# Patient Record
Sex: FEMALE | Race: ASIAN | NOT HISPANIC OR LATINO | Employment: OTHER | URBAN - METROPOLITAN AREA
[De-identification: names, ages, dates, MRNs, and addresses within clinical notes are randomized per-mention and may not be internally consistent; named-entity substitution may affect disease eponyms.]

---

## 2017-03-02 ENCOUNTER — APPOINTMENT (OUTPATIENT)
Dept: GENERAL RADIOLOGY | Facility: HOSPITAL | Age: 52
End: 2017-03-02

## 2017-03-02 ENCOUNTER — HOSPITAL ENCOUNTER (INPATIENT)
Facility: HOSPITAL | Age: 52
LOS: 5 days | Discharge: HOME | End: 2017-03-07
Attending: EMERGENCY MEDICINE | Admitting: HOSPITALIST

## 2017-03-02 DIAGNOSIS — K92.2 UPPER GI BLEEDING: Primary | ICD-10-CM

## 2017-03-02 DIAGNOSIS — D50.0 IRON DEFICIENCY ANEMIA DUE TO CHRONIC BLOOD LOSS: ICD-10-CM

## 2017-03-02 LAB
ABO GROUP BLD: NORMAL
ACETONE BLD QL: NEGATIVE
ALBUMIN SERPL-MCNC: 3.8 G/DL (ref 3.5–5.2)
ALBUMIN/GLOB SERPL: 1.3 G/DL
ALP SERPL-CCNC: 78 U/L (ref 39–117)
ALT SERPL W P-5'-P-CCNC: 18 U/L (ref 1–33)
AMYLASE SERPL-CCNC: 38 U/L (ref 28–100)
ANION GAP SERPL CALCULATED.3IONS-SCNC: 12.7 MMOL/L
AST SERPL-CCNC: 16 U/L (ref 1–32)
BASOPHILS # BLD AUTO: 0.02 10*3/MM3 (ref 0–0.2)
BASOPHILS NFR BLD AUTO: 0.1 % (ref 0–1.5)
BILIRUB SERPL-MCNC: 0.4 MG/DL (ref 0.1–1.2)
BLD GP AB SCN SERPL QL: NEGATIVE
BUN BLD-MCNC: 41 MG/DL (ref 6–20)
BUN/CREAT SERPL: 47.1 (ref 7–25)
CALCIUM SPEC-SCNC: 8.7 MG/DL (ref 8.6–10.5)
CHLORIDE SERPL-SCNC: 105 MMOL/L (ref 98–107)
CO2 SERPL-SCNC: 22.3 MMOL/L (ref 22–29)
CREAT BLD-MCNC: 0.87 MG/DL (ref 0.57–1)
D-LACTATE SERPL-SCNC: 1.9 MMOL/L (ref 0.5–2)
D-LACTATE SERPL-SCNC: 2.4 MMOL/L (ref 0.5–2)
DEPRECATED RDW RBC AUTO: 42.7 FL (ref 37–54)
EOSINOPHIL # BLD AUTO: 0.02 10*3/MM3 (ref 0–0.7)
EOSINOPHIL NFR BLD AUTO: 0.1 % (ref 0.3–6.2)
ERYTHROCYTE [DISTWIDTH] IN BLOOD BY AUTOMATED COUNT: 12.8 % (ref 11.7–13)
GFR SERPL CREATININE-BSD FRML MDRD: 69 ML/MIN/1.73
GLOBULIN UR ELPH-MCNC: 3 GM/DL
GLUCOSE BLD-MCNC: 158 MG/DL (ref 65–99)
HCT VFR BLD AUTO: 35.1 % (ref 35.6–45.5)
HGB BLD-MCNC: 11.3 G/DL (ref 11.9–15.5)
IMM GRANULOCYTES # BLD: 0.07 10*3/MM3 (ref 0–0.03)
IMM GRANULOCYTES NFR BLD: 0.5 % (ref 0–0.5)
INR PPP: 1.18 (ref 0.9–1.1)
LIPASE SERPL-CCNC: 35 U/L (ref 13–60)
LYMPHOCYTES # BLD AUTO: 2.79 10*3/MM3 (ref 0.9–4.8)
LYMPHOCYTES NFR BLD AUTO: 18.7 % (ref 19.6–45.3)
MCH RBC QN AUTO: 29.6 PG (ref 26.9–32)
MCHC RBC AUTO-ENTMCNC: 32.2 G/DL (ref 32.4–36.3)
MCV RBC AUTO: 91.9 FL (ref 80.5–98.2)
MONOCYTES # BLD AUTO: 0.45 10*3/MM3 (ref 0.2–1.2)
MONOCYTES NFR BLD AUTO: 3 % (ref 5–12)
NEUTROPHILS # BLD AUTO: 11.55 10*3/MM3 (ref 1.9–8.1)
NEUTROPHILS NFR BLD AUTO: 77.6 % (ref 42.7–76)
NRBC BLD MANUAL-RTO: 0 /100 WBC (ref 0–0)
PLATELET # BLD AUTO: 291 10*3/MM3 (ref 140–500)
PMV BLD AUTO: 11.6 FL (ref 6–12)
POTASSIUM BLD-SCNC: 4.7 MMOL/L (ref 3.5–5.2)
PROT SERPL-MCNC: 6.8 G/DL (ref 6–8.5)
PROTHROMBIN TIME: 14.6 SECONDS (ref 11.7–14.2)
RBC # BLD AUTO: 3.82 10*6/MM3 (ref 3.9–5.2)
RH BLD: POSITIVE
SODIUM BLD-SCNC: 140 MMOL/L (ref 136–145)
WBC NRBC COR # BLD: 14.9 10*3/MM3 (ref 4.5–10.7)

## 2017-03-02 PROCEDURE — 86900 BLOOD TYPING SEROLOGIC ABO: CPT | Performed by: EMERGENCY MEDICINE

## 2017-03-02 PROCEDURE — 80053 COMPREHEN METABOLIC PANEL: CPT | Performed by: EMERGENCY MEDICINE

## 2017-03-02 PROCEDURE — 83690 ASSAY OF LIPASE: CPT | Performed by: EMERGENCY MEDICINE

## 2017-03-02 PROCEDURE — 82150 ASSAY OF AMYLASE: CPT | Performed by: EMERGENCY MEDICINE

## 2017-03-02 PROCEDURE — 86850 RBC ANTIBODY SCREEN: CPT | Performed by: EMERGENCY MEDICINE

## 2017-03-02 PROCEDURE — 82009 KETONE BODYS QUAL: CPT | Performed by: EMERGENCY MEDICINE

## 2017-03-02 PROCEDURE — 93010 ELECTROCARDIOGRAM REPORT: CPT | Performed by: INTERNAL MEDICINE

## 2017-03-02 PROCEDURE — 93005 ELECTROCARDIOGRAM TRACING: CPT | Performed by: EMERGENCY MEDICINE

## 2017-03-02 PROCEDURE — 83605 ASSAY OF LACTIC ACID: CPT | Performed by: EMERGENCY MEDICINE

## 2017-03-02 PROCEDURE — 85610 PROTHROMBIN TIME: CPT | Performed by: EMERGENCY MEDICINE

## 2017-03-02 PROCEDURE — 71020 HC CHEST PA AND LATERAL: CPT

## 2017-03-02 PROCEDURE — 86901 BLOOD TYPING SEROLOGIC RH(D): CPT | Performed by: EMERGENCY MEDICINE

## 2017-03-02 PROCEDURE — 99284 EMERGENCY DEPT VISIT MOD MDM: CPT

## 2017-03-02 PROCEDURE — 85025 COMPLETE CBC W/AUTO DIFF WBC: CPT | Performed by: EMERGENCY MEDICINE

## 2017-03-02 RX ORDER — PANTOPRAZOLE SODIUM 40 MG/10ML
80 INJECTION, POWDER, LYOPHILIZED, FOR SOLUTION INTRAVENOUS ONCE
Status: COMPLETED | OUTPATIENT
Start: 2017-03-02 | End: 2017-03-02

## 2017-03-02 RX ORDER — SODIUM CHLORIDE 0.9 % (FLUSH) 0.9 %
10 SYRINGE (ML) INJECTION AS NEEDED
Status: DISCONTINUED | OUTPATIENT
Start: 2017-03-02 | End: 2017-03-07 | Stop reason: HOSPADM

## 2017-03-02 RX ORDER — ASPIRIN 81 MG/1
81 TABLET ORAL DAILY
Status: ON HOLD | COMMUNITY
End: 2017-03-03

## 2017-03-02 RX ADMIN — PANTOPRAZOLE SODIUM 40 MG: 40 INJECTION, POWDER, FOR SOLUTION INTRAVENOUS at 20:48

## 2017-03-02 RX ADMIN — SODIUM CHLORIDE 1000 ML: 9 INJECTION, SOLUTION INTRAVENOUS at 19:55

## 2017-03-02 RX ADMIN — SODIUM CHLORIDE 2859 ML: 9 INJECTION, SOLUTION INTRAVENOUS at 22:02

## 2017-03-02 RX ADMIN — PANTOPRAZOLE SODIUM 80 MG: 40 INJECTION, POWDER, FOR SOLUTION INTRAVENOUS at 20:26

## 2017-03-03 ENCOUNTER — ANESTHESIA (OUTPATIENT)
Dept: GASTROENTEROLOGY | Facility: HOSPITAL | Age: 52
End: 2017-03-03

## 2017-03-03 ENCOUNTER — ANESTHESIA EVENT (OUTPATIENT)
Dept: GASTROENTEROLOGY | Facility: HOSPITAL | Age: 52
End: 2017-03-03

## 2017-03-03 LAB
ANION GAP SERPL CALCULATED.3IONS-SCNC: 13.9 MMOL/L
BUN BLD-MCNC: 30 MG/DL (ref 6–20)
BUN/CREAT SERPL: 30.9 (ref 7–25)
CALCIUM SPEC-SCNC: 8.2 MG/DL (ref 8.6–10.5)
CHLORIDE SERPL-SCNC: 108 MMOL/L (ref 98–107)
CO2 SERPL-SCNC: 20.1 MMOL/L (ref 22–29)
CREAT BLD-MCNC: 0.97 MG/DL (ref 0.57–1)
DEPRECATED RDW RBC AUTO: 43.2 FL (ref 37–54)
ERYTHROCYTE [DISTWIDTH] IN BLOOD BY AUTOMATED COUNT: 12.9 % (ref 11.7–13)
GFR SERPL CREATININE-BSD FRML MDRD: 61 ML/MIN/1.73
GLUCOSE BLD-MCNC: 135 MG/DL (ref 65–99)
GLUCOSE BLDC GLUCOMTR-MCNC: 100 MG/DL (ref 70–130)
GLUCOSE BLDC GLUCOMTR-MCNC: 103 MG/DL (ref 70–130)
GLUCOSE BLDC GLUCOMTR-MCNC: 118 MG/DL (ref 70–130)
GLUCOSE BLDC GLUCOMTR-MCNC: 119 MG/DL (ref 70–130)
GLUCOSE BLDC GLUCOMTR-MCNC: 95 MG/DL (ref 70–130)
HBA1C MFR BLD: 5.7 % (ref 4.8–5.6)
HCT VFR BLD AUTO: 30.9 % (ref 35.6–45.5)
HGB BLD-MCNC: 9.9 G/DL (ref 11.9–15.5)
MAGNESIUM SERPL-MCNC: 1.7 MG/DL (ref 1.6–2.6)
MCH RBC QN AUTO: 29.4 PG (ref 26.9–32)
MCHC RBC AUTO-ENTMCNC: 32 G/DL (ref 32.4–36.3)
MCV RBC AUTO: 91.7 FL (ref 80.5–98.2)
NT-PROBNP SERPL-MCNC: 288.1 PG/ML (ref 5–900)
PLATELET # BLD AUTO: 283 10*3/MM3 (ref 140–500)
PMV BLD AUTO: 11.7 FL (ref 6–12)
POTASSIUM BLD-SCNC: 4 MMOL/L (ref 3.5–5.2)
POTASSIUM BLD-SCNC: 4.1 MMOL/L (ref 3.5–5.2)
RBC # BLD AUTO: 3.37 10*6/MM3 (ref 3.9–5.2)
SODIUM BLD-SCNC: 142 MMOL/L (ref 136–145)
TROPONIN T SERPL-MCNC: <0.01 NG/ML (ref 0–0.03)
TROPONIN T SERPL-MCNC: <0.01 NG/ML (ref 0–0.03)
WBC NRBC COR # BLD: 13.08 10*3/MM3 (ref 4.5–10.7)

## 2017-03-03 PROCEDURE — 80048 BASIC METABOLIC PNL TOTAL CA: CPT | Performed by: HOSPITALIST

## 2017-03-03 PROCEDURE — 0W3P8ZZ CONTROL BLEEDING IN GASTROINTESTINAL TRACT, VIA NATURAL OR ARTIFICIAL OPENING ENDOSCOPIC: ICD-10-PCS | Performed by: INTERNAL MEDICINE

## 2017-03-03 PROCEDURE — 84132 ASSAY OF SERUM POTASSIUM: CPT | Performed by: HOSPITALIST

## 2017-03-03 PROCEDURE — 83036 HEMOGLOBIN GLYCOSYLATED A1C: CPT | Performed by: HOSPITALIST

## 2017-03-03 PROCEDURE — 82962 GLUCOSE BLOOD TEST: CPT

## 2017-03-03 PROCEDURE — 25010000002 PROPOFOL 10 MG/ML EMULSION: Performed by: ANESTHESIOLOGY

## 2017-03-03 PROCEDURE — 43239 EGD BIOPSY SINGLE/MULTIPLE: CPT | Performed by: INTERNAL MEDICINE

## 2017-03-03 PROCEDURE — 87081 CULTURE SCREEN ONLY: CPT | Performed by: INTERNAL MEDICINE

## 2017-03-03 PROCEDURE — 93005 ELECTROCARDIOGRAM TRACING: CPT | Performed by: HOSPITALIST

## 2017-03-03 PROCEDURE — 88305 TISSUE EXAM BY PATHOLOGIST: CPT | Performed by: INTERNAL MEDICINE

## 2017-03-03 PROCEDURE — 93010 ELECTROCARDIOGRAM REPORT: CPT | Performed by: INTERNAL MEDICINE

## 2017-03-03 PROCEDURE — 0DB68ZX EXCISION OF STOMACH, VIA NATURAL OR ARTIFICIAL OPENING ENDOSCOPIC, DIAGNOSTIC: ICD-10-PCS | Performed by: INTERNAL MEDICINE

## 2017-03-03 PROCEDURE — 83880 ASSAY OF NATRIURETIC PEPTIDE: CPT | Performed by: NURSE PRACTITIONER

## 2017-03-03 PROCEDURE — 84484 ASSAY OF TROPONIN QUANT: CPT | Performed by: NURSE PRACTITIONER

## 2017-03-03 PROCEDURE — 99222 1ST HOSP IP/OBS MODERATE 55: CPT | Performed by: INTERNAL MEDICINE

## 2017-03-03 PROCEDURE — 84484 ASSAY OF TROPONIN QUANT: CPT | Performed by: HOSPITALIST

## 2017-03-03 PROCEDURE — 43236 UPPR GI SCOPE W/SUBMUC INJ: CPT | Performed by: INTERNAL MEDICINE

## 2017-03-03 PROCEDURE — 83735 ASSAY OF MAGNESIUM: CPT | Performed by: HOSPITALIST

## 2017-03-03 PROCEDURE — 3E0G8GC INTRODUCTION OF OTHER THERAPEUTIC SUBSTANCE INTO UPPER GI, VIA NATURAL OR ARTIFICIAL OPENING ENDOSCOPIC: ICD-10-PCS | Performed by: INTERNAL MEDICINE

## 2017-03-03 PROCEDURE — 88342 IMHCHEM/IMCYTCHM 1ST ANTB: CPT | Performed by: INTERNAL MEDICINE

## 2017-03-03 PROCEDURE — 85027 COMPLETE CBC AUTOMATED: CPT | Performed by: HOSPITALIST

## 2017-03-03 PROCEDURE — 43255 EGD CONTROL BLEEDING ANY: CPT | Performed by: INTERNAL MEDICINE

## 2017-03-03 PROCEDURE — 99232 SBSQ HOSP IP/OBS MODERATE 35: CPT | Performed by: INTERNAL MEDICINE

## 2017-03-03 PROCEDURE — 88312 SPECIAL STAINS GROUP 1: CPT | Performed by: INTERNAL MEDICINE

## 2017-03-03 RX ORDER — LIDOCAINE HYDROCHLORIDE AND EPINEPHRINE 10; 10 MG/ML; UG/ML
INJECTION, SOLUTION INFILTRATION; PERINEURAL AS NEEDED
Status: DISCONTINUED | OUTPATIENT
Start: 2017-03-03 | End: 2017-03-03 | Stop reason: HOSPADM

## 2017-03-03 RX ORDER — MONTELUKAST SODIUM 10 MG/1
10 TABLET ORAL DAILY
Status: DISCONTINUED | OUTPATIENT
Start: 2017-03-03 | End: 2017-03-07 | Stop reason: HOSPADM

## 2017-03-03 RX ORDER — SODIUM CHLORIDE, SODIUM LACTATE, POTASSIUM CHLORIDE, CALCIUM CHLORIDE 600; 310; 30; 20 MG/100ML; MG/100ML; MG/100ML; MG/100ML
9 INJECTION, SOLUTION INTRAVENOUS CONTINUOUS PRN
Status: DISCONTINUED | OUTPATIENT
Start: 2017-03-03 | End: 2017-03-07 | Stop reason: HOSPADM

## 2017-03-03 RX ORDER — LOSARTAN POTASSIUM 50 MG/1
50 TABLET ORAL DAILY
Status: DISCONTINUED | OUTPATIENT
Start: 2017-03-03 | End: 2017-03-07 | Stop reason: HOSPADM

## 2017-03-03 RX ORDER — PROPOFOL 10 MG/ML
VIAL (ML) INTRAVENOUS AS NEEDED
Status: DISCONTINUED | OUTPATIENT
Start: 2017-03-03 | End: 2017-03-03 | Stop reason: SURG

## 2017-03-03 RX ORDER — SODIUM CHLORIDE 0.9 % (FLUSH) 0.9 %
1-10 SYRINGE (ML) INJECTION AS NEEDED
Status: DISCONTINUED | OUTPATIENT
Start: 2017-03-03 | End: 2017-03-03

## 2017-03-03 RX ORDER — ATORVASTATIN CALCIUM 10 MG/1
10 TABLET, FILM COATED ORAL NIGHTLY
Status: DISCONTINUED | OUTPATIENT
Start: 2017-03-03 | End: 2017-03-07 | Stop reason: HOSPADM

## 2017-03-03 RX ORDER — ESCITALOPRAM OXALATE 10 MG/1
10 TABLET ORAL NIGHTLY
Status: DISCONTINUED | OUTPATIENT
Start: 2017-03-03 | End: 2017-03-07 | Stop reason: HOSPADM

## 2017-03-03 RX ORDER — SODIUM CHLORIDE 9 MG/ML
75 INJECTION, SOLUTION INTRAVENOUS CONTINUOUS
Status: DISCONTINUED | OUTPATIENT
Start: 2017-03-03 | End: 2017-03-04

## 2017-03-03 RX ORDER — PANTOPRAZOLE SODIUM 40 MG/10ML
80 INJECTION, POWDER, LYOPHILIZED, FOR SOLUTION INTRAVENOUS ONCE
Status: DISCONTINUED | OUTPATIENT
Start: 2017-03-03 | End: 2017-03-03

## 2017-03-03 RX ORDER — BUDESONIDE AND FORMOTEROL FUMARATE DIHYDRATE 160; 4.5 UG/1; UG/1
2 AEROSOL RESPIRATORY (INHALATION)
Status: DISCONTINUED | OUTPATIENT
Start: 2017-03-03 | End: 2017-03-07 | Stop reason: HOSPADM

## 2017-03-03 RX ORDER — PANTOPRAZOLE SODIUM 40 MG/10ML
40 INJECTION, POWDER, LYOPHILIZED, FOR SOLUTION INTRAVENOUS EVERY 12 HOURS SCHEDULED
Status: DISCONTINUED | OUTPATIENT
Start: 2017-03-03 | End: 2017-03-05

## 2017-03-03 RX ORDER — ISOSORBIDE MONONITRATE 30 MG/1
30 TABLET, EXTENDED RELEASE ORAL
Status: DISCONTINUED | OUTPATIENT
Start: 2017-03-03 | End: 2017-03-04

## 2017-03-03 RX ORDER — LABETALOL HYDROCHLORIDE 5 MG/ML
INJECTION, SOLUTION INTRAVENOUS AS NEEDED
Status: DISCONTINUED | OUTPATIENT
Start: 2017-03-03 | End: 2017-03-03 | Stop reason: SURG

## 2017-03-03 RX ORDER — PROPOFOL 10 MG/ML
VIAL (ML) INTRAVENOUS CONTINUOUS PRN
Status: DISCONTINUED | OUTPATIENT
Start: 2017-03-03 | End: 2017-03-03 | Stop reason: SURG

## 2017-03-03 RX ORDER — AMLODIPINE BESYLATE 5 MG/1
5 TABLET ORAL
Status: DISCONTINUED | OUTPATIENT
Start: 2017-03-03 | End: 2017-03-07 | Stop reason: HOSPADM

## 2017-03-03 RX ORDER — LIDOCAINE HYDROCHLORIDE 20 MG/ML
INJECTION, SOLUTION INFILTRATION; PERINEURAL AS NEEDED
Status: DISCONTINUED | OUTPATIENT
Start: 2017-03-03 | End: 2017-03-03 | Stop reason: SURG

## 2017-03-03 RX ADMIN — LOSARTAN POTASSIUM 50 MG: 50 TABLET, FILM COATED ORAL at 17:41

## 2017-03-03 RX ADMIN — BUDESONIDE AND FORMOTEROL FUMARATE DIHYDRATE 2 PUFF: 160; 4.5 AEROSOL RESPIRATORY (INHALATION) at 21:10

## 2017-03-03 RX ADMIN — PANTOPRAZOLE SODIUM 40 MG: 40 INJECTION, POWDER, FOR SOLUTION INTRAVENOUS at 09:24

## 2017-03-03 RX ADMIN — AMLODIPINE BESYLATE 5 MG: 5 TABLET ORAL at 17:42

## 2017-03-03 RX ADMIN — EPHEDRINE SULFATE 20 MG: 50 INJECTION INTRAMUSCULAR; INTRAVENOUS; SUBCUTANEOUS at 15:54

## 2017-03-03 RX ADMIN — ATORVASTATIN CALCIUM 10 MG: 10 TABLET, FILM COATED ORAL at 21:48

## 2017-03-03 RX ADMIN — SODIUM CHLORIDE 75 ML/HR: 9 INJECTION, SOLUTION INTRAVENOUS at 05:10

## 2017-03-03 RX ADMIN — MONTELUKAST 10 MG: 10 TABLET, FILM COATED ORAL at 17:42

## 2017-03-03 RX ADMIN — ESCITALOPRAM 10 MG: 10 TABLET, FILM COATED ORAL at 21:49

## 2017-03-03 RX ADMIN — ISOSORBIDE MONONITRATE 30 MG: 30 TABLET, EXTENDED RELEASE ORAL at 17:41

## 2017-03-03 RX ADMIN — EPHEDRINE SULFATE 10 MG: 50 INJECTION INTRAMUSCULAR; INTRAVENOUS; SUBCUTANEOUS at 15:52

## 2017-03-03 RX ADMIN — LABETALOL HYDROCHLORIDE 5 MG: 5 INJECTION, SOLUTION INTRAVENOUS at 15:38

## 2017-03-03 RX ADMIN — LIDOCAINE HYDROCHLORIDE 60 MG: 20 INJECTION, SOLUTION INFILTRATION; PERINEURAL at 15:25

## 2017-03-03 RX ADMIN — EPHEDRINE SULFATE 20 MG: 50 INJECTION INTRAMUSCULAR; INTRAVENOUS; SUBCUTANEOUS at 16:00

## 2017-03-03 RX ADMIN — PROPOFOL 300 MCG/KG/MIN: 10 INJECTION, EMULSION INTRAVENOUS at 15:26

## 2017-03-03 RX ADMIN — SODIUM CHLORIDE, POTASSIUM CHLORIDE, SODIUM LACTATE AND CALCIUM CHLORIDE: 600; 310; 30; 20 INJECTION, SOLUTION INTRAVENOUS at 15:22

## 2017-03-03 RX ADMIN — SODIUM CHLORIDE, POTASSIUM CHLORIDE, SODIUM LACTATE AND CALCIUM CHLORIDE 9 ML/HR: 600; 310; 30; 20 INJECTION, SOLUTION INTRAVENOUS at 14:04

## 2017-03-03 RX ADMIN — PANTOPRAZOLE SODIUM 40 MG: 40 INJECTION, POWDER, FOR SOLUTION INTRAVENOUS at 21:49

## 2017-03-03 RX ADMIN — PROPOFOL 150 MG: 10 INJECTION, EMULSION INTRAVENOUS at 15:25

## 2017-03-03 NOTE — CONSULTS
[facility]  Cardiology Consult Note  .  Patient Identification:  Name: Flaca Hassan  Age: 51 y.o.  Sex: female  :  1965  MRN: [id number]            Requesting Physician: Dr. 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  She underwent EGD today due to upper GI bleeding, iron deficiency anemia due to chronic blood loss.  Hemoglobin down to 9.9 this morning, was 11.3 on 3/2.  She states complaining of black stools.  Cardiology consultation asked to evaluate chest pain per Dr. Lopez.  Patient just returned back to room from the procedure.  She states was told has an ulcer.  She gets chest pains.  She states gets chest pains when has short of breath on exertion which she attributes to her asthma.  She states has asthma for several months, and lately worsening shortness of breath.  She describes the pain as substernal, chest tightness, comes and goes, not daily, associated with nausea, diaphoresis and can radiate to upper back shoulders.  Worsen upon taking a deep breath and palpation.  She states has chest tenderness due to breast cysts. Has belching burping.  She is physically active and can walk back and forth long hallway in her apartment and gets sob with chest tightness.  Chest pain resolves with rest.  Does not climb steps.  Gets palpitations sometimes with some lightheadedness, not daily, not bad, brief, resolve spontaneously.  No syncope.  No blackout spell.  Has cold sometimes with yellow phlegm.  No hemoptysis.  No recent fever.  Gets chills.  No swelling.  States has gained some weight but she  tries to avoid excess calories but still gains weight.  No chest pain now.  Last chest pain was a couple of days ago.  She wears oxygen at night for sleep apnea.    Cardiac risk factors: Positive for hypertension.  Borderline diabetes.  Positive for elevated cholesterol.  Nonsmoker never.  Family history for heart disease: Sr. age early 60s heart attack and underwent bypass.  Brother 56 or 57 years of age had heart attack.  Father age 65 heart attack.     Noted 3/21/2016 office visit her EKG sinus rhythm with T wave abnormality lateral leads.  At that time as well she was clinically stable.  Thought in view of a recent GI bleed at that time as well, the patient preferred to wait before pursuing with any cardiac cath.  She was recommended to go to cardiac rehab and reevaluate in 3 months.      Past Medical History:  Past Medical History   Diagnosis Date   • Asthma    • Breast cyst    • Diabetes mellitus    • Diverticulosis    • Hyperlipidemia    • Hypertension    • Irritable bowel syndrome    • Melena    • Obesity      Past Surgical History:  Past Surgical History   Procedure Laterality Date   • Upper gastrointestinal endoscopy  02/10/2016     sami-Ruth Ann Willson M.D.   • Colonoscopy  02/11/2016     mary, VICTOR MANUEL,    • Breast cyst excision        Current Meds:     Current Facility-Administered Medications:   •  amLODIPine (NORVASC) tablet 5 mg, 5 mg, Oral, Q24H, Myron Lopez MD  •  atorvastatin (LIPITOR) tablet 10 mg, 10 mg, Oral, Nightly, Myron Lopez MD  •  budesonide-formoterol (SYMBICORT) 160-4.5 MCG/ACT inhaler 2 puff, 2 puff, Inhalation, BID - RT, Myron Lopez MD  •  escitalopram (LEXAPRO) tablet 10 mg, 10 mg, Oral, Nightly, Myron Lopez MD  •  Influenza Vac Subunit Quad (FLUCELVAX) injection 0.5 mL, 0.5 mL, Intramuscular, During Hospitalization, Myron Lopez MD  •  isosorbide mononitrate (IMDUR) 24 hr tablet 30 mg, 30 mg, Oral, Q24H, Myron Lopez MD  •  lactated ringers infusion, 9 mL/hr, Intravenous, Continuous  "PRN, Ousmane Vergara MD, Last Rate: 9 mL/hr at 03/03/17 1404  •  losartan (COZAAR) tablet 50 mg, 50 mg, Oral, Daily, Myron Lopez MD  •  montelukast (SINGULAIR) tablet 10 mg, 10 mg, Oral, Daily, Myron Lopez MD  •  pantoprazole (PROTONIX) injection 40 mg, 40 mg, Intravenous, Q12H, Myron Lopez MD, 40 mg at 03/03/17 0924  •  sodium chloride 0.9 % flush 1-10 mL, 1-10 mL, Intravenous, PRN, Ousmane Vergara MD  •  Insert peripheral IV, , , Once **AND** sodium chloride 0.9 % flush 10 mL, 10 mL, Intravenous, PRN, Nadir Bush MD  •  sodium chloride 0.9 % infusion, 75 mL/hr, Intravenous, Continuous, Myron Lopez MD, Last Rate: 75 mL/hr at 03/03/17 0510, 75 mL/hr at 03/03/17 0510    Allergies:  No Known Allergies  Social History:   Social History   Substance Use Topics   • Smoking status: Never Smoker   • Smokeless tobacco: Never Used   • Alcohol use No      Family History:  Family History   Problem Relation Age of Onset   • Breast cancer Other    • Lung cancer Brother    • Throat cancer Paternal Uncle    • Tongue cancer Paternal Grandfather         All systems were reviewed and negative except for:  Constitution:  positive for chills, fatigue and sweats  Respiratory: positive for  cough, productive, shortness of air and Yellow phlegm  Cardiovascular: positive for  chest pressure / pain, on exertion, palpitations and No chest pain now.  Gets dizziness.  No near syncope or syncope.  No blackout spell.  Gastrointestinal: postitive for  nausea, vomiting and Black stools  Breast:  positive for cysts   Review of systems:  Pertenant positives are as mentioned in the HPI and all the other    review of systems are negative    Objective:  Visit Vitals   • /51 (BP Location: Right arm, Patient Position: Sitting)   • Pulse 90   • Temp 97.6 °F (36.4 °C) (Oral)   • Resp 14   • Ht 62\" (157.5 cm)   • Wt 214 lb (97.1 kg)   • LMP Comment: post menopausal   • SpO2 94%   • BMI 39.14 kg/m2          I/O this shift:  In: 200 [I.V.:200]  Out: - "     Physical Examination: By     Physical Exam    General: No acute distress, well developed, well nourished    Skin: Warm and dry, no diaphoresis noted;    no pallor or cyanosis   HEENT: Normal Pupills; no conjunctiva erythema; external ear and nose normal; oral mucosa moist, no xanthelasma, lips not cyanotic   Neck: Supple; no carotid bruits; no  JVD; thyroid not enlarged. Trachea mid line    Heart: [place] not palpable, S1S2  regular rate and rhythm; soft systolic murmurs, no rubs or gallops are auscultated.   Chest: Respirations regular, unlabored at rest, bilateral breath sounds have good air entry throughout all lung fields; no crackles,  or wheezes auscultated.     Abdomen: Soft, non-tender, non-distended, positive bowel sounds, no hepatomegaly, No Bruit   Extremities: Bilateral lower extremities have no pre-tibial pitting edema; Radials pulses are palpable   Musculoskeletal:  Gait and station; normal   No clubbing of the fingers   Neurological/  Psychological:  Alert and oriented; no new  motor deficits; speech and behavior appropriate;     Rest of the exam is stable     Lab Review:  Personally reviewed the labs, radiology imaging and other cardiac procedures.      Results from last 7 days  Lab Units 03/03/17  1040 03/03/17 0535 03/02/17 2015   SODIUM mmol/L  --  142 140   POTASSIUM mmol/L 4.0 4.1 4.7   CHLORIDE mmol/L  --  108* 105   TOTAL CO2 mmol/L  --  20.1* 22.3   BUN mg/dL  --  30* 41*   CREATININE mg/dL  --  0.97 0.87   CALCIUM mg/dL  --  8.2* 8.7   BILIRUBIN mg/dL  --   --  0.4   ALK PHOS U/L  --   --  78   ALT (SGPT) U/L  --   --  18   AST (SGOT) U/L  --   --  16   GLUCOSE mg/dL  --  135* 158*       Results from last 7 days  Lab Units 03/03/17  1040   TROPONIN T ng/mL <0.010     @LABRCNTbnp@    Results from last 7 days  Lab Units 03/03/17  0535 03/02/17  2113   WBC 10*3/mm3 13.08* 14.90*   HEMOGLOBIN g/dL 9.9* 11.3*   HEMATOCRIT % 30.9* 35.1*   PLATELETS 10*3/mm3 283 291       Results  from last 7 days  Lab Units 03/02/17 2015   INR  1.18*       Results from last 7 days  Lab Units 03/03/17  1040   MAGNESIUM mg/dL 1.7     @LABRCNTIP(chol,trig,hdl,ldl)    I personally viewed and interpreted the patient's EKG/Telemetry data    ECG: Sinus, nonspecific ST T wave changes and abnormal T-wave        IMPRESSION: Overall probably negative Cardiolite stress study.   Echocardiogram: Ordered.    XRAY:  )  Imaging Results (last 24 hours)     Procedure Component Value Units Date/Time    XR Chest 2 View [[id number]] Collected:  03/02/17 2008     Updated:  03/02/17 2017    Narrative:       XR CHEST 2 VW-     HISTORY: Female who is 51 years-old,  chest pain     TECHNIQUE: Frontal and lateral views of the chest     COMPARISON: 04/02/2012, 03/20/2012.           FINDINGS: Heart, mediastinum and pulmonary vasculature are unremarkable.  Region of fat density at the right base appears similar to prior exams,  in correlation with the CT from 03/23/2012. No acute infiltrate is  noted. No pleural effusion or pneumothorax. Otherwise stable.       Impression:       No acute infiltrate. No significant change.  Follow-up/further evaluation can be obtained is indications persist.     This report was finalized on 3/2/2017 8:14 PM by Dr. Pan Alexandre MD.             Patient Active Problem List   Diagnosis   • Hypertension   • Obesity   • Hyperlipidemia   • Iron deficiency anemia due to chronic blood loss   • Upper GI bleeding        Upper GI bleed.    Assessment/ Plan :  -Chest pain.  No chest pain now.  -Abnormal EKG  -Palpitations  -Hypertension, controlled.  Takes Norvasc, Cozaar  -Hyperlipidemia.  Takes Lipitor  -Obesity    She denies chest pains with typical features.  No chest pain now.  His chest pain couple of days ago.  EKG shows sinus and is abnormal for nonspecific ST abnormality, and T abn  lateral lead.  No heart failure upon physical exam.  No MI by troponin and EKG criteria.  Chest x-ray no acute process.   Cardiolite stress test 2/16 shows probably negative for ischemia.  Monitor troponin and EKG.  Monitor pro BNP, JVP, weight. Difficult situation because has GI bleed an d to undergo an invasive cardiac cath.  probably not a candidate for antiplatelet therapy if needs coronary stenting due to her GI bleeding issue (GI report not available). She if off ASA.  EKG in a.m.  TSH and Lipid profile in a.m.    Obtain echocardiogram with Doppler to evaluate chest pain, shortness of breath, hypertension, sleep apnea, check valvular and wall motion.    Discussed with and ongoing management by Dr. PAUL Rose-Maggie BANG    Thank you for requesting the consult and please call me if has any further questions on this consult    Yaniv Rose MD,FACC  3/3/2017, 5:13 PM    Patient is seen and examined by me. All labs, radiology reports and cardiac procedures are reviewed an or obtained by me. Asessment and plan is by me.  Yaniv Rose MD

## 2017-03-03 NOTE — PLAN OF CARE
Problem: Patient Care Overview (Adult)  Goal: Plan of Care Review  Outcome: Ongoing (interventions implemented as appropriate)    03/03/17 0406   Coping/Psychosocial Response Interventions   Plan Of Care Reviewed With patient   Patient Care Overview   Progress no change   Outcome Evaluation   Outcome Summary/Follow up Plan New admit. One BM with dark stool tonight. Awaiting orders from Dr. Lopez.       Goal: Discharge Needs Assessment  Outcome: Ongoing (interventions implemented as appropriate)    Problem: Gastrointestinal Bleeding (Adult)  Goal: Signs and Symptoms of Listed Potential Problems Will be Absent or Manageable (Gastrointestinal Bleeding)  Outcome: Ongoing (interventions implemented as appropriate)    Problem: Pain, Acute (Adult)  Goal: Identify Related Risk Factors and Signs and Symptoms  Outcome: Ongoing (interventions implemented as appropriate)  Goal: Acceptable Pain Control/Comfort Level  Outcome: Ongoing (interventions implemented as appropriate)    Problem: Fall Risk (Adult)  Goal: Identify Related Risk Factors and Signs and Symptoms  Outcome: Ongoing (interventions implemented as appropriate)  Goal: Absence of Falls  Outcome: Ongoing (interventions implemented as appropriate)

## 2017-03-03 NOTE — ED PROVIDER NOTES
EMERGENCY DEPARTMENT ENCOUNTER    CHIEF COMPLAINT  Chief Complaint: vomiting blood  History given by: patient  History limited by: nothing  Room Number: 20/20  PMD: Isidro Odom MD      HPI:  Pt is a 51 y.o. female who presents complaining of multiple episodes of sudden onset vomiting blood and black/bloody diarrhea today. Pt denies hx of abdominal and liver problems or stomach ulcers. Pt states she frequently takes ibuprofen and states hx of similar sx.     Duration:  1 day  Onset: sudden  Timing: episodic  Quality: bloody  Intensity/Severity: moderate  Progression: unchanged  Associated Symptoms: black/bloody diarrhea  Aggravating Factors: none  Alleviating Factors: none  Previous Episodes: none  Treatment before arrival: none    PAST MEDICAL HISTORY  Active Ambulatory Problems     Diagnosis Date Noted   • Hypertension 03/21/2016   • Obesity 03/21/2016   • Hyperlipidemia 03/21/2016   • Iron deficiency anemia due to chronic blood loss 07/11/2016     Resolved Ambulatory Problems     Diagnosis Date Noted   • No Resolved Ambulatory Problems     Past Medical History   Diagnosis Date   • Asthma    • Breast cyst    • Diabetes mellitus    • Diverticulosis    • Irritable bowel syndrome    • Melena        PAST SURGICAL HISTORY  Past Surgical History   Procedure Laterality Date   • Upper gastrointestinal endoscopy  02/10/2016     Dieudonne Willson M.D.   • Colonoscopy  02/11/2016     mary, VICTOR MANUEL,    • Breast cyst excision         FAMILY HISTORY  Family History   Problem Relation Age of Onset   • Breast cancer Other    • Lung cancer Other    • Throat cancer Other    • Tongue cancer Other        SOCIAL HISTORY  Social History     Social History   • Marital status:      Spouse name: N/A   • Number of children: N/A   • Years of education: High School     Occupational History   •  Retired     Social History Main Topics   • Smoking status: Never Smoker   • Smokeless tobacco: Not on file   • Alcohol use No   •  Drug use: No   • Sexual activity: Not on file     Other Topics Concern   • Not on file     Social History Narrative       ALLERGIES  Review of patient's allergies indicates no known allergies.    REVIEW OF SYSTEMS  Review of Systems   Constitutional: Negative for chills and fever.   HENT: Negative for sore throat and trouble swallowing.    Eyes: Negative for visual disturbance.   Respiratory: Negative for cough and shortness of breath.    Cardiovascular: Negative for chest pain, palpitations and leg swelling.   Gastrointestinal: Positive for blood in stool, diarrhea and vomiting (blood). Negative for abdominal pain.   Endocrine: Negative.    Genitourinary: Negative for decreased urine volume, dysuria and frequency.   Musculoskeletal: Negative for neck pain.   Skin: Negative for rash.   Allergic/Immunologic: Negative.    Neurological: Negative for syncope, weakness, numbness and headaches.   Hematological: Negative.    Psychiatric/Behavioral: Negative.    All other systems reviewed and are negative.      PHYSICAL EXAM  ED Triage Vitals   Temp Heart Rate Resp BP SpO2   03/02/17 1921 03/02/17 1921 03/02/17 1921 03/02/17 1921 03/02/17 1921   97 °F (36.1 °C) 90 20 120/61 97 %      Temp src Heart Rate Source Patient Position BP Location FiO2 (%)   -- -- -- -- --              Physical Exam   Constitutional: She is oriented to person, place, and time and well-developed, well-nourished, and in no distress. No distress.   HENT:   Head: Normocephalic and atraumatic.   Eyes: EOM are normal. Pupils are equal, round, and reactive to light.   Neck: Normal range of motion. Neck supple.   Cardiovascular: Normal rate, regular rhythm and normal heart sounds.    Pulmonary/Chest: Effort normal and breath sounds normal. No respiratory distress.   Abdominal: Soft. There is no tenderness. There is no rebound and no guarding.   Musculoskeletal: Normal range of motion. She exhibits no edema.   Neurological: She is alert and oriented to  person, place, and time. She has normal sensation and normal strength.   Skin: Skin is warm and dry. No rash noted.   Psychiatric: Mood and affect normal.   Nursing note and vitals reviewed.      LAB RESULTS  Lab Results (last 24 hours)     Procedure Component Value Units Date/Time    Ketone Bodies, Serum (will not run at [facility]) [[id number]] Collected:  03/02/17 1945    Specimen:  Blood Updated:  03/02/17 2032    Narrative:       The following orders were created for panel order Ketone Bodies, Serum (will not run at [facility]).  Procedure                               Abnormality         Status                     ---------                               -----------         ------                     Acetone[[id number]]                       Normal              Final result                 Please view results for these tests on the individual orders.    Acetone [[id number]]  (Normal) Collected:  03/02/17 1945    Specimen:  Blood from Arm, Right Updated:  03/02/17 2032     Acetone Negative     Lactic Acid, Plasma [[id number]]  (Abnormal) Collected:  03/02/17 2014    Specimen:  Blood from Arm, Left Updated:  03/02/17 2057     Lactate 2.4 (C) mmol/L     Comprehensive Metabolic Panel [[id number]]  (Abnormal) Collected:  03/02/17 2015    Specimen:  Blood from Arm, Right Updated:  03/02/17 2102     Glucose 158 (H) mg/dL      BUN 41 (H) mg/dL      Creatinine 0.87 mg/dL      Sodium 140 mmol/L      Potassium 4.7 mmol/L      Chloride 105 mmol/L      CO2 22.3 mmol/L      Calcium 8.7 mg/dL      Total Protein 6.8 g/dL      Albumin 3.80 g/dL      ALT (SGPT) 18 U/L      AST (SGOT) 16 U/L      Alkaline Phosphatase 78 U/L      Total Bilirubin 0.4 mg/dL      eGFR Non African Amer 69 mL/min/1.73      Globulin 3.0 gm/dL      A/G Ratio 1.3 g/dL      BUN/Creatinine Ratio 47.1 (H)      Anion Gap 12.7 mmol/L     CBC & Differential [[id number]] Collected:  03/02/17 2015    Specimen:  Blood Updated:  03/02/17 2145    Narrative:       The following  orders were created for panel order CBC & Differential.  Procedure                               Abnormality         Status                     ---------                               -----------         ------                     Scan Slide[[id number]]                                                                   CBC Auto Differential[[id number]]         Abnormal            Final result                 Please view results for these tests on the individual orders.    Protime-INR [[id number]]  (Abnormal) Collected:  03/02/17 2015    Specimen:  Blood from Arm, Right Updated:  03/02/17 2046     Protime 14.6 (H) Seconds      INR 1.18 (H)     Lipase [[id number]]  (Normal) Collected:  03/02/17 2015    Specimen:  Blood from Arm, Right Updated:  03/02/17 2102     Lipase 35 U/L     Amylase [[id number]]  (Normal) Collected:  03/02/17 2015    Specimen:  Blood from Arm, Right Updated:  03/02/17 2102     Amylase 38 U/L     CBC Auto Differential [[id number]]  (Abnormal) Collected:  03/02/17 2113    Specimen:  Blood from Arm, Right Updated:  03/02/17 2145     WBC 14.90 (H) 10*3/mm3      RBC 3.82 (L) 10*6/mm3      Hemoglobin 11.3 (L) g/dL      Hematocrit 35.1 (L) %      MCV 91.9 fL      MCH 29.6 pg      MCHC 32.2 (L) g/dL      RDW 12.8 %      RDW-SD 42.7 fl      MPV 11.6 fL      Platelets 291 10*3/mm3      Neutrophil % 77.6 (H) %      Lymphocyte % 18.7 (L) %      Monocyte % 3.0 (L) %      Eosinophil % 0.1 (L) %      Basophil % 0.1 %      Immature Grans % 0.5 %      Neutrophils, Absolute 11.55 (H) 10*3/mm3      Lymphocytes, Absolute 2.79 10*3/mm3      Monocytes, Absolute 0.45 10*3/mm3      Eosinophils, Absolute 0.02 10*3/mm3      Basophils, Absolute 0.02 10*3/mm3      Immature Grans, Absolute 0.07 (H) 10*3/mm3      nRBC 0.0 /100 WBC     Lactate Acid, Reflex [[id number]]  (Normal) Collected:  03/02/17 2113    Specimen:  Blood Updated:  03/02/17 2151     Lactate 1.9 mmol/L       I ordered the above labs and reviewed the results    RADIOLOGY  XR  Chest 2 View   Final Result   No acute infiltrate. No significant change.   Follow-up/further evaluation can be obtained is indications persist.       This report was finalized on 3/2/2017 8:14 PM by Dr. Pan Alexandre MD.          I ordered the above noted radiological studies. Interpreted by radiologist. Reviewed by me in PACS.       PROCEDURES  Procedures  EKG         EKG time: 19:49  Rhythm/Rate: sinus tachycardia, rate 102  P waves and MD: normal  QRS, axis: normal   ST and T waves: non specific ST wave changes     Interpreted Contemporaneously by me, independently viewed  Unchanged compared to prior 2/10/16      PROGRESS AND CONSULTS  ED Course     7: 42 PM  Ordered lactic acid, type and screen, lipase, Pt-INR, CBC, CMP, CXR, and EKG for further evaluation.     10:52 PM  Discussed Pt's case with Dr. Lopez who agrees to admit.       MEDICAL DECISION MAKING  Results were reviewed/discussed with the patient and they were also made aware of online access. Pt also made aware that some labs, such as cultures, will not be resulted during ER visit and follow up with PMD is necessary.     MDM  Number of Diagnoses or Management Options     Amount and/or Complexity of Data Reviewed  Clinical lab tests: ordered and reviewed (Lactate: 2.4  Hgb: 11.3  WBC: 14.90)  Tests in the radiology section of CPT®: ordered and reviewed (CXR: Negative acute)  Tests in the medicine section of CPT®: ordered and reviewed  Discuss the patient with other providers: yes (Dr. Lopez- admitting)  Independent visualization of images, tracings, or specimens: yes    Patient Progress  Patient progress: stable         DIAGNOSIS  Final diagnoses:   Upper GI bleeding       DISPOSITION  ADMISSION: Dr. Lopez    Discussed treatment plan and reason for admission with pt/family and admitting physician.  Pt/family voiced understanding of the plan for admission for further testing/treatment as needed.     Latest Documented Vital Signs:  As of 11:10  PM  BP- 146/88 HR- 104 Temp- 97 °F (36.1 °C) O2 sat- 93%    --  Documentation assistance provided by maryjo Jules for Dr. Eleazar MD.  Information recorded by the scribe was done at my direction and has been verified and validated by me.        Georgiana Jules  03/02/17 4171       Nadri Bush MD  03/02/17 1818

## 2017-03-03 NOTE — ED NOTES
MULTIPLE EPISODES OF VOMITING BLOOD & BLOODY DIARRHEA ALL DAY TODAY.      Kizzy Modi RN  03/02/17 1921

## 2017-03-03 NOTE — PLAN OF CARE
"Problem: Patient Care Overview (Adult)  Goal: Plan of Care Review  Outcome: Ongoing (interventions implemented as appropriate)    03/03/17 1802   Coping/Psychosocial Response Interventions   Plan Of Care Reviewed With patient   Patient Care Overview   Progress improving   Outcome Evaluation   Outcome Summary/Follow up Plan PT had EGD today, ulcer found and \"heat\" added to it. Pt currently resting comfortably in bed. Vitals stable, diet advanced to clear liquids. Will continue to monitor.        Goal: Adult Individualization and Mutuality  Outcome: Ongoing (interventions implemented as appropriate)  Goal: Discharge Needs Assessment  Outcome: Ongoing (interventions implemented as appropriate)    Problem: Gastrointestinal Bleeding (Adult)  Goal: Signs and Symptoms of Listed Potential Problems Will be Absent or Manageable (Gastrointestinal Bleeding)  Outcome: Ongoing (interventions implemented as appropriate)    Problem: Pain, Acute (Adult)  Goal: Identify Related Risk Factors and Signs and Symptoms  Outcome: Ongoing (interventions implemented as appropriate)  Goal: Acceptable Pain Control/Comfort Level  Outcome: Ongoing (interventions implemented as appropriate)    Problem: Fall Risk (Adult)  Goal: Identify Related Risk Factors and Signs and Symptoms  Outcome: Outcome(s) achieved Date Met:  03/03/17  Goal: Absence of Falls  Outcome: Ongoing (interventions implemented as appropriate)      "

## 2017-03-03 NOTE — ED NOTES
Pt ambulated to bathroom on the way back pt displayed SOA, dizzy and diaphoretic. Pt had a moderate sized bowel movement that was black and tarry with blood in the toliet. Pt placed on 2L NC         Jimena Lucero RN  03/02/17 2273

## 2017-03-03 NOTE — ANESTHESIA PREPROCEDURE EVALUATION
Anesthesia Evaluation     Patient summary reviewed and Nursing notes reviewed      Airway   Mallampati: III  TM distance: <3 FB  Neck ROM: full  difficult intubation highly probable  Dental - normal exam     Pulmonary - normal exam   (+) asthma,   Cardiovascular - normal exam    (+) hypertension,       Neuro/Psych- negative ROS  GI/Hepatic/Renal/Endo    (+) obesity, morbid obesity, diabetes mellitus type 2,     Musculoskeletal (-) negative ROS    Abdominal   (+) obese,    Substance History - negative use     OB/GYN negative ob/gyn ROS         Other                                  Anesthesia Plan    ASA 3     MAC     intravenous induction   Anesthetic plan and risks discussed with patient.

## 2017-03-03 NOTE — CONSULTS
[facility]  Initial Inpatient Consult Note    Referring Provider: Myron Lopez    Reason for Consultation: Hematemesis/melena    Subjective     History of present illness:    This is a 51-year-old female I been asked to see today in consultation by Dr. Lopez for evaluation of hematemesis and melena.  The patient presented to the emergency room yesterday evening with complaints of 2-3 days of abdominal pain located primarily in the mid to upper epigastrium.  She began noticing black tarry stool and had one or 2 episodes of emesis with visible red blood per her report.  She became increasingly fatigued and weak to the point where she required presentation to the emergency room.  Her admission hemoglobin was 11.3 which has decreased to 9.9 overnight.  She's had no further hematemesis since admission.  She continues to complain of abdominal pain and nausea.  Labs also show an isolated elevation of her BUN with a normal creatinine.    Patient has a prior history of similar episode 1 year ago.  She was admitted to this facility and underwent upper and lower endoscopic evaluation with Dr. Ruth Ann Willson which were unrevealing.  She was seen by Dr. Willson in the office for follow-up and was scheduled for capsule endoscopy but this was never performed.    Past Medical History:  Past Medical History   Diagnosis Date   • Asthma    • Breast cyst    • Diabetes mellitus    • Diverticulosis    • Hyperlipidemia    • Hypertension    • Irritable bowel syndrome    • Melena    • Obesity        Past Surgical History:  Past Surgical History   Procedure Laterality Date   • Upper gastrointestinal endoscopy  02/10/2016     sami-Ruth Ann Willson M.D.   • Colonoscopy  02/11/2016     tics, [facility],    • Breast cyst excision          Social History:   Social History   Substance Use Topics   • Smoking status: Never Smoker   • Smokeless tobacco: Never Used   • Alcohol use No        Family History:  Family History   Problem  Relation Age of Onset   • Breast cancer Other    • Lung cancer Brother    • Throat cancer Paternal Uncle    • Tongue cancer Paternal Grandfather        Home Meds:  Prescriptions Prior to Admission   Medication Sig Dispense Refill Last Dose   • amLODIPine (NORVASC) 5 MG tablet take 1 tablet by mouth once daily  0 3/2/2017 at Unknown time   • aspirin 81 MG EC tablet Take 81 mg by mouth Daily.   3/2/2017 at Unknown time   • budesonide-formoterol (SYMBICORT) 160-4.5 MCG/ACT inhaler Inhale 2 puffs 2 (two) times a day.   3/2/2017 at Unknown time   • Furosemide (LASIX PO) Take 20 mg by mouth daily.   3/2/2017 at Unknown time   • losartan (COZAAR) 50 MG tablet Take 50 mg by mouth Daily.  0 3/2/2017 at Unknown time   • Montelukast Sodium (SINGULAIR PO) Take 10 mg by mouth daily.   3/2/2017 at Unknown time   • potassium chloride (K-DUR,KLOR-CON) 20 MEQ CR tablet TAKE ONE TABLET BY MOUTH EVERY DAY                               ...  (REFER TO PRESCRIPTION NOTES).  0 3/2/2017 at Unknown time   • simvastatin (ZOCOR) 10 MG tablet Take 10 mg by mouth every night.   3/1/2017   • escitalopram (LEXAPRO) 10 MG tablet TAKE ONE TABLET BY MOUTH IN THE MORNING                          ...  (REFER TO PRESCRIPTION NOTES).  0 Taking       Current Meds:     pantoprazole 40 mg Intravenous Q12H       Allergies:  No Known Allergies    Review of Systems  All systems were reviewed and negative except for:  Gastrointestinal: postitive for  bloating / distention, hematemesis, melena, nausea and pain     Objective     Vital Signs  Temp:  [97 °F (36.1 °C)-98.2 °F (36.8 °C)] 98.2 °F (36.8 °C)  Heart Rate:  [] 89  Resp:  [18-20] 18  BP: (120-173)/(61-90) 132/71    Physical Exam:  General Appearance:    Alert, cooperative, in no acute distress   Head:    Normocephalic, without obvious abnormality, atraumatic   Eyes:            Lids and lashes normal, conjunctivae and sclerae normal, no   icterus   Throat:   No oral lesions, no thrush, oral mucosa  moist   Neck:   No adenopathy, supple, trachea midline, no thyromegaly, no   carotid bruit, no JVD   Lungs:     Clear to auscultation,respirations regular, even and                   unlabored    Heart:    Regular rhythm and normal rate, normal S1 and S2, no            murmur, no gallop, no rub, no click   Chest Wall:    No abnormalities observed   Abdomen:      Morbidly obese, mild TTP w/o rebound/guarding from umbilicus to upper epigastrium, +BS   Rectal:     Deferred   Extremities:   no edema, no cyanosis, no redness   Skin:   No bleeding, bruising or rash   Lymph nodes:   No palpable adenopathy   Psychiatric:  Judgement and insight: normal   Orientation to person place and time: normal   Mood and affect: normal     Results Review:   I reviewed the patient's new clinical results.  I reviewed the patient's new imaging results and agree with the interpretation.      Results from last 7 days  Lab Units 03/03/17  0535 03/02/17  2113   WBC 10*3/mm3 13.08* 14.90*   HEMOGLOBIN g/dL 9.9* 11.3*   HEMATOCRIT % 30.9* 35.1*   PLATELETS 10*3/mm3 283 291       Results from last 7 days  Lab Units 03/03/17  0535 03/02/17 2015   SODIUM mmol/L 142 140   POTASSIUM mmol/L 4.1 4.7   CHLORIDE mmol/L 108* 105   TOTAL CO2 mmol/L 20.1* 22.3   BUN mg/dL 30* 41*   CREATININE mg/dL 0.97 0.87   CALCIUM mg/dL 8.2* 8.7   BILIRUBIN mg/dL  --  0.4   ALK PHOS U/L  --  78   ALT (SGPT) U/L  --  18   AST (SGOT) U/L  --  16   GLUCOSE mg/dL 135* 158*       Results from last 7 days  Lab Units 03/02/17 2015   INR  1.18*       0  Lab Value Date/Time   LIPASE 35 03/02/2017 2015   LIPASE 42 02/09/2016 1018       Radiology:  Imaging Results (last 72 hours)     Procedure Component Value Units Date/Time    XR Chest 2 View [[id number]] Collected:  03/02/17 2008     Updated:  03/02/17 2017    Narrative:       XR CHEST 2 VW-     HISTORY: Female who is 51 years-old,  chest pain     TECHNIQUE: Frontal and lateral views of the chest     COMPARISON: 04/02/2012,  03/20/2012.           FINDINGS: Heart, mediastinum and pulmonary vasculature are unremarkable.  Region of fat density at the right base appears similar to prior exams,  in correlation with the CT from 03/23/2012. No acute infiltrate is  noted. No pleural effusion or pneumothorax. Otherwise stable.       Impression:       No acute infiltrate. No significant change.  Follow-up/further evaluation can be obtained is indications persist.     This report was finalized on 3/2/2017 8:14 PM by Dr. Pan Alexandre MD.             Assessment/Plan     Active Problems:    Upper GI bleeding      Recommendations:  Recurrent overt Gi bleeding - likely upper source given reports of hematemesis and isolated elevation of BUN.    -recommend patient undergo EGD this afternoon with Dr. Vergara  -continue IV PPI for now    Further recommendations will be made based on findings at time of upper endoscopy.  If EGD negative, options would be to repeat lower endoscopy or proceed with outpt colonoscopy    I discussed the patients findings and my recommendations with patient and nursing staff          Flaca Hassan  [facility]  [address] [place] [address]  Office: [phone]

## 2017-03-03 NOTE — H&P
CHIEF COMPLAINT: Vomiting blood for the last 2 days.     HISTORY: The patient is a 51-year-old  female with a history of hypertension, hyperlipidemia, obesity, and asthma, who presented to [facility] Emergency Room with nausea and vomiting, vomiting blood, and also noticed black stools and sometime bright red blood in the stools also. No abdominal pain.  Occasional chest pain. No shortness of breath. No associated symptoms. The patient was evaluated in the ER and admitted for further workup. The patient does take aspirin at home.     At the time of interview, she denies any chest pain, but she had chest pain earlier. EKG was done.  No associated symptoms with it, but her main complaint is vomiting blood for the last 2 days and noticed blood and black stools for the last couple of days.     PAST MEDICAL HISTORY:  1. Hypertension.    2. Hyperlipidemia.    3. Asthma.  4. Depression.     SOCIAL HISTORY: No tobacco, alcohol, or drug abuse.     FAMILY HISTORY: Noncontributory.     ALLERGIES: No known drug allergies.     HOME MEDICATIONS:   1. Aspirin.    2. Norvasc.    3. Lipitor.    4. Symbicort.    5. Lexapro.   6. Cozaar.   7. Singulair.     PHYSICAL EXAMINATION:  GENERAL: Alert, oriented, in no respiratory distress. No chest pain.   VITAL SIGNS: Afebrile, pulse 92, respirations 20, blood pressure is 147/80, and O2 saturations are 98% on room air.   HEENT: Unremarkable.   NECK: Supple.   LUNGS: Decreased breath sounds.   HEART: S1 and S2.   ABDOMEN: Soft, nontender. Bowel sounds positive.   EXTREMITIES: No edema.   NEUROLOGICAL: No focal deficit.     DIAGNOSTIC DATA: Hemoglobin is 9.9, white count 13.0, platelets are 283,000.  Sodium 142, potassium 4.1, BUN 30, creatinine 0.87, blood sugar is .  Acetone negative.  Chest x-ray no active disease. EKG: Sinus rhythm with nonspecific ST-T wave changes and abnormal T wave, could be ischemic.     ASSESSMENT:  1. Hematemesis.   2. Chest pain, rule  out acute coronary syndrome.   3. Hypertension.   4. Hyperlipidemia.   5. Asthma.     PLAN:  1. Admit.   2. Stop aspirin.   3. IV Protonix.   4. GI consult.   5. IV fluids.   6. Stress ulcer/DVT prophylaxis with Protonix and SCDs.   7. Cardiology to see.   8. Repeat EKG and cardiac enzymes.  9. Check echocardiogram.   10. Follow closely. Further recommendations according to hospital course.     Myron Lopez M.D.  AA:colleen  D:   03/03/2017 12:32:15  T:   03/03/2017 13:15:52  Job ID:   [id number]  Document ID:   [id number]  cc:

## 2017-03-03 NOTE — ANESTHESIA POSTPROCEDURE EVALUATION
Patient: Flaca Hassan    Procedure Summary     Date Anesthesia Start Anesthesia Stop Room / Location    03/03/17 1522 1601  [facility] 6 /  [facility]       Procedure Diagnosis Surgeon Provider    ESOPHAGOGASTRODUODENOSCOPY (N/A Esophagus) Upper GI bleeding; Iron deficiency anemia due to chronic blood loss  (Upper GI bleeding [K92.2]; Iron deficiency anemia due to chronic blood loss [D50.0]) MD Arnie Bronson MD          Anesthesia Type: MAC  Last vitals  BP 90/59 (03/03/17 1600)    Temp      Pulse 91 (03/03/17 1600)   Resp 20 (03/03/17 1600)    SpO2 95 % (03/03/17 1600)      Post Anesthesia Care and Evaluation    Patient location during evaluation: bedside  Patient participation: complete - patient participated  Level of consciousness: awake and alert  Pain management: adequate  Airway patency: patent  Anesthetic complications: No anesthetic complications  PONV Status: none  Cardiovascular status: acceptable  Respiratory status: acceptable and nasal cannula  Hydration status: acceptable

## 2017-03-03 NOTE — NURSING NOTE
Pt has complaints of a pressure in her chest, protocol for chest pain initiated.  STAT EKG, K and MG orders placed.

## 2017-03-03 NOTE — ED NOTES
PATIENT STATES HAVING BLACK STOOLS YESTERDAY, STARTED VOMITING BLACK BLOODY VOMIT TODAY. FEELS NAUSEATED AND HAS ABDOMINAL PAIN.      Lashon Wright RN  03/02/17 1943

## 2017-03-03 NOTE — ED NOTES
Assisted patient to the bathroom (bed side), patient had black bloody stool medium amount. Patient got out of breath when standing.      Lashon Wright RN  03/02/17 [id number]

## 2017-03-03 NOTE — PLAN OF CARE
Problem: GI Endoscopy (Adult)  Goal: Signs and Symptoms of Listed Potential Problems Will be Absent or Manageable (GI Endoscopy)  Outcome: Ongoing (interventions implemented as appropriate)    03/03/17 1338   GI Endoscopy   Problems Assessed (GI Endoscopy) pain;bleeding   Problems Present (GI Endoscopy) none

## 2017-03-04 ENCOUNTER — APPOINTMENT (OUTPATIENT)
Dept: CARDIOLOGY | Facility: HOSPITAL | Age: 52
End: 2017-03-04
Attending: INTERNAL MEDICINE

## 2017-03-04 LAB
ALBUMIN SERPL-MCNC: 3.1 G/DL (ref 3.5–5.2)
ALBUMIN/GLOB SERPL: 1.1 G/DL
ALP SERPL-CCNC: 61 U/L (ref 39–117)
ALT SERPL W P-5'-P-CCNC: 19 U/L (ref 1–33)
ANION GAP SERPL CALCULATED.3IONS-SCNC: 14.3 MMOL/L
AST SERPL-CCNC: 20 U/L (ref 1–32)
BACTERIA UR QL AUTO: ABNORMAL /HPF
BASOPHILS # BLD AUTO: 0.05 10*3/MM3 (ref 0–0.2)
BASOPHILS NFR BLD AUTO: 0.5 % (ref 0–1.5)
BH CV ECHO MEAS - AO MAX PG (FULL): 7.4 MMHG
BH CV ECHO MEAS - AO MAX PG: 16.2 MMHG
BH CV ECHO MEAS - AO MEAN PG (FULL): 6 MMHG
BH CV ECHO MEAS - AO MEAN PG: 9 MMHG
BH CV ECHO MEAS - AO ROOT AREA (BSA CORRECTED): 1.4
BH CV ECHO MEAS - AO ROOT AREA: 5.7 CM^2
BH CV ECHO MEAS - AO ROOT DIAM: 2.7 CM
BH CV ECHO MEAS - AO V2 MAX: 201 CM/SEC
BH CV ECHO MEAS - AO V2 MEAN: 136 CM/SEC
BH CV ECHO MEAS - AO V2 VTI: 41.5 CM
BH CV ECHO MEAS - ASC AORTA: 3.3 CM
BH CV ECHO MEAS - AVA(I,A): 2.1 CM^2
BH CV ECHO MEAS - AVA(I,D): 2.1 CM^2
BH CV ECHO MEAS - AVA(V,A): 2.3 CM^2
BH CV ECHO MEAS - AVA(V,D): 2.3 CM^2
BH CV ECHO MEAS - BSA(HAYCOCK): 2.1 M^2
BH CV ECHO MEAS - BSA: 2 M^2
BH CV ECHO MEAS - BZI_BMI: 39.1 KILOGRAMS/M^2
BH CV ECHO MEAS - BZI_METRIC_HEIGHT: 157.5 CM
BH CV ECHO MEAS - BZI_METRIC_WEIGHT: 97.1 KG
BH CV ECHO MEAS - CONTRAST EF (2CH): 56 ML/M^2
BH CV ECHO MEAS - CONTRAST EF 4CH: 52.6 ML/M^2
BH CV ECHO MEAS - EDV(CUBED): 59.3 ML
BH CV ECHO MEAS - EDV(MOD-SP2): 84 ML
BH CV ECHO MEAS - EDV(MOD-SP4): 78 ML
BH CV ECHO MEAS - EDV(TEICH): 65.9 ML
BH CV ECHO MEAS - EF(CUBED): 66.8 %
BH CV ECHO MEAS - EF(MOD-SP2): 56 %
BH CV ECHO MEAS - EF(MOD-SP4): 52.6 %
BH CV ECHO MEAS - EF(TEICH): 59 %
BH CV ECHO MEAS - ESV(CUBED): 19.7 ML
BH CV ECHO MEAS - ESV(MOD-SP2): 37 ML
BH CV ECHO MEAS - ESV(MOD-SP4): 37 ML
BH CV ECHO MEAS - ESV(TEICH): 27 ML
BH CV ECHO MEAS - FS: 30.8 %
BH CV ECHO MEAS - IVS/LVPW: 1
BH CV ECHO MEAS - IVSD: 1.2 CM
BH CV ECHO MEAS - LAT PEAK E' VEL: 8 CM/SEC
BH CV ECHO MEAS - LV DIASTOLIC VOL/BSA (35-75): 39.6 ML/M^2
BH CV ECHO MEAS - LV MASS(C)D: 159.3 GRAMS
BH CV ECHO MEAS - LV MASS(C)DI: 81 GRAMS/M^2
BH CV ECHO MEAS - LV MAX PG: 8.8 MMHG
BH CV ECHO MEAS - LV MEAN PG: 3 MMHG
BH CV ECHO MEAS - LV SYSTOLIC VOL/BSA (12-30): 18.8 ML/M^2
BH CV ECHO MEAS - LV V1 MAX: 148 CM/SEC
BH CV ECHO MEAS - LV V1 MEAN: 83.8 CM/SEC
BH CV ECHO MEAS - LV V1 VTI: 27.5 CM
BH CV ECHO MEAS - LVIDD: 3.9 CM
BH CV ECHO MEAS - LVIDS: 2.7 CM
BH CV ECHO MEAS - LVLD AP2: 9.1 CM
BH CV ECHO MEAS - LVLD AP4: 8.4 CM
BH CV ECHO MEAS - LVLS AP2: 8.4 CM
BH CV ECHO MEAS - LVLS AP4: 8.2 CM
BH CV ECHO MEAS - LVOT AREA (M): 3.1 CM^2
BH CV ECHO MEAS - LVOT AREA: 3.1 CM^2
BH CV ECHO MEAS - LVOT DIAM: 2 CM
BH CV ECHO MEAS - LVPWD: 1.2 CM
BH CV ECHO MEAS - MED PEAK E' VEL: 6 CM/SEC
BH CV ECHO MEAS - MV A DUR: 0.1 SEC
BH CV ECHO MEAS - MV A MAX VEL: 137 CM/SEC
BH CV ECHO MEAS - MV DEC SLOPE: 493 CM/SEC^2
BH CV ECHO MEAS - MV DEC TIME: 0.21 SEC
BH CV ECHO MEAS - MV E MAX VEL: 129 CM/SEC
BH CV ECHO MEAS - MV E/A: 0.94
BH CV ECHO MEAS - MV MAX PG: 7.1 MMHG
BH CV ECHO MEAS - MV MEAN PG: 4 MMHG
BH CV ECHO MEAS - MV P1/2T MAX VEL: 129 CM/SEC
BH CV ECHO MEAS - MV P1/2T: 76.6 MSEC
BH CV ECHO MEAS - MV V2 MAX: 133 CM/SEC
BH CV ECHO MEAS - MV V2 MEAN: 92.2 CM/SEC
BH CV ECHO MEAS - MV V2 VTI: 33.7 CM
BH CV ECHO MEAS - MVA P1/2T LCG: 1.7 CM^2
BH CV ECHO MEAS - MVA(P1/2T): 2.9 CM^2
BH CV ECHO MEAS - MVA(VTI): 2.6 CM^2
BH CV ECHO MEAS - PA ACC TIME: 0.1 SEC
BH CV ECHO MEAS - PA MAX PG (FULL): 4.4 MMHG
BH CV ECHO MEAS - PA MAX PG: 9 MMHG
BH CV ECHO MEAS - PA PR(ACCEL): 36.3 MMHG
BH CV ECHO MEAS - PA V2 MAX: 150 CM/SEC
BH CV ECHO MEAS - PULM A REVS DUR: 0.14 SEC
BH CV ECHO MEAS - PULM A REVS VEL: 34.4 CM/SEC
BH CV ECHO MEAS - PULM DIAS VEL: 33 CM/SEC
BH CV ECHO MEAS - PULM S/D: 1.7
BH CV ECHO MEAS - PULM SYS VEL: 55.3 CM/SEC
BH CV ECHO MEAS - PVA(V,A): 2 CM^2
BH CV ECHO MEAS - PVA(V,D): 2 CM^2
BH CV ECHO MEAS - QP/QS: 0.93
BH CV ECHO MEAS - RV MAX PG: 4.6 MMHG
BH CV ECHO MEAS - RV MEAN PG: 2 MMHG
BH CV ECHO MEAS - RV V1 MAX: 107 CM/SEC
BH CV ECHO MEAS - RV V1 MEAN: 58.3 CM/SEC
BH CV ECHO MEAS - RV V1 VTI: 28.2 CM
BH CV ECHO MEAS - RVOT AREA: 2.8 CM^2
BH CV ECHO MEAS - RVOT DIAM: 1.9 CM
BH CV ECHO MEAS - SI(AO): 120.8 ML/M^2
BH CV ECHO MEAS - SI(CUBED): 20.1 ML/M^2
BH CV ECHO MEAS - SI(LVOT): 43.9 ML/M^2
BH CV ECHO MEAS - SI(MOD-SP2): 23.9 ML/M^2
BH CV ECHO MEAS - SI(MOD-SP4): 20.8 ML/M^2
BH CV ECHO MEAS - SI(TEICH): 19.8 ML/M^2
BH CV ECHO MEAS - SV(AO): 237.6 ML
BH CV ECHO MEAS - SV(CUBED): 39.6 ML
BH CV ECHO MEAS - SV(LVOT): 86.4 ML
BH CV ECHO MEAS - SV(MOD-SP2): 47 ML
BH CV ECHO MEAS - SV(MOD-SP4): 41 ML
BH CV ECHO MEAS - SV(RVOT): 80 ML
BH CV ECHO MEAS - SV(TEICH): 38.9 ML
BH CV ECHO MEAS - TAPSE (>1.6): 2 CM2
BH CV XLRA - RV BASE: 2.3 CM
BH CV XLRA - TDI S': 12 CM/SEC
BILIRUB SERPL-MCNC: 0.4 MG/DL (ref 0.1–1.2)
BILIRUB UR QL STRIP: NEGATIVE
BUN BLD-MCNC: 20 MG/DL (ref 6–20)
BUN/CREAT SERPL: 22.2 (ref 7–25)
CALCIUM SPEC-SCNC: 7.9 MG/DL (ref 8.6–10.5)
CHLORIDE SERPL-SCNC: 108 MMOL/L (ref 98–107)
CHOLEST SERPL-MCNC: 120 MG/DL (ref 0–200)
CLARITY UR: ABNORMAL
CO2 SERPL-SCNC: 20.7 MMOL/L (ref 22–29)
COLOR UR: YELLOW
CREAT BLD-MCNC: 0.9 MG/DL (ref 0.57–1)
DEPRECATED RDW RBC AUTO: 45.8 FL (ref 37–54)
E/E' RATIO: 19
EOSINOPHIL # BLD AUTO: 0.36 10*3/MM3 (ref 0–0.7)
EOSINOPHIL NFR BLD AUTO: 3.3 % (ref 0.3–6.2)
ERYTHROCYTE [DISTWIDTH] IN BLOOD BY AUTOMATED COUNT: 13.4 % (ref 11.7–13)
GFR SERPL CREATININE-BSD FRML MDRD: 66 ML/MIN/1.73
GLOBULIN UR ELPH-MCNC: 2.7 GM/DL
GLUCOSE BLD-MCNC: 131 MG/DL (ref 65–99)
GLUCOSE BLDC GLUCOMTR-MCNC: 102 MG/DL (ref 70–130)
GLUCOSE BLDC GLUCOMTR-MCNC: 102 MG/DL (ref 70–130)
GLUCOSE BLDC GLUCOMTR-MCNC: 111 MG/DL (ref 70–130)
GLUCOSE BLDC GLUCOMTR-MCNC: 123 MG/DL (ref 70–130)
GLUCOSE BLDC GLUCOMTR-MCNC: 133 MG/DL (ref 70–130)
GLUCOSE UR STRIP-MCNC: NEGATIVE MG/DL
HCT VFR BLD AUTO: 25.9 % (ref 35.6–45.5)
HDLC SERPL-MCNC: 24 MG/DL (ref 40–60)
HGB BLD-MCNC: 8.3 G/DL (ref 11.9–15.5)
HGB UR QL STRIP.AUTO: ABNORMAL
HYALINE CASTS UR QL AUTO: ABNORMAL /LPF
IMM GRANULOCYTES # BLD: 0.05 10*3/MM3 (ref 0–0.03)
IMM GRANULOCYTES NFR BLD: 0.5 % (ref 0–0.5)
KETONES UR QL STRIP: NEGATIVE
LDLC SERPL CALC-MCNC: 65 MG/DL (ref 0–100)
LDLC/HDLC SERPL: 2.72 {RATIO}
LEFT ATRIUM VOLUME INDEX: 16 ML/M2
LEUKOCYTE ESTERASE UR QL STRIP.AUTO: ABNORMAL
LYMPHOCYTES # BLD AUTO: 2.56 10*3/MM3 (ref 0.9–4.8)
LYMPHOCYTES NFR BLD AUTO: 23.4 % (ref 19.6–45.3)
MCH RBC QN AUTO: 30.3 PG (ref 26.9–32)
MCHC RBC AUTO-ENTMCNC: 32 G/DL (ref 32.4–36.3)
MCV RBC AUTO: 94.5 FL (ref 80.5–98.2)
MONOCYTES # BLD AUTO: 0.59 10*3/MM3 (ref 0.2–1.2)
MONOCYTES NFR BLD AUTO: 5.4 % (ref 5–12)
NEUTROPHILS # BLD AUTO: 7.32 10*3/MM3 (ref 1.9–8.1)
NEUTROPHILS NFR BLD AUTO: 66.9 % (ref 42.7–76)
NITRITE UR QL STRIP: NEGATIVE
NT-PROBNP SERPL-MCNC: 146.8 PG/ML (ref 0–900)
PH UR STRIP.AUTO: 5.5 [PH] (ref 5–8)
PLATELET # BLD AUTO: 229 10*3/MM3 (ref 140–500)
PMV BLD AUTO: 11.4 FL (ref 6–12)
POTASSIUM BLD-SCNC: 4 MMOL/L (ref 3.5–5.2)
PROT SERPL-MCNC: 5.8 G/DL (ref 6–8.5)
PROT UR QL STRIP: NEGATIVE
RBC # BLD AUTO: 2.74 10*6/MM3 (ref 3.9–5.2)
RBC # UR: ABNORMAL /HPF
REF LAB TEST METHOD: ABNORMAL
SODIUM BLD-SCNC: 143 MMOL/L (ref 136–145)
SP GR UR STRIP: 1.02 (ref 1–1.03)
SQUAMOUS #/AREA URNS HPF: ABNORMAL /HPF
TRIGL SERPL-MCNC: 154 MG/DL (ref 0–150)
TSH SERPL DL<=0.05 MIU/L-ACNC: 4.02 MIU/ML (ref 0.27–4.2)
UREASE TISS QL: POSITIVE
UROBILINOGEN UR QL STRIP: ABNORMAL
VLDLC SERPL-MCNC: 30.8 MG/DL (ref 5–40)
WBC NRBC COR # BLD: 10.93 10*3/MM3 (ref 4.5–10.7)
WBC UR QL AUTO: ABNORMAL /HPF

## 2017-03-04 PROCEDURE — 84443 ASSAY THYROID STIM HORMONE: CPT | Performed by: HOSPITALIST

## 2017-03-04 PROCEDURE — 93306 TTE W/DOPPLER COMPLETE: CPT | Performed by: INTERNAL MEDICINE

## 2017-03-04 PROCEDURE — 82962 GLUCOSE BLOOD TEST: CPT

## 2017-03-04 PROCEDURE — 93010 ELECTROCARDIOGRAM REPORT: CPT | Performed by: INTERNAL MEDICINE

## 2017-03-04 PROCEDURE — 81001 URINALYSIS AUTO W/SCOPE: CPT | Performed by: HOSPITALIST

## 2017-03-04 PROCEDURE — 94799 UNLISTED PULMONARY SVC/PX: CPT

## 2017-03-04 PROCEDURE — 99232 SBSQ HOSP IP/OBS MODERATE 35: CPT | Performed by: INTERNAL MEDICINE

## 2017-03-04 PROCEDURE — C8929 TTE W OR WO FOL WCON,DOPPLER: HCPCS

## 2017-03-04 PROCEDURE — 93005 ELECTROCARDIOGRAM TRACING: CPT | Performed by: NURSE PRACTITIONER

## 2017-03-04 PROCEDURE — 80061 LIPID PANEL: CPT | Performed by: HOSPITALIST

## 2017-03-04 PROCEDURE — 85025 COMPLETE CBC W/AUTO DIFF WBC: CPT | Performed by: HOSPITALIST

## 2017-03-04 PROCEDURE — 87186 SC STD MICRODIL/AGAR DIL: CPT | Performed by: HOSPITALIST

## 2017-03-04 PROCEDURE — 83880 ASSAY OF NATRIURETIC PEPTIDE: CPT | Performed by: HOSPITALIST

## 2017-03-04 PROCEDURE — 25010000002 PERFLUTREN (DEFINITY) 8.476 MG IN SODIUM CHLORIDE 10 ML INJECTION: Performed by: HOSPITALIST

## 2017-03-04 PROCEDURE — 87086 URINE CULTURE/COLONY COUNT: CPT | Performed by: HOSPITALIST

## 2017-03-04 PROCEDURE — 80053 COMPREHEN METABOLIC PANEL: CPT | Performed by: HOSPITALIST

## 2017-03-04 RX ADMIN — ATORVASTATIN CALCIUM 10 MG: 10 TABLET, FILM COATED ORAL at 20:45

## 2017-03-04 RX ADMIN — AMLODIPINE BESYLATE 5 MG: 5 TABLET ORAL at 10:26

## 2017-03-04 RX ADMIN — PANTOPRAZOLE SODIUM 40 MG: 40 INJECTION, POWDER, FOR SOLUTION INTRAVENOUS at 10:26

## 2017-03-04 RX ADMIN — ESCITALOPRAM 10 MG: 10 TABLET, FILM COATED ORAL at 20:45

## 2017-03-04 RX ADMIN — PANTOPRAZOLE SODIUM 40 MG: 40 INJECTION, POWDER, FOR SOLUTION INTRAVENOUS at 20:45

## 2017-03-04 RX ADMIN — SODIUM CHLORIDE 75 ML/HR: 9 INJECTION, SOLUTION INTRAVENOUS at 00:32

## 2017-03-04 RX ADMIN — BUDESONIDE AND FORMOTEROL FUMARATE DIHYDRATE 2 PUFF: 160; 4.5 AEROSOL RESPIRATORY (INHALATION) at 10:29

## 2017-03-04 RX ADMIN — ISOSORBIDE MONONITRATE 30 MG: 30 TABLET, EXTENDED RELEASE ORAL at 10:26

## 2017-03-04 RX ADMIN — BUDESONIDE AND FORMOTEROL FUMARATE DIHYDRATE 2 PUFF: 160; 4.5 AEROSOL RESPIRATORY (INHALATION) at 20:53

## 2017-03-04 RX ADMIN — MONTELUKAST 10 MG: 10 TABLET, FILM COATED ORAL at 10:26

## 2017-03-04 RX ADMIN — LOSARTAN POTASSIUM 50 MG: 50 TABLET, FILM COATED ORAL at 10:26

## 2017-03-04 RX ADMIN — PERFLUTREN 7 ML: 6.52 INJECTION, SUSPENSION INTRAVENOUS at 09:22

## 2017-03-04 NOTE — PLAN OF CARE
Problem: Patient Care Overview (Adult)  Goal: Plan of Care Review  Outcome: Ongoing (interventions implemented as appropriate)    03/04/17 3643   Coping/Psychosocial Response Interventions   Plan Of Care Reviewed With patient   Patient Care Overview   Progress improving   Outcome Evaluation   Outcome Summary/Follow up Plan PT has had episodes of dark brown diarrhea, no signs of blood. Vitals have been stable. ECHO done today. Need urine sample for UA. Will continue to monitor.        Goal: Adult Individualization and Mutuality  Outcome: Ongoing (interventions implemented as appropriate)  Goal: Discharge Needs Assessment  Outcome: Ongoing (interventions implemented as appropriate)    Problem: Gastrointestinal Bleeding (Adult)  Goal: Signs and Symptoms of Listed Potential Problems Will be Absent or Manageable (Gastrointestinal Bleeding)  Outcome: Ongoing (interventions implemented as appropriate)    Problem: Pain, Acute (Adult)  Goal: Identify Related Risk Factors and Signs and Symptoms  Outcome: Ongoing (interventions implemented as appropriate)  Goal: Acceptable Pain Control/Comfort Level  Outcome: Ongoing (interventions implemented as appropriate)    Problem: Fall Risk (Adult)  Goal: Absence of Falls  Outcome: Ongoing (interventions implemented as appropriate)

## 2017-03-04 NOTE — PLAN OF CARE
Problem: Patient Care Overview (Adult)  Goal: Plan of Care Review  Outcome: Ongoing (interventions implemented as appropriate)    03/04/17 0423   Coping/Psychosocial Response Interventions   Plan Of Care Reviewed With patient   Patient Care Overview   Progress improving   Outcome Evaluation   Outcome Summary/Follow up Plan Had one small BM, no bright red blood, dark brown, no fever, no nausea, no vomiting.       Goal: Adult Individualization and Mutuality  Outcome: Ongoing (interventions implemented as appropriate)  Goal: Discharge Needs Assessment  Outcome: Ongoing (interventions implemented as appropriate)    Problem: Gastrointestinal Bleeding (Adult)  Goal: Signs and Symptoms of Listed Potential Problems Will be Absent or Manageable (Gastrointestinal Bleeding)  Outcome: Ongoing (interventions implemented as appropriate)    Problem: Pain, Acute (Adult)  Goal: Identify Related Risk Factors and Signs and Symptoms  Outcome: Ongoing (interventions implemented as appropriate)  Goal: Acceptable Pain Control/Comfort Level  Outcome: Ongoing (interventions implemented as appropriate)    Problem: Fall Risk (Adult)  Goal: Absence of Falls  Outcome: Ongoing (interventions implemented as appropriate)

## 2017-03-04 NOTE — PROGRESS NOTES
" DAILY PROGRESS NOTE      CHIEF COMPLAINT:   DOING BETTER  NO MORE VOMITING/HEMETEMESIS    HISTORY: The patient is a 51-year-old  female with a history of hypertension, hyperlipidemia, obesity, and asthma, who presented to [facility] Emergency Room with nausea and vomiting, vomiting blood, and also noticed black stools and sometime bright red blood in the stools also. No abdominal pain.  Occasional chest pain. No shortness of breath. No associated symptoms. The patient was evaluated in the ER and admitted for further workup. The patient does take aspirin at home.     PHYSICAL EXAMINATION:Blood pressure 126/68, pulse 82, temperature 96.9 °F (36.1 °C), temperature source Oral, resp. rate 18, height 62\" (157.5 cm), weight 214 lb (97.1 kg), SpO2 98 %.    GENERAL: Alert, oriented, in no respiratory distress. No chest pain.   HEENT: Unremarkable.   NECK: Supple.   LUNGS: Decreased breath sounds.   HEART: S1 and S2.   ABDOMEN: Soft, nontender. Bowel sounds positive.   EXTREMITIES: No edema.   NEUROLOGICAL: No focal deficit.     DIAGNOSTIC DATA:  Lab Results (last 24 hours)     Procedure Component Value Units Date/Time    Hemoglobin A1c [[id number]]  (Abnormal) Collected:  03/03/17 1240    Specimen:  Blood Updated:  03/03/17 1305     Hemoglobin A1C 5.70 (H) %     Narrative:       Hemoglobin A1C Ranges:    Increased Risk for Diabetes  5.7% to 6.4%  Diabetes                     >= 6.5%  Diabetic Goal                < 7.0%    Troponin [[id number]]  (Normal) Collected:  03/03/17 1040    Specimen:  Blood Updated:  03/03/17 1312     Troponin T <0.010 ng/mL     Narrative:       Troponin T Reference Ranges:  Less than 0.03 ng/mL:    Negative for AMI  0.03 to 0.09 ng/mL:      Indeterminant for AMI  Greater than 0.09 ng/mL: Positive for AMI    POC Glucose Fingerstick [[id number]]  (Normal) Collected:  03/03/17 1407    Specimen:  Blood Updated:  03/03/17 1408     Glucose 100 mg/dL     Narrative:       Meter: [id number] " : [id number] Vinh FREEMAN    Urease For H Pylori [[id number]] Collected:  03/03/17 1536    Specimen:  Tissue from Stomach Updated:  03/03/17 1653    POC Glucose Fingerstick [[id number]]  (Normal) Collected:  03/03/17 1722    Specimen:  Blood Updated:  03/03/17 1724     Glucose 118 mg/dL     Narrative:       Meter: [id number] : [id number] Edgar Humaira    BNP [[id number]]  (Normal) Collected:  03/03/17 1040    Specimen:  Blood Updated:  03/03/17 1847     proBNP 288.1 pg/mL     Narrative:       Among patients with dyspnea, NT-proBNP is highly sensitive for the detection of acute congestive heart failure. In addition NT-proBNP of <300 pg/ml effectively rules out acute congestive heart failure with 99% negative predictive value.    Troponin [[id number]]  (Normal) Collected:  03/03/17 1901    Specimen:  Blood Updated:  03/03/17 2008     Troponin T <0.010 ng/mL     Narrative:       Troponin T Reference Ranges:  Less than 0.03 ng/mL:    Negative for AMI  0.03 to 0.09 ng/mL:      Indeterminant for AMI  Greater than 0.09 ng/mL: Positive for AMI    Tissue Exam [[id number]] Collected:  03/03/17 1536    Specimen:  Tissue from Stomach Updated:  03/03/17 2243    POC Glucose Fingerstick [[id number]]  (Normal) Collected:  03/04/17 0011    Specimen:  Blood Updated:  03/04/17 0012     Glucose 123 mg/dL     Narrative:       Meter: [id number] : [id number] [facility]    Comprehensive Metabolic Panel [[id number]]  (Abnormal) Collected:  03/04/17 0538    Specimen:  Blood Updated:  03/04/17 0640     Glucose 131 (H) mg/dL      BUN 20 mg/dL      Creatinine 0.90 mg/dL      Sodium 143 mmol/L      Potassium 4.0 mmol/L      Chloride 108 (H) mmol/L      CO2 20.7 (L) mmol/L      Calcium 7.9 (L) mg/dL      Total Protein 5.8 (L) g/dL      Albumin 3.10 (L) g/dL      ALT (SGPT) 19 U/L      AST (SGOT) 20 U/L      Alkaline Phosphatase 61 U/L      Total Bilirubin 0.4 mg/dL      eGFR Non African Amer 66 mL/min/1.73      Globulin 2.7 gm/dL      A/G  Ratio 1.1 g/dL      BUN/Creatinine Ratio 22.2      Anion Gap 14.3 mmol/L     Lipid Panel [[id number]]  (Abnormal) Collected:  03/04/17 0538    Specimen:  Blood Updated:  03/04/17 0640     Total Cholesterol 120 mg/dL      Triglycerides 154 (H) mg/dL      HDL Cholesterol 24 (L) mg/dL      LDL Cholesterol  65 mg/dL      VLDL Cholesterol 30.8 mg/dL      LDL/HDL Ratio 2.72     Narrative:       Cholesterol Reference Ranges  (U.S. Department of Health and Human Services ATP III Classifications)    Desirable          <200 mg/dL  Borderline High    200-239 mg/dL  High Risk          >240 mg/dL      Triglyceride Reference Ranges  (U.S. Department of Health and Human Services ATP III Classifications)    Normal           <150 mg/dL  Borderline High  150-199 mg/dL  High             200-499 mg/dL  Very High        >500 mg/dL    HDL Reference Ranges  (U.S. Department of Health and Human Services ATP III Classifcations)    Low     <40 mg/dl (major risk factor for CHD)  High    >60 mg/dl ('negative' risk factor for CHD)        LDL Reference Ranges  (U.S. Department of Health and Human Services ATP III Classifcations)    Optimal          <100 mg/dL  Near Optimal     100-129 mg/dL  Borderline High  130-159 mg/dL  High             160-189 mg/dL  Very High        >189 mg/dL    CBC & Differential [[id number]] Collected:  03/04/17 0538    Specimen:  Blood Updated:  03/04/17 0642    Narrative:       The following orders were created for panel order CBC & Differential.  Procedure                               Abnormality         Status                     ---------                               -----------         ------                     CBC Auto Differential[[id number]]         Abnormal            Final result                 Please view results for these tests on the individual orders.    CBC Auto Differential [[id number]]  (Abnormal) Collected:  03/04/17 0538    Specimen:  Blood Updated:  03/04/17 0642     WBC 10.93 (H) 10*3/mm3      RBC 2.74  (L) 10*6/mm3      Hemoglobin 8.3 (L) g/dL      Hematocrit 25.9 (L) %      MCV 94.5 fL      MCH 30.3 pg      MCHC 32.0 (L) g/dL      RDW 13.4 (H) %      RDW-SD 45.8 fl      MPV 11.4 fL      Platelets 229 10*3/mm3      Neutrophil % 66.9 %      Lymphocyte % 23.4 %      Monocyte % 5.4 %      Eosinophil % 3.3 %      Basophil % 0.5 %      Immature Grans % 0.5 %      Neutrophils, Absolute 7.32 10*3/mm3      Lymphocytes, Absolute 2.56 10*3/mm3      Monocytes, Absolute 0.59 10*3/mm3      Eosinophils, Absolute 0.36 10*3/mm3      Basophils, Absolute 0.05 10*3/mm3      Immature Grans, Absolute 0.05 (H) 10*3/mm3     BNP [[id number]]  (Normal) Collected:  03/04/17 0538    Specimen:  Blood Updated:  03/04/17 0649     proBNP 146.8 pg/mL     Narrative:       Among patients with dyspnea, NT-proBNP is highly sensitive for the detection of acute congestive heart failure. In addition NT-proBNP of <300 pg/ml effectively rules out acute congestive heart failure with 99% negative predictive value.    TSH [[id number]]  (Normal) Collected:  03/04/17 0538    Specimen:  Blood Updated:  03/04/17 0649     TSH 4.020 mIU/mL     POC Glucose Fingerstick [[id number]]  (Abnormal) Collected:  03/04/17 0745    Specimen:  Blood Updated:  03/04/17 0750     Glucose 133 (H) mg/dL     Narrative:       Meter: [id number] : [id number] Michael Ferris    POC Glucose Fingerstick [[id number]]  (Normal) Collected:  03/04/17 1157    Specimen:  Blood Updated:  03/04/17 1158     Glucose 111 mg/dL     Narrative:       Meter: [id number] : [id number] Michael Ferris       Hemoglobin is 9.9, white count 13.0, platelets are 283,000.  Sodium 142, potassium 4.1, BUN 30, creatinine 0.87, blood sugar is .  Acetone negative.  Chest x-ray no active disease. EKG: Sinus rhythm with nonspecific ST-T wave changes and abnormal T wave, could be ischemic.   Imaging Results (last 72 hours)     Procedure Component Value Units Date/Time    XR Chest 2 View [[id number]] Collected:   03/02/17 2008     Updated:  03/02/17 2017    Narrative:       XR CHEST 2 VW-     HISTORY: Female who is 51 years-old,  chest pain     TECHNIQUE: Frontal and lateral views of the chest     COMPARISON: 04/02/2012, 03/20/2012.           FINDINGS: Heart, mediastinum and pulmonary vasculature are unremarkable.  Region of fat density at the right base appears similar to prior exams,  in correlation with the CT from 03/23/2012. No acute infiltrate is  noted. No pleural effusion or pneumothorax. Otherwise stable.       Impression:       No acute infiltrate. No significant change.  Follow-up/further evaluation can be obtained is indications persist.     This report was finalized on 3/2/2017 8:14 PM by Dr. Pan Alexandre MD.           Current Facility-Administered Medications:   •  amLODIPine (NORVASC) tablet 5 mg, 5 mg, Oral, Q24H, Myron Lopez MD, 5 mg at 03/04/17 1026  •  atorvastatin (LIPITOR) tablet 10 mg, 10 mg, Oral, Nightly, Myron Lopez MD, 10 mg at 03/03/17 2148  •  budesonide-formoterol (SYMBICORT) 160-4.5 MCG/ACT inhaler 2 puff, 2 puff, Inhalation, BID - RT, Myron Lopez MD, 2 puff at 03/04/17 1029  •  escitalopram (LEXAPRO) tablet 10 mg, 10 mg, Oral, Nightly, Myron Lopez MD, 10 mg at 03/03/17 2149  •  Influenza Vac Subunit Quad (FLUCELVAX) injection 0.5 mL, 0.5 mL, Intramuscular, During Hospitalization, Myron Lopez MD  •  isosorbide mononitrate (IMDUR) 24 hr tablet 30 mg, 30 mg, Oral, Q24H, Myron Lopez MD, 30 mg at 03/04/17 1026  •  lactated ringers infusion, 9 mL/hr, Intravenous, Continuous PRN, Ousmane Vergara MD, Last Rate: 9 mL/hr at 03/03/17 1404  •  losartan (COZAAR) tablet 50 mg, 50 mg, Oral, Daily, Myron Lopez MD, 50 mg at 03/04/17 1026  •  montelukast (SINGULAIR) tablet 10 mg, 10 mg, Oral, Daily, Myron Lopez MD, 10 mg at 03/04/17 1026  •  pantoprazole (PROTONIX) injection 40 mg, 40 mg, Intravenous, Q12H, Myron Lopez MD, 40 mg at 03/04/17 1026  •  Insert peripheral IV, , , Once **AND** sodium  chloride 0.9 % flush 10 mL, 10 mL, Intravenous, PRN, Nadir Bush MD  •  sodium chloride 0.9 % infusion, 75 mL/hr, Intravenous, Continuous, Myron Lopez MD, Last Rate: 75 mL/hr at 03/04/17 0032, 75 mL/hr at 03/04/17 0032       ASSESSMENT:  1. Hematemesis. /DU S/P EGD  2. Chest pain, rule out acute coronary syndrome. PER CARD  3. Hypertension.   4. Hyperlipidemia.   5. Asthma.     PLAN:  1. CPM  2. OFF aspirin.   3.  Protonix.   4. GI consult. NOTED  5. IV fluids.   6. Stress ulcer/DVT prophylaxis   7. Cardiology INPUT APPRECIATED  8. Repeat EKG and cardiac enzymes.  9. Echocardiogram. PENDING  10. Follow closely. Further recommendations according to hospital course.     Myron Lopez M.D.

## 2017-03-05 ENCOUNTER — INPATIENT HOSPITAL (OUTPATIENT)
Dept: URBAN - METROPOLITAN AREA HOSPITAL 113 | Facility: HOSPITAL | Age: 52
End: 2017-03-05
Payer: COMMERCIAL

## 2017-03-05 DIAGNOSIS — K92.2 GASTROINTESTINAL HEMORRHAGE, UNSPECIFIED: ICD-10-CM

## 2017-03-05 LAB
ANION GAP SERPL CALCULATED.3IONS-SCNC: 14.2 MMOL/L
BASOPHILS # BLD AUTO: 0.03 10*3/MM3 (ref 0–0.2)
BASOPHILS NFR BLD AUTO: 0.3 % (ref 0–1.5)
BUN BLD-MCNC: 13 MG/DL (ref 6–20)
BUN/CREAT SERPL: 14.3 (ref 7–25)
CALCIUM SPEC-SCNC: 8.4 MG/DL (ref 8.6–10.5)
CHLORIDE SERPL-SCNC: 106 MMOL/L (ref 98–107)
CO2 SERPL-SCNC: 20.8 MMOL/L (ref 22–29)
CREAT BLD-MCNC: 0.91 MG/DL (ref 0.57–1)
DEPRECATED RDW RBC AUTO: 44.9 FL (ref 37–54)
EOSINOPHIL # BLD AUTO: 0.38 10*3/MM3 (ref 0–0.7)
EOSINOPHIL NFR BLD AUTO: 3.5 % (ref 0.3–6.2)
ERYTHROCYTE [DISTWIDTH] IN BLOOD BY AUTOMATED COUNT: 13.2 % (ref 11.7–13)
GFR SERPL CREATININE-BSD FRML MDRD: 65 ML/MIN/1.73
GLUCOSE BLD-MCNC: 131 MG/DL (ref 65–99)
GLUCOSE BLDC GLUCOMTR-MCNC: 128 MG/DL (ref 70–130)
HCT VFR BLD AUTO: 26.3 % (ref 35.6–45.5)
HGB BLD-MCNC: 8.5 G/DL (ref 11.9–15.5)
IMM GRANULOCYTES # BLD: 0.06 10*3/MM3 (ref 0–0.03)
IMM GRANULOCYTES NFR BLD: 0.6 % (ref 0–0.5)
LYMPHOCYTES # BLD AUTO: 2.36 10*3/MM3 (ref 0.9–4.8)
LYMPHOCYTES NFR BLD AUTO: 21.8 % (ref 19.6–45.3)
MCH RBC QN AUTO: 30.6 PG (ref 26.9–32)
MCHC RBC AUTO-ENTMCNC: 32.3 G/DL (ref 32.4–36.3)
MCV RBC AUTO: 94.6 FL (ref 80.5–98.2)
MONOCYTES # BLD AUTO: 0.7 10*3/MM3 (ref 0.2–1.2)
MONOCYTES NFR BLD AUTO: 6.5 % (ref 5–12)
NEUTROPHILS # BLD AUTO: 7.32 10*3/MM3 (ref 1.9–8.1)
NEUTROPHILS NFR BLD AUTO: 67.3 % (ref 42.7–76)
PLATELET # BLD AUTO: 212 10*3/MM3 (ref 140–500)
PMV BLD AUTO: 11.4 FL (ref 6–12)
POTASSIUM BLD-SCNC: 3.8 MMOL/L (ref 3.5–5.2)
RBC # BLD AUTO: 2.78 10*6/MM3 (ref 3.9–5.2)
SODIUM BLD-SCNC: 141 MMOL/L (ref 136–145)
WBC NRBC COR # BLD: 10.85 10*3/MM3 (ref 4.5–10.7)

## 2017-03-05 PROCEDURE — 80048 BASIC METABOLIC PNL TOTAL CA: CPT | Performed by: HOSPITALIST

## 2017-03-05 PROCEDURE — 99232 SBSQ HOSP IP/OBS MODERATE 35: CPT | Performed by: INTERNAL MEDICINE

## 2017-03-05 PROCEDURE — 82962 GLUCOSE BLOOD TEST: CPT

## 2017-03-05 PROCEDURE — 85025 COMPLETE CBC W/AUTO DIFF WBC: CPT | Performed by: HOSPITALIST

## 2017-03-05 PROCEDURE — 94799 UNLISTED PULMONARY SVC/PX: CPT

## 2017-03-05 PROCEDURE — 99231 SBSQ HOSP IP/OBS SF/LOW 25: CPT

## 2017-03-05 PROCEDURE — 25010000002 ONDANSETRON PER 1 MG: Performed by: HOSPITALIST

## 2017-03-05 RX ORDER — PANTOPRAZOLE SODIUM 40 MG/1
40 TABLET, DELAYED RELEASE ORAL
Status: DISCONTINUED | OUTPATIENT
Start: 2017-03-05 | End: 2017-03-07 | Stop reason: HOSPADM

## 2017-03-05 RX ORDER — ONDANSETRON 2 MG/ML
4 INJECTION INTRAMUSCULAR; INTRAVENOUS EVERY 6 HOURS PRN
Status: DISCONTINUED | OUTPATIENT
Start: 2017-03-05 | End: 2017-03-07 | Stop reason: HOSPADM

## 2017-03-05 RX ORDER — PANTOPRAZOLE SODIUM 40 MG/1
40 TABLET, DELAYED RELEASE ORAL
Status: DISCONTINUED | OUTPATIENT
Start: 2017-03-06 | End: 2017-03-05

## 2017-03-05 RX ORDER — TEMAZEPAM 7.5 MG/1
7.5 CAPSULE ORAL NIGHTLY PRN
Status: DISCONTINUED | OUTPATIENT
Start: 2017-03-05 | End: 2017-03-07 | Stop reason: HOSPADM

## 2017-03-05 RX ADMIN — TEMAZEPAM 7.5 MG: 7.5 CAPSULE ORAL at 22:43

## 2017-03-05 RX ADMIN — ESCITALOPRAM 10 MG: 10 TABLET, FILM COATED ORAL at 20:46

## 2017-03-05 RX ADMIN — PANTOPRAZOLE SODIUM 40 MG: 40 INJECTION, POWDER, FOR SOLUTION INTRAVENOUS at 08:56

## 2017-03-05 RX ADMIN — ATORVASTATIN CALCIUM 10 MG: 10 TABLET, FILM COATED ORAL at 20:46

## 2017-03-05 RX ADMIN — LOSARTAN POTASSIUM 50 MG: 50 TABLET, FILM COATED ORAL at 08:55

## 2017-03-05 RX ADMIN — AMLODIPINE BESYLATE 5 MG: 5 TABLET ORAL at 08:55

## 2017-03-05 RX ADMIN — BUDESONIDE AND FORMOTEROL FUMARATE DIHYDRATE 2 PUFF: 160; 4.5 AEROSOL RESPIRATORY (INHALATION) at 22:31

## 2017-03-05 RX ADMIN — ONDANSETRON 4 MG: 2 INJECTION INTRAMUSCULAR; INTRAVENOUS at 00:39

## 2017-03-05 RX ADMIN — MONTELUKAST 10 MG: 10 TABLET, FILM COATED ORAL at 08:55

## 2017-03-05 RX ADMIN — BUDESONIDE AND FORMOTEROL FUMARATE DIHYDRATE 2 PUFF: 160; 4.5 AEROSOL RESPIRATORY (INHALATION) at 08:31

## 2017-03-05 NOTE — PLAN OF CARE
Problem: Patient Care Overview (Adult)  Goal: Plan of Care Review  Outcome: Ongoing (interventions implemented as appropriate)    03/05/17 0350   Coping/Psychosocial Response Interventions   Plan Of Care Reviewed With patient   Patient Care Overview   Progress improving   Outcome Evaluation   Outcome Summary/Follow up Plan Had one dark BM, c/o nausea, Zofran given, u/a sample collected.        Goal: Adult Individualization and Mutuality  Outcome: Ongoing (interventions implemented as appropriate)  Goal: Discharge Needs Assessment  Outcome: Ongoing (interventions implemented as appropriate)    Problem: Gastrointestinal Bleeding (Adult)  Goal: Signs and Symptoms of Listed Potential Problems Will be Absent or Manageable (Gastrointestinal Bleeding)  Outcome: Ongoing (interventions implemented as appropriate)    Problem: Pain, Acute (Adult)  Goal: Identify Related Risk Factors and Signs and Symptoms  Outcome: Ongoing (interventions implemented as appropriate)  Goal: Acceptable Pain Control/Comfort Level  Outcome: Ongoing (interventions implemented as appropriate)    Problem: Fall Risk (Adult)  Goal: Absence of Falls  Outcome: Ongoing (interventions implemented as appropriate)

## 2017-03-05 NOTE — PLAN OF CARE
Problem: Patient Care Overview (Adult)  Goal: Plan of Care Review  Outcome: Ongoing (interventions implemented as appropriate)    03/05/17 9323   Coping/Psychosocial Response Interventions   Plan Of Care Reviewed With patient   Patient Care Overview   Progress improving   Outcome Evaluation   Outcome Summary/Follow up Plan Pt to be NPO at midnight for stress test in AM. No caffeine at all. no c/o nausea. advaced to a regular cardiac diet, sleeping pill ordered for tonight. Vitals stable, will continue to monitor.        Goal: Adult Individualization and Mutuality  Outcome: Ongoing (interventions implemented as appropriate)  Goal: Discharge Needs Assessment  Outcome: Ongoing (interventions implemented as appropriate)    Problem: Gastrointestinal Bleeding (Adult)  Goal: Signs and Symptoms of Listed Potential Problems Will be Absent or Manageable (Gastrointestinal Bleeding)  Outcome: Ongoing (interventions implemented as appropriate)    Problem: Pain, Acute (Adult)  Goal: Identify Related Risk Factors and Signs and Symptoms  Outcome: Ongoing (interventions implemented as appropriate)  Goal: Acceptable Pain Control/Comfort Level  Outcome: Ongoing (interventions implemented as appropriate)    Problem: Fall Risk (Adult)  Goal: Absence of Falls  Outcome: Ongoing (interventions implemented as appropriate)

## 2017-03-05 NOTE — PROGRESS NOTES
[facility]  Cardiology Progress Note    Patient Identification:  Name:Flaca Hassan  Age:51 y.o.  Sex: female  :  1965  MRN: [id number]             Length of Stay: 2    Follow up for:  Chest pain      Interval History :      EGD showed gastric ucler and patient complains of chest pains that have both typical and atypical features. Echo shows normal LVEF. Once she is a bit better I would obtain cardiolyte stress test. She is still having some dark melanotic stools    HPI     Admitted with GI bleed and noted to have peptic ulcer disease    The following portions of the patient's history were reviewed and updated as appropriate: allergies, current medications, past family history, past medical history, past social history, past surgical history and problem list.  Past Medical History   Diagnosis Date   • Asthma    • Breast cyst    • Diabetes mellitus    • Diverticulosis    • Hyperlipidemia    • Hypertension    • Irritable bowel syndrome    • Melena    • Obesity        Scheduled Meds:    Current Facility-Administered Medications:   •  amLODIPine (NORVASC) tablet 5 mg, 5 mg, Oral, Q24H, Myron Lopez MD, 5 mg at 17 1026  •  atorvastatin (LIPITOR) tablet 10 mg, 10 mg, Oral, Nightly, Myron Lopez MD, 10 mg at 17 2148  •  budesonide-formoterol (SYMBICORT) 160-4.5 MCG/ACT inhaler 2 puff, 2 puff, Inhalation, BID - RT, Myron Lopez MD, 2 puff at 17 1029  •  escitalopram (LEXAPRO) tablet 10 mg, 10 mg, Oral, Nightly, Myron Lopez MD, 10 mg at 17 2149  •  Influenza Vac Subunit Quad (FLUCELVAX) injection 0.5 mL, 0.5 mL, Intramuscular, During Hospitalization, Myron Lopez MD  •  lactated ringers infusion, 9 mL/hr, Intravenous, Continuous PRN, Ousmane Vergara MD, Stopped at 17 1414  •  losartan (COZAAR) tablet 50 mg, 50 mg, Oral, Daily, Myron Lopez MD, 50 mg at 17 1026  •  montelukast (SINGULAIR) tablet 10 mg, 10 mg, Oral, Daily, Myron Lopez MD, 10  "mg at 03/04/17 1026  •  pantoprazole (PROTONIX) injection 40 mg, 40 mg, Intravenous, Q12H, Myron Lopez MD, 40 mg at 03/04/17 1026  •  Insert peripheral IV, , , Once **AND** sodium chloride 0.9 % flush 10 mL, 10 mL, Intravenous, PRN, Nadir Bush MD    All systems were reviewed and negative except for:  Gastrointestinal: postitive for  diarrhea    Visit Vitals   • /68 (BP Location: Right arm, Patient Position: Lying)   • Pulse 82   • Temp 97.7 °F (36.5 °C) (Oral)   • Resp 18   • Ht 62\" (157.5 cm)   • Wt 214 lb (97.1 kg)   • LMP Comment: post menopausal   • SpO2 97%   • BMI 39.14 kg/m2        No intake or output data in the 24 hours ending 03/04/17 1923       Wt Readings from Last 1 Encounters:   03/03/17 1353 214 lb (97.1 kg)   03/03/17 0042 229 lb 8 oz (104 kg)   03/02/17 1921 210 lb (95.3 kg)       Physical Examination: By         Physical Exam    General: No acute distress. obese   Skin: Warm and dry, no diaphoresis noted   HEENT: No ptosis;  oral mucosa moist   Neck: Supple; no carotid bruits; no JVD, Trachea mid line   Heart: S1S2  regular rate and rhythm;  Soft sysotlic murmurs; no gallop or rub appreciated, No thrills palpable   Chest: Respirations unlabored at rest, bilateral breath sounds have good air entry; no  wheezes auscultated.     Abdomen: Soft, non-tender, non-distended,obese, positive bowel sounds  ,No aneurysms palpable   Extremities: Bilateral lower extremities have no pre-tibial pitting edema; Radials are palpable   Neurological: Alert and oriented ; no new motor deficits,       Lab Review:  Personally reviewed the labs, radiology imaging and other cardiac procedures.       Results from last 7 days  Lab Units 03/04/17  0538   SODIUM mmol/L 143   POTASSIUM mmol/L 4.0   CHLORIDE mmol/L 108*   TOTAL CO2 mmol/L 20.7*   BUN mg/dL 20   CREATININE mg/dL 0.90   CALCIUM mg/dL 7.9*   BILIRUBIN mg/dL 0.4   ALK PHOS U/L 61   ALT (SGPT) U/L 19   AST (SGOT) U/L 20   GLUCOSE mg/dL 131* "       Results from last 7 days  Lab Units 03/03/17  1901 03/03/17  1040   TROPONIN T ng/mL <0.010 <0.010     @[id number]@    Results from last 7 days  Lab Units 03/04/17  0538 03/03/17  0535 03/02/17  2113   WBC 10*3/mm3 10.93* 13.08* 14.90*   HEMOGLOBIN g/dL 8.3* 9.9* 11.3*   HEMATOCRIT % 25.9* 30.9* 35.1*   PLATELETS 10*3/mm3 229 283 291       Results from last 7 days  Lab Units 03/02/17 2015   INR  1.18*       Estimated Creatinine Clearance: 80.4 mL/min (by C-G formula based on Cr of 0.9).    I personally viewed and interpreted the patient's EKG/Telemetry data    ECG: normal sinus rhythm    Echocardiogram:   Normal EF      Patient Active Problem List   Diagnosis   • Hypertension   • Obesity   • Hyperlipidemia   • Iron deficiency anemia due to chronic blood loss   • Upper GI bleeding       Assessment/ Plan :    Overall current chest pain is mostly because of GI issues. Pain radiates from epigastrium to the back. I will obtain a stress test when she is more stable.    Yaniv Rose MD, [facility]  3/4/71653:23 PM      Patient is personally examined by me. All labs, radiology reports and cardiac procedures are reviewed and or obtained by me. Asessment and plan is by me.-----Yaniv Rose MD

## 2017-03-05 NOTE — PROGRESS NOTES
" DAILY PROGRESS NOTE      CHIEF COMPLAINT:   DOING SAME  STILL WITH KELVIN    HISTORY: The patient is a 51-year-old  female with a history of hypertension, hyperlipidemia, obesity, and asthma, who presented to [facility] Emergency Room with nausea and vomiting, vomiting blood, and also noticed black stools and sometime bright red blood in the stools also. No abdominal pain.  Occasional chest pain. No shortness of breath. No associated symptoms. The patient was evaluated in the ER and admitted for further workup. The patient does take aspirin at home.     PHYSICAL EXAMINATION:Blood pressure 124/73, pulse 73, temperature 96.6 °F (35.9 °C), temperature source Oral, resp. rate 18, height 62\" (157.5 cm), weight 229 lb 4.8 oz (104 kg), SpO2 98 %.    GENERAL: Alert, oriented, in no respiratory distress. No chest pain.   HEENT: Unremarkable.   NECK: Supple.   LUNGS: Decreased breath sounds.   HEART: S1 and S2.   ABDOMEN: Soft, nontender. Bowel sounds positive.   EXTREMITIES: No edema.   NEUROLOGICAL: No focal deficit.     DIAGNOSTIC DATA:  Lab Results (last 24 hours)     Procedure Component Value Units Date/Time    POC Glucose Fingerstick [[id number]]  (Normal) Collected:  03/04/17 1709    Specimen:  Blood Updated:  03/04/17 1711     Glucose 102 mg/dL     Narrative:       Meter: [id number] : [id number] Michael Ferris    Urease For H Pylori [[id number]]  (Abnormal) Collected:  03/03/17 1536    Specimen:  Tissue from Stomach Updated:  03/04/17 1746     Urease Positive (A)     POC Glucose Fingerstick [[id number]]  (Normal) Collected:  03/04/17 2050    Specimen:  Blood Updated:  03/04/17 2052     Glucose 102 mg/dL     Narrative:       Meter: [id number] : [id number] Dean Marks    Urine Culture [[id number]] Collected:  03/04/17 2328    Specimen:  Urine from Urine, Clean Catch Updated:  03/04/17 2343    Urinalysis With / Culture If Indicated [[id number]]  (Abnormal) Collected:  03/04/17 2328    Specimen:  " Urine from Urine, Clean Catch Updated:  03/04/17 2347     Color, UA Yellow      Appearance, UA Cloudy (A)      pH, UA 5.5      Specific Gravity, UA 1.019      Glucose, UA Negative      Ketones, UA Negative      Bilirubin, UA Negative      Blood, UA Large (3+) (A)      Protein, UA Negative      Leuk Esterase, UA Moderate (2+) (A)      Nitrite, UA Negative      Urobilinogen, UA 0.2 E.U./dL     Urinalysis, Microscopic Only [[id number]]  (Abnormal) Collected:  03/04/17 2328    Specimen:  Urine from Urine, Clean Catch Updated:  03/04/17 2347     RBC, UA 6-12 (A) /HPF      WBC, UA 31-50 (A) /HPF      Bacteria, UA 1+ (A) /HPF      Squamous Epithelial Cells, UA 0-2 /HPF      Hyaline Casts, UA 0-2 /LPF      Methodology Automated Microscopy     CBC & Differential [[id number]] Collected:  03/05/17 0604    Specimen:  Blood Updated:  03/05/17 0642    Narrative:       The following orders were created for panel order CBC & Differential.  Procedure                               Abnormality         Status                     ---------                               -----------         ------                     CBC Auto Differential[[id number]]         Abnormal            Final result                 Please view results for these tests on the individual orders.    CBC Auto Differential [[id number]]  (Abnormal) Collected:  03/05/17 0604    Specimen:  Blood Updated:  03/05/17 0642     WBC 10.85 (H) 10*3/mm3      RBC 2.78 (L) 10*6/mm3      Hemoglobin 8.5 (L) g/dL      Hematocrit 26.3 (L) %      MCV 94.6 fL      MCH 30.6 pg      MCHC 32.3 (L) g/dL      RDW 13.2 (H) %      RDW-SD 44.9 fl      MPV 11.4 fL      Platelets 212 10*3/mm3      Neutrophil % 67.3 %      Lymphocyte % 21.8 %      Monocyte % 6.5 %      Eosinophil % 3.5 %      Basophil % 0.3 %      Immature Grans % 0.6 (H) %      Neutrophils, Absolute 7.32 10*3/mm3      Lymphocytes, Absolute 2.36 10*3/mm3      Monocytes, Absolute 0.70 10*3/mm3      Eosinophils, Absolute 0.38 10*3/mm3       Basophils, Absolute 0.03 10*3/mm3      Immature Grans, Absolute 0.06 (H) 10*3/mm3     Basic Metabolic Panel [[id number]]  (Abnormal) Collected:  03/05/17 0604    Specimen:  Blood Updated:  03/05/17 0705     Glucose 131 (H) mg/dL      BUN 13 mg/dL      Creatinine 0.91 mg/dL      Sodium 141 mmol/L      Potassium 3.8 mmol/L      Chloride 106 mmol/L      CO2 20.8 (L) mmol/L      Calcium 8.4 (L) mg/dL      eGFR Non African Amer 65 mL/min/1.73      BUN/Creatinine Ratio 14.3      Anion Gap 14.2 mmol/L     Narrative:       GFR Normal >60  Chronic Kidney Disease <60  Kidney Failure <15    POC Glucose Fingerstick [[id number]]  (Normal) Collected:  03/05/17 0742    Specimen:  Blood Updated:  03/05/17 0743     Glucose 128 mg/dL     Narrative:       Meter: [id number] : [id number] Michael Ferris       Hemoglobin is 9.9, white count 13.0, platelets are 283,000.  Sodium 142, potassium 4.1, BUN 30, creatinine 0.87, blood sugar is .  Acetone negative.  Chest x-ray no active disease. EKG: Sinus rhythm with nonspecific ST-T wave changes and abnormal T wave, could be ischemic.   Imaging Results (last 72 hours)     Procedure Component Value Units Date/Time    XR Chest 2 View [[id number]] Collected:  03/02/17 2008     Updated:  03/02/17 2017    Narrative:       XR CHEST 2 VW-     HISTORY: Female who is 51 years-old,  chest pain     TECHNIQUE: Frontal and lateral views of the chest     COMPARISON: 04/02/2012, 03/20/2012.           FINDINGS: Heart, mediastinum and pulmonary vasculature are unremarkable.  Region of fat density at the right base appears similar to prior exams,  in correlation with the CT from 03/23/2012. No acute infiltrate is  noted. No pleural effusion or pneumothorax. Otherwise stable.       Impression:       No acute infiltrate. No significant change.  Follow-up/further evaluation can be obtained is indications persist.     This report was finalized on 3/2/2017 8:14 PM by Dr. Pan Alexandre MD.           Current  Facility-Administered Medications:   •  amLODIPine (NORVASC) tablet 5 mg, 5 mg, Oral, Q24H, Myron Lopez MD, 5 mg at 03/05/17 0855  •  atorvastatin (LIPITOR) tablet 10 mg, 10 mg, Oral, Nightly, Myron Lopez MD, 10 mg at 03/04/17 2045  •  budesonide-formoterol (SYMBICORT) 160-4.5 MCG/ACT inhaler 2 puff, 2 puff, Inhalation, BID - RT, Myron Lopez MD, 2 puff at 03/05/17 0831  •  escitalopram (LEXAPRO) tablet 10 mg, 10 mg, Oral, Nightly, Myron Lopez MD, 10 mg at 03/04/17 2045  •  Influenza Vac Subunit Quad (FLUCELVAX) injection 0.5 mL, 0.5 mL, Intramuscular, During Hospitalization, Myron Lopez MD  •  lactated ringers infusion, 9 mL/hr, Intravenous, Continuous PRN, Ousmane Vergara MD, Stopped at 03/04/17 1414  •  losartan (COZAAR) tablet 50 mg, 50 mg, Oral, Daily, Myron Lopez MD, 50 mg at 03/05/17 0855  •  montelukast (SINGULAIR) tablet 10 mg, 10 mg, Oral, Daily, Myron Lopez MD, 10 mg at 03/05/17 0855  •  ondansetron (ZOFRAN) injection 4 mg, 4 mg, Intravenous, Q6H PRN, Myron Lopez MD, 4 mg at 03/05/17 0039  •  pantoprazole (PROTONIX) injection 40 mg, 40 mg, Intravenous, Q12H, Myron Lopez MD, 40 mg at 03/05/17 0856  •  Insert peripheral IV, , , Once **AND** sodium chloride 0.9 % flush 10 mL, 10 mL, Intravenous, PRN, Nadir Bush MD       ASSESSMENT:  1. Hematemesis. /DU S/P EGD UREASE POSITIVE PER GI  2. Chest pain, rule out acute coronary syndrome. PER CARD NEED STRESS TEST  3. Hypertension.   4. Hyperlipidemia.   5. Asthma.     PLAN:  1. CPM  2. OFF aspirin.   3.  Protonix.   4. GI consult. NOTED  5. IV fluids. STOP  6. Stress ulcer/DVT prophylaxis   7. Cardiology INPUT APPRECIATED  8. Repeat EKG and cardiac enzymes.  9. Echocardiogram. PENDING  10. Follow closely. Further recommendations according to hospital course.     Myron Lopez M.D.

## 2017-03-05 NOTE — NURSING NOTE
"Dr. Lopez called to RN to notify pt positive for H Pylori, GI called, Dr. Perkins on call for Dr. Vergara.  Dr. Perkins stated, \"this can be treated outpatient.\"   "

## 2017-03-06 ENCOUNTER — APPOINTMENT (OUTPATIENT)
Dept: NUCLEAR MEDICINE | Facility: HOSPITAL | Age: 52
End: 2017-03-06

## 2017-03-06 LAB
ANION GAP SERPL CALCULATED.3IONS-SCNC: 17.2 MMOL/L
BASOPHILS # BLD AUTO: 0.05 10*3/MM3 (ref 0–0.2)
BASOPHILS NFR BLD AUTO: 0.5 % (ref 0–1.5)
BH CV STRESS COMMENTS STAGE 1: NORMAL
BH CV STRESS DOSE REGADENOSON STAGE 1: 0.4
BH CV STRESS DURATION MIN STAGE 1: 0
BH CV STRESS DURATION SEC STAGE 1: 15
BH CV STRESS PROTOCOL 1: NORMAL
BH CV STRESS RECOVERY BP: NORMAL MMHG
BH CV STRESS RECOVERY HR: 78 BPM
BH CV STRESS STAGE 1: 1
BUN BLD-MCNC: 13 MG/DL (ref 6–20)
BUN/CREAT SERPL: 13.1 (ref 7–25)
CALCIUM SPEC-SCNC: 8.3 MG/DL (ref 8.6–10.5)
CHLORIDE SERPL-SCNC: 106 MMOL/L (ref 98–107)
CO2 SERPL-SCNC: 17.8 MMOL/L (ref 22–29)
CREAT BLD-MCNC: 0.99 MG/DL (ref 0.57–1)
DEPRECATED RDW RBC AUTO: 44.3 FL (ref 37–54)
EOSINOPHIL # BLD AUTO: 0.33 10*3/MM3 (ref 0–0.7)
EOSINOPHIL NFR BLD AUTO: 3.1 % (ref 0.3–6.2)
ERYTHROCYTE [DISTWIDTH] IN BLOOD BY AUTOMATED COUNT: 13.2 % (ref 11.7–13)
GFR SERPL CREATININE-BSD FRML MDRD: 59 ML/MIN/1.73
GLUCOSE BLD-MCNC: 129 MG/DL (ref 65–99)
GLUCOSE BLDC GLUCOMTR-MCNC: 149 MG/DL (ref 70–130)
HCT VFR BLD AUTO: 26.1 % (ref 35.6–45.5)
HGB BLD-MCNC: 8.3 G/DL (ref 11.9–15.5)
IMM GRANULOCYTES # BLD: 0.07 10*3/MM3 (ref 0–0.03)
IMM GRANULOCYTES NFR BLD: 0.7 % (ref 0–0.5)
LYMPHOCYTES # BLD AUTO: 2.34 10*3/MM3 (ref 0.9–4.8)
LYMPHOCYTES NFR BLD AUTO: 22.2 % (ref 19.6–45.3)
MAXIMAL PREDICTED HEART RATE: 169 BPM
MCH RBC QN AUTO: 29.7 PG (ref 26.9–32)
MCHC RBC AUTO-ENTMCNC: 31.8 G/DL (ref 32.4–36.3)
MCV RBC AUTO: 93.5 FL (ref 80.5–98.2)
MONOCYTES # BLD AUTO: 0.69 10*3/MM3 (ref 0.2–1.2)
MONOCYTES NFR BLD AUTO: 6.6 % (ref 5–12)
NEUTROPHILS # BLD AUTO: 7.04 10*3/MM3 (ref 1.9–8.1)
NEUTROPHILS NFR BLD AUTO: 66.9 % (ref 42.7–76)
NRBC BLD MANUAL-RTO: 0.7 /100 WBC (ref 0–0)
PERCENT MAX PREDICTED HR: 47.93 %
PLATELET # BLD AUTO: 233 10*3/MM3 (ref 140–500)
PMV BLD AUTO: 11.6 FL (ref 6–12)
POTASSIUM BLD-SCNC: 4.1 MMOL/L (ref 3.5–5.2)
RBC # BLD AUTO: 2.79 10*6/MM3 (ref 3.9–5.2)
SODIUM BLD-SCNC: 141 MMOL/L (ref 136–145)
STRESS BASELINE BP: NORMAL MMHG
STRESS BASELINE HR: 77 BPM
STRESS PERCENT HR: 56 %
STRESS POST PEAK BP: NORMAL MMHG
STRESS POST PEAK HR: 81 BPM
STRESS TARGET HR: 144 BPM
WBC NRBC COR # BLD: 10.52 10*3/MM3 (ref 4.5–10.7)

## 2017-03-06 PROCEDURE — 25010000002 REGADENOSON 0.4 MG/5ML SOLUTION: Performed by: HOSPITALIST

## 2017-03-06 PROCEDURE — 78452 HT MUSCLE IMAGE SPECT MULT: CPT | Performed by: INTERNAL MEDICINE

## 2017-03-06 PROCEDURE — A9500 TC99M SESTAMIBI: HCPCS | Performed by: HOSPITALIST

## 2017-03-06 PROCEDURE — 99231 SBSQ HOSP IP/OBS SF/LOW 25: CPT | Performed by: INTERNAL MEDICINE

## 2017-03-06 PROCEDURE — 78452 HT MUSCLE IMAGE SPECT MULT: CPT

## 2017-03-06 PROCEDURE — 82962 GLUCOSE BLOOD TEST: CPT

## 2017-03-06 PROCEDURE — 0 TECHNETIUM SESTAMIBI: Performed by: HOSPITALIST

## 2017-03-06 PROCEDURE — 0 TECHNETIUM SESTAMIBI: Performed by: INTERNAL MEDICINE

## 2017-03-06 PROCEDURE — 80048 BASIC METABOLIC PNL TOTAL CA: CPT | Performed by: HOSPITALIST

## 2017-03-06 PROCEDURE — 93018 CV STRESS TEST I&R ONLY: CPT | Performed by: INTERNAL MEDICINE

## 2017-03-06 PROCEDURE — 93017 CV STRESS TEST TRACING ONLY: CPT

## 2017-03-06 PROCEDURE — 94799 UNLISTED PULMONARY SVC/PX: CPT

## 2017-03-06 PROCEDURE — 93016 CV STRESS TEST SUPVJ ONLY: CPT | Performed by: INTERNAL MEDICINE

## 2017-03-06 PROCEDURE — A9500 TC99M SESTAMIBI: HCPCS | Performed by: INTERNAL MEDICINE

## 2017-03-06 PROCEDURE — 85025 COMPLETE CBC W/AUTO DIFF WBC: CPT | Performed by: HOSPITALIST

## 2017-03-06 PROCEDURE — 99232 SBSQ HOSP IP/OBS MODERATE 35: CPT | Performed by: INTERNAL MEDICINE

## 2017-03-06 RX ORDER — CLARITHROMYCIN 500 MG/1
500 TABLET, COATED ORAL EVERY 12 HOURS SCHEDULED
Status: DISCONTINUED | OUTPATIENT
Start: 2017-03-06 | End: 2017-03-07 | Stop reason: HOSPADM

## 2017-03-06 RX ORDER — AMOXICILLIN 875 MG/1
875 TABLET, COATED ORAL EVERY 12 HOURS SCHEDULED
Status: DISCONTINUED | OUTPATIENT
Start: 2017-03-06 | End: 2017-03-07 | Stop reason: HOSPADM

## 2017-03-06 RX ADMIN — Medication 1 DOSE: at 06:40

## 2017-03-06 RX ADMIN — AMLODIPINE BESYLATE 5 MG: 5 TABLET ORAL at 12:30

## 2017-03-06 RX ADMIN — AMOXICILLIN 875 MG: 875 TABLET, FILM COATED ORAL at 17:00

## 2017-03-06 RX ADMIN — CLARITHROMYCIN 500 MG: 500 TABLET, FILM COATED ORAL at 20:55

## 2017-03-06 RX ADMIN — PANTOPRAZOLE SODIUM 40 MG: 40 TABLET, DELAYED RELEASE ORAL at 12:31

## 2017-03-06 RX ADMIN — ESCITALOPRAM 10 MG: 10 TABLET, FILM COATED ORAL at 20:56

## 2017-03-06 RX ADMIN — ATORVASTATIN CALCIUM 10 MG: 10 TABLET, FILM COATED ORAL at 20:56

## 2017-03-06 RX ADMIN — REGADENOSON 0.4 MG: 0.08 INJECTION, SOLUTION INTRAVENOUS at 12:15

## 2017-03-06 RX ADMIN — BUDESONIDE AND FORMOTEROL FUMARATE DIHYDRATE 2 PUFF: 160; 4.5 AEROSOL RESPIRATORY (INHALATION) at 07:51

## 2017-03-06 RX ADMIN — AMOXICILLIN 875 MG: 875 TABLET, FILM COATED ORAL at 20:56

## 2017-03-06 RX ADMIN — CLARITHROMYCIN 500 MG: 500 TABLET, FILM COATED ORAL at 17:00

## 2017-03-06 RX ADMIN — MONTELUKAST 10 MG: 10 TABLET, FILM COATED ORAL at 12:30

## 2017-03-06 RX ADMIN — LOSARTAN POTASSIUM 50 MG: 50 TABLET, FILM COATED ORAL at 12:30

## 2017-03-06 RX ADMIN — BUDESONIDE AND FORMOTEROL FUMARATE DIHYDRATE 2 PUFF: 160; 4.5 AEROSOL RESPIRATORY (INHALATION) at 20:41

## 2017-03-06 RX ADMIN — PANTOPRAZOLE SODIUM 40 MG: 40 TABLET, DELAYED RELEASE ORAL at 19:06

## 2017-03-06 RX ADMIN — Medication 1 DOSE: at 12:15

## 2017-03-06 NOTE — PROGRESS NOTES
Discharge Planning Assessment  [facility]     Patient Name: Flaca Hassan  MRN: [id number]  Today's Date: 3/6/2017    Admit Date: 3/2/2017          Discharge Needs Assessment       03/06/17 0835    Living Environment    Lives With sibling(s)   lives with brotherTaty     Living Arrangements apartment    Quality Of Family Relationships supportive    Able to Return to Prior Living Arrangements yes    Discharge Needs Assessment    Concerns To Be Addressed discharge planning concerns   Patient concerned that she has become increasingly weak.    Equipment Currently Used at Home oxygen;respiratory supplies   Patient reports that she has home o2 and c-pap from [facility]    Transportation Available car;family or friend will provide    Discharge Disposition still a patient    Discharge Contact Information if Applicable brother, Taty Hassan ( [phone])    Discharge Planning Comments Patient concerned about increasing weakness.  Will obtain PT eval.  Patient provided RTR and NH/HH list.  Will follow.             Discharge Plan       03/06/17 0823    Case Management/Social Work Plan    Plan Disposition unknown    Patient/Family In Agreement With Plan yes    Additional Comments IMM letter signed.  Facesheet verified.  Spoke with patient in room.  Introduced self and explained role. Patient lives in a 1st floor apartment with her brother, Taty Hassan ( [phone]).  Patient IADLS prior to admit, but admits that she has become increasingly weak this hospitalization.  Note left  for MD to request PT eval.  Patient normally IADLS, but sister in law does assist with bathing if she isn't felling well.  Patient has a c-pap and home o2 from [facility].  She does not have a HH or SNU history.  Due to complaints of weakness, patient was provided with RTR and NH/HH list.  Explained Medicare benefits and qualifying inpatient stay for SNU. CCP will  continue to follow for disposition.         Discharge Placement      No information found                Demographic Summary       03/06/17 0832    Referral Information    Admission Type inpatient    Arrived From admitted as an inpatient    Reason For Consult discharge planning    Contact Information    Permission Granted to Share Information With family/designee    Primary Care Physician Information    Name DR ENID KAUR            Functional Status       03/06/17 0832    Functional Status Current    Ambulation 2-->assistive person    Transferring 2-->assistive person    Toileting 2-->assistive person    Bathing 2-->assistive person    Dressing 2-->assistive person    Eating 0-->independent    Communication 0-->understands/communicates without difficulty    Swallowing (if score 2 or more for any item, consult Rehab Services) 0-->swallows foods/liquids without difficulty    Change in Functional Status Since Onset of Current Illness/Injury yes   Patient reports that she has become increasingly weak and is having difficulty ambulating to the bathroom.  Will ask MD for PT marilou.     Functional Status Prior    Ambulation 0-->independent    Transferring 0-->independent    Toileting 0-->independent    Bathing 0-->independent   Reports that sometimes her sister in law assists her if she isn't felling well.     Dressing 0-->independent    Eating 0-->independent    Communication 0-->understands/communicates without difficulty    Swallowing 0-->swallows foods/liquids without difficulty    Activity Tolerance    Current Activity Limitations none    Usual Activity Tolerance moderate    Current Activity Tolerance poor    Cognitive/Perceptual/Developmental    Current Mental Status/Cognitive Functioning no deficits noted    Recent Changes in Mental Status/Cognitive Functioning no changes            Psychosocial     None            Abuse/Neglect     None            Legal     None            Substance Abuse     None            Patient Forms     None          Radha Evans RN

## 2017-03-06 NOTE — PROGRESS NOTES
[facility]/GI Progress Note   Chief Complaint:  Upper GI bleed    Subjective     Interval History:   EGD on 3/3/17 with DU and HP +.     Brown stool this am but patient states she had black stools yesterday and last night. RN states there was no report of melena in shift hand off. Pt c/o lower abdominal pain.     History taken from: patient chart RN    Review of Systems:    per HPI    Objective     Vital Signs  Temp:  [96.6 °F (35.9 °C)-98.6 °F (37 °C)] 98.6 °F (37 °C)  Heart Rate:  [70-87] 81  Resp:  [18] 18  BP: (124-141)/(72-79) 141/79  Body mass index is 41.94 kg/(m^2).  No intake or output data in the 24 hours ending 03/06/17 0829       Physical Exam:   General: patient awake, alert and cooperative   Eyes: Normal lids and lashes, no scleral icterus, no conjunctival pallor   Neck: supple, normal ROM, no tracheal deviation, no thyromegaly   Skin: warm and dry, not jaundiced   Cardiovascular: regular rhythm and rate, no murmurs auscultated   Pulm: clear to auscultation bilaterally, regular and unlabored   Abdomen: morbidly obese, soft, Mild generalized tender, nondistended; normal bowel sounds   Rectal: deferred   Extremities: no rash or edema   Neurologic: Normal mood and behavior    All Medications Have Been Reviewed     Results Review:       Results from last 7 days  Lab Units 03/06/17 0541 03/05/17 0604 03/04/17  0538   WBC 10*3/mm3 10.52 10.85* 10.93*   HEMOGLOBIN g/dL 8.3* 8.5* 8.3*   HEMATOCRIT % 26.1* 26.3* 25.9*   PLATELETS 10*3/mm3 233 212 229       Results from last 7 days  Lab Units 03/06/17 0541 03/05/17  0604 03/04/17 0538  03/02/17 2015   SODIUM mmol/L 141 141 143  < > 140   POTASSIUM mmol/L 4.1 3.8 4.0  < > 4.7   CHLORIDE mmol/L 106 106 108*  < > 105   TOTAL CO2 mmol/L 17.8* 20.8* 20.7*  < > 22.3   BUN mg/dL 13 13 20  < > 41*   CREATININE mg/dL 0.99 0.91 0.90  < > 0.87   CALCIUM mg/dL 8.3* 8.4* 7.9*  < > 8.7   BILIRUBIN mg/dL  --   --  0.4  --  0.4   ALK PHOS U/L  --   --  61  --  78   ALT  (SGPT) U/L  --   --  19  --  18   AST (SGOT) U/L  --   --  20  --  16   GLUCOSE mg/dL 129* 131* 131*  < > 158*   < > = values in this interval not displayed.      Results from last 7 days  Lab Units 03/02/17 2015   INR  1.18*     RADIOLOGY:    Imaging Results (last 72 hours)     ** No results found for the last 72 hours. **        Assessment/Plan     Patient Active Problem List   Diagnosis Code   • Hypertension I10   • Obesity E66.9   • Hyperlipidemia E78.5   • Iron deficiency anemia due to chronic blood loss D50.0   • Upper GI bleeding K92.2       Vanessa Leong, MERRITT  03/06/17  8:29 AM    1. Recurrent GI bleed- DU s/p heater probe. PPI BID. Path pending. Conflicting reports of continued melena.  BUN normalized and Hb stable.    2. H.pylori positive- upon discharge recommend amoxicillin 1gm bid and clarithromycin 500mg bid x 10 days, in conjuction with PPI therapy   3. Chest pain- cards w/u in progress  4. Anemia- low but stable    F/u appt 4/17/17 at 830 with Ruth Ann Willson MD

## 2017-03-06 NOTE — PROGRESS NOTES
[facility]  Cardiology Progress Note    Patient Identification:  Name:Flaca Hassan  Age:51 y.o.  Sex: female  :  1965  MRN: [id number]             Length of Stay: 3    Follow up for:  Chest pain    Interval History :      EGD showed gastric ucler and patient complains of chest pains that have both typical and atypical features. Echo shows normal LVEF. Once she is a bit better I would obtain cardiolyte stress test. She is still having some dark melanotic stools    HPI     Admitted with GI bleed and noted to have peptic ulcer disease    The following portions of the patient's history were reviewed and updated as appropriate: allergies, current medications, past family history, past medical history, past social history, past surgical history and problem list.  Past Medical History   Diagnosis Date   • Asthma    • Breast cyst    • Diabetes mellitus    • Diverticulosis    • Hyperlipidemia    • Hypertension    • Irritable bowel syndrome    • Melena    • Obesity        Scheduled Meds:    Current Facility-Administered Medications:   •  amLODIPine (NORVASC) tablet 5 mg, 5 mg, Oral, Q24H, Myron Lopez MD, 5 mg at 17  •  atorvastatin (LIPITOR) tablet 10 mg, 10 mg, Oral, Nightly, Myron Lopez MD, 10 mg at 17  •  budesonide-formoterol (SYMBICORT) 160-4.5 MCG/ACT inhaler 2 puff, 2 puff, Inhalation, BID - RT, Myron Lopez MD, 2 puff at 17 0831  •  escitalopram (LEXAPRO) tablet 10 mg, 10 mg, Oral, Nightly, Myron Lopez MD, 10 mg at 17  •  Influenza Vac Subunit Quad (FLUCELVAX) injection 0.5 mL, 0.5 mL, Intramuscular, During Hospitalization, Myron Lopez MD  •  lactated ringers infusion, 9 mL/hr, Intravenous, Continuous PRN, Ousmane Vergara MD, Stopped at 17 1414  •  losartan (COZAAR) tablet 50 mg, 50 mg, Oral, Daily, Myron Lopez MD, 50 mg at 17 0855  •  montelukast (SINGULAIR) tablet 10 mg, 10 mg, Oral, Daily, Myron Lopez MD, 10 mg  "at 03/05/17 0855  •  ondansetron (ZOFRAN) injection 4 mg, 4 mg, Intravenous, Q6H PRN, Myron Lopez MD, 4 mg at 03/05/17 0039  •  pantoprazole (PROTONIX) EC tablet 40 mg, 40 mg, Oral, BID AC, Myron Lopez MD, 40 mg at 03/05/17 1820  •  Insert peripheral IV, , , Once **AND** sodium chloride 0.9 % flush 10 mL, 10 mL, Intravenous, PRN, Nadir Bush MD  •  temazepam (RESTORIL) capsule 7.5 mg, 7.5 mg, Oral, Nightly PRN, Myron Lopez MD    All systems were reviewed and negative except for:  Gastrointestinal: postitive for  diarrhea    Visit Vitals   • /72 (BP Location: Left arm, Patient Position: Sitting)   • Pulse 87   • Temp 98.6 °F (37 °C) (Oral)   • Resp 18   • Ht 62\" (157.5 cm)   • Wt 229 lb 4.8 oz (104 kg)   • LMP Comment: post menopausal   • SpO2 98%   • BMI 41.94 kg/m2          Intake/Output Summary (Last 24 hours) at 03/05/17 2201  Last data filed at 03/04/17 2359   Gross per 24 hour   Intake      0 ml   Output    300 ml   Net   -300 ml          Wt Readings from Last 1 Encounters:   03/04/17 2359 229 lb 4.8 oz (104 kg)   03/03/17 1353 214 lb (97.1 kg)   03/03/17 0042 229 lb 8 oz (104 kg)   03/02/17 1921 210 lb (95.3 kg)       Physical Examination: By   Physical Exam    General: No acute distress. obese   Skin: Warm and dry, no diaphoresis noted   HEENT: No ptosis;  oral mucosa moist   Neck: Supple; no carotid bruits; no JVD, Trachea mid line   Heart: S1S2  regular rate and rhythm;  Soft sysotlic murmurs; no gallop or rub appreciated, No thrills palpable   Chest: Respirations unlabored at rest, bilateral breath sounds have good air entry; no  wheezes auscultated.     Abdomen: Soft, non-tender, non-distended,obese, positive bowel sounds  ,No aneurysms palpable   Extremities: Bilateral lower extremities have no pre-tibial pitting edema; Radials are palpable   Neurological: Alert and oriented ; no new motor deficits,       Lab Review:  Personally reviewed the labs, radiology imaging and other " cardiac procedures.         Results from last 7 days  Lab Units 03/05/17  0604 03/04/17  0538   SODIUM mmol/L 141 143   POTASSIUM mmol/L 3.8 4.0   CHLORIDE mmol/L 106 108*   TOTAL CO2 mmol/L 20.8* 20.7*   BUN mg/dL 13 20   CREATININE mg/dL 0.91 0.90   CALCIUM mg/dL 8.4* 7.9*   BILIRUBIN mg/dL  --  0.4   ALK PHOS U/L  --  61   ALT (SGPT) U/L  --  19   AST (SGOT) U/L  --  20   GLUCOSE mg/dL 131* 131*       Results from last 7 days  Lab Units 03/03/17  1901 03/03/17  1040   TROPONIN T ng/mL <0.010 <0.010     @[id number]@    Results from last 7 days  Lab Units 03/05/17  0604 03/04/17  0538 03/03/17  0535   WBC 10*3/mm3 10.85* 10.93* 13.08*   HEMOGLOBIN g/dL 8.5* 8.3* 9.9*   HEMATOCRIT % 26.3* 25.9* 30.9*   PLATELETS 10*3/mm3 212 229 283       Results from last 7 days  Lab Units 03/02/17 2015   INR  1.18*       Estimated Creatinine Clearance: 82.8 mL/min (by C-G formula based on Cr of 0.91).    I personally viewed and interpreted the patient's EKG/Telemetry data    ECG: normal sinus rhythm    Echocardiogram:   Normal EF      Patient Active Problem List   Diagnosis   • Hypertension   • Obesity   • Hyperlipidemia   • Iron deficiency anemia due to chronic blood loss   • Upper GI bleeding       Assessment/ Plan :    Overall current chest pain is mostly because of GI issues. Pain radiates from epigastrium to the back. I will obtain a stress test in AM    Yaniv Rose MD, [facility]  3/5/39557:23 PM      Patient is personally examined by me. All labs, radiology reports and cardiac procedures are reviewed and or obtained by me. Asessment and plan is by me.-----Yaniv Rose MD

## 2017-03-06 NOTE — PROGRESS NOTES
GI Daily Progress Note    Assessment/Plan:    Active Problems:    Upper GI bleeding       LOS: 3 days     Flaca Hassan is a 51 y.o. female who was admitted with UGIB. She reports his symptoms are improving with treatment. Pt in bed eating dinner some SOA but o/w no complaints. Reviewed hr EGD with DU and was HP positive on Urease    Subjective:    Patient expresses no GI complaints  Patient denies abdominal pain and bloody stools    Objective:    Vital signs in last 24 hours:  Temp:  [96.6 °F (35.9 °C)-98.8 °F (37.1 °C)] 98.4 °F (36.9 °C)  Heart Rate:  [70-83] 82  Resp:  [18] 18  BP: (124-140)/(63-76) 133/76    Intake/Output last 3 shifts:  I/O last 3 completed shifts:  In: -   Out: 300 [Urine:300]  Intake/Output this shift:        Results from last 7 days  Lab Units 03/05/17  0604 03/04/17  0538 03/03/17  0535   WBC 10*3/mm3 10.85* 10.93* 13.08*   HEMOGLOBIN g/dL 8.5* 8.3* 9.9*   HEMATOCRIT % 26.3* 25.9* 30.9*   PLATELETS 10*3/mm3 212 229 283       Results from last 7 days  Lab Units 03/05/17  0604   SODIUM mmol/L 141   POTASSIUM mmol/L 3.8   CHLORIDE mmol/L 106   TOTAL CO2 mmol/L 20.8*   BUN mg/dL 13   CREATININE mg/dL 0.91   GLUCOSE mg/dL 131*   CALCIUM mg/dL 8.4*       Physical Exam:Abdomen  Sounds Normal Active Bowel Sounds   Distension  Obese   Tenderness Nontender     Bleeding DU-S/P heater probe  CP  Asthma    Cardio w/u planned but if possible would hold asa for 4 weeks, would treat with PPI only for now and address HP, 2 weeks of 2 abx in about 6 weeks. Has seen Akua in the past but appears to like whomever she is talking to?  [facility] to check on and set up f/u

## 2017-03-06 NOTE — PROGRESS NOTES
" DAILY PROGRESS NOTE      CHIEF COMPLAINT:   DOING SAME  STILL WITH KELVIN    HISTORY: The patient is a 51-year-old  female with a history of hypertension, hyperlipidemia, obesity, and asthma, who presented to [facility] Emergency Room with nausea and vomiting, vomiting blood, and also noticed black stools and sometime bright red blood in the stools also. No abdominal pain.  Occasional chest pain. No shortness of breath. No associated symptoms. The patient was evaluated in the ER and admitted for further workup. The patient does take aspirin at home.     PHYSICAL EXAMINATION:Blood pressure 141/79, pulse 84, temperature 98.3 °F (36.8 °C), temperature source Oral, resp. rate 19, height 62\" (157.5 cm), weight 229 lb 4.8 oz (104 kg), SpO2 96 %.    GENERAL: Alert, oriented, in no respiratory distress. No chest pain.   HEENT: Unremarkable.   NECK: Supple.   LUNGS: Decreased breath sounds.   HEART: S1 and S2.   ABDOMEN: Soft, nontender. Bowel sounds positive.   EXTREMITIES: No edema.   NEUROLOGICAL: No focal deficit.     DIAGNOSTIC DATA:  Lab Results (last 24 hours)     Procedure Component Value Units Date/Time    CBC & Differential [[id number]] Collected:  03/06/17 0541    Specimen:  Blood Updated:  03/06/17 0609    Narrative:       The following orders were created for panel order CBC & Differential.  Procedure                               Abnormality         Status                     ---------                               -----------         ------                     CBC Auto Differential[[id number]]         Abnormal            Final result                 Please view results for these tests on the individual orders.    CBC Auto Differential [[id number]]  (Abnormal) Collected:  03/06/17 0541    Specimen:  Blood from Hand, Right Updated:  03/06/17 0609     WBC 10.52 10*3/mm3      RBC 2.79 (L) 10*6/mm3      Hemoglobin 8.3 (L) g/dL      Hematocrit 26.1 (L) %      MCV 93.5 fL      MCH 29.7 pg      MCHC " 31.8 (L) g/dL      RDW 13.2 (H) %      RDW-SD 44.3 fl      MPV 11.6 fL      Platelets 233 10*3/mm3      Neutrophil % 66.9 %      Lymphocyte % 22.2 %      Monocyte % 6.6 %      Eosinophil % 3.1 %      Basophil % 0.5 %      Immature Grans % 0.7 (H) %      Neutrophils, Absolute 7.04 10*3/mm3      Lymphocytes, Absolute 2.34 10*3/mm3      Monocytes, Absolute 0.69 10*3/mm3      Eosinophils, Absolute 0.33 10*3/mm3      Basophils, Absolute 0.05 10*3/mm3      Immature Grans, Absolute 0.07 (H) 10*3/mm3      nRBC 0.7 (H) /100 WBC     Basic Metabolic Panel [[id number]]  (Abnormal) Collected:  03/06/17 0541    Specimen:  Blood Updated:  03/06/17 0637     Glucose 129 (H) mg/dL      BUN 13 mg/dL      Creatinine 0.99 mg/dL      Sodium 141 mmol/L      Potassium 4.1 mmol/L      Chloride 106 mmol/L      CO2 17.8 (L) mmol/L      Calcium 8.3 (L) mg/dL      eGFR Non African Amer 59 (L) mL/min/1.73      BUN/Creatinine Ratio 13.1      Anion Gap 17.2 mmol/L     Narrative:       GFR Normal >60  Chronic Kidney Disease <60  Kidney Failure <15    Urine Culture [[id number]]  (Abnormal) Collected:  03/04/17 2328    Specimen:  Urine from Urine, Clean Catch Updated:  03/06/17 0658     Urine Culture >100,000 CFU/mL Gram Negative Bacilli (A)        Hemoglobin is 9.9, white count 13.0, platelets are 283,000.  Sodium 142, potassium 4.1, BUN 30, creatinine 0.87, blood sugar is .  Acetone negative.  Chest x-ray no active disease. EKG: Sinus rhythm with nonspecific ST-T wave changes and abnormal T wave, could be ischemic.   Imaging Results (last 72 hours)     Procedure Component Value Units Date/Time    XR Chest 2 View [[id number]] Collected:  03/02/17 2008     Updated:  03/02/17 2017    Narrative:       XR CHEST 2 VW-     HISTORY: Female who is 51 years-old,  chest pain     TECHNIQUE: Frontal and lateral views of the chest     COMPARISON: 04/02/2012, 03/20/2012.           FINDINGS: Heart, mediastinum and pulmonary vasculature are unremarkable.  Region of  fat density at the right base appears similar to prior exams,  in correlation with the CT from 03/23/2012. No acute infiltrate is  noted. No pleural effusion or pneumothorax. Otherwise stable.       Impression:       No acute infiltrate. No significant change.  Follow-up/further evaluation can be obtained is indications persist.     This report was finalized on 3/2/2017 8:14 PM by Dr. Pan Alexandre MD.           Current Facility-Administered Medications:   •  amLODIPine (NORVASC) tablet 5 mg, 5 mg, Oral, Q24H, Myron Lopez MD, 5 mg at 03/06/17 1230  •  atorvastatin (LIPITOR) tablet 10 mg, 10 mg, Oral, Nightly, Myron Lopez MD, 10 mg at 03/05/17 2046  •  budesonide-formoterol (SYMBICORT) 160-4.5 MCG/ACT inhaler 2 puff, 2 puff, Inhalation, BID - RT, Myron Lopez MD, 2 puff at 03/06/17 0751  •  escitalopram (LEXAPRO) tablet 10 mg, 10 mg, Oral, Nightly, Myron Lopez MD, 10 mg at 03/05/17 2046  •  Influenza Vac Subunit Quad (FLUCELVAX) injection 0.5 mL, 0.5 mL, Intramuscular, During Hospitalization, Myron Lopez MD  •  lactated ringers infusion, 9 mL/hr, Intravenous, Continuous PRN, Ousmane Vergara MD, Stopped at 03/04/17 1414  •  losartan (COZAAR) tablet 50 mg, 50 mg, Oral, Daily, Myron Lopez MD, 50 mg at 03/06/17 1230  •  montelukast (SINGULAIR) tablet 10 mg, 10 mg, Oral, Daily, Myron Lopez MD, 10 mg at 03/06/17 1230  •  ondansetron (ZOFRAN) injection 4 mg, 4 mg, Intravenous, Q6H PRN, Myron Lopez MD, 4 mg at 03/05/17 0039  •  pantoprazole (PROTONIX) EC tablet 40 mg, 40 mg, Oral, BID AC, Myron Lopez MD, 40 mg at 03/06/17 1231  •  Insert peripheral IV, , , Once **AND** sodium chloride 0.9 % flush 10 mL, 10 mL, Intravenous, PRN, Nadir Bush MD  •  temazepam (RESTORIL) capsule 7.5 mg, 7.5 mg, Oral, Nightly PRN, Myron Lopez MD, 7.5 mg at 03/05/17 1028       ASSESSMENT:  1. Hematemesis. /DU S/P EGD UREASE POSITIVE PER GI  2. Chest pain, rule out acute coronary syndrome. PER CARD NEED STRESS TEST  3. Hypertension.    4. Hyperlipidemia.   5. Asthma.     PLAN:  1. CPM  2. OFF aspirin.   3.  PPI/AMOXICILLIN/BIAXIN  4. STRESS TEST TODAY  5. IV fluids. STOP  6. Stress ulcer/DVT prophylaxis   7. Cardiology INPUT APPRECIATED  8. ACTIVITY AS TOLERATED  9. Echocardiogram. NOTED  10. HOME IN AM    Myron Lopez M.D.

## 2017-03-06 NOTE — PLAN OF CARE
Problem: Patient Care Overview (Adult)  Goal: Plan of Care Review  Outcome: Ongoing (interventions implemented as appropriate)    03/06/17 6144   Coping/Psychosocial Response Interventions   Plan Of Care Reviewed With patient   Patient Care Overview   Progress improving   Outcome Evaluation   Outcome Summary/Follow up Plan pt had 2 bm today dark brown in color no blood noted in the stool. Pt had stress test day. plan to d/c in the am       Goal: Adult Individualization and Mutuality  Outcome: Ongoing (interventions implemented as appropriate)  Goal: Discharge Needs Assessment  Outcome: Ongoing (interventions implemented as appropriate)    Problem: Gastrointestinal Bleeding (Adult)  Goal: Signs and Symptoms of Listed Potential Problems Will be Absent or Manageable (Gastrointestinal Bleeding)  Outcome: Ongoing (interventions implemented as appropriate)    Problem: Pain, Acute (Adult)  Goal: Identify Related Risk Factors and Signs and Symptoms  Outcome: Ongoing (interventions implemented as appropriate)  Goal: Acceptable Pain Control/Comfort Level  Outcome: Ongoing (interventions implemented as appropriate)    Problem: Fall Risk (Adult)  Goal: Absence of Falls  Outcome: Ongoing (interventions implemented as appropriate)

## 2017-03-06 NOTE — PROGRESS NOTES
[facility]  Cardiology Progress Note    Patient Identification:  Name:Flaca Hassan  Age:51 y.o.  Sex: female  :  1965  MRN: [id number]             Length of Stay: 4    Follow up for:  Chest pain    Interval History :    She underwent a cardiolite stress test today to evaluate her cp    EGD showed gastric ucler and patient complains of chest pains that have both typical and atypical features. Echo shows normal LVEF.     She is still having some dark melanotic stools    HPI     Admitted with GI bleed and noted to have peptic ulcer disease    The following portions of the patient's history were reviewed and updated as appropriate: allergies, current medications, past family history, past medical history, past social history, past surgical history and problem list.  Past Medical History   Diagnosis Date   • Asthma    • Breast cyst    • Diabetes mellitus    • Diverticulosis    • Hyperlipidemia    • Hypertension    • Irritable bowel syndrome    • Melena    • Obesity        Scheduled Meds:    Current Facility-Administered Medications:   •  amLODIPine (NORVASC) tablet 5 mg, 5 mg, Oral, Q24H, Myron Lopez MD, 5 mg at 17 1230  •  amoxicillin (AMOXIL) tablet 875 mg, 875 mg, Oral, Q12H, Myron Lopez MD  •  atorvastatin (LIPITOR) tablet 10 mg, 10 mg, Oral, Nightly, Myron Lopez MD, 10 mg at 17  •  budesonide-formoterol (SYMBICORT) 160-4.5 MCG/ACT inhaler 2 puff, 2 puff, Inhalation, BID - RT, Myron Lopez MD, 2 puff at 17 0751  •  clarithromycin (BIAXIN) tablet 500 mg, 500 mg, Oral, Q12H, Myron Lopez MD  •  escitalopram (LEXAPRO) tablet 10 mg, 10 mg, Oral, Nightly, Myron Lopez MD, 10 mg at 17  •  Influenza Vac Subunit Quad (FLUCELVAX) injection 0.5 mL, 0.5 mL, Intramuscular, During Hospitalization, Myron Lopez MD  •  lactated ringers infusion, 9 mL/hr, Intravenous, Continuous PRN, Ousmane Vergara MD, Stopped at 17 1414  •  losartan (COZAAR)  "tablet 50 mg, 50 mg, Oral, Daily, Myron Lopez MD, 50 mg at 03/06/17 1230  •  montelukast (SINGULAIR) tablet 10 mg, 10 mg, Oral, Daily, Myron Lopez MD, 10 mg at 03/06/17 1230  •  ondansetron (ZOFRAN) injection 4 mg, 4 mg, Intravenous, Q6H PRN, Myron Lopez MD, 4 mg at 03/05/17 0039  •  pantoprazole (PROTONIX) EC tablet 40 mg, 40 mg, Oral, BID AC, Myron Lopez MD, 40 mg at 03/06/17 1231  •  Insert peripheral IV, , , Once **AND** sodium chloride 0.9 % flush 10 mL, 10 mL, Intravenous, PRN, Nadir Bush MD  •  temazepam (RESTORIL) capsule 7.5 mg, 7.5 mg, Oral, Nightly PRN, Myron Lopez MD, 7.5 mg at 03/05/17 2243    All systems were reviewed and negative except for:  Gastrointestinal: postitive for  diarrhea    Visit Vitals   • /79 (BP Location: Right arm, Patient Position: Lying)   • Pulse 84   • Temp 98.3 °F (36.8 °C) (Oral)   • Resp 19   • Ht 62\" (157.5 cm)   • Wt 229 lb 4.8 oz (104 kg)   • LMP Comment: post menopausal   • SpO2 96%   • BMI 41.94 kg/m2          Intake/Output Summary (Last 24 hours) at 03/06/17 1337  Last data filed at 03/06/17 1220   Gross per 24 hour   Intake      0 ml   Output      1 ml   Net     -1 ml          Wt Readings from Last 1 Encounters:   03/04/17 2359 229 lb 4.8 oz (104 kg)   03/03/17 1353 214 lb (97.1 kg)   03/03/17 0042 229 lb 8 oz (104 kg)   03/02/17 1921 210 lb (95.3 kg)       Physical Examination: By   Physical Exam    General: No acute distress. obese   Skin: Warm and dry, no diaphoresis noted   HEENT: No ptosis;  oral mucosa moist   Neck: Supple; no carotid bruits; no JVD, Trachea mid line   Heart: S1S2  regular rate and rhythm;  Soft sysotlic murmurs; no gallop or rub appreciated, No thrills palpable   Chest: Respirations unlabored at rest, bilateral breath sounds have good air entry; no  wheezes auscultated.     Abdomen: Soft, non-tender, non-distended,obese, positive bowel sounds  ,No aneurysms palpable   Extremities: Bilateral lower extremities have no " pre-tibial pitting edema; Radials are palpable   Neurological: Alert and oriented ; no new motor deficits,       Lab Review:  Personally reviewed the labs, radiology imaging and other cardiac procedures.         Results from last 7 days  Lab Units 03/06/17  0541  03/04/17  0538   SODIUM mmol/L 141  < > 143   POTASSIUM mmol/L 4.1  < > 4.0   CHLORIDE mmol/L 106  < > 108*   TOTAL CO2 mmol/L 17.8*  < > 20.7*   BUN mg/dL 13  < > 20   CREATININE mg/dL 0.99  < > 0.90   CALCIUM mg/dL 8.3*  < > 7.9*   BILIRUBIN mg/dL  --   --  0.4   ALK PHOS U/L  --   --  61   ALT (SGPT) U/L  --   --  19   AST (SGOT) U/L  --   --  20   GLUCOSE mg/dL 129*  < > 131*   < > = values in this interval not displayed.    Results from last 7 days  Lab Units 03/03/17  1901 03/03/17  1040   TROPONIN T ng/mL <0.010 <0.010     @[id number]@    Results from last 7 days  Lab Units 03/06/17  0541 03/05/17  0604 03/04/17  0538   WBC 10*3/mm3 10.52 10.85* 10.93*   HEMOGLOBIN g/dL 8.3* 8.5* 8.3*   HEMATOCRIT % 26.1* 26.3* 25.9*   PLATELETS 10*3/mm3 233 212 229       Results from last 7 days  Lab Units 03/02/17 2015   INR  1.18*       Estimated Creatinine Clearance: 76.1 mL/min (by C-G formula based on Cr of 0.99).    I personally viewed and interpreted the patient's EKG/Telemetry data    ECG: normal sinus rhythm nonspecific ST Tchange    Echocardiogram:   Normal EF    CXR  3/2  IMPRESSION:  No acute infiltrate. No significant change.        Patient Active Problem List   Diagnosis   • Hypertension   • Obesity   • Hyperlipidemia   • Iron deficiency anemia due to chronic blood loss   • Upper GI bleeding       Assessment/ Plan :    Overall current chest pain is mostly because of GI issues. Pain radiates from epigastrium to the back.   Underwent a cardiolite stress test today, report pending    Ongoing  Management by dr PAUL Urbina [facility] ARRN    stresstest has been normal    Yaniv Rose MD, FACC  3/6/78470:23 PM      Patient is personally  examined by me. All labs, radiology reports and cardiac procedures are reviewed and or obtained by me. Asessment and plan is by me.-----Yaniv Rose MD

## 2017-03-06 NOTE — PLAN OF CARE
Problem: Patient Care Overview (Adult)  Goal: Plan of Care Review  Outcome: Ongoing (interventions implemented as appropriate)    03/05/17 9819   Coping/Psychosocial Response Interventions   Plan Of Care Reviewed With patient   Patient Care Overview   Progress improving   Outcome Evaluation   Outcome Summary/Follow up Plan Pt NPO at MD for stress test no c/o of pain this shift sleeping pill gien for rest.         Problem: Gastrointestinal Bleeding (Adult)  Goal: Signs and Symptoms of Listed Potential Problems Will be Absent or Manageable (Gastrointestinal Bleeding)  Outcome: Ongoing (interventions implemented as appropriate)    Problem: Pain, Acute (Adult)  Goal: Identify Related Risk Factors and Signs and Symptoms  Outcome: Ongoing (interventions implemented as appropriate)  Goal: Acceptable Pain Control/Comfort Level  Outcome: Ongoing (interventions implemented as appropriate)    Problem: Fall Risk (Adult)  Goal: Absence of Falls  Outcome: Ongoing (interventions implemented as appropriate)

## 2017-03-07 VITALS
RESPIRATION RATE: 18 BRPM | TEMPERATURE: 97.6 F | HEIGHT: 62 IN | WEIGHT: 229.3 LBS | HEART RATE: 82 BPM | DIASTOLIC BLOOD PRESSURE: 77 MMHG | SYSTOLIC BLOOD PRESSURE: 153 MMHG | OXYGEN SATURATION: 94 % | BODY MASS INDEX: 42.2 KG/M2

## 2017-03-07 LAB
ANISOCYTOSIS BLD QL: NORMAL
BASOPHILS # BLD AUTO: 0.03 10*3/MM3 (ref 0–0.2)
BASOPHILS NFR BLD AUTO: 0.3 % (ref 0–1.5)
C DIFF TOX GENS STL QL NAA+PROBE: NEGATIVE
CYTO UR: NORMAL
DEPRECATED RDW RBC AUTO: 46.3 FL (ref 37–54)
EOSINOPHIL # BLD AUTO: 0.37 10*3/MM3 (ref 0–0.7)
EOSINOPHIL NFR BLD AUTO: 3.2 % (ref 0.3–6.2)
ERYTHROCYTE [DISTWIDTH] IN BLOOD BY AUTOMATED COUNT: 14 % (ref 11.7–13)
GLUCOSE BLDC GLUCOMTR-MCNC: 131 MG/DL (ref 70–130)
GLUCOSE BLDC GLUCOMTR-MCNC: 189 MG/DL (ref 70–130)
HCT VFR BLD AUTO: 28.8 % (ref 35.6–45.5)
HGB BLD-MCNC: 9.2 G/DL (ref 11.9–15.5)
IMM GRANULOCYTES # BLD: 0.06 10*3/MM3 (ref 0–0.03)
IMM GRANULOCYTES NFR BLD: 0.5 % (ref 0–0.5)
LAB AP CASE REPORT: NORMAL
LYMPHOCYTES # BLD AUTO: 2.82 10*3/MM3 (ref 0.9–4.8)
LYMPHOCYTES NFR BLD AUTO: 24.3 % (ref 19.6–45.3)
Lab: NORMAL
MCH RBC QN AUTO: 30.5 PG (ref 26.9–32)
MCHC RBC AUTO-ENTMCNC: 31.9 G/DL (ref 32.4–36.3)
MCV RBC AUTO: 95.4 FL (ref 80.5–98.2)
MONOCYTES # BLD AUTO: 0.85 10*3/MM3 (ref 0.2–1.2)
MONOCYTES NFR BLD AUTO: 7.3 % (ref 5–12)
NEUTROPHILS # BLD AUTO: 7.48 10*3/MM3 (ref 1.9–8.1)
NEUTROPHILS NFR BLD AUTO: 64.4 % (ref 42.7–76)
PATH REPORT.FINAL DX SPEC: NORMAL
PATH REPORT.GROSS SPEC: NORMAL
PLAT MORPH BLD: NORMAL
PLATELET # BLD AUTO: 291 10*3/MM3 (ref 140–500)
PMV BLD AUTO: 11.4 FL (ref 6–12)
POLYCHROMASIA BLD QL SMEAR: NORMAL
RBC # BLD AUTO: 3.02 10*6/MM3 (ref 3.9–5.2)
SPHEROCYTES BLD QL SMEAR: NORMAL
WBC MORPH BLD: NORMAL
WBC NRBC COR # BLD: 11.61 10*3/MM3 (ref 4.5–10.7)

## 2017-03-07 PROCEDURE — 85025 COMPLETE CBC W/AUTO DIFF WBC: CPT | Performed by: HOSPITALIST

## 2017-03-07 PROCEDURE — 87493 C DIFF AMPLIFIED PROBE: CPT | Performed by: HOSPITALIST

## 2017-03-07 PROCEDURE — 82962 GLUCOSE BLOOD TEST: CPT

## 2017-03-07 PROCEDURE — 99231 SBSQ HOSP IP/OBS SF/LOW 25: CPT | Performed by: INTERNAL MEDICINE

## 2017-03-07 PROCEDURE — 94799 UNLISTED PULMONARY SVC/PX: CPT

## 2017-03-07 PROCEDURE — 85007 BL SMEAR W/DIFF WBC COUNT: CPT | Performed by: HOSPITALIST

## 2017-03-07 RX ORDER — AMOXICILLIN 875 MG/1
875 TABLET, COATED ORAL EVERY 12 HOURS SCHEDULED
Qty: 18 TABLET | Refills: 0 | Status: SHIPPED | OUTPATIENT
Start: 2017-03-07 | End: 2017-03-16

## 2017-03-07 RX ORDER — CLARITHROMYCIN 500 MG/1
500 TABLET, COATED ORAL EVERY 12 HOURS SCHEDULED
Qty: 18 TABLET | Refills: 0 | Status: SHIPPED | OUTPATIENT
Start: 2017-03-07 | End: 2017-03-16

## 2017-03-07 RX ORDER — PANTOPRAZOLE SODIUM 40 MG/1
40 TABLET, DELAYED RELEASE ORAL
Qty: 60 TABLET | Refills: 0 | Status: SHIPPED | OUTPATIENT
Start: 2017-03-07 | End: 2017-04-06

## 2017-03-07 RX ADMIN — PANTOPRAZOLE SODIUM 40 MG: 40 TABLET, DELAYED RELEASE ORAL at 08:20

## 2017-03-07 RX ADMIN — BUDESONIDE AND FORMOTEROL FUMARATE DIHYDRATE 2 PUFF: 160; 4.5 AEROSOL RESPIRATORY (INHALATION) at 09:01

## 2017-03-07 RX ADMIN — LOSARTAN POTASSIUM 50 MG: 50 TABLET, FILM COATED ORAL at 08:20

## 2017-03-07 RX ADMIN — MONTELUKAST 10 MG: 10 TABLET, FILM COATED ORAL at 08:20

## 2017-03-07 RX ADMIN — CLARITHROMYCIN 500 MG: 500 TABLET, FILM COATED ORAL at 08:20

## 2017-03-07 RX ADMIN — AMLODIPINE BESYLATE 5 MG: 5 TABLET ORAL at 08:20

## 2017-03-07 RX ADMIN — AMOXICILLIN 875 MG: 875 TABLET, FILM COATED ORAL at 08:20

## 2017-03-07 NOTE — DISCHARGE SUMMARY
DATE OF ADMISSION: 03/02/2017  DATE OF DISCHARGE:  03/07/2017     FINAL DIAGNOSES:  1.  Recurrent gastrointestinal bleed.   2.  Duodenal ulcer, status post heater probe.   3.  Anemia. Hemoglobin and hematocrit stable.   4.  Helicobacter pylori positive, on treatment.   5.  Atypical chest pain with stress test negative.   6.  Hypertension.   7.  Urinary tract infection.   8.  History of hyperlipidemia.   9.  Asthma.     DISCHARGE MEDICATIONS: Per discharge medication reconciliation sheet.     CONSULTANTS:  1.  Gastroenterology.   2.  Cardiology.     PROCEDURES:  1.  Stress test, which was negative for ischemia. No evidence of ischemia. Ejection fraction was normal.   2.  The patient did have an echocardiogram done too, which was essentially unremarkable with mild concentric hypertrophy.     HISTORY OF PRESENT ILLNESS: The patient is a 51-year-old  female with history of hypertension, hyperlipidemia, obesity, and asthma, who was admitted through the emergency room with nausea and vomiting, vomiting blood.  She was admitted for management.     HOSPITAL COURSE: The patient was admitted. She was treated with IV Protonix. She was taking aspirin which was stopped. A GI consult was obtained. They did the EGD, which they found duodenal ulcers and they stopped the bleeding with heater probe. The biopsy came back positive for H. pylori. She was started on amoxicillin, Biaxin, and Protonix; she will continue for 10 days. She has diarrhea, which was checked and C. difficile was negative.  She underwent a stress test, which showed no ischemia. The patient will be discharged home on the above medication. She will continue home dose of Lipitor, Lexapro, Cozaar, and Norvasc.    FOLLOWUP:  She will follow up with primary doctor in 1 week and follow up with GI and Cardiology per their instructions, and take medications as directed.        BONNIE Bruno:Kim  D:   03/07/2017 12:53:39  T:   03/07/2017 13:13:45  Job ID:    [id number]  Document ID:   [id number]  cc:

## 2017-03-07 NOTE — PROGRESS NOTES
" DAILY PROGRESS NOTE      CHIEF COMPLAINT:   DOING BETTER      HISTORY: The patient is a 51-year-old  female with a history of hypertension, hyperlipidemia, obesity, and asthma, who presented to [facility] with nausea and vomiting, vomiting blood, and also noticed black stools and sometime bright red blood in the stools also. No abdominal pain.  Occasional chest pain. No shortness of breath. No associated symptoms. The patient was evaluated in the ER and admitted for further workup. The patient does take aspirin at home.     PHYSICAL EXAMINATION:Blood pressure 153/84, pulse 82, temperature 98.4 °F (36.9 °C), temperature source Oral, resp. rate 20, height 62\" (157.5 cm), weight 229 lb 4.8 oz (104 kg), SpO2 97 %.    GENERAL: Alert, oriented, in no respiratory distress. No chest pain.   HEENT: Unremarkable.   NECK: Supple.   LUNGS: Decreased breath sounds.   HEART: S1 and S2.   ABDOMEN: Soft, nontender. Bowel sounds positive.   EXTREMITIES: No edema.   NEUROLOGICAL: No focal deficit.     DIAGNOSTIC DATA:  Lab Results (last 24 hours)     Procedure Component Value Units Date/Time    POC Glucose Fingerstick [[id number]]  (Abnormal) Collected:  03/06/17 2035    Specimen:  Blood Updated:  03/06/17 2036     Glucose 149 (H) mg/dL     Narrative:       Meter: [id number] : [id number] Challengevane Casanova    POC Glucose Fingerstick [[id number]]  (Abnormal) Collected:  03/07/17 0729    Specimen:  Blood Updated:  03/07/17 0733     Glucose 131 (H) mg/dL     Narrative:       Meter: [id number] : [id number] Sy Lawrence    CBC & Differential [[id number]] Collected:  03/07/17 0613    Specimen:  Blood Updated:  03/07/17 0756    Narrative:       The following orders were created for panel order CBC & Differential.  Procedure                               Abnormality         Status                     ---------                               -----------         ------                     Scan Slide[[id number]]      "                                   Final result               CBC Auto Differential[[id number]]         Abnormal            Final result                 Please view results for these tests on the individual orders.    CBC Auto Differential [[id number]]  (Abnormal) Collected:  03/07/17 0613    Specimen:  Blood Updated:  03/07/17 0756     WBC 11.61 (H) 10*3/mm3      RBC 3.02 (L) 10*6/mm3      Hemoglobin 9.2 (L) g/dL      Hematocrit 28.8 (L) %      MCV 95.4 fL      MCH 30.5 pg      MCHC 31.9 (L) g/dL      RDW 14.0 (H) %      RDW-SD 46.3 fl      MPV 11.4 fL      Platelets 291 10*3/mm3      Neutrophil % 64.4 %      Lymphocyte % 24.3 %      Monocyte % 7.3 %      Eosinophil % 3.2 %      Basophil % 0.3 %      Immature Grans % 0.5 %      Neutrophils, Absolute 7.48 10*3/mm3      Lymphocytes, Absolute 2.82 10*3/mm3      Monocytes, Absolute 0.85 10*3/mm3      Eosinophils, Absolute 0.37 10*3/mm3      Basophils, Absolute 0.03 10*3/mm3      Immature Grans, Absolute 0.06 (H) 10*3/mm3     Scan Slide [[id number]] Collected:  03/07/17 0613    Specimen:  Blood Updated:  03/07/17 0756     Anisocytosis Mod/2+      Polychromasia Slight/1+      Spherocytes Slight/1+      WBC Morphology Normal      Platelet Morphology Normal     Clostridium Difficile Toxin, PCR [[id number]]  (Normal) Collected:  03/07/17 0416    Specimen:  Stool from Per Rectum Updated:  03/07/17 0825     C. Difficile Toxins by PCR Negative     Tissue Exam [[id number]] Collected:  03/03/17 1536    Specimen:  Tissue from Stomach Updated:  03/07/17 0845     Case Report --      Surgical Pathology Report                         Case: [id number]                                  Authorizing Provider:  Ousmane Vergara MD            Collected:           03/03/2017 03:36 PM          Ordering Location:     [facility]  Received:            03/03/2017 10:43 PM                                 ENDO SUITES                                                                  Pathologist:       "     Giorgio Castaneda MD                                                        Specimen:    Stomach, RANDOM GASTRIC BIOPSIES                                                            Final Diagnosis --      1. RANDOM GASTRIC BIOPSIES:              MILD-TO-MODERATE CHRONIC ACTIVE GASTRITIS.               NO INTESTINAL METAPLASIA.               NO IDENTIFIED HELICOBACTER ORGANISMS WITH A DIFF-QUIK STAIN.               IMMUNOSTAIN FOR H. PYLORI POSITIVE.     COMMENT: Appropriate controls have been utilized for al stains including Diff-Quik and immunostain.     [facility]/brielle     CPT CODES:  1. 02800, 38150, 38241       Gross Description --      Received in formalin labeled \"random gastric biopsies\". The specimen consists of five tan-white tissue fragments ranging from 1 mm to 5 mm which are submitted in toto in block 1A.     DH/USO/CMK/hmf        Microscopic Description --      Performed, incorporated in diagnosis.         Embedded Images --     Urine Culture [[id number]]  (Abnormal)  (Susceptibility) Collected:  03/04/17 2328    Specimen:  Urine from Urine, Clean Catch Updated:  03/07/17 1016     Urine Culture >100,000 CFU/mL Escherichia coli (A)       50,000 CFU/mL Gram Negative Bacilli (A)     Susceptibility      Escherichia coli     CAMILLE     Ampicillin >=32 ug/ml Resistant     Ampicillin + Sulbactam >=32 ug/ml Resistant     Cefazolin <=4 ug/ml Susceptible     Cefepime <=1 ug/ml Susceptible     Ceftriaxone <=1 ug/ml Susceptible     Ciprofloxacin 0.5 ug/ml Susceptible     Ertapenem <=0.5 ug/ml Susceptible     Gentamicin <=1 ug/ml Susceptible     Levofloxacin 1 ug/ml Susceptible     Nitrofurantoin <=16 ug/ml Susceptible     Piperacillin + Tazobactam <=4 ug/ml Susceptible     Tetracycline <=1 ug/ml Susceptible     Trimethoprim + Sulfamethoxazole >=320 ug/ml Resistant                    POC Glucose Fingerstick [[id number]]  (Abnormal) Collected:  03/07/17 1152    Specimen:  Blood Updated:  03/07/17 1153     Glucose 189 (H) " mg/dL     Narrative:       Meter: [id number] : [id number] Sy Lawrence       Hemoglobin is 9.9, white count 13.0, platelets are 283,000.  Sodium 142, potassium 4.1, BUN 30, creatinine 0.87, blood sugar is .  Acetone negative.  Chest x-ray no active disease. EKG: Sinus rhythm with nonspecific ST-T wave changes and abnormal T wave, could be ischemic.   Imaging Results (last 72 hours)     Procedure Component Value Units Date/Time    XR Chest 2 View [[id number]] Collected:  03/02/17 2008     Updated:  03/02/17 2017    Narrative:       XR CHEST 2 VW-     HISTORY: Female who is 51 years-old,  chest pain     TECHNIQUE: Frontal and lateral views of the chest     COMPARISON: 04/02/2012, 03/20/2012.           FINDINGS: Heart, mediastinum and pulmonary vasculature are unremarkable.  Region of fat density at the right base appears similar to prior exams,  in correlation with the CT from 03/23/2012. No acute infiltrate is  noted. No pleural effusion or pneumothorax. Otherwise stable.       Impression:       No acute infiltrate. No significant change.  Follow-up/further evaluation can be obtained is indications persist.     This report was finalized on 3/2/2017 8:14 PM by Dr. Pan Alexandre MD.           Current Facility-Administered Medications:   •  amLODIPine (NORVASC) tablet 5 mg, 5 mg, Oral, Q24H, Myron Lopez MD, 5 mg at 03/07/17 0820  •  amoxicillin (AMOXIL) tablet 875 mg, 875 mg, Oral, Q12H, Myron Lopez MD, 875 mg at 03/07/17 0820  •  atorvastatin (LIPITOR) tablet 10 mg, 10 mg, Oral, Nightly, Myron Lopez MD, 10 mg at 03/06/17 2056  •  budesonide-formoterol (SYMBICORT) 160-4.5 MCG/ACT inhaler 2 puff, 2 puff, Inhalation, BID - RT, Myron Lopez MD, 2 puff at 03/07/17 0901  •  clarithromycin (BIAXIN) tablet 500 mg, 500 mg, Oral, Q12H, Myron Lopez MD, 500 mg at 03/07/17 0820  •  escitalopram (LEXAPRO) tablet 10 mg, 10 mg, Oral, Nightly, Myron Lopez MD, 10 mg at 03/06/17 2056  •  Influenza Vac Subunit Quad  (FLUCELVAX) injection 0.5 mL, 0.5 mL, Intramuscular, During Hospitalization, Myron Lopez MD  •  lactated ringers infusion, 9 mL/hr, Intravenous, Continuous PRN, Ousmane Vergara MD, Stopped at 03/04/17 1414  •  losartan (COZAAR) tablet 50 mg, 50 mg, Oral, Daily, Myron Lopez MD, 50 mg at 03/07/17 0820  •  montelukast (SINGULAIR) tablet 10 mg, 10 mg, Oral, Daily, Myron Lopez MD, 10 mg at 03/07/17 0820  •  ondansetron (ZOFRAN) injection 4 mg, 4 mg, Intravenous, Q6H PRN, Myron Lopez MD, 4 mg at 03/05/17 0039  •  pantoprazole (PROTONIX) EC tablet 40 mg, 40 mg, Oral, BID AC, Myron Lopez MD, 40 mg at 03/07/17 0820  •  Insert peripheral IV, , , Once **AND** sodium chloride 0.9 % flush 10 mL, 10 mL, Intravenous, PRN, Nadir Bush MD  •  temazepam (RESTORIL) capsule 7.5 mg, 7.5 mg, Oral, Nightly PRN, Myron Lopez MD, 7.5 mg at 03/05/17 3970     STRESS TEST NEGATIVE    ASSESSMENT:  1. Hematemesis. /DU S/P EGD UREASE POSITIVE PER GI/H PYLORI ON TX  2. Chest pain, rule out acute coronary syndrome.   3. Hypertension.   4. Hyperlipidemia.   5. Asthma.   6. UTI ON ABX    PLAN:  DISCHARGE  SUMMARY DICTATED    Myron Lopez M.D.

## 2017-03-07 NOTE — PLAN OF CARE
Problem: Patient Care Overview (Adult)  Goal: Plan of Care Review  Outcome: Ongoing (interventions implemented as appropriate)    03/07/17 1502   Coping/Psychosocial Response Interventions   Plan Of Care Reviewed With patient   Patient Care Overview   Progress improving   Outcome Evaluation   Outcome Summary/Follow up Plan No signs of bleeding, no blood in stools noted. O2 weaned down 1L, patient does have dyspnea on exertion. Will be discharged today.       Goal: Adult Individualization and Mutuality  Outcome: Ongoing (interventions implemented as appropriate)  Goal: Discharge Needs Assessment  Outcome: Ongoing (interventions implemented as appropriate)    Problem: Gastrointestinal Bleeding (Adult)  Goal: Signs and Symptoms of Listed Potential Problems Will be Absent or Manageable (Gastrointestinal Bleeding)  Outcome: Ongoing (interventions implemented as appropriate)    Problem: Pain, Acute (Adult)  Goal: Identify Related Risk Factors and Signs and Symptoms  Outcome: Ongoing (interventions implemented as appropriate)  Goal: Acceptable Pain Control/Comfort Level  Outcome: Ongoing (interventions implemented as appropriate)    Problem: Fall Risk (Adult)  Goal: Absence of Falls  Outcome: Ongoing (interventions implemented as appropriate)

## 2017-03-07 NOTE — PROGRESS NOTES
BGA/GI Progress Note   Chief Complaint:  Upper GI bleed    Subjective     Interval History:   EGD on 3/3/17 with DU and HP +.     Brown stools only no melena.  Mild nausea and complaints of mild RUQ/ chest wall pain.     History taken from: patient chart RN    Review of Systems:    per HPI    Objective     Vital Signs  Temp:  [98 °F (36.7 °C)-98.4 °F (36.9 °C)] 98.4 °F (36.9 °C)  Heart Rate:  [77-84] 82  Resp:  [18-20] 20  BP: (102-166)/(61-92) 153/84  Body mass index is 41.94 kg/(m^2).    Intake/Output Summary (Last 24 hours) at 03/07/17 1005  Last data filed at 03/06/17 1220   Gross per 24 hour   Intake      0 ml   Output      1 ml   Net     -1 ml          Physical Exam:   General: patient awake, alert and cooperative   Eyes: Normal lids and lashes, no scleral icterus, no conjunctival pallor   Neck: supple, normal ROM, no tracheal deviation, no thyromegaly   Skin: warm and dry, not jaundiced   Cardiovascular: regular rhythm and rate, no murmurs auscultated   Pulm: clear to auscultation bilaterally, regular and unlabored   Abdomen: morbidly obese, soft, Mild generalized tender, nondistended; normal bowel sounds   Rectal: deferred   Extremities: no rash or edema   Neurologic: Normal mood and behavior    All Medications Have Been Reviewed     Results Review:       Results from last 7 days  Lab Units 03/07/17  0613 03/06/17  0541 03/05/17  0604   WBC 10*3/mm3 11.61* 10.52 10.85*   HEMOGLOBIN g/dL 9.2* 8.3* 8.5*   HEMATOCRIT % 28.8* 26.1* 26.3*   PLATELETS 10*3/mm3 291 233 212       Results from last 7 days  Lab Units 03/06/17  0541 03/05/17  0604 03/04/17  0538  03/02/17 2015   SODIUM mmol/L 141 141 143  < > 140   POTASSIUM mmol/L 4.1 3.8 4.0  < > 4.7   CHLORIDE mmol/L 106 106 108*  < > 105   TOTAL CO2 mmol/L 17.8* 20.8* 20.7*  < > 22.3   BUN mg/dL 13 13 20  < > 41*   CREATININE mg/dL 0.99 0.91 0.90  < > 0.87   CALCIUM mg/dL 8.3* 8.4* 7.9*  < > 8.7   BILIRUBIN mg/dL  --   --  0.4  --  0.4   ALK PHOS U/L  --    --  61  --  78   ALT (SGPT) U/L  --   --  19  --  18   AST (SGOT) U/L  --   --  20  --  16   GLUCOSE mg/dL 129* 131* 131*  < > 158*   < > = values in this interval not displayed.      Results from last 7 days  Lab Units 03/02/17 2015   INR  1.18*     RADIOLOGY:    Imaging Results (last 72 hours)     ** No results found for the last 72 hours. **        Assessment/Plan     Patient Active Problem List   Diagnosis Code   • Hypertension I10   • Obesity E66.9   • Hyperlipidemia E78.5   • Iron deficiency anemia due to chronic blood loss D50.0   • Upper GI bleeding K92.2       Vanessa Leong, APRN  03/07/17  10:05 AM    1. Recurrent GI bleed- DU s/p heater probe. PPI BID. Path pending. Conflicting reports of continued melena. BUN normalized and Hb stable/ increased.   2. H.pylori positive- upon discharge recommend amoxicillin 1gm bid and clarithromycin 500mg bid x 10 days, in conjuction with PPI therapy   3. Chest pain- cards opinion likely GI related  4. Anemia- improved     Pt requesting nutrition consult for PUD. GERD diet guidance.   Ok for discharge.   F/u appt 4/17/17 at 830 with Ruth Ann Willson MD

## 2017-03-07 NOTE — PLAN OF CARE
Problem: Patient Care Overview (Adult)  Goal: Plan of Care Review  Outcome: Ongoing (interventions implemented as appropriate)    03/06/17 6814   Coping/Psychosocial Response Interventions   Plan Of Care Reviewed With patient   Patient Care Overview   Progress improving   Outcome Evaluation   Outcome Summary/Follow up Plan Pt is on H-Pyloria medication dose pack. Pt still c/o of abdomanal pain but no BM to show blood in stool. Pt does not want to go home. Pt brother in law was in pt room and pt had him ask questions about treatment. I assured him that if there was blood in the stool we would notify MD also stool collection has been ordered no stool to send as of now will cont to monitor.         Problem: Gastrointestinal Bleeding (Adult)  Goal: Signs and Symptoms of Listed Potential Problems Will be Absent or Manageable (Gastrointestinal Bleeding)  Outcome: Ongoing (interventions implemented as appropriate)    Problem: Pain, Acute (Adult)  Goal: Identify Related Risk Factors and Signs and Symptoms  Outcome: Ongoing (interventions implemented as appropriate)  Goal: Acceptable Pain Control/Comfort Level  Outcome: Ongoing (interventions implemented as appropriate)    Problem: Fall Risk (Adult)  Goal: Absence of Falls  Outcome: Ongoing (interventions implemented as appropriate)

## 2017-03-08 LAB
BACTERIA SPEC AEROBE CULT: ABNORMAL
BACTERIA SPEC AEROBE CULT: ABNORMAL

## 2017-04-04 ENCOUNTER — TELEPHONE (OUTPATIENT)
Dept: GASTROENTEROLOGY | Facility: CLINIC | Age: 52
End: 2017-04-04

## 2017-04-04 NOTE — TELEPHONE ENCOUNTER
----- Message from Ousmane Vergara MD sent at 4/2/2017  3:21 PM EDT -----  His call him and tell him that the biopsies of his stomach that were done when we did the EGD last month, did show that he has a bacteria lining his stomach called Helicobacter pylori.  He has probably had this bacteria lining his stomach since he was a child.  This bacteria can cause ulcers in the stomach and duodenum and rarely can cause stomach cancer.  I see no evidence of cancer on the biopsies.  Tell him to make sure to follow up with Dr. Ruth Ann Horvath later this month and he can discuss with her how to treat this bacteria with antibiotics.

## 2017-04-04 NOTE — TELEPHONE ENCOUNTER
Called pt and with pt's permission spoke with her brother and advised per Dr Vergara that the bx of the stomach showed a bacteria lining her stomach called helicobacter pylori.  She has probably has this bacteria lining her stomach since she was a child.  This bacteria can cause ulcers in the stomach and duodenum and rarely can cause stomach cancer.  He saw no eviedence of cancer on the bx.  Advised him to have her f/u with Dr Willson  Later this month to discuss how to treat this bacteria.  He verb understanding and state he will have her call back to make appt.

## 2017-04-14 ENCOUNTER — TELEPHONE (OUTPATIENT)
Dept: GASTROENTEROLOGY | Facility: CLINIC | Age: 52
End: 2017-04-14

## 2017-04-14 RX ORDER — PANTOPRAZOLE SODIUM 40 MG/1
40 TABLET, DELAYED RELEASE ORAL 2 TIMES DAILY
Qty: 180 TABLET | Refills: 3 | Status: SHIPPED | OUTPATIENT
Start: 2017-04-14 | End: 2018-03-13

## 2017-04-14 NOTE — TELEPHONE ENCOUNTER
Faxed request received from [facility] for Pantoprazole.  Per op note with EGD of 3/3/17 - continue protonix.  Escribe completed.

## 2017-06-28 ENCOUNTER — OFFICE VISIT (OUTPATIENT)
Dept: GASTROENTEROLOGY | Facility: CLINIC | Age: 52
End: 2017-06-28

## 2017-06-28 VITALS
BODY MASS INDEX: 38.9 KG/M2 | DIASTOLIC BLOOD PRESSURE: 84 MMHG | SYSTOLIC BLOOD PRESSURE: 142 MMHG | WEIGHT: 211.4 LBS | HEIGHT: 62 IN

## 2017-06-28 DIAGNOSIS — A04.8 BACTERIAL INFECTION DUE TO HELICOBACTER PYLORI: ICD-10-CM

## 2017-06-28 DIAGNOSIS — K59.00 CONSTIPATION, UNSPECIFIED CONSTIPATION TYPE: ICD-10-CM

## 2017-06-28 DIAGNOSIS — K26.9 DUODENAL ULCER: ICD-10-CM

## 2017-06-28 DIAGNOSIS — D50.0 IRON DEFICIENCY ANEMIA DUE TO CHRONIC BLOOD LOSS: Primary | ICD-10-CM

## 2017-06-28 PROCEDURE — 99214 OFFICE O/P EST MOD 30 MIN: CPT | Performed by: INTERNAL MEDICINE

## 2017-06-28 RX ORDER — CLARITHROMYCIN 500 MG/1
500 TABLET, COATED ORAL 2 TIMES DAILY
Qty: 28 TABLET | Refills: 0 | Status: SHIPPED | OUTPATIENT
Start: 2017-06-28 | End: 2017-11-16 | Stop reason: HOSPADM

## 2017-06-28 RX ORDER — AMOXICILLIN 500 MG/1
1000 CAPSULE ORAL 2 TIMES DAILY
Qty: 56 CAPSULE | Refills: 0 | Status: SHIPPED | OUTPATIENT
Start: 2017-06-28 | End: 2017-11-16 | Stop reason: HOSPADM

## 2017-06-28 RX ORDER — PANTOPRAZOLE SODIUM 40 MG/1
40 TABLET, DELAYED RELEASE ORAL 2 TIMES DAILY
Qty: 28 TABLET | Refills: 0 | Status: SHIPPED | OUTPATIENT
Start: 2017-06-28 | End: 2017-07-12

## 2017-06-29 LAB
BASOPHILS # BLD AUTO: 0.02 10*3/MM3 (ref 0–0.2)
BASOPHILS NFR BLD AUTO: 0.2 % (ref 0–1.5)
EOSINOPHIL # BLD AUTO: 0.16 10*3/MM3 (ref 0–0.7)
EOSINOPHIL NFR BLD AUTO: 2 % (ref 0.3–6.2)
ERYTHROCYTE [DISTWIDTH] IN BLOOD BY AUTOMATED COUNT: 16.3 % (ref 11.7–13)
HCT VFR BLD AUTO: 39.6 % (ref 35.6–45.5)
HGB BLD-MCNC: 12.1 G/DL (ref 11.9–15.5)
IMM GRANULOCYTES # BLD: 0.02 10*3/MM3 (ref 0–0.03)
IMM GRANULOCYTES NFR BLD: 0.2 % (ref 0–0.5)
LYMPHOCYTES # BLD AUTO: 2.26 10*3/MM3 (ref 0.9–4.8)
LYMPHOCYTES NFR BLD AUTO: 27.9 % (ref 19.6–45.3)
MCH RBC QN AUTO: 25.4 PG (ref 26.9–32)
MCHC RBC AUTO-ENTMCNC: 30.6 G/DL (ref 32.4–36.3)
MCV RBC AUTO: 83.2 FL (ref 80.5–98.2)
MONOCYTES # BLD AUTO: 0.57 10*3/MM3 (ref 0.2–1.2)
MONOCYTES NFR BLD AUTO: 7 % (ref 5–12)
NEUTROPHILS # BLD AUTO: 5.08 10*3/MM3 (ref 1.9–8.1)
NEUTROPHILS NFR BLD AUTO: 62.7 % (ref 42.7–76)
PLATELET # BLD AUTO: 338 10*3/MM3 (ref 140–500)
RBC # BLD AUTO: 4.76 10*6/MM3 (ref 3.9–5.2)
WBC # BLD AUTO: 8.11 10*3/MM3 (ref 4.5–10.7)

## 2017-06-30 ENCOUNTER — TELEPHONE (OUTPATIENT)
Dept: GASTROENTEROLOGY | Facility: CLINIC | Age: 52
End: 2017-06-30

## 2017-06-30 NOTE — TELEPHONE ENCOUNTER
Call to brotherTaty (see [employer] auth of 3/10/16).  Advise per Dr Willson that anemia has resolved.  Brother verb understanding.

## 2017-09-17 ENCOUNTER — HOSPITAL ENCOUNTER (EMERGENCY)
Facility: HOSPITAL | Age: 52
Discharge: HOME | End: 2017-09-17
Attending: EMERGENCY MEDICINE | Admitting: EMERGENCY MEDICINE

## 2017-09-17 VITALS
RESPIRATION RATE: 18 BRPM | TEMPERATURE: 98.8 F | BODY MASS INDEX: 37.91 KG/M2 | WEIGHT: 206 LBS | HEIGHT: 62 IN | OXYGEN SATURATION: 98 % | HEART RATE: 86 BPM

## 2017-09-17 DIAGNOSIS — L03.116 CELLULITIS OF LEFT FOOT: Primary | ICD-10-CM

## 2017-09-17 PROCEDURE — 99282 EMERGENCY DEPT VISIT SF MDM: CPT

## 2017-09-17 RX ORDER — SULFAMETHOXAZOLE AND TRIMETHOPRIM 800; 160 MG/1; MG/1
2 TABLET ORAL 2 TIMES DAILY
Qty: 40 TABLET | Refills: 0 | Status: SHIPPED | OUTPATIENT
Start: 2017-09-17 | End: 2018-07-26 | Stop reason: ALTCHOICE

## 2017-09-17 NOTE — ED PROVIDER NOTES
EMERGENCY DEPARTMENT ENCOUNTER    CHIEF COMPLAINT  Chief Complaint: bug bite, left foot  History given by: patient, pt's son  History limited by: none  Room Number: 08/08  PMD: Isidro Odom MD      HPI:  Pt is a 52 y.o. female who presents complaining bug bite to her left foot. Pt's son reported that on 9/14/17 she felt a numbness and jerked away. Pt did not see anything bite her. Pt's son reported that she was seen at urgent care 9/16/17 and was given a shot of abx and been taking Tylenol for pain.     Duration:  3 days  Onset: sudden  Timing: constant  Location: left foot  Radiation: none  Quality: none  Intensity/Severity: moderate  Progression: unchanged  Associated Symptoms: none  Aggravating Factors: none  Alleviating Factors: none  Previous Episodes: none  Treatment before arrival: seen by urgent care and given shot of abx    PAST MEDICAL HISTORY  Active Ambulatory Problems     Diagnosis Date Noted   • Hypertension 03/21/2016   • Obesity 03/21/2016   • Hyperlipidemia 03/21/2016   • Iron deficiency anemia due to chronic blood loss 07/11/2016   • Upper GI bleeding 03/02/2017     Resolved Ambulatory Problems     Diagnosis Date Noted   • No Resolved Ambulatory Problems     Past Medical History:   Diagnosis Date   • Anemia    • Asthma    • Breast cyst    • Diabetes mellitus    • Diverticulosis    • H/O: GI bleed    • Hyperlipidemia    • Hypertension    • Irritable bowel syndrome    • Melena    • Obesity        PAST SURGICAL HISTORY  Past Surgical History:   Procedure Laterality Date   • BREAST CYST EXCISION     • COLONOSCOPY  02/11/2016    mary, VICTOR MANUEL,    • ENDOSCOPY N/A 3/3/2017    erythematous mucosa in stomach, duodenal ulcer , mild to moderate chronic active gastritis, positive for h pylori   • UPPER GASTROINTESTINAL ENDOSCOPY  02/10/2016    sami-Ruth Ann Willson M.D.       FAMILY HISTORY  Family History   Problem Relation Age of Onset   • Breast cancer Other    • Lung cancer Brother    • Throat cancer  Paternal Uncle    • Tongue cancer Paternal Grandfather        SOCIAL HISTORY  Social History     Social History   • Marital status:      Spouse name: N/A   • Number of children: N/A   • Years of education: High School     Occupational History   •  Retired     Social History Main Topics   • Smoking status: Former Smoker     Packs/day: 1.00     Types: Cigarettes     Quit date: 1990   • Smokeless tobacco: Never Used   • Alcohol use No   • Drug use: No   • Sexual activity: Defer     Other Topics Concern   • Not on file     Social History Narrative       ALLERGIES  Review of patient's allergies indicates no known allergies.    REVIEW OF SYSTEMS  Review of Systems   Constitutional: Negative for fever.   HENT: Negative for sore throat.    Eyes: Negative.    Respiratory: Negative for cough and shortness of breath.    Cardiovascular: Negative for chest pain.   Gastrointestinal: Negative for abdominal pain, diarrhea and vomiting.   Genitourinary: Negative for dysuria.   Musculoskeletal: Negative for neck pain.   Skin: Negative for rash.        Bug bite to left foot   Allergic/Immunologic: Negative.    Neurological: Negative for weakness, numbness and headaches.   Hematological: Negative.    Psychiatric/Behavioral: Negative.    All other systems reviewed and are negative.      PHYSICAL EXAM  ED Triage Vitals   Temp Heart Rate Resp BP SpO2   09/17/17 1611 09/17/17 1611 09/17/17 1611 -- 09/17/17 1611   98.8 °F (37.1 °C) 86 18  98 %      Temp src Heart Rate Source Patient Position BP Location FiO2 (%)   09/17/17 1611 09/17/17 1611 -- -- --   Tympanic Monitor          Physical Exam   Constitutional: She is oriented to person, place, and time and well-developed, well-nourished, and in no distress.   Eyes: EOM are normal.   Neck: Normal range of motion.   Cardiovascular: Normal rate and regular rhythm.    Pulmonary/Chest: Effort normal and breath sounds normal. No respiratory distress.   Musculoskeletal:   Mild erythema to  dorsum of left foot. In the middle of the area appears to have a bug bite. NVI distally.   Neurological: She is alert and oriented to person, place, and time. She has normal sensation and normal strength.   Skin: Skin is warm and dry.   Mild erythema to dorsum of left foot. In the middle of the area appears to have a bug bite   Psychiatric: Affect normal.   Nursing note and vitals reviewed.      LAB RESULTS  Lab Results (last 24 hours)     ** No results found for the last 24 hours. **          I ordered the above labs and reviewed the results    PROCEDURES  Procedures      PROGRESS AND CONSULTS  ED Course   1704  Discussed the plan to discharge with oral abx, Bactrim and follow up with  in 2-3 days. Pt given instructions to elevate leg, local wound care, and Tylenol for pain as needed. Pt understands and agrees with the plan, all questions answered.    MEDICAL DECISION MAKING  Results were reviewed/discussed with the patient and they were also made aware of online access. Pt also made aware that some labs, such as cultures, will not be resulted during ER visit and follow up with PMD is necessary.     MDM       DIAGNOSIS  Final diagnoses:   Cellulitis of left foot       DISPOSITION  DISCHARGE    Patient discharged in stable condition.    Reviewed implications of results, diagnosis, meds, responsibility to follow up, warning signs and symptoms of possible worsening, potential complications and reasons to return to ER.    Patient/Family voiced understanding of above instructions.    Discussed plan for discharge, as there is no emergent indication for admission.  Pt/family is agreeable and understands need for follow up and repeat testing.  Pt is aware that discharge does not mean that nothing is wrong but it indicates no emergency is present that requires admission and they must continue care with follow-up as given below or physician of their choice.     FOLLOW-UP  Isidro Odom MD  [address]  [facility] [address]  [phone]    In 3 days  For wound re-check         Medication List      New Prescriptions          sulfamethoxazole-trimethoprim 800-160 MG per tablet   Commonly known as:  BACTRIM DS,SEPTRA DS   Take 2 tablets by mouth 2 (Two) Times a Day.               Latest Documented Vital Signs:  As of 5:22 PM  BP-   HR- 86 Temp- 98.8 °F (37.1 °C) (Tympanic) O2 sat- 98%    --  Documentation assistance provided by maryjo Montemayor for Dr. Grissom.  Information recorded by the scribe was done at my direction and has been verified and validated by me.     Saundra Montemayor  09/17/17 1729       Arnol Grissom MD  09/17/17 8517

## 2017-11-13 ENCOUNTER — APPOINTMENT (OUTPATIENT)
Dept: CARDIOLOGY | Facility: HOSPITAL | Age: 52
End: 2017-11-13
Attending: INTERNAL MEDICINE

## 2017-11-13 ENCOUNTER — APPOINTMENT (OUTPATIENT)
Dept: GENERAL RADIOLOGY | Facility: HOSPITAL | Age: 52
End: 2017-11-13

## 2017-11-13 ENCOUNTER — HOSPITAL ENCOUNTER (INPATIENT)
Facility: HOSPITAL | Age: 52
LOS: 4 days | Discharge: HOME | End: 2017-11-17
Attending: EMERGENCY MEDICINE | Admitting: INTERNAL MEDICINE

## 2017-11-13 DIAGNOSIS — I21.3 ST ELEVATION MYOCARDIAL INFARCTION (STEMI), UNSPECIFIED ARTERY (HCC): Primary | ICD-10-CM

## 2017-11-13 LAB
ALBUMIN SERPL-MCNC: 4.7 G/DL (ref 3.5–5.2)
ALBUMIN/GLOB SERPL: 1.1 G/DL
ALP SERPL-CCNC: 129 U/L (ref 39–117)
ALT SERPL W P-5'-P-CCNC: 22 U/L (ref 1–33)
ANION GAP SERPL CALCULATED.3IONS-SCNC: 17.8 MMOL/L
AORTIC DIMENSIONLESS INDEX: 0.7 (DI)
ASCENDING AORTA: 3.3 CM
AST SERPL-CCNC: 23 U/L (ref 1–32)
BASOPHILS # BLD AUTO: 0.02 10*3/MM3 (ref 0–0.2)
BASOPHILS NFR BLD AUTO: 0.2 % (ref 0–1.5)
BH CV ECHO MEAS - ACS: 1.7 CM
BH CV ECHO MEAS - AO MAX PG: 16 MMHG
BH CV ECHO MEAS - AO MEAN PG: 9 MMHG
BH CV ECHO MEAS - AO ROOT DIAM: 2.6 CM
BH CV ECHO MEAS - AO V2 MAX: 198 CM/SEC
BH CV ECHO MEAS - AO V2 VTI: 42 CM
BH CV ECHO MEAS - AVA(I,D): 1.9 CM2
BH CV ECHO MEAS - BSA(HAYCOCK): 2 M^2
BH CV ECHO MEAS - BSA: 1.9 M^2
BH CV ECHO MEAS - BZI_BMI: 36.6 KILOGRAMS/M^2
BH CV ECHO MEAS - BZI_METRIC_HEIGHT: 157.5 CM
BH CV ECHO MEAS - BZI_METRIC_WEIGHT: 90.7 KG
BH CV ECHO MEAS - EF(MOD-SP2): 58 %
BH CV ECHO MEAS - EF(MOD-SP4): 59 %
BH CV ECHO MEAS - FS: 59 %
BH CV ECHO MEAS - IVSD: 1.3 CM
BH CV ECHO MEAS - LAT PEAK E' VEL: 8 CM/SEC
BH CV ECHO MEAS - LV MEAN PG: 4 MMHG
BH CV ECHO MEAS - LV V1 MAX: 129 CM/SEC
BH CV ECHO MEAS - LV V1 VTI: 28 CM
BH CV ECHO MEAS - LVIDD: 3.3 CM
BH CV ECHO MEAS - LVIDS: 2.3 CM
BH CV ECHO MEAS - LVOT DIAM: 1.9 CM
BH CV ECHO MEAS - LVPWD: 1.4 CM
BH CV ECHO MEAS - MED PEAK E' VEL: 6 CM/SEC
BH CV ECHO MEAS - MR MAX PG: 80 MMHG
BH CV ECHO MEAS - MR MAX VEL: 489 CM/SEC
BH CV ECHO MEAS - MV A DUR: 0.1 SEC
BH CV ECHO MEAS - MV A MAX VEL: 90 CM/SEC
BH CV ECHO MEAS - MV DEC TIME: 0 MSEC
BH CV ECHO MEAS - MV E MAX VEL: 104 CM/SEC
BH CV ECHO MEAS - MV E/A: 1.2
BH CV ECHO MEAS - MV MAX PG: 6 MMHG
BH CV ECHO MEAS - MV MEAN PG: 3 MMHG
BH CV ECHO MEAS - MV P1/2T: 82 MSEC
BH CV ECHO MEAS - MV V2 VTI: 38 CM
BH CV ECHO MEAS - MVA(P1/2T): 2.7 CM2
BH CV ECHO MEAS - RAP SYSTOLE: 3 MMHG
BH CV ECHO MEAS - RVSP: 30 MMHG
BH CV ECHO MEAS - TAPSE (>1.6): 1.9 CM2
BH CV ECHO MEAS - TR MAX V: 27 MMHG
BH CV ECHO MEAS - TR MAX VEL: 259 CM/SEC
BH CV VAS BP RIGHT ARM: NORMAL MMHG
BH CV XLRA - RV BASE: 2.7 CM
BH CV XLRA - TDI S': 11 CM/SEC
BILIRUB SERPL-MCNC: 0.6 MG/DL (ref 0.1–1.2)
BUN BLD-MCNC: 13 MG/DL (ref 6–20)
BUN/CREAT SERPL: 13 (ref 7–25)
CALCIUM SPEC-SCNC: 10.9 MG/DL (ref 8.6–10.5)
CHLORIDE SERPL-SCNC: 99 MMOL/L (ref 98–107)
CO2 SERPL-SCNC: 24.2 MMOL/L (ref 22–29)
CREAT BLD-MCNC: 1 MG/DL (ref 0.57–1)
DEPRECATED RDW RBC AUTO: 47.8 FL (ref 37–54)
E/E' RATIO: 15
EOSINOPHIL # BLD AUTO: 0.72 10*3/MM3 (ref 0–0.7)
EOSINOPHIL NFR BLD AUTO: 5.6 % (ref 0.3–6.2)
ERYTHROCYTE [DISTWIDTH] IN BLOOD BY AUTOMATED COUNT: 15.1 % (ref 11.7–13)
GFR SERPL CREATININE-BSD FRML MDRD: 58 ML/MIN/1.73
GLOBULIN UR ELPH-MCNC: 4.4 GM/DL
GLUCOSE BLD-MCNC: 143 MG/DL (ref 65–99)
GLUCOSE BLDC GLUCOMTR-MCNC: 112 MG/DL (ref 70–130)
GLUCOSE BLDC GLUCOMTR-MCNC: 139 MG/DL (ref 70–130)
GLUCOSE BLDC GLUCOMTR-MCNC: 99 MG/DL (ref 70–130)
HCT VFR BLD AUTO: 44.7 % (ref 35.6–45.5)
HGB BLD-MCNC: 14.1 G/DL (ref 11.9–15.5)
HOLD SPECIMEN: NORMAL
HOLD SPECIMEN: NORMAL
IMM GRANULOCYTES # BLD: 0.05 10*3/MM3 (ref 0–0.03)
IMM GRANULOCYTES NFR BLD: 0.4 % (ref 0–0.5)
INR PPP: 1.16 (ref 0.9–1.1)
LEFT ATRIUM VOLUME INDEX: 23 ML/M2
LYMPHOCYTES # BLD AUTO: 4.41 10*3/MM3 (ref 0.9–4.8)
LYMPHOCYTES NFR BLD AUTO: 34.1 % (ref 19.6–45.3)
MCH RBC QN AUTO: 27.5 PG (ref 26.9–32)
MCHC RBC AUTO-ENTMCNC: 31.5 G/DL (ref 32.4–36.3)
MCV RBC AUTO: 87.3 FL (ref 80.5–98.2)
MONOCYTES # BLD AUTO: 0.87 10*3/MM3 (ref 0.2–1.2)
MONOCYTES NFR BLD AUTO: 6.7 % (ref 5–12)
NEUTROPHILS # BLD AUTO: 6.88 10*3/MM3 (ref 1.9–8.1)
NEUTROPHILS NFR BLD AUTO: 53 % (ref 42.7–76)
NRBC BLD MANUAL-RTO: 0 /100 WBC (ref 0–0)
PLATELET # BLD AUTO: 311 10*3/MM3 (ref 140–500)
PMV BLD AUTO: 12.5 FL (ref 6–12)
POTASSIUM BLD-SCNC: 3.7 MMOL/L (ref 3.5–5.2)
PROT SERPL-MCNC: 9.1 G/DL (ref 6–8.5)
PROTHROMBIN TIME: 14.4 SECONDS (ref 11.7–14.2)
RBC # BLD AUTO: 5.12 10*6/MM3 (ref 3.9–5.2)
SODIUM BLD-SCNC: 141 MMOL/L (ref 136–145)
TROPONIN T SERPL-MCNC: 0.03 NG/ML (ref 0–0.03)
WBC NRBC COR # BLD: 12.95 10*3/MM3 (ref 4.5–10.7)
WHOLE BLOOD HOLD SPECIMEN: NORMAL
WHOLE BLOOD HOLD SPECIMEN: NORMAL

## 2017-11-13 PROCEDURE — 80053 COMPREHEN METABOLIC PANEL: CPT | Performed by: EMERGENCY MEDICINE

## 2017-11-13 PROCEDURE — 93005 ELECTROCARDIOGRAM TRACING: CPT | Performed by: EMERGENCY MEDICINE

## 2017-11-13 PROCEDURE — 94799 UNLISTED PULMONARY SVC/PX: CPT

## 2017-11-13 PROCEDURE — 25010000002 EPTIFIBATIDE PER 5 MG: Performed by: INTERNAL MEDICINE

## 2017-11-13 PROCEDURE — 85347 COAGULATION TIME ACTIVATED: CPT

## 2017-11-13 PROCEDURE — C1887 CATHETER, GUIDING: HCPCS | Performed by: INTERNAL MEDICINE

## 2017-11-13 PROCEDURE — 93010 ELECTROCARDIOGRAM REPORT: CPT | Performed by: INTERNAL MEDICINE

## 2017-11-13 PROCEDURE — 25010000002 ONDANSETRON PER 1 MG

## 2017-11-13 PROCEDURE — 93306 TTE W/DOPPLER COMPLETE: CPT

## 2017-11-13 PROCEDURE — C1874 STENT, COATED/COV W/DEL SYS: HCPCS | Performed by: INTERNAL MEDICINE

## 2017-11-13 PROCEDURE — 82962 GLUCOSE BLOOD TEST: CPT

## 2017-11-13 PROCEDURE — 71010 HC CHEST PA OR AP: CPT

## 2017-11-13 PROCEDURE — 94640 AIRWAY INHALATION TREATMENT: CPT

## 2017-11-13 PROCEDURE — 25010000002 FENTANYL CITRATE (PF) 100 MCG/2ML SOLUTION: Performed by: INTERNAL MEDICINE

## 2017-11-13 PROCEDURE — 027034Z DILATION OF CORONARY ARTERY, ONE ARTERY WITH DRUG-ELUTING INTRALUMINAL DEVICE, PERCUTANEOUS APPROACH: ICD-10-PCS | Performed by: INTERNAL MEDICINE

## 2017-11-13 PROCEDURE — C1894 INTRO/SHEATH, NON-LASER: HCPCS | Performed by: INTERNAL MEDICINE

## 2017-11-13 PROCEDURE — B2151ZZ FLUOROSCOPY OF LEFT HEART USING LOW OSMOLAR CONTRAST: ICD-10-PCS | Performed by: INTERNAL MEDICINE

## 2017-11-13 PROCEDURE — 93458 L HRT ARTERY/VENTRICLE ANGIO: CPT | Performed by: INTERNAL MEDICINE

## 2017-11-13 PROCEDURE — C1725 CATH, TRANSLUMIN NON-LASER: HCPCS | Performed by: INTERNAL MEDICINE

## 2017-11-13 PROCEDURE — 25010000002 HYDRALAZINE PER 20 MG: Performed by: NURSE PRACTITIONER

## 2017-11-13 PROCEDURE — C1769 GUIDE WIRE: HCPCS | Performed by: INTERNAL MEDICINE

## 2017-11-13 PROCEDURE — B2111ZZ FLUOROSCOPY OF MULTIPLE CORONARY ARTERIES USING LOW OSMOLAR CONTRAST: ICD-10-PCS | Performed by: INTERNAL MEDICINE

## 2017-11-13 PROCEDURE — 85610 PROTHROMBIN TIME: CPT | Performed by: EMERGENCY MEDICINE

## 2017-11-13 PROCEDURE — 93306 TTE W/DOPPLER COMPLETE: CPT | Performed by: INTERNAL MEDICINE

## 2017-11-13 PROCEDURE — 25010000002 HEPARIN (PORCINE) PER 1000 UNITS: Performed by: INTERNAL MEDICINE

## 2017-11-13 PROCEDURE — 25010000002 MIDAZOLAM PER 1 MG: Performed by: INTERNAL MEDICINE

## 2017-11-13 PROCEDURE — C9606 PERC D-E COR REVASC W AMI S: HCPCS | Performed by: INTERNAL MEDICINE

## 2017-11-13 PROCEDURE — 25010000002 HYDROMORPHONE PER 4 MG

## 2017-11-13 PROCEDURE — 99291 CRITICAL CARE FIRST HOUR: CPT | Performed by: INTERNAL MEDICINE

## 2017-11-13 PROCEDURE — 4A023N7 MEASUREMENT OF CARDIAC SAMPLING AND PRESSURE, LEFT HEART, PERCUTANEOUS APPROACH: ICD-10-PCS | Performed by: INTERNAL MEDICINE

## 2017-11-13 PROCEDURE — 99285 EMERGENCY DEPT VISIT HI MDM: CPT

## 2017-11-13 PROCEDURE — 85025 COMPLETE CBC W/AUTO DIFF WBC: CPT | Performed by: EMERGENCY MEDICINE

## 2017-11-13 PROCEDURE — 92941 PRQ TRLML REVSC TOT OCCL AMI: CPT | Performed by: INTERNAL MEDICINE

## 2017-11-13 PROCEDURE — 84484 ASSAY OF TROPONIN QUANT: CPT | Performed by: EMERGENCY MEDICINE

## 2017-11-13 PROCEDURE — 0 IOPAMIDOL PER 1 ML: Performed by: INTERNAL MEDICINE

## 2017-11-13 DEVICE — XIENCE ALPINE EVEROLIMUS ELUTING CORONARY STENT SYSTEM 3.50 MM X 12 MM / RAPID-EXCHANGE
Type: IMPLANTABLE DEVICE | Status: FUNCTIONAL
Brand: XIENCE ALPINE

## 2017-11-13 RX ORDER — LOSARTAN POTASSIUM 50 MG/1
50 TABLET ORAL DAILY
Status: DISCONTINUED | OUTPATIENT
Start: 2017-11-13 | End: 2017-11-17 | Stop reason: HOSPADM

## 2017-11-13 RX ORDER — MIDAZOLAM HYDROCHLORIDE 1 MG/ML
INJECTION INTRAMUSCULAR; INTRAVENOUS AS NEEDED
Status: DISCONTINUED | OUTPATIENT
Start: 2017-11-13 | End: 2017-11-13 | Stop reason: HOSPADM

## 2017-11-13 RX ORDER — ONDANSETRON 2 MG/ML
INJECTION INTRAMUSCULAR; INTRAVENOUS
Status: COMPLETED
Start: 2017-11-13 | End: 2017-11-13

## 2017-11-13 RX ORDER — ASPIRIN 325 MG
325 TABLET, DELAYED RELEASE (ENTERIC COATED) ORAL DAILY
Status: DISCONTINUED | OUTPATIENT
Start: 2017-11-13 | End: 2017-11-17 | Stop reason: HOSPADM

## 2017-11-13 RX ORDER — CLOPIDOGREL BISULFATE 75 MG/1
75 TABLET ORAL DAILY
Status: DISCONTINUED | OUTPATIENT
Start: 2017-11-14 | End: 2017-11-17 | Stop reason: HOSPADM

## 2017-11-13 RX ORDER — HYDROMORPHONE HYDROCHLORIDE 1 MG/ML
0.5 INJECTION, SOLUTION INTRAMUSCULAR; INTRAVENOUS; SUBCUTANEOUS ONCE
Status: COMPLETED | OUTPATIENT
Start: 2017-11-13 | End: 2017-11-13

## 2017-11-13 RX ORDER — SODIUM CHLORIDE 9 MG/ML
100 INJECTION, SOLUTION INTRAVENOUS CONTINUOUS
Status: DISCONTINUED | OUTPATIENT
Start: 2017-11-13 | End: 2017-11-15

## 2017-11-13 RX ORDER — AMLODIPINE BESYLATE 2.5 MG/1
2.5 TABLET ORAL
Status: DISCONTINUED | OUTPATIENT
Start: 2017-11-13 | End: 2017-11-13

## 2017-11-13 RX ORDER — OXYCODONE HYDROCHLORIDE AND ACETAMINOPHEN 5; 325 MG/1; MG/1
1 TABLET ORAL EVERY 6 HOURS PRN
Status: DISCONTINUED | OUTPATIENT
Start: 2017-11-13 | End: 2017-11-17

## 2017-11-13 RX ORDER — EPTIFIBATIDE 0.75 MG/ML
2 INJECTION, SOLUTION INTRAVENOUS CONTINUOUS
Status: ACTIVE | OUTPATIENT
Start: 2017-11-13 | End: 2017-11-14

## 2017-11-13 RX ORDER — SODIUM CHLORIDE 0.9 % (FLUSH) 0.9 %
10 SYRINGE (ML) INJECTION AS NEEDED
Status: DISCONTINUED | OUTPATIENT
Start: 2017-11-13 | End: 2017-11-14

## 2017-11-13 RX ORDER — ONDANSETRON 2 MG/ML
4 INJECTION INTRAMUSCULAR; INTRAVENOUS ONCE
Status: COMPLETED | OUTPATIENT
Start: 2017-11-13 | End: 2017-11-13

## 2017-11-13 RX ORDER — LIDOCAINE HYDROCHLORIDE 20 MG/ML
INJECTION, SOLUTION INFILTRATION; PERINEURAL AS NEEDED
Status: DISCONTINUED | OUTPATIENT
Start: 2017-11-13 | End: 2017-11-13 | Stop reason: HOSPADM

## 2017-11-13 RX ORDER — CLOPIDOGREL BISULFATE 75 MG/1
TABLET ORAL
Status: COMPLETED | OUTPATIENT
Start: 2017-11-13 | End: 2017-11-13

## 2017-11-13 RX ORDER — HEPARIN SODIUM 1000 [USP'U]/ML
INJECTION, SOLUTION INTRAVENOUS; SUBCUTANEOUS AS NEEDED
Status: DISCONTINUED | OUTPATIENT
Start: 2017-11-13 | End: 2017-11-13 | Stop reason: HOSPADM

## 2017-11-13 RX ORDER — PANTOPRAZOLE SODIUM 40 MG/1
40 TABLET, DELAYED RELEASE ORAL
Status: DISCONTINUED | OUTPATIENT
Start: 2017-11-13 | End: 2017-11-17 | Stop reason: HOSPADM

## 2017-11-13 RX ORDER — SODIUM CHLORIDE 0.9 % (FLUSH) 0.9 %
1-10 SYRINGE (ML) INJECTION AS NEEDED
Status: DISCONTINUED | OUTPATIENT
Start: 2017-11-13 | End: 2017-11-17

## 2017-11-13 RX ORDER — AMLODIPINE BESYLATE 5 MG/1
5 TABLET ORAL
Status: DISCONTINUED | OUTPATIENT
Start: 2017-11-14 | End: 2017-11-17 | Stop reason: HOSPADM

## 2017-11-13 RX ORDER — PHENYLEPHRINE HCL IN 0.9% NACL 0.5 MG/5ML
SYRINGE (ML) INTRAVENOUS AS NEEDED
Status: DISCONTINUED | OUTPATIENT
Start: 2017-11-13 | End: 2017-11-13 | Stop reason: HOSPADM

## 2017-11-13 RX ORDER — ATROPINE SULFATE 1 MG/ML
INJECTION, SOLUTION INTRAMUSCULAR; INTRAVENOUS; SUBCUTANEOUS AS NEEDED
Status: DISCONTINUED | OUTPATIENT
Start: 2017-11-13 | End: 2017-11-13 | Stop reason: HOSPADM

## 2017-11-13 RX ORDER — HYDRALAZINE HYDROCHLORIDE 20 MG/ML
20 INJECTION INTRAMUSCULAR; INTRAVENOUS EVERY 6 HOURS PRN
Status: DISCONTINUED | OUTPATIENT
Start: 2017-11-13 | End: 2017-11-17

## 2017-11-13 RX ORDER — EPTIFIBATIDE 0.75 MG/ML
2 INJECTION, SOLUTION INTRAVENOUS CONTINUOUS
Status: CANCELLED | OUTPATIENT
Start: 2017-11-13

## 2017-11-13 RX ORDER — ATORVASTATIN CALCIUM 20 MG/1
20 TABLET, FILM COATED ORAL DAILY
Status: DISCONTINUED | OUTPATIENT
Start: 2017-11-13 | End: 2017-11-17 | Stop reason: HOSPADM

## 2017-11-13 RX ORDER — FENTANYL CITRATE 50 UG/ML
INJECTION, SOLUTION INTRAMUSCULAR; INTRAVENOUS AS NEEDED
Status: DISCONTINUED | OUTPATIENT
Start: 2017-11-13 | End: 2017-11-13 | Stop reason: HOSPADM

## 2017-11-13 RX ORDER — EPTIFIBATIDE 0.75 MG/ML
INJECTION, SOLUTION INTRAVENOUS CONTINUOUS PRN
Status: DISCONTINUED | OUTPATIENT
Start: 2017-11-13 | End: 2017-11-13 | Stop reason: HOSPADM

## 2017-11-13 RX ORDER — BUDESONIDE AND FORMOTEROL FUMARATE DIHYDRATE 160; 4.5 UG/1; UG/1
2 AEROSOL RESPIRATORY (INHALATION)
Status: DISCONTINUED | OUTPATIENT
Start: 2017-11-13 | End: 2017-11-17 | Stop reason: HOSPADM

## 2017-11-13 RX ADMIN — AMLODIPINE BESYLATE 2.5 MG: 2.5 TABLET ORAL at 12:51

## 2017-11-13 RX ADMIN — PANTOPRAZOLE SODIUM 40 MG: 40 TABLET, DELAYED RELEASE ORAL at 12:51

## 2017-11-13 RX ADMIN — SODIUM CHLORIDE 100 ML/HR: 9 INJECTION, SOLUTION INTRAVENOUS at 12:52

## 2017-11-13 RX ADMIN — HYDRALAZINE HYDROCHLORIDE 20 MG: 20 INJECTION INTRAMUSCULAR; INTRAVENOUS at 15:27

## 2017-11-13 RX ADMIN — BUDESONIDE AND FORMOTEROL FUMARATE DIHYDRATE 2 PUFF: 160; 4.5 AEROSOL RESPIRATORY (INHALATION) at 14:31

## 2017-11-13 RX ADMIN — ONDANSETRON 4 MG: 2 INJECTION INTRAMUSCULAR; INTRAVENOUS at 05:29

## 2017-11-13 RX ADMIN — CLOPIDOGREL BISULFATE 600 MG: 75 TABLET, FILM COATED ORAL at 05:25

## 2017-11-13 RX ADMIN — ATORVASTATIN CALCIUM 20 MG: 20 TABLET, FILM COATED ORAL at 12:52

## 2017-11-13 RX ADMIN — PANTOPRAZOLE SODIUM 40 MG: 40 TABLET, DELAYED RELEASE ORAL at 18:18

## 2017-11-13 RX ADMIN — EPTIFIBATIDE 2 MCG/KG/MIN: 0.75 INJECTION, SOLUTION INTRAVENOUS at 15:26

## 2017-11-13 RX ADMIN — OXYCODONE HYDROCHLORIDE AND ACETAMINOPHEN 1 TABLET: 5; 325 TABLET ORAL at 19:52

## 2017-11-13 RX ADMIN — BUDESONIDE AND FORMOTEROL FUMARATE DIHYDRATE 2 PUFF: 160; 4.5 AEROSOL RESPIRATORY (INHALATION) at 20:21

## 2017-11-13 RX ADMIN — LOSARTAN POTASSIUM 50 MG: 50 TABLET, FILM COATED ORAL at 12:51

## 2017-11-13 RX ADMIN — EPTIFIBATIDE 2 MCG/KG/MIN: 0.75 INJECTION, SOLUTION INTRAVENOUS at 12:53

## 2017-11-13 RX ADMIN — SODIUM CHLORIDE 100 ML/HR: 9 INJECTION, SOLUTION INTRAVENOUS at 23:18

## 2017-11-13 RX ADMIN — ASPIRIN 325 MG: 325 TABLET, DELAYED RELEASE ORAL at 12:51

## 2017-11-13 RX ADMIN — HYDROMORPHONE HYDROCHLORIDE 0.5 MG: 1 INJECTION, SOLUTION INTRAMUSCULAR; INTRAVENOUS; SUBCUTANEOUS at 05:30

## 2017-11-13 RX ADMIN — EPTIFIBATIDE 2 MCG/KG/MIN: 0.75 INJECTION, SOLUTION INTRAVENOUS at 21:18

## 2017-11-13 RX ADMIN — METOPROLOL TARTRATE 25 MG: 25 TABLET ORAL at 21:11

## 2017-11-13 NOTE — CONSULTS
"Met with pt at [facility] in CCU s/p MI & stent placement.  Discussed benefits of cardiac rehab and provided Phase II information packet which includes a yellow cover sheet, a [facility] Cardiac Re[facility] Programs handout, and two [place] Heart Letter articles that stress the importance of cardiac rehab after a heart event.  Also provided Sammy booklet \"Understanding Cardiac Rehabilitation.\"  She lives closest to [facility], so I provided contact information for our program.  Encouraged to call as soon as possible after discharge to set up her first appointment.  Also encouraged pt to call her insurance company to determine if she has any co-pays or deductibles and to bring her discharge instructions (AVS) from this hospitalization to her first cardiac rehab appointment.  Provided my name & phone number if she has any questions later.      "

## 2017-11-13 NOTE — H&P
Patient Care Team:  Isidro Odom MD as PCP - General (Family Medicine)  Ruth Ann Willson MD as Consulting Physician (Gastroenterology)  Marilee James MD as Consulting Physician (Hematology and Oncology)  Maxine Rose MD (Inactive) as Consulting Physician (Cardiology)    Chief complaint chest pain    Subjective     Patient is a 52 y.o. female presents with sudden onset of chest pain around 3:00 this morning approximately couple hours prior to the coming to the emergency room. Onset of symptoms was abrupt starting 2 hours ago.  Symptoms are associated with shortness of breath sweating weakness.  Symptoms are aggravated by none.   Symptoms improve with none. Severity 8/10 Context during sleep Quality pressure    Review of Systems   Could not be obtained due to the acuteness of the situation    History  Past Medical History:   Diagnosis Date   • Anemia    • Asthma    • Breast cyst    • Diabetes mellitus    • Diverticulosis    • H/O: GI bleed     recurrent   • Hyperlipidemia    • Hypertension    • Irritable bowel syndrome    • Melena    • Obesity      Past Surgical History:   Procedure Laterality Date   • BREAST CYST EXCISION     • COLONOSCOPY  02/11/2016    tics, NBIH,    • ENDOSCOPY N/A 3/3/2017    erythematous mucosa in stomach, duodenal ulcer , mild to moderate chronic active gastritis, positive for h pylori   • UPPER GASTROINTESTINAL ENDOSCOPY  02/10/2016    normal-Ruth Ann Willson M.D.     Family History   Problem Relation Age of Onset   • Breast cancer Other    • Lung cancer Brother    • Throat cancer Paternal Uncle    • Tongue cancer Paternal Grandfather      Social History   Substance Use Topics   • Smoking status: Former Smoker     Packs/day: 1.00     Types: Cigarettes     Quit date: 1990   • Smokeless tobacco: Never Used   • Alcohol use No       (Not in a hospital admission)  Allergies:  Review of patient's allergies indicates no known allergies.    Objective     Vital Signs  Temp:  [97.4  °F (36.3 °C)] 97.4 °F (36.3 °C)  Heart Rate:  [57-82] 73  Resp:  [16-18] 18  BP: (152-184)/() 184/110    Physical Exam:      General Appearance:    Alert, Severe distress    Head:    Normocephalic, without obvious abnormality, atraumatic   Eyes:            Lids and lashes normal, conjunctivae and sclerae normal, no   icterus, no pallor, corneas clear, PERRLA   Ears:    Ears appear intact with no abnormalities noted   Throat:   No oral lesions, no thrush, oral mucosa moist   Neck:   No adenopathy, supple, trachea midline, no thyromegaly, no     carotid bruit, no JVD   Back:     No kyphosis present, no scoliosis present, no skin lesions,       erythema or scars, no tenderness to percussion or                   palpation,   range of motion normal   Lungs:     Clear to auscultation,respirations regular, even and                   unlabored    Heart:    Regular rhythm and normal rate, normal S1 and S2, no            murmur, no gallop, no rub, no click   Breast Exam:    Deferred   Abdomen:     Normal bowel sounds, no masses, no organomegaly, soft        non-tender, non-distended, no guarding, no rebound                 tenderness   Genitalia:    Deferred   Extremities:   Moves all extremities well, no edema, no cyanosis, no              redness   Pulses:   Pulses palpable and equal bilaterally   Skin:   No bleeding, bruising or rash   Lymph nodes:   No palpable adenopathy   Neurologic:   Cranial nerves 2 - 12 grossly intact, sensation intact, DTR        present and equal bilaterally         Results Review:    I reviewed the patient's new clinical results.  Assessment/Plan     Active Problems:    ST elevation myocardial infarction (STEMI)      Hypercholesterolemia  Obesity  Hypertension  Pre diabetic    I discussed the patients findings and my recommendations with patient and family.     52-year-old white female with sudden onset of retrosternal chest pain highly suggestive of angina with EKG suggestive of acute  myocardial infarction with severe pain and distress, will proceed with the pain management, emergency diagnostic heart catheter and possible angioplasty    Imer Montaño MD  11/13/17  6:38 AM    Time: Critical care 45 min

## 2017-11-13 NOTE — ED PROVIDER NOTES
EMERGENCY DEPARTMENT ENCOUNTER    CHIEF COMPLAINT  Chief Complaint: Chest pain  History given by: patient   History limited by: n/a  Room Number: 16/16  PMD: Isidro Odom MD      HPI:  Pt is a 52 y.o. female who presents complaining of chest pain that began last night. Pt states that the   Pt also complains of numbness in her right arm and SOA. Pt denies personal cardiac hx, but has been seen by Dr Rose in the past. Pt was given 324 mg of ASA per EMS PTA. Hx of hypertension.     Onset: gradual  Timing: constant   Location: chest   Radiation: none  Quality: pain  Intensity/Severity: severe   Progression: unchanged   Associated Symptoms: SOA, numbness in the RUE  Aggravating Factors: none  Previous Episodes: none  Treatment before arrival: 324 mg of ASA    PAST MEDICAL HISTORY  Active Ambulatory Problems     Diagnosis Date Noted   • Hypertension 03/21/2016   • Obesity 03/21/2016   • Hyperlipidemia 03/21/2016   • Iron deficiency anemia due to chronic blood loss 07/11/2016   • Upper GI bleeding 03/02/2017     Resolved Ambulatory Problems     Diagnosis Date Noted   • No Resolved Ambulatory Problems     Past Medical History:   Diagnosis Date   • Anemia    • Asthma    • Breast cyst    • Diabetes mellitus    • Diverticulosis    • H/O: GI bleed    • Hyperlipidemia    • Hypertension    • Irritable bowel syndrome    • Melena    • Obesity        PAST SURGICAL HISTORY  Past Surgical History:   Procedure Laterality Date   • BREAST CYST EXCISION     • COLONOSCOPY  02/11/2016    mary, VICTOR MANUEL,    • ENDOSCOPY N/A 3/3/2017    erythematous mucosa in stomach, duodenal ulcer , mild to moderate chronic active gastritis, positive for h pylori   • UPPER GASTROINTESTINAL ENDOSCOPY  02/10/2016    sami-Ruth Ann Willson M.D.       FAMILY HISTORY  Family History   Problem Relation Age of Onset   • Breast cancer Other    • Lung cancer Brother    • Throat cancer Paternal Uncle    • Tongue cancer Paternal Grandfather        SOCIAL  HISTORY  Social History     Social History   • Marital status:      Spouse name: N/A   • Number of children: N/A   • Years of education: High School     Occupational History   •  Retired     Social History Main Topics   • Smoking status: Former Smoker     Packs/day: 1.00     Types: Cigarettes     Quit date: 1990   • Smokeless tobacco: Never Used   • Alcohol use No   • Drug use: No   • Sexual activity: Defer     Other Topics Concern   • Not on file     Social History Narrative       ALLERGIES  Review of patient's allergies indicates no known allergies.    REVIEW OF SYSTEMS  Review of Systems   Constitutional: Negative for chills and fever.   HENT: Negative for congestion and sore throat.    Eyes: Negative.    Respiratory: Positive for shortness of breath. Negative for cough.    Cardiovascular: Positive for chest pain. Negative for leg swelling.   Gastrointestinal: Negative for abdominal pain, diarrhea and vomiting.   Genitourinary: Negative for difficulty urinating and dysuria.   Musculoskeletal: Negative for back pain and neck pain.   Skin: Negative for rash and wound.   Allergic/Immunologic: Negative.    Neurological: Positive for numbness (RUE). Negative for dizziness, weakness and headaches.   Psychiatric/Behavioral: Negative.    All other systems reviewed and are negative.      PHYSICAL EXAM  ED Triage Vitals   Temp Heart Rate Resp BP SpO2   11/13/17 0529 11/13/17 0520 11/13/17 0520 11/13/17 0529 11/13/17 0529   97.4 °F (36.3 °C) 80 18 174/100 100 %      Temp src Heart Rate Source Patient Position BP Location FiO2 (%)   11/13/17 0529 11/13/17 0520 -- -- --   Tympanic Monitor          Physical Exam   Constitutional: She is oriented to person, place, and time and well-developed, well-nourished, and in no distress. No distress.   HENT:   Head: Normocephalic and atraumatic.   Eyes: EOM are normal. Pupils are equal, round, and reactive to light.   Neck: Normal range of motion. Neck supple.   Cardiovascular:  Normal rate, regular rhythm and normal heart sounds.    Pulmonary/Chest: Effort normal and breath sounds normal. No respiratory distress.   Abdominal: Soft. There is no tenderness. There is no rebound and no guarding.   Musculoskeletal: Normal range of motion. She exhibits no edema.   Neurological: She is alert and oriented to person, place, and time.   Skin: Skin is warm and dry. No rash noted.   Psychiatric: Mood and affect normal.   Nursing note and vitals reviewed.      LAB RESULTS  Lab Results (last 24 hours)     Procedure Component Value Units Date/Time    CBC & Differential [[id number]] Collected:  11/13/17 0526    Specimen:  Blood Updated:  11/13/17 0538    Narrative:       The following orders were created for panel order CBC & Differential.  Procedure                               Abnormality         Status                     ---------                               -----------         ------                     CBC Auto Differential[[id number]]        Abnormal            Final result                 Please view results for these tests on the individual orders.    Troponin [[id number]]  (Normal) Collected:  11/13/17 0526    Specimen:  Blood Updated:  11/13/17 0558     Troponin T 0.028 ng/mL     Narrative:       Troponin T Reference Ranges:  Less than 0.03 ng/mL:    Negative for AMI  0.03 to 0.09 ng/mL:      Indeterminant for AMI  Greater than 0.09 ng/mL: Positive for AMI    Comprehensive Metabolic Panel [[id number]]  (Abnormal) Collected:  11/13/17 0526    Specimen:  Blood Updated:  11/13/17 0558     Glucose 143 (H) mg/dL      BUN 13 mg/dL      Creatinine 1.00 mg/dL      Sodium 141 mmol/L      Potassium 3.7 mmol/L      Chloride 99 mmol/L      CO2 24.2 mmol/L      Calcium 10.9 (H) mg/dL      Total Protein 9.1 (H) g/dL      Albumin 4.70 g/dL      ALT (SGPT) 22 U/L      AST (SGOT) 23 U/L      Alkaline Phosphatase 129 (H) U/L      Total Bilirubin 0.6 mg/dL      eGFR Non African Amer 58 (L) mL/min/1.73       Globulin 4.4 gm/dL      A/G Ratio 1.1 g/dL      BUN/Creatinine Ratio 13.0     Anion Gap 17.8 mmol/L     Protime-INR [[id number]]  (Abnormal) Collected:  11/13/17 0526    Specimen:  Blood Updated:  11/13/17 0548     Protime 14.4 (H) Seconds      INR 1.16 (H)    CBC Auto Differential [[id number]]  (Abnormal) Collected:  11/13/17 0526    Specimen:  Blood Updated:  11/13/17 0538     WBC 12.95 (H) 10*3/mm3      RBC 5.12 10*6/mm3      Hemoglobin 14.1 g/dL      Hematocrit 44.7 %      MCV 87.3 fL      MCH 27.5 pg      MCHC 31.5 (L) g/dL      RDW 15.1 (H) %      RDW-SD 47.8 fl      MPV 12.5 (H) fL      Platelets 311 10*3/mm3      Neutrophil % 53.0 %      Lymphocyte % 34.1 %      Monocyte % 6.7 %      Eosinophil % 5.6 %      Basophil % 0.2 %      Immature Grans % 0.4 %      Neutrophils, Absolute 6.88 10*3/mm3      Lymphocytes, Absolute 4.41 10*3/mm3      Monocytes, Absolute 0.87 10*3/mm3      Eosinophils, Absolute 0.72 (H) 10*3/mm3      Basophils, Absolute 0.02 10*3/mm3      Immature Grans, Absolute 0.05 (H) 10*3/mm3      nRBC 0.0 /100 WBC           I ordered the above labs and reviewed the results    RADIOLOGY  XR Chest 1 View   Final Result   Cardiomegaly with right lung base atelectasis, possibly   small effusion       This report was finalized on 11/13/2017 5:50 AM by Yousuf Woodward MD.               I ordered the above noted radiological studies. Interpreted by radiologist. Reviewed by me in PACS.       PROCEDURES  Procedures    EKG           EKG time: 05:20  Rhythm/Rate: NSR 80  P waves and TX: nml  QRS, axis: nml   ST and T waves: ST elevation in inferior and lateral leads with reciprocal changes      Interpreted Contemporaneously by me, independently viewed  changed compared to prior 3/2017    PROGRESS AND CONSULTS  ED Course     05:12   EKG received from EMS. Team C called, cath lab activated.     05:15  Pt arrived in the ED.     05:16  Spoke with Dr Montaño who agrees to take the pt to the cardiac cath lab      05;19  BP- 174/100 HR- 57 Temp- 97.4 °F (36.3 °C) (Tympanic) O2 sat- 100%  Advised pt that the EKG shows a STEMI. Informed her of the plan to go to the cardiac cath lab. Pt understands and agrees with the plan, all questions answered.    05:24  IVF ordered for hydration. Dilaudid and Zofran ordered to treat pain. Plavix ordered for anticoagulation. Pt/inr, CMP, troponin, CBC, EKG, and CXR ordered.     05:26  Lopressor ordered.     05:30  Lopressor held due to HR in the 50's     0546  Pt transported to the cardiac cath lab.     MEDICAL DECISION MAKING  Results were reviewed/discussed with the patient and they were also made aware of online access. Pt also made aware that some labs, such as cultures, will not be resulted during ER visit and follow up with PMD is necessary.     MDM  Number of Diagnoses or Management Options  ST elevation myocardial infarction (STEMI), unspecified artery:      Amount and/or Complexity of Data Reviewed  Clinical lab tests: ordered and reviewed (Troponin=0.028)  Tests in the radiology section of CPT®: ordered and reviewed (CXR shows cardiomegaly with right lung base atelectasis, possibly small effusion  )  Tests in the medicine section of CPT®: ordered and reviewed (See EKG procedure note. )  Discuss the patient with other providers: yes (Dr Montaño, cardiology. )  Independent visualization of images, tracings, or specimens: yes    Patient Progress  Patient progress: stable         DIAGNOSIS  Final diagnoses:   ST elevation myocardial infarction (STEMI), unspecified artery       DISPOSITION  ADMISSION    Discussed treatment plan and reason for admission with pt/family and admitting physician.  Pt/family voiced understanding of the plan for admission for further testing/treatment as needed.     Latest Documented Vital Signs:  As of 6:32 AM  BP- (!) 184/110 HR- 73 Temp- 97.4 °F (36.3 °C) (Tympanic) O2 sat- 99%    --  Documentation assistance provided by maryjo White for   [facility].  Information recorded by the scribe was done at my direction and has been verified and validated by me.        Padmini White  11/13/17 0624       Artem Leong MD  11/13/17 0633

## 2017-11-14 LAB
ANION GAP SERPL CALCULATED.3IONS-SCNC: 9.1 MMOL/L
BUN BLD-MCNC: 8 MG/DL (ref 6–20)
BUN/CREAT SERPL: 8.9 (ref 7–25)
CALCIUM SPEC-SCNC: 7.9 MG/DL (ref 8.6–10.5)
CHLORIDE SERPL-SCNC: 110 MMOL/L (ref 98–107)
CHOLEST SERPL-MCNC: 101 MG/DL (ref 0–200)
CO2 SERPL-SCNC: 22.9 MMOL/L (ref 22–29)
CREAT BLD-MCNC: 0.9 MG/DL (ref 0.57–1)
DEPRECATED RDW RBC AUTO: 49.8 FL (ref 37–54)
ERYTHROCYTE [DISTWIDTH] IN BLOOD BY AUTOMATED COUNT: 15.4 % (ref 11.7–13)
GFR SERPL CREATININE-BSD FRML MDRD: 66 ML/MIN/1.73
GLUCOSE BLD-MCNC: 94 MG/DL (ref 65–99)
GLUCOSE BLDC GLUCOMTR-MCNC: 116 MG/DL (ref 70–130)
GLUCOSE BLDC GLUCOMTR-MCNC: 118 MG/DL (ref 70–130)
GLUCOSE BLDC GLUCOMTR-MCNC: 94 MG/DL (ref 70–130)
HBA1C MFR BLD: 5.8 % (ref 4.8–5.6)
HCT VFR BLD AUTO: 36.9 % (ref 35.6–45.5)
HDLC SERPL-MCNC: 27 MG/DL (ref 40–60)
HGB BLD-MCNC: 11.4 G/DL (ref 11.9–15.5)
LDLC SERPL CALC-MCNC: 53 MG/DL (ref 0–100)
LDLC/HDLC SERPL: 1.96 {RATIO}
MCH RBC QN AUTO: 27.6 PG (ref 26.9–32)
MCHC RBC AUTO-ENTMCNC: 30.9 G/DL (ref 32.4–36.3)
MCV RBC AUTO: 89.3 FL (ref 80.5–98.2)
PLATELET # BLD AUTO: 246 10*3/MM3 (ref 140–500)
PMV BLD AUTO: 12 FL (ref 6–12)
POTASSIUM BLD-SCNC: 3.8 MMOL/L (ref 3.5–5.2)
RBC # BLD AUTO: 4.13 10*6/MM3 (ref 3.9–5.2)
SODIUM BLD-SCNC: 142 MMOL/L (ref 136–145)
TRIGL SERPL-MCNC: 105 MG/DL (ref 0–150)
TROPONIN T SERPL-MCNC: 1.87 NG/ML (ref 0–0.03)
VLDLC SERPL-MCNC: 21 MG/DL (ref 5–40)
WBC NRBC COR # BLD: 9.47 10*3/MM3 (ref 4.5–10.7)

## 2017-11-14 PROCEDURE — 94799 UNLISTED PULMONARY SVC/PX: CPT

## 2017-11-14 PROCEDURE — 99232 SBSQ HOSP IP/OBS MODERATE 35: CPT | Performed by: INTERNAL MEDICINE

## 2017-11-14 PROCEDURE — 25010000002 EPTIFIBATIDE PER 5 MG: Performed by: INTERNAL MEDICINE

## 2017-11-14 PROCEDURE — 85027 COMPLETE CBC AUTOMATED: CPT | Performed by: INTERNAL MEDICINE

## 2017-11-14 PROCEDURE — 80048 BASIC METABOLIC PNL TOTAL CA: CPT | Performed by: INTERNAL MEDICINE

## 2017-11-14 PROCEDURE — 83036 HEMOGLOBIN GLYCOSYLATED A1C: CPT | Performed by: INTERNAL MEDICINE

## 2017-11-14 PROCEDURE — 82962 GLUCOSE BLOOD TEST: CPT

## 2017-11-14 PROCEDURE — 93010 ELECTROCARDIOGRAM REPORT: CPT | Performed by: INTERNAL MEDICINE

## 2017-11-14 PROCEDURE — 80061 LIPID PANEL: CPT | Performed by: INTERNAL MEDICINE

## 2017-11-14 PROCEDURE — 84484 ASSAY OF TROPONIN QUANT: CPT | Performed by: INTERNAL MEDICINE

## 2017-11-14 PROCEDURE — 93005 ELECTROCARDIOGRAM TRACING: CPT | Performed by: INTERNAL MEDICINE

## 2017-11-14 RX ADMIN — SODIUM CHLORIDE 100 ML/HR: 9 INJECTION, SOLUTION INTRAVENOUS at 22:25

## 2017-11-14 RX ADMIN — PANTOPRAZOLE SODIUM 40 MG: 40 TABLET, DELAYED RELEASE ORAL at 16:48

## 2017-11-14 RX ADMIN — AMLODIPINE BESYLATE 5 MG: 5 TABLET ORAL at 08:37

## 2017-11-14 RX ADMIN — CLOPIDOGREL 75 MG: 75 TABLET, FILM COATED ORAL at 08:37

## 2017-11-14 RX ADMIN — METOPROLOL TARTRATE 25 MG: 25 TABLET ORAL at 08:36

## 2017-11-14 RX ADMIN — EPTIFIBATIDE 2 MCG/KG/MIN: 0.75 INJECTION, SOLUTION INTRAVENOUS at 04:43

## 2017-11-14 RX ADMIN — PANTOPRAZOLE SODIUM 40 MG: 40 TABLET, DELAYED RELEASE ORAL at 08:36

## 2017-11-14 RX ADMIN — ASPIRIN 325 MG: 325 TABLET, DELAYED RELEASE ORAL at 08:37

## 2017-11-14 RX ADMIN — OXYCODONE HYDROCHLORIDE AND ACETAMINOPHEN 1 TABLET: 5; 325 TABLET ORAL at 04:52

## 2017-11-14 RX ADMIN — BUDESONIDE AND FORMOTEROL FUMARATE DIHYDRATE 2 PUFF: 160; 4.5 AEROSOL RESPIRATORY (INHALATION) at 07:35

## 2017-11-14 RX ADMIN — ATORVASTATIN CALCIUM 20 MG: 20 TABLET, FILM COATED ORAL at 08:37

## 2017-11-14 RX ADMIN — LOSARTAN POTASSIUM 50 MG: 50 TABLET, FILM COATED ORAL at 08:36

## 2017-11-14 RX ADMIN — METOPROLOL TARTRATE 25 MG: 25 TABLET ORAL at 20:49

## 2017-11-14 NOTE — PROGRESS NOTES
[facility]  Cardiology Progress Note    Patient Identification:  Name: Flaca Hassan  Age: 52 y.o.  Sex: female  :  1965  MRN: [id number]                 Follow Up / Chief Complaint: STEMI    Interval History: 52 y.o. female with sudden onset of chest pain found to be STEMI and underwent ROBERTO  to RCA. 17.       Subjective: reports issues in past with simvastatin causing liver issues and her pcp took her off it. No chest pain or pressure.       Objective:  Tele SR with inverted T waves    Temp:  [97.9 °F (36.6 °C)-98.6 °F (37 °C)] 97.9 °F (36.6 °C)  Heart Rate:  [] 74  Resp:  [16-23] 16  BP: (106-195)/() 149/76      Past Medical History:  Past Medical History:   Diagnosis Date   • Anemia    • Asthma    • Breast cyst    • Diabetes mellitus    • Diverticulosis    • H/O: GI bleed     recurrent   • Hyperlipidemia    • Hypertension    • Irritable bowel syndrome    • Melena    • Obesity      Past Surgical History:  Past Surgical History:   Procedure Laterality Date   • BREAST CYST EXCISION     • CARDIAC CATHETERIZATION N/A 2017    Procedure: Left Heart Cath;  Surgeon: Imer Montaño MD;  Location:  [facility];  Service:    • CARDIAC CATHETERIZATION N/A 2017    Procedure: Stent ROBERTO coronary;  Surgeon: Imer Montaño MD;  Location:  [facility];  Service:    • COLONOSCOPY  2016    VICTOR MANUEL hernandez,    • ENDOSCOPY N/A 3/3/2017    erythematous mucosa in stomach, duodenal ulcer , mild to moderate chronic active gastritis, positive for h pylori   • UPPER GASTROINTESTINAL ENDOSCOPY  02/10/2016    Dieudonne Willson M.D.        Social History:   Social History   Substance Use Topics   • Smoking status: Former Smoker     Packs/day: 1.00     Types: Cigarettes     Quit date:    • Smokeless tobacco: Never Used   • Alcohol use No      Family History:  Family History   Problem Relation Age of Onset   • Breast cancer Other    •  Lung cancer Brother    • Throat cancer Paternal Uncle    • Tongue cancer Paternal Grandfather           Allergies:  No Known Allergies  Scheduled Meds:    amLODIPine 5 mg Q24H   aspirin 325 mg Daily   atorvastatin 20 mg Daily   budesonide-formoterol 2 puff BID - RT   clopidogrel 75 mg Daily   losartan 50 mg Daily   metoprolol tartrate 25 mg Q12H   pantoprazole 40 mg BID AC           INTAKE AND OUTPUT:    Intake/Output Summary (Last 24 hours) at 17 1227  Last data filed at 17 0500   Gross per 24 hour   Intake             1903 ml   Output             1550 ml   Net              353 ml       Constitutional:  Temp:  [97.9 °F (36.6 °C)-98.6 °F (37 °C)] 97.9 °F (36.6 °C)  Heart Rate:  [] 74  Resp:  [16-23] 16  BP: (106-195)/() 149/76    Physical Exam by Dr. Montaño  General:  Appears in no acute distress  Eyes: PERTL,  HEENT:  No JVD. Thyroid not visibly enlarged. No mucosal pallor or cyanosis  Respiratory: Respirations regular and unlabored at rest. BBS with good air entry in all fields. No crackles, rubs or wheezes auscultated  Cardiovascular: S1S2 Regular rate and rhythm. No murmur, rub or gallop. No carotid bruits. DP/PT pulses     . No pretibial pitting edema  Gastrointestinal: Abdomen soft, flat, non tender. Bowel sounds present. No hepatosplenomegaly. No ascites  Musculoskeletal: KHAN x4. No abnormal movements  Extremities: No digital clubbing or cyanosis, right radial site without hematoma  Skin: Color pink. Skin warm and dry to touch. No rashes    Neuro: AAO x3 CN II-XII grossly intact  Psych: Mood and affect normal, pleasant and cooperative          Cardiographics  Telemetry: SR with inverted T waves. With prolonged QTc    EC17              Echocardiogram:   17  Interpretation Summary      · Peak velocity of the flow distal to the aortic valve is 198 cm/s.  · The mitral valve peak gradient is 6 mmHg.  · The mitral valve mean gradient is 3  "mmHg.  · The mitral valve area (PHT) is 2.7 cm2.  · Calculated right ventricular systolic pressure from tricuspid regurgitation is 30 mmHg.  · Left atrial cavity size is borderline dilated.  · Left Ventricle: Calculated EF = 59%.  · There is no evidence of pericardial effusion         Lab Review     Results from last 7 days  Lab Units 11/14/17  0451 11/13/17  0526   TROPONIN T ng/mL 1.870* 0.028           Results from last 7 days  Lab Units 11/14/17  0451   SODIUM mmol/L 142   POTASSIUM mmol/L 3.8   BUN mg/dL 8   CREATININE mg/dL 0.90   CALCIUM mg/dL 7.9*         Results from last 7 days  Lab Units 11/14/17  0451 11/13/17  0526   WBC 10*3/mm3 9.47 12.95*   HEMOGLOBIN g/dL 11.4* 14.1   HEMATOCRIT % 36.9 44.7   PLATELETS 10*3/mm3 246 311       Results from last 7 days  Lab Units 11/13/17  0526   INR  1.16*         Assessment:  Active Problems:    ST elevation myocardial infarction (STEMI)   Hypercholesterolemia  Obesity  Hypertension  Pre diabetic A1c 5.8      Plan:  Transfer to [facility].  Continue ASA, plavix, BB, And ARB. Integrilin is due to be completed at 1200 noon. EKG showing inverted T waves and QTc 510.     Labs/tests ordered for am: BMP and CBC. EKG in am.       I reviewed the patient's new clinical results. I personally viewed and interpreted the patient's EKG/Telemetry data    )11/14/2017  Imer Montaño MD      EMR Dragon/Transcription:   \"Dictated utilizing Dragon dictation\".   "

## 2017-11-14 NOTE — PLAN OF CARE
Problem: Patient Care Overview (Adult)  Goal: Plan of Care Review  Outcome: Ongoing (interventions implemented as appropriate)    11/14/17 0531   Coping/Psychosocial Response Interventions   Plan Of Care Reviewed With patient   Patient Care Overview   Progress progress towards functional goals is fair   Outcome Evaluation   Outcome Summary/Follow up Plan pt remains on CCU, complaints of pain and pain meds given- pain subsided, -130--now stable, slept well throughout the night, will continue to monitor         Problem: Cardiac Catheterization with/without PCI (Adult)  Goal: Signs and Symptoms of Listed Potential Problems Will be Absent or Manageable (Cardiac Catheterization with/without PCI)  Outcome: Ongoing (interventions implemented as appropriate)    11/14/17 0531   Cardiac Catheterization with/without PCI   Problems Assessed (Cardiac Catheterization) all   Problems Present (Cardiac Catheterization) none         Problem: Pain, Acute (Adult)  Goal: Identify Related Risk Factors and Signs and Symptoms  Outcome: Ongoing (interventions implemented as appropriate)    11/14/17 0531   Pain, Acute   Related Risk Factors (Acute Pain) procedure/treatment   Signs and Symptoms (Acute Pain) verbalization of pain descriptors       Goal: Acceptable Pain Control/Comfort Level  Outcome: Ongoing (interventions implemented as appropriate)    11/14/17 0531   Pain, Acute (Adult)   Acceptable Pain Control/Comfort Level making progress toward outcome

## 2017-11-14 NOTE — PLAN OF CARE
Problem: Patient Care Overview (Adult)  Goal: Plan of Care Review  Outcome: Ongoing (interventions implemented as appropriate)    11/14/17 7837   Coping/Psychosocial Response Interventions   Plan Of Care Reviewed With patient   Patient Care Overview   Progress improving   Outcome Evaluation   Outcome Summary/Follow up Plan No c/o pain or any other complaint since arrival to CVU. VSS. R radial remains CDI. CTM.       Goal: Adult Individualization and Mutuality  Outcome: Ongoing (interventions implemented as appropriate)    Problem: Cardiac Catheterization with/without PCI (Adult)  Goal: Signs and Symptoms of Listed Potential Problems Will be Absent or Manageable (Cardiac Catheterization with/without PCI)  Outcome: Ongoing (interventions implemented as appropriate)    Problem: Pain, Acute (Adult)  Goal: Identify Related Risk Factors and Signs and Symptoms  Outcome: Ongoing (interventions implemented as appropriate)  Goal: Acceptable Pain Control/Comfort Level  Outcome: Ongoing (interventions implemented as appropriate)

## 2017-11-15 LAB
ACT BLD: 390 SECONDS (ref 82–152)
ANION GAP SERPL CALCULATED.3IONS-SCNC: 12.1 MMOL/L
BASOPHILS # BLD AUTO: 0.05 10*3/MM3 (ref 0–0.2)
BASOPHILS NFR BLD AUTO: 0.5 % (ref 0–1.5)
BUN BLD-MCNC: 12 MG/DL (ref 6–20)
BUN/CREAT SERPL: 11.9 (ref 7–25)
CALCIUM SPEC-SCNC: 8.6 MG/DL (ref 8.6–10.5)
CHLORIDE SERPL-SCNC: 109 MMOL/L (ref 98–107)
CO2 SERPL-SCNC: 21.9 MMOL/L (ref 22–29)
CREAT BLD-MCNC: 1.01 MG/DL (ref 0.57–1)
DEPRECATED RDW RBC AUTO: 51.2 FL (ref 37–54)
EOSINOPHIL # BLD AUTO: 1.05 10*3/MM3 (ref 0–0.7)
EOSINOPHIL NFR BLD AUTO: 10.2 % (ref 0.3–6.2)
ERYTHROCYTE [DISTWIDTH] IN BLOOD BY AUTOMATED COUNT: 15.5 % (ref 11.7–13)
GFR SERPL CREATININE-BSD FRML MDRD: 58 ML/MIN/1.73
GLUCOSE BLD-MCNC: 111 MG/DL (ref 65–99)
GLUCOSE BLDC GLUCOMTR-MCNC: 104 MG/DL (ref 70–130)
GLUCOSE BLDC GLUCOMTR-MCNC: 123 MG/DL (ref 70–130)
GLUCOSE BLDC GLUCOMTR-MCNC: 97 MG/DL (ref 70–130)
GLUCOSE BLDC GLUCOMTR-MCNC: 98 MG/DL (ref 70–130)
HCT VFR BLD AUTO: 38.6 % (ref 35.6–45.5)
HGB BLD-MCNC: 11.7 G/DL (ref 11.9–15.5)
IMM GRANULOCYTES # BLD: 0.02 10*3/MM3 (ref 0–0.03)
IMM GRANULOCYTES NFR BLD: 0.2 % (ref 0–0.5)
LYMPHOCYTES # BLD AUTO: 2.4 10*3/MM3 (ref 0.9–4.8)
LYMPHOCYTES NFR BLD AUTO: 23.3 % (ref 19.6–45.3)
MCH RBC QN AUTO: 27.6 PG (ref 26.9–32)
MCHC RBC AUTO-ENTMCNC: 30.3 G/DL (ref 32.4–36.3)
MCV RBC AUTO: 91 FL (ref 80.5–98.2)
MONOCYTES # BLD AUTO: 0.83 10*3/MM3 (ref 0.2–1.2)
MONOCYTES NFR BLD AUTO: 8.1 % (ref 5–12)
NEUTROPHILS # BLD AUTO: 5.96 10*3/MM3 (ref 1.9–8.1)
NEUTROPHILS NFR BLD AUTO: 57.7 % (ref 42.7–76)
NT-PROBNP SERPL-MCNC: 789.5 PG/ML (ref 5–900)
PLATELET # BLD AUTO: 238 10*3/MM3 (ref 140–500)
PMV BLD AUTO: 12.5 FL (ref 6–12)
POTASSIUM BLD-SCNC: 4.3 MMOL/L (ref 3.5–5.2)
RBC # BLD AUTO: 4.24 10*6/MM3 (ref 3.9–5.2)
SODIUM BLD-SCNC: 143 MMOL/L (ref 136–145)
WBC NRBC COR # BLD: 10.31 10*3/MM3 (ref 4.5–10.7)

## 2017-11-15 PROCEDURE — 94799 UNLISTED PULMONARY SVC/PX: CPT | Performed by: NURSE PRACTITIONER

## 2017-11-15 PROCEDURE — 82962 GLUCOSE BLOOD TEST: CPT

## 2017-11-15 PROCEDURE — 99232 SBSQ HOSP IP/OBS MODERATE 35: CPT | Performed by: INTERNAL MEDICINE

## 2017-11-15 PROCEDURE — 94799 UNLISTED PULMONARY SVC/PX: CPT

## 2017-11-15 PROCEDURE — 80048 BASIC METABOLIC PNL TOTAL CA: CPT | Performed by: NURSE PRACTITIONER

## 2017-11-15 PROCEDURE — 93010 ELECTROCARDIOGRAM REPORT: CPT | Performed by: INTERNAL MEDICINE

## 2017-11-15 PROCEDURE — 85025 COMPLETE CBC W/AUTO DIFF WBC: CPT | Performed by: NURSE PRACTITIONER

## 2017-11-15 PROCEDURE — 93005 ELECTROCARDIOGRAM TRACING: CPT | Performed by: NURSE PRACTITIONER

## 2017-11-15 PROCEDURE — 83880 ASSAY OF NATRIURETIC PEPTIDE: CPT | Performed by: INTERNAL MEDICINE

## 2017-11-15 RX ADMIN — BUDESONIDE AND FORMOTEROL FUMARATE DIHYDRATE 2 PUFF: 160; 4.5 AEROSOL RESPIRATORY (INHALATION) at 19:37

## 2017-11-15 RX ADMIN — PANTOPRAZOLE SODIUM 40 MG: 40 TABLET, DELAYED RELEASE ORAL at 17:47

## 2017-11-15 RX ADMIN — ATORVASTATIN CALCIUM 20 MG: 20 TABLET, FILM COATED ORAL at 08:29

## 2017-11-15 RX ADMIN — ASPIRIN 325 MG: 325 TABLET, DELAYED RELEASE ORAL at 08:29

## 2017-11-15 RX ADMIN — AMLODIPINE BESYLATE 5 MG: 5 TABLET ORAL at 08:29

## 2017-11-15 RX ADMIN — LOSARTAN POTASSIUM 50 MG: 50 TABLET, FILM COATED ORAL at 08:29

## 2017-11-15 RX ADMIN — CLOPIDOGREL 75 MG: 75 TABLET, FILM COATED ORAL at 08:29

## 2017-11-15 RX ADMIN — PANTOPRAZOLE SODIUM 40 MG: 40 TABLET, DELAYED RELEASE ORAL at 06:30

## 2017-11-15 RX ADMIN — METOPROLOL TARTRATE 25 MG: 25 TABLET ORAL at 20:51

## 2017-11-15 RX ADMIN — METOPROLOL TARTRATE 25 MG: 25 TABLET ORAL at 08:29

## 2017-11-15 NOTE — PLAN OF CARE
Problem: Patient Care Overview (Adult)  Goal: Plan of Care Review  Outcome: Ongoing (interventions implemented as appropriate)    11/15/17 0549   Coping/Psychosocial Response Interventions   Plan Of Care Reviewed With patient   Patient Care Overview   Progress improving   Outcome Evaluation   Outcome Summary/Follow up Plan Vitals stable. No falls. No c/o pain R radial site CDI. Pt had BM during shift. Possible d/c this AM. Monitoring closely.        Goal: Adult Individualization and Mutuality  Outcome: Ongoing (interventions implemented as appropriate)  Goal: Discharge Needs Assessment  Outcome: Ongoing (interventions implemented as appropriate)    Problem: Cardiac Catheterization with/without PCI (Adult)  Goal: Signs and Symptoms of Listed Potential Problems Will be Absent or Manageable (Cardiac Catheterization with/without PCI)  Outcome: Ongoing (interventions implemented as appropriate)    11/15/17 0549   Cardiac Catheterization with/without PCI   Problems Assessed (Cardiac Catheterization) all   Problems Present (Cardiac Catheterization) none         Problem: Pain, Acute (Adult)  Goal: Identify Related Risk Factors and Signs and Symptoms  Outcome: Outcome(s) achieved Date Met:  11/15/17    11/15/17 0549   Pain, Acute   Related Risk Factors (Acute Pain) communication barrier;knowledge deficit;fear;patient perception       Goal: Acceptable Pain Control/Comfort Level  Outcome: Ongoing (interventions implemented as appropriate)    11/15/17 0549   Pain, Acute (Adult)   Acceptable Pain Control/Comfort Level making progress toward outcome

## 2017-11-15 NOTE — PLAN OF CARE
Problem: Patient Care Overview (Adult)  Goal: Plan of Care Review  Outcome: Ongoing (interventions implemented as appropriate)    11/15/17 5607   Coping/Psychosocial Response Interventions   Plan Of Care Reviewed With patient   Patient Care Overview   Progress improving   Outcome Evaluation   Outcome Summary/Follow up Plan IV fluids stopped. cath site clean and dry. encouraged patient to ambulate. IS reeducation completed. SBP elevated at times. MD aware. possibly home tomorrow        Goal: Adult Individualization and Mutuality  Outcome: Ongoing (interventions implemented as appropriate)  Goal: Discharge Needs Assessment  Outcome: Ongoing (interventions implemented as appropriate)    Problem: Cardiac Catheterization with/without PCI (Adult)  Goal: Signs and Symptoms of Listed Potential Problems Will be Absent or Manageable (Cardiac Catheterization with/without PCI)  Outcome: Ongoing (interventions implemented as appropriate)    Problem: Pain, Acute (Adult)  Goal: Acceptable Pain Control/Comfort Level  Outcome: Ongoing (interventions implemented as appropriate)

## 2017-11-16 LAB
ANION GAP SERPL CALCULATED.3IONS-SCNC: 14.9 MMOL/L
BUN BLD-MCNC: 14 MG/DL (ref 6–20)
BUN/CREAT SERPL: 15.2 (ref 7–25)
CALCIUM SPEC-SCNC: 8.6 MG/DL (ref 8.6–10.5)
CHLORIDE SERPL-SCNC: 105 MMOL/L (ref 98–107)
CO2 SERPL-SCNC: 21.1 MMOL/L (ref 22–29)
CREAT BLD-MCNC: 0.92 MG/DL (ref 0.57–1)
GFR SERPL CREATININE-BSD FRML MDRD: 64 ML/MIN/1.73
GLUCOSE BLD-MCNC: 108 MG/DL (ref 65–99)
GLUCOSE BLDC GLUCOMTR-MCNC: 101 MG/DL (ref 70–130)
GLUCOSE BLDC GLUCOMTR-MCNC: 114 MG/DL (ref 70–130)
GLUCOSE BLDC GLUCOMTR-MCNC: 86 MG/DL (ref 70–130)
POTASSIUM BLD-SCNC: 4 MMOL/L (ref 3.5–5.2)
SODIUM BLD-SCNC: 141 MMOL/L (ref 136–145)

## 2017-11-16 PROCEDURE — 99232 SBSQ HOSP IP/OBS MODERATE 35: CPT | Performed by: INTERNAL MEDICINE

## 2017-11-16 PROCEDURE — 93010 ELECTROCARDIOGRAM REPORT: CPT | Performed by: INTERNAL MEDICINE

## 2017-11-16 PROCEDURE — 80048 BASIC METABOLIC PNL TOTAL CA: CPT | Performed by: INTERNAL MEDICINE

## 2017-11-16 PROCEDURE — 94799 UNLISTED PULMONARY SVC/PX: CPT

## 2017-11-16 PROCEDURE — 93005 ELECTROCARDIOGRAM TRACING: CPT | Performed by: INTERNAL MEDICINE

## 2017-11-16 PROCEDURE — 94760 N-INVAS EAR/PLS OXIMETRY 1: CPT

## 2017-11-16 PROCEDURE — 82962 GLUCOSE BLOOD TEST: CPT

## 2017-11-16 RX ORDER — FUROSEMIDE 40 MG/1
40 TABLET ORAL DAILY
Start: 2017-11-16 | End: 2019-11-11 | Stop reason: SDUPTHER

## 2017-11-16 RX ORDER — POTASSIUM CHLORIDE 750 MG/1
20 CAPSULE, EXTENDED RELEASE ORAL DAILY
Status: DISCONTINUED | OUTPATIENT
Start: 2017-11-16 | End: 2017-11-17 | Stop reason: HOSPADM

## 2017-11-16 RX ORDER — CLOPIDOGREL BISULFATE 75 MG/1
75 TABLET ORAL DAILY
Qty: 30 TABLET | Refills: 11 | Status: SHIPPED | OUTPATIENT
Start: 2017-11-17 | End: 2019-07-02 | Stop reason: SDUPTHER

## 2017-11-16 RX ORDER — SIMVASTATIN 20 MG
20 TABLET ORAL NIGHTLY
Qty: 30 TABLET | Refills: 1 | Status: SHIPPED | OUTPATIENT
Start: 2017-11-16 | End: 2018-02-04 | Stop reason: SDUPTHER

## 2017-11-16 RX ORDER — POTASSIUM CHLORIDE 750 MG/1
20 CAPSULE, EXTENDED RELEASE ORAL DAILY
Start: 2017-11-16 | End: 2019-11-11 | Stop reason: SDUPTHER

## 2017-11-16 RX ORDER — NITROGLYCERIN 0.4 MG/1
TABLET SUBLINGUAL
Qty: 25 TABLET | Refills: 0 | Status: SHIPPED | OUTPATIENT
Start: 2017-11-16 | End: 2019-11-11 | Stop reason: SDUPTHER

## 2017-11-16 RX ORDER — FUROSEMIDE 40 MG/1
40 TABLET ORAL DAILY
Status: DISCONTINUED | OUTPATIENT
Start: 2017-11-16 | End: 2017-11-17 | Stop reason: HOSPADM

## 2017-11-16 RX ADMIN — PANTOPRAZOLE SODIUM 40 MG: 40 TABLET, DELAYED RELEASE ORAL at 18:10

## 2017-11-16 RX ADMIN — FUROSEMIDE 40 MG: 40 TABLET ORAL at 13:11

## 2017-11-16 RX ADMIN — METOPROLOL TARTRATE 25 MG: 25 TABLET ORAL at 10:09

## 2017-11-16 RX ADMIN — PANTOPRAZOLE SODIUM 40 MG: 40 TABLET, DELAYED RELEASE ORAL at 06:49

## 2017-11-16 RX ADMIN — LOSARTAN POTASSIUM 50 MG: 50 TABLET, FILM COATED ORAL at 10:09

## 2017-11-16 RX ADMIN — BUDESONIDE AND FORMOTEROL FUMARATE DIHYDRATE 2 PUFF: 160; 4.5 AEROSOL RESPIRATORY (INHALATION) at 09:55

## 2017-11-16 RX ADMIN — AMLODIPINE BESYLATE 5 MG: 5 TABLET ORAL at 10:09

## 2017-11-16 RX ADMIN — ASPIRIN 325 MG: 325 TABLET, DELAYED RELEASE ORAL at 10:09

## 2017-11-16 RX ADMIN — POTASSIUM CHLORIDE 20 MEQ: 750 CAPSULE, EXTENDED RELEASE ORAL at 13:11

## 2017-11-16 RX ADMIN — METOPROLOL TARTRATE 25 MG: 25 TABLET ORAL at 21:03

## 2017-11-16 RX ADMIN — ATORVASTATIN CALCIUM 20 MG: 20 TABLET, FILM COATED ORAL at 10:09

## 2017-11-16 RX ADMIN — CLOPIDOGREL 75 MG: 75 TABLET, FILM COATED ORAL at 10:09

## 2017-11-16 NOTE — PROGRESS NOTES
[facility]  Cardiology Progress Note    Patient Identification:  Name: Flaca Hassan  Age: 52 y.o.  Sex: female  :  1965  MRN: [id number]                 Follow Up / Chief Complaint: STEMI    Interval History: 52 y.o. female with sudden onset of chest pain found to be STEMI and underwent ROBERTO  to RCA. 17.       Subjective: reports issues in past with simvastatin causing liver issues and her pcp took her off it. No chest pain or pressure.       Objective:  Tele SR with inverted T waves    Temp:  [97.9 °F (36.6 °C)-98.6 °F (37 °C)] 97.9 °F (36.6 °C)  Heart Rate:  [] 74  Resp:  [16-23] 16  BP: (106-195)/() 149/76      Past Medical History:  Past Medical History:   Diagnosis Date   • Anemia    • Asthma    • Breast cyst    • Diabetes mellitus    • Diverticulosis    • H/O: GI bleed     recurrent   • Hyperlipidemia    • Hypertension    • Irritable bowel syndrome    • Melena    • Obesity      Past Surgical History:  Past Surgical History:   Procedure Laterality Date   • BREAST CYST EXCISION     • CARDIAC CATHETERIZATION N/A 2017    Procedure: Left Heart Cath;  Surgeon: Imer Montaño MD;  Location:  [facility];  Service:    • CARDIAC CATHETERIZATION N/A 2017    Procedure: Stent ROBERTO coronary;  Surgeon: Imer Montaño MD;  Location:  [facility];  Service:    • COLONOSCOPY  2016    VICTOR MANUEL hernandez,    • ENDOSCOPY N/A 3/3/2017    erythematous mucosa in stomach, duodenal ulcer , mild to moderate chronic active gastritis, positive for h pylori   • UPPER GASTROINTESTINAL ENDOSCOPY  02/10/2016    Dieudonne Willson M.D.        Social History:   Social History   Substance Use Topics   • Smoking status: Former Smoker     Packs/day: 1.00     Types: Cigarettes     Quit date:    • Smokeless tobacco: Never Used   • Alcohol use No      Family History:  Family History   Problem Relation Age of Onset   • Breast cancer Other    •  Lung cancer Brother    • Throat cancer Paternal Uncle    • Tongue cancer Paternal Grandfather           Allergies:  No Known Allergies  Scheduled Meds:    amLODIPine 5 mg Q24H   aspirin 325 mg Daily   atorvastatin 20 mg Daily   budesonide-formoterol 2 puff BID - RT   clopidogrel 75 mg Daily   furosemide 40 mg Daily   losartan 50 mg Daily   metoprolol tartrate 25 mg Q12H   pantoprazole 40 mg BID AC   potassium chloride 20 mEq Daily           INTAKE AND OUTPUT:    Intake/Output Summary (Last 24 hours) at 17 1718  Last data filed at 17 1600   Gross per 24 hour   Intake              600 ml   Output             2750 ml   Net            -2150 ml       Constitutional:  Temp:  [96.3 °F (35.7 °C)-98.2 °F (36.8 °C)] 97.6 °F (36.4 °C)  Heart Rate:  [66-77] 71  Resp:  [16] 16  BP: (142-182)/(74-99) 166/99    Physical Exam by Dr. Montaño  General:  Appears in no acute distress  Eyes: PERTL,  HEENT:  No JVD. Thyroid not visibly enlarged. No mucosal pallor or cyanosis  Respiratory: Respirations regular and unlabored at rest. BBS with good air entry in all fields. No crackles, rubs or wheezes auscultated  Cardiovascular: S1S2 Regular rate and rhythm. No murmur, rub or gallop. No carotid bruits. DP/PT pulses     . No pretibial pitting edema  Gastrointestinal: Abdomen soft, flat, non tender. Bowel sounds present. No hepatosplenomegaly. No ascites  Musculoskeletal: KHAN x4. No abnormal movements  Extremities: No digital clubbing or cyanosis, right radial site without hematoma  Skin: Color pink. Skin warm and dry to touch. No rashes    Neuro: AAO x3 CN II-XII grossly intact  Psych: Mood and affect normal, pleasant and cooperative          Cardiographics  Telemetry: SR with inverted T waves. With prolonged QTc    EC17              Echocardiogram:   17  Interpretation Summary      · Peak velocity of the flow distal to the aortic valve is 198 cm/s.  · The mitral valve peak gradient is 6  "mmHg.  · The mitral valve mean gradient is 3 mmHg.  · The mitral valve area (PHT) is 2.7 cm2.  · Calculated right ventricular systolic pressure from tricuspid regurgitation is 30 mmHg.  · Left atrial cavity size is borderline dilated.  · Left Ventricle: Calculated EF = 59%.  · There is no evidence of pericardial effusion         Lab Review     Results from last 7 days  Lab Units 11/14/17  0451 11/13/17  0526   TROPONIN T ng/mL 1.870* 0.028           Results from last 7 days  Lab Units 11/16/17  0335   SODIUM mmol/L 141   POTASSIUM mmol/L 4.0   BUN mg/dL 14   CREATININE mg/dL 0.92   CALCIUM mg/dL 8.6         Results from last 7 days  Lab Units 11/15/17  0419 11/14/17  0451 11/13/17  0526   WBC 10*3/mm3 10.31 9.47 12.95*   HEMOGLOBIN g/dL 11.7* 11.4* 14.1   HEMATOCRIT % 38.6 36.9 44.7   PLATELETS 10*3/mm3 238 246 311       Results from last 7 days  Lab Units 11/13/17  0526   INR  1.16*         Assessment:  Active Problems:    ST elevation myocardial infarction (STEMI)   Hypercholesterolemia  Obesity  Hypertension  Pre diabetic A1c 5.8      Plan:  Patient shortness of breath may be due to Brilinta was switched over to the Plavix    INCREASE ACTIVITY    HOME AM    CBC, BMP AM     Labs/tests ordered for am: BMP and CBC. EKG in am.       I reviewed the patient's new clinical results. I personally viewed and interpreted the patient's EKG/Telemetry data    )11/16/2017  Imer Montaño MD      EMR Dragon/Transcription:   \"Dictated utilizing Dragon dictation\".   "

## 2017-11-16 NOTE — PLAN OF CARE
Problem: Patient Care Overview (Adult)  Goal: Plan of Care Review  Outcome: Ongoing (interventions implemented as appropriate)    11/16/17 0507   Coping/Psychosocial Response Interventions   Plan Of Care Reviewed With patient   Patient Care Overview   Progress improving   Outcome Evaluation   Outcome Summary/Follow up Plan Vitals stable. No falls. No c/o pain. Pt using IS independently. Resting comfortably. Monitoring closely. Possible d/c this AM.        Goal: Adult Individualization and Mutuality  Outcome: Ongoing (interventions implemented as appropriate)  Goal: Discharge Needs Assessment  Outcome: Ongoing (interventions implemented as appropriate)    Problem: Cardiac Catheterization with/without PCI (Adult)  Goal: Signs and Symptoms of Listed Potential Problems Will be Absent or Manageable (Cardiac Catheterization with/without PCI)  Outcome: Ongoing (interventions implemented as appropriate)    11/16/17 0507   Cardiac Catheterization with/without PCI   Problems Assessed (Cardiac Catheterization) all   Problems Present (Cardiac Catheterization) none         Problem: Pain, Acute (Adult)  Goal: Acceptable Pain Control/Comfort Level  Outcome: Ongoing (interventions implemented as appropriate)    11/16/17 0507   Pain, Acute (Adult)   Acceptable Pain Control/Comfort Level making progress toward outcome

## 2017-11-16 NOTE — DISCHARGE INSTR - APPOINTMENTS
Nov 27, 2017 02:00 PM [facility]  Hospital Follow Up with Isidro Daniel  [phone]

## 2017-11-16 NOTE — PROGRESS NOTES
"Discharge Planning Assessment  [facility]     Patient Name: Flaca Hassan  MRN: [id number]  Today's Date: 11/16/2017    Admit Date: 11/13/2017          Discharge Needs Assessment       11/16/17 1742    Living Environment    Lives With sibling(s)    Living Arrangements apartment    Home Accessibility no concerns    Stair Railings at Home none    Type of Financial/Environmental Concern none    Transportation Available family or friend will provide    Living Environment    Provides Primary Care For no one, unable/limited ability to care for self    Primary Care Provided By other (see comments)   relatives    Quality Of Family Relationships supportive;helpful;involved    Able to Return to Prior Living Arrangements yes    Discharge Needs Assessment    Concerns To Be Addressed denies needs/concerns at this time    Anticipated Changes Related to Illness none    Equipment Currently Used at Home cane, straight    Equipment Needed After Discharge none    Discharge Disposition still a patient    Discharge Contact Information if Applicable brother Sonya Posey [phone]            Discharge Plan       11/16/17 1758    Case Management/Social Work Plan    Additional Comments Spoke with Pt at bedside and she speaks limited English.  [facility] offered to get the  on the phone and she insisted CCP call her brother whom she lives with, Sonya Posey (Taty is his last name).  [facility] called Pt brother via phone [phone] at 5:22 PM to discuss Pt d/c plan.  Pt brother confirmed information on face sheet.  Brother has confirmed Pt lives with him and has been since her divorce \"a while ago.\"  Pt is IADL'S however his spouse assist Pt with her bathing and cooking.  Pt is disabled and uses a cane when ambulating.  Pt uses a CPAP and home oxygen from [facility].  Brother denies past home health and subacute rehab.  Pt pharmacy is confirmed as [facility] on [address].  Brother denies issues with Pt affording her " medications.  Pt plans to return home at discharge and does not anticipate any discharge needs.  CCP will follow as needed.          11/16/17 1745    Case Management/Social Work Plan    Plan Home where she lives with her brother.      Patient/Family In Agreement With Plan yes        Discharge Placement     No information found        Expected Discharge Date and Time     Expected Discharge Date Expected Discharge Time    Nov 16, 2017               Demographic Summary       11/16/17 1735    Referral Information    Admission Type inpatient    Arrived From admitted as an inpatient;home or self-care    Referral Source admission list    Reason For Consult discharge planning    Record Reviewed medical record;history and physical    Contact Information    Permission Granted to Share Information With family/designee    Primary Care Physician Information    Name Isidro Odom MD            Functional Status       11/16/17 1738    IADL    Medications assistive person    Meal Preparation completely dependent    Housekeeping completely dependent    Laundry completely dependent    Shopping completely dependent    Oral Care independent    Activity Tolerance    Usual Activity Tolerance moderate    Current Activity Tolerance fair      11/16/17 1737    Functional Status Current    Ambulation 2-->assistive person    Transferring 2-->assistive person    Toileting 2-->assistive person    Bathing 2-->assistive person    Dressing 0-->independent    Eating 0-->independent    Communication 0-->understands/communicates without difficulty    Swallowing (if score 2 or more for any item, consult Rehab Services) 0-->swallows foods/liquids without difficulty    Change in Functional Status Since Onset of Current Illness/Injury no    Functional Status Prior    Ambulation 0-->independent    Transferring 0-->independent    Toileting 0-->independent    Bathing 2-->assistive person    Dressing 2-->assistive person    Eating 0-->independent     Communication 0-->understands/communicates without difficulty    Swallowing 0-->swallows foods/liquids without difficulty            Psychosocial     None            Abuse/Neglect     None            Legal     None            Substance Abuse     None            Patient Forms     None          Palma Rouse RN

## 2017-11-16 NOTE — PROGRESS NOTES
[facility]  Cardiology Progress Note    Patient Identification:  Name: Flaca Hassan  Age: 52 y.o.  Sex: female  :  1965  MRN: [id number]                 Follow Up / Chief Complaint: STEMI    Interval History: 52 y.o. female with sudden onset of chest pain found to be STEMI and underwent ROBERTO  to RCA. 17.       Subjective: reports issues in past with simvastatin causing liver issues and her pcp took her off it. No chest pain or pressure.       Objective:  Tele SR with inverted T waves    Temp:  [97.9 °F (36.6 °C)-98.6 °F (37 °C)] 97.9 °F (36.6 °C)  Heart Rate:  [] 74  Resp:  [16-23] 16  BP: (106-195)/() 149/76      Past Medical History:  Past Medical History:   Diagnosis Date   • Anemia    • Asthma    • Breast cyst    • Diabetes mellitus    • Diverticulosis    • H/O: GI bleed     recurrent   • Hyperlipidemia    • Hypertension    • Irritable bowel syndrome    • Melena    • Obesity      Past Surgical History:  Past Surgical History:   Procedure Laterality Date   • BREAST CYST EXCISION     • CARDIAC CATHETERIZATION N/A 2017    Procedure: Left Heart Cath;  Surgeon: Imer Montaño MD;  Location:  [facility];  Service:    • CARDIAC CATHETERIZATION N/A 2017    Procedure: Stent ROBERTO coronary;  Surgeon: Imer Montaño MD;  Location:  [facility];  Service:    • COLONOSCOPY  2016    VICTOR MANUEL hernandez,    • ENDOSCOPY N/A 3/3/2017    erythematous mucosa in stomach, duodenal ulcer , mild to moderate chronic active gastritis, positive for h pylori   • UPPER GASTROINTESTINAL ENDOSCOPY  02/10/2016    Dieudonne Willson M.D.        Social History:   Social History   Substance Use Topics   • Smoking status: Former Smoker     Packs/day: 1.00     Types: Cigarettes     Quit date:    • Smokeless tobacco: Never Used   • Alcohol use No      Family History:  Family History   Problem Relation Age of Onset   • Breast cancer Other    •  Lung cancer Brother    • Throat cancer Paternal Uncle    • Tongue cancer Paternal Grandfather           Allergies:  No Known Allergies  Scheduled Meds:    amLODIPine 5 mg Q24H   aspirin 325 mg Daily   atorvastatin 20 mg Daily   budesonide-formoterol 2 puff BID - RT   clopidogrel 75 mg Daily   losartan 50 mg Daily   metoprolol tartrate 25 mg Q12H   pantoprazole 40 mg BID AC           INTAKE AND OUTPUT:    Intake/Output Summary (Last 24 hours) at 11/15/17 2011  Last data filed at 11/15/17 1700   Gross per 24 hour   Intake             1782 ml   Output             2200 ml   Net             -418 ml       Constitutional:  Temp:  [97 °F (36.1 °C)-98.4 °F (36.9 °C)] 97 °F (36.1 °C)  Heart Rate:  [66-77] 77  Resp:  [14-16] 16  BP: (143-182)/() 182/96    Physical Exam by Dr. Montaño  General:  Appears in no acute distress  Eyes: PERTL,  HEENT:  No JVD. Thyroid not visibly enlarged. No mucosal pallor or cyanosis  Respiratory: Respirations regular and unlabored at rest. BBS with good air entry in all fields. No crackles, rubs or wheezes auscultated  Cardiovascular: S1S2 Regular rate and rhythm. No murmur, rub or gallop. No carotid bruits. DP/PT pulses     . No pretibial pitting edema  Gastrointestinal: Abdomen soft, flat, non tender. Bowel sounds present. No hepatosplenomegaly. No ascites  Musculoskeletal: KHAN x4. No abnormal movements  Extremities: No digital clubbing or cyanosis, right radial site without hematoma  Skin: Color pink. Skin warm and dry to touch. No rashes    Neuro: AAO x3 CN II-XII grossly intact  Psych: Mood and affect normal, pleasant and cooperative          Cardiographics  Telemetry: SR with inverted T waves. With prolonged QTc    EC17              Echocardiogram:   17  Interpretation Summary      · Peak velocity of the flow distal to the aortic valve is 198 cm/s.  · The mitral valve peak gradient is 6 mmHg.  · The mitral valve mean gradient is 3 mmHg.  · The  "mitral valve area (PHT) is 2.7 cm2.  · Calculated right ventricular systolic pressure from tricuspid regurgitation is 30 mmHg.  · Left atrial cavity size is borderline dilated.  · Left Ventricle: Calculated EF = 59%.  · There is no evidence of pericardial effusion         Lab Review     Results from last 7 days  Lab Units 11/14/17 0451 11/13/17  0526   TROPONIN T ng/mL 1.870* 0.028           Results from last 7 days  Lab Units 11/15/17  0419   SODIUM mmol/L 143   POTASSIUM mmol/L 4.3   BUN mg/dL 12   CREATININE mg/dL 1.01*   CALCIUM mg/dL 8.6         Results from last 7 days  Lab Units 11/15/17  0419 11/14/17  0451 11/13/17  0526   WBC 10*3/mm3 10.31 9.47 12.95*   HEMOGLOBIN g/dL 11.7* 11.4* 14.1   HEMATOCRIT % 38.6 36.9 44.7   PLATELETS 10*3/mm3 238 246 311       Results from last 7 days  Lab Units 11/13/17  0526   INR  1.16*         Assessment:  Active Problems:    ST elevation myocardial infarction (STEMI)   Hypercholesterolemia  Obesity  Hypertension  Pre diabetic A1c 5.8      Plan:  .CON SAME    INCREASE ACTIVITY    HOME AM    CBC, BMP AM     Labs/tests ordered for am: BMP and CBC. EKG in am.       I reviewed the patient's new clinical results. I personally viewed and interpreted the patient's EKG/Telemetry data    )11/15/2017  Imer Montaño MD      EMR Dragon/Transcription:   \"Dictated utilizing Dragon dictation\".   "

## 2017-11-16 NOTE — DISCHARGE SUMMARY
[facility]  Physician Discharge Summary    Patient Identification:  Name: Flaca Hassan  Age: 52 y.o.  Sex: female  :  1965  MRN: [id number]    Admit date: 2017    Discharge date and time:  2017    Admitting Physician: Imer Montaño MD     Discharge Physician: Dr Montaño    Discharge Diagnoses:   - s/p STEMI  - s/p 3.5/15 xience ROBERTO-> 100% RCA (normal left main, early atherosclerotic plaquing of LAD, diagonal, perforators and nondominant LCx)  - EF 55-60%, trace-mild MR/TR  - HTN  - Dyslipidemia    Discharged Condition: stable    Hospital Course:   This patient was taken emergently to the cardiac Cath Lab after presenting to the emergency room reporting a several hour history of nonexertional chest pain with associated weakness and shortness of breath.  EKG showed inferior ST elevation consistent with an acute myocardial infarction.  Emergent cardiac catheterization showed 100% occlusion of the RCA.  Angiomax was used for placement of 1 drug-eluting stent with reduction of stenosis from 100% to 0%.  She tolerated the procedure without complications and was observed in high level telemetry before eventual transfer to stepdown unit.    She was seen in consultation by cardiac rehabilitation    Statin dose is been increased since HDL was not at goal.  Diastolic pressure has remained slightly elevated despite the initiation of beta blocker.  She has been restarted on home Lasix and potassium which were not included on her home medication list.    On day of discharge, she is awake, alert and ambulating in halls greater than 200 feet on room air without PINTO or chest tightness  I reviewed activity restrictions, site care, changes to home medications and importance of compliance with all medications-specifically her aspirin and Plavix - with the patient.  She was advised to call our office for any questions or concerns otherwise we will see her in office as scheduled on  December 7 and appointment card was provided.  She was advised to see her PCP within one week for ongoing medical care including management of dyslipidemia.  Repeat LFTs and lipid panel in 8-12 weeks are recommended since statin dose is been increased.  All questions were answered.  She voiced understanding and agreement with treatment plan       Consults:   IP CONSULT TO INTERVENTIONAL CARDIOLOGY  CARDIAC REHAB EVALUATION AND ENROLLMENT    Discharge Exam:  General: Awake, alert sitting up at bedside.  Appears in no acute distress   Skin: Warm and dry, no diaphoresis noted   EYES: PERTL   HEENT: external ear and nose normal; oral mucosa moist   Neck:  no JVD   Heart: S1S2 regular rate and rhythm; no murmur appreciated   Pulmonary: Respirations regular, unlabored at rest, bilateral breath sounds have good air entry throughout all lung fields; no crackles or wheezes auscultated.     GI: Soft, non-tender, non-distended, positive bowel sounds     Extremities: Right radial site without oozing, ecchymosis, palpable hematoma or thrill.  Right brachial 2/right ulnar 2/right radial 2.   RUE warm with 1-2 sec cap refill. Bilateral lower extremities have no pre-tibial pitting edema; DP pulses are palpable   Neurological: Alert and oriented x 3; no neuro deficits        Tele:        LABS:      Results from last 7 days  Lab Units 11/16/17  0335   SODIUM mmol/L 141   POTASSIUM mmol/L 4.0   BUN mg/dL 14   CREATININE mg/dL 0.92   CALCIUM mg/dL 8.6       Results from last 7 days  Lab Units 11/14/17  0451   CHOLESTEROL mg/dL 101   TRIGLYCERIDES mg/dL 105   HDL CHOL mg/dL 27*     Disposition:  home    Discharge Medications:    Flaca Hassan   Home Medication Instructions SEAN:[id number]    Printed on:11/16/17 1140   Medication Information                      amLODIPine (NORVASC) 5 MG tablet  take 1 tablet by mouth once daily             aspirin  MG EC tablet  Take 1 tablet by mouth Daily.             budesonide-formoterol  (SYMBICORT) 160-4.5 MCG/ACT inhaler  Inhale 2 puffs 2 (two) times a day.             clopidogrel (PLAVIX) 75 MG tablet  Take 1 tablet by mouth Daily.             escitalopram (LEXAPRO) 10 MG tablet  TAKE ONE TABLET BY MOUTH IN THE MORNING                          ...  (REFER TO PRESCRIPTION NOTES).             furosemide (LASIX) 40 MG tablet  Take 1 tablet by mouth Daily.             losartan (COZAAR) 50 MG tablet  Take 50 mg by mouth Daily.             metoprolol tartrate (LOPRESSOR) 25 MG tablet  Take 1 tablet by mouth Every 12 (Twelve) Hours.             Montelukast Sodium (SINGULAIR PO)  Take 10 mg by mouth daily.             nitroglycerin (NITROSTAT) 0.4 MG SL tablet  1 under the tongue as needed for angina, may repeat q5mins for up three doses             pantoprazole (PROTONIX) 40 MG EC tablet  Take 1 tablet by mouth 2 (Two) Times a Day.             potassium chloride (MICRO-K) 10 MEQ CR capsule  Take 2 capsules by mouth Daily.             simvastatin (ZOCOR) 20 MG tablet  Take 1 tablet by mouth Every Night.                   Discharge Home Instructions:  1. Routine post cardiac catheterization/PCI discharge home care instructions:      No submerging procedure site below water for 7-10 days.      No lifting objects greater than 1 lbs for 3 days.      Monitor puncture site for bleeding and/or knots;.If bleeding should occur at the wrist site, apply pressure and return to the ER.      You may apply a DRY Band-Aid over the puncture site if needed. Do not apply any lotions, salves or ointments to site.      No driving for 3 days.      Return to ER per EMS for recurrent symptoms.      No smoking.      Take all medications as prescribed.    2. Follow up with PCP in one week for ongoing medical care including management of dyslipidemia.  Repeat LFTs and lipid panel recommended in 8-12 weeks since statin dose has been increased  3.  Follow-up with Dr. Montaño at the [address] office December 7 at 10:45  "AM.  Call the office in the interim for any questions or concerns      I reviewed the patient's new clinical results, discharge treatments, medications and follow up with Dr Montaño. I personally viewed and interpreted the patient's EKG/Telemetry data  Signed:  Imer Montaño MD  11/17/2017  10:26 AM      EMR Dragon/Transcription:   \"Dictated utilizing Dragon dictation\".     "

## 2017-11-16 NOTE — PLAN OF CARE
Problem: Patient Care Overview (Adult)  Goal: Plan of Care Review  Outcome: Ongoing (interventions implemented as appropriate)    11/16/17 1213   Coping/Psychosocial Response Interventions   Plan Of Care Reviewed With patient   Patient Care Overview   Progress improving   Outcome Evaluation   Outcome Summary/Follow up Plan VSS. No complaints. Now able to use IS independently, and was encouraged to use it hourly. Home today. Waiting on ride. CTM.       Goal: Adult Individualization and Mutuality  Outcome: Ongoing (interventions implemented as appropriate)  Goal: Discharge Needs Assessment  Outcome: Ongoing (interventions implemented as appropriate)    Problem: Cardiac Catheterization with/without PCI (Adult)  Goal: Signs and Symptoms of Listed Potential Problems Will be Absent or Manageable (Cardiac Catheterization with/without PCI)  Outcome: Ongoing (interventions implemented as appropriate)    Problem: Pain, Acute (Adult)  Goal: Acceptable Pain Control/Comfort Level  Outcome: Ongoing (interventions implemented as appropriate)

## 2017-11-17 VITALS
RESPIRATION RATE: 16 BRPM | WEIGHT: 202 LBS | HEIGHT: 62 IN | BODY MASS INDEX: 37.17 KG/M2 | SYSTOLIC BLOOD PRESSURE: 120 MMHG | TEMPERATURE: 98.4 F | HEART RATE: 74 BPM | OXYGEN SATURATION: 96 % | DIASTOLIC BLOOD PRESSURE: 83 MMHG

## 2017-11-17 LAB
GLUCOSE BLDC GLUCOMTR-MCNC: 113 MG/DL (ref 70–130)
GLUCOSE BLDC GLUCOMTR-MCNC: 83 MG/DL (ref 70–130)

## 2017-11-17 PROCEDURE — 82962 GLUCOSE BLOOD TEST: CPT

## 2017-11-17 PROCEDURE — 99238 HOSP IP/OBS DSCHRG MGMT 30/<: CPT | Performed by: INTERNAL MEDICINE

## 2017-11-17 RX ADMIN — ATORVASTATIN CALCIUM 20 MG: 20 TABLET, FILM COATED ORAL at 08:22

## 2017-11-17 RX ADMIN — AMLODIPINE BESYLATE 5 MG: 5 TABLET ORAL at 08:22

## 2017-11-17 RX ADMIN — POTASSIUM CHLORIDE 20 MEQ: 750 CAPSULE, EXTENDED RELEASE ORAL at 08:22

## 2017-11-17 RX ADMIN — ASPIRIN 325 MG: 325 TABLET, DELAYED RELEASE ORAL at 08:22

## 2017-11-17 RX ADMIN — METOPROLOL TARTRATE 25 MG: 25 TABLET ORAL at 08:22

## 2017-11-17 RX ADMIN — LOSARTAN POTASSIUM 50 MG: 50 TABLET, FILM COATED ORAL at 08:22

## 2017-11-17 RX ADMIN — CLOPIDOGREL 75 MG: 75 TABLET, FILM COATED ORAL at 08:22

## 2017-11-17 RX ADMIN — FUROSEMIDE 40 MG: 40 TABLET ORAL at 08:22

## 2017-11-17 RX ADMIN — PANTOPRAZOLE SODIUM 40 MG: 40 TABLET, DELAYED RELEASE ORAL at 05:34

## 2017-11-17 NOTE — PLAN OF CARE
Problem: Patient Care Overview (Adult)  Goal: Plan of Care Review  Outcome: Ongoing (interventions implemented as appropriate)    11/17/17 0239   Coping/Psychosocial Response Interventions   Plan Of Care Reviewed With patient   Patient Care Overview   Progress improving   Outcome Evaluation   Outcome Summary/Follow up Plan VSS, no complaints of pain or chest pain, O2 at 2L for comfort, dc home today. Will continue to monitor.         Problem: Pain, Acute (Adult)  Goal: Identify Related Risk Factors and Signs and Symptoms  Outcome: Ongoing (interventions implemented as appropriate)

## 2017-11-20 NOTE — PROGRESS NOTES
Case Management Discharge Note    Final Note: Home with no needs    Discharge Placement     No information found             Discharge Codes: 01  Discharge to home

## 2017-12-07 ENCOUNTER — OFFICE VISIT (OUTPATIENT)
Dept: CARDIOLOGY | Facility: CLINIC | Age: 52
End: 2017-12-07

## 2017-12-07 ENCOUNTER — HOSPITAL ENCOUNTER (OUTPATIENT)
Dept: CARDIOLOGY | Facility: HOSPITAL | Age: 52
Discharge: HOME | End: 2017-12-07
Attending: INTERNAL MEDICINE | Admitting: INTERNAL MEDICINE

## 2017-12-07 VITALS
BODY MASS INDEX: 37.73 KG/M2 | HEIGHT: 62 IN | SYSTOLIC BLOOD PRESSURE: 163 MMHG | HEART RATE: 69 BPM | WEIGHT: 205 LBS | DIASTOLIC BLOOD PRESSURE: 83 MMHG

## 2017-12-07 VITALS — SYSTOLIC BLOOD PRESSURE: 142 MMHG | HEART RATE: 54 BPM | DIASTOLIC BLOOD PRESSURE: 80 MMHG

## 2017-12-07 DIAGNOSIS — I10 ESSENTIAL HYPERTENSION: ICD-10-CM

## 2017-12-07 DIAGNOSIS — I21.11 ST ELEVATION MYOCARDIAL INFARCTION INVOLVING RIGHT CORONARY ARTERY (HCC): Primary | ICD-10-CM

## 2017-12-07 DIAGNOSIS — E78.5 HYPERLIPIDEMIA, UNSPECIFIED HYPERLIPIDEMIA TYPE: ICD-10-CM

## 2017-12-07 LAB
BH CV STRESS BP STAGE 1: NORMAL
BH CV STRESS BP STAGE 2: NORMAL
BH CV STRESS DURATION MIN STAGE 1: 3
BH CV STRESS DURATION MIN STAGE 2: 3
BH CV STRESS DURATION MIN STAGE 3: 0
BH CV STRESS DURATION SEC STAGE 1: 0
BH CV STRESS DURATION SEC STAGE 2: 0
BH CV STRESS DURATION SEC STAGE 3: 53
BH CV STRESS GRADE STAGE 1: 0
BH CV STRESS GRADE STAGE 2: 12
BH CV STRESS GRADE STAGE 3: 10
BH CV STRESS HR STAGE 1: 94
BH CV STRESS HR STAGE 2: 102
BH CV STRESS HR STAGE 3: 109
BH CV STRESS METS STAGE 1: 2.3
BH CV STRESS METS STAGE 2: 3.5
BH CV STRESS METS STAGE 3: 3.8
BH CV STRESS PROTOCOL 1: NORMAL
BH CV STRESS RECOVERY BP: NORMAL MMHG
BH CV STRESS RECOVERY HR: 64 BPM
BH CV STRESS SPEED STAGE 1: 1.7
BH CV STRESS SPEED STAGE 2: 1.7
BH CV STRESS SPEED STAGE 3: 1.7
BH CV STRESS STAGE 1: 1
BH CV STRESS STAGE 2: 2
BH CV STRESS STAGE 3: 3
MAXIMAL PREDICTED HEART RATE: 168 BPM
PERCENT MAX PREDICTED HR: 64.88 %
STRESS BASELINE BP: NORMAL MMHG
STRESS BASELINE HR: 53 BPM
STRESS PERCENT HR: 76 %
STRESS POST ESTIMATED WORKLOAD: 3.8 METS
STRESS POST EXERCISE DUR MIN: 6 MIN
STRESS POST EXERCISE DUR SEC: 53 SEC
STRESS POST PEAK BP: NORMAL MMHG
STRESS POST PEAK HR: 109 BPM
STRESS TARGET HR: 143 BPM

## 2017-12-07 PROCEDURE — 93016 CV STRESS TEST SUPVJ ONLY: CPT | Performed by: INTERNAL MEDICINE

## 2017-12-07 PROCEDURE — 93018 CV STRESS TEST I&R ONLY: CPT | Performed by: INTERNAL MEDICINE

## 2017-12-07 PROCEDURE — 99213 OFFICE O/P EST LOW 20 MIN: CPT | Performed by: INTERNAL MEDICINE

## 2017-12-07 PROCEDURE — 93017 CV STRESS TEST TRACING ONLY: CPT

## 2017-12-07 RX ORDER — ASPIRIN 81 MG/1
81 TABLET ORAL DAILY
COMMUNITY
End: 2021-01-04 | Stop reason: ALTCHOICE

## 2017-12-07 NOTE — PROGRESS NOTES
Subjective:       Flaca Hassan is a 52 y.o. female who here for follow up    CC  [facility] follow up  HPI  52-year-old recently had acute myocardial infarction underwent angioplasty and stent without any problems and complications complains of shortness of breath  Discharge Diagnoses:   - s/p STEMI  - s/p 3.5/15 xience ROBERTO-> 100% RCA (normal left main, early atherosclerotic plaquing of LAD, diagonal, perforators and nondominant LCx)  - EF 55-60%, trace-mild MR/TR  - HTN  - Dyslipidemia     Problem List Items Addressed This Visit        Cardiovascular and Mediastinum    Hypertension    Hyperlipidemia    ST elevation myocardial infarction (STEMI) - Primary        .    The following portions of the patient's history were reviewed and updated as appropriate: allergies, current medications, past family history, past medical history, past social history, past surgical history and problem list.    Past Medical History:   Diagnosis Date   • Anemia    • Asthma    • Breast cyst    • Diabetes mellitus    • Diverticulosis    • H/O: GI bleed     recurrent   • Hyperlipidemia    • Hypertension    • Irritable bowel syndrome    • Melena    • Obesity     reports that she quit smoking about 27 years ago. Her smoking use included Cigarettes. She smoked 1.00 pack per day. She has never used smokeless tobacco. She reports that she does not drink alcohol or use illicit drugs.  Family History   Problem Relation Age of Onset   • Breast cancer Other    • Lung cancer Brother    • Hypertension Brother    • Hyperlipidemia Brother    • Heart disease Brother    • Throat cancer Paternal Uncle    • Tongue cancer Paternal Grandfather    • Hypertension Father    • Hyperlipidemia Father    • Heart disease Father        Review of Systems  Constitutional: No wt loss, fever, fatigue  Gastrointestinal: No nausea, abdominal pain  Behavioral/Psych: No insomnia or anxiety   Cardiovascular Shortness of breath  Objective:       Physical Exam            "Physical Exam  /83  Pulse 69  Ht 157.5 cm (62\")  Wt 93 kg (205 lb)  BMI 37.49 kg/m2    General appearance: NAD, conversant   Eyes: anicteric sclerae, moist conjunctivae; no lid-lag; PERRLA   HENT: Atraumatic; oropharynx clear with moist mucous membranes and no mucosal ulcerations;  normal hard and soft palate   Neck: Trachea midline; FROM, supple, no thyromegaly or lymphadenopathy   Lungs: CTA, with normal respiratory effort and no intercostal retractions   CV: S1-S2 regular, no murmurs, no rub, no gallop   Abdomen: Soft, non-tender; no masses or HSM   Extremities: No peripheral edema or extremity lymphadenopathy  Skin: Normal temperature, turgor and texture; no rash, ulcers or subcutaneous nodules   Psych: Appropriate affect, alert and oriented to person, place and time           Cardiographics  @Procedures    Echocardiogram:        Current Outpatient Prescriptions:   •  amLODIPine (NORVASC) 5 MG tablet, take 1 tablet by mouth once daily, Disp: , Rfl: 0  •  aspirin  MG EC tablet, Take 1 tablet by mouth Daily., Disp: , Rfl:   •  budesonide-formoterol (SYMBICORT) 160-4.5 MCG/ACT inhaler, Inhale 2 puffs 2 (two) times a day., Disp: , Rfl:   •  clopidogrel (PLAVIX) 75 MG tablet, Take 1 tablet by mouth Daily., Disp: 30 tablet, Rfl: 11  •  escitalopram (LEXAPRO) 10 MG tablet, TAKE ONE TABLET BY MOUTH IN THE MORNING                          ...  (REFER TO PRESCRIPTION NOTES)., Disp: , Rfl: 0  •  furosemide (LASIX) 40 MG tablet, Take 1 tablet by mouth Daily., Disp: , Rfl:   •  losartan (COZAAR) 50 MG tablet, Take 50 mg by mouth Daily., Disp: , Rfl: 0  •  metoprolol tartrate (LOPRESSOR) 25 MG tablet, Take 1 tablet by mouth Every 12 (Twelve) Hours., Disp: 30 tablet, Rfl: 1  •  Montelukast Sodium (SINGULAIR PO), Take 10 mg by mouth daily., Disp: , Rfl:   •  nitroglycerin (NITROSTAT) 0.4 MG SL tablet, 1 under the tongue as needed for angina, may repeat q5mins for up three doses, Disp: 25 tablet, Rfl: 0  •  " pantoprazole (PROTONIX) 40 MG EC tablet, Take 1 tablet by mouth 2 (Two) Times a Day., Disp: 180 tablet, Rfl: 3  •  potassium chloride (MICRO-K) 10 MEQ CR capsule, Take 2 capsules by mouth Daily., Disp: , Rfl:   •  simvastatin (ZOCOR) 20 MG tablet, Take 1 tablet by mouth Every Night., Disp: 30 tablet, Rfl: 1  •  sulfamethoxazole-trimethoprim (BACTRIM DS,SEPTRA DS) 800-160 MG per tablet, Take 2 tablets by mouth 2 (Two) Times a Day., Disp: 40 tablet, Rfl: 0   Assessment:        Patient Active Problem List   Diagnosis   • Hypertension   • Obesity   • Hyperlipidemia   • Iron deficiency anemia due to chronic blood loss   • Upper GI bleeding   • ST elevation myocardial infarction (STEMI)     Total Cholesterol 0 - 200 mg/dL 101   Triglycerides 0 - 150 mg/dL 105   HDL Cholesterol 40 - 60 mg/dL 27 (L)   LDL Cholesterol  0 - 100 mg/dL 53   VLDL Cholesterol 5 - 40 mg/dL 21   LDL/HDL Ratio  1.96     HEMODYNAMIC / ANGIOGRAPHIC DATA:    1. Left ventricular end diastolic pressure was 10 mmHg.  2. Left ventriculography revealed an EF around 60%.    3. The left main is normal.  4. The left anterior descending artery is early atherosclerotic plaque including the diagonal and  branches.  5. The left circumflex is nondominant and early atherosclerotic plaque.  6. The right coronary artery is 100% reduced to 0%.  7. Successful stenting of the distal RCA, with reduction of stenosis from 100% to 0% with 3.5/15  xience stent.     HAYLEE FLOW    PRE...0....       POST..3....     TYPE OF LESION......c.......          Plan:            ICD-10-CM ICD-9-CM   1. ST elevation myocardial infarction involving right coronary artery I21.11 410.31   2. Hyperlipidemia, unspecified hyperlipidemia type E78.5 272.4   3. Essential hypertension I10 401.9     1. ST elevation myocardial infarction involving right coronary artery  Resolved after the angioplasty and a stent    2. Hyperlipidemia, unspecified hyperlipidemia type  Risk of the hyperlipidemia,  importance of the treatment has been explained    Pros and cons of the statins has been explained    Regular blood workup as well as side effects including the liver failure, myelopathy death has been explained          3. Essential hypertension  Importance of controlling hypertension and blood pressure  checkup on the regular basis has been explained  Hypertension as a silent killer has been discussed  Risk reduction of the weight and regular exercises to control the hypertension has been explained    cardiac rehab    ett    See us 6 month  With labs    Reduce asa 81 mg po daily  COUNSELING:    Flaca Gudino was given to patient for the following topics: diagnostic results, risk factor reductions, impressions, risks and benefits of treatment options and importance of treatment compliance .       SMOKING COUNSELING:    Counseling given: Not Answered      EMR Dragon/Transcription disclaimer:   Much of this encounter note is an electronic transcription/translation of spoken language to printed text. The electronic translation of spoken language may permit erroneous, or at times, nonsensical words or phrases to be inadvertently transcribed; Although I have reviewed the note for such errors, some may still exist.

## 2017-12-18 ENCOUNTER — OFFICE VISIT (OUTPATIENT)
Dept: CARDIAC REHAB | Facility: HOSPITAL | Age: 52
End: 2017-12-18

## 2017-12-18 VITALS
WEIGHT: 208 LBS | BODY MASS INDEX: 38.28 KG/M2 | HEIGHT: 62 IN | DIASTOLIC BLOOD PRESSURE: 88 MMHG | HEART RATE: 57 BPM | SYSTOLIC BLOOD PRESSURE: 150 MMHG

## 2017-12-18 DIAGNOSIS — I21.3 ST ELEVATION MYOCARDIAL INFARCTION (STEMI), UNSPECIFIED ARTERY (HCC): Primary | ICD-10-CM

## 2017-12-18 DIAGNOSIS — Z95.5 S/P RIGHT CORONARY ARTERY (RCA) STENT PLACEMENT: ICD-10-CM

## 2017-12-18 PROCEDURE — 93797 PHYS/QHP OP CAR RHAB WO ECG: CPT

## 2017-12-18 NOTE — PROGRESS NOTES
Cardiac Rehab Initial Assessment      Name: Flaca Hassan  :1965 Allergies:Review of patient's allergies indicates no known allergies.   MRN: [id number] 52 y.o. Physician: Isidro Odom MD   Primary Diagnosis:    Diagnosis Plan   1. ST elevation myocardial infarction (STEMI), unspecified artery     2. S/P right coronary artery (RCA) stent placement      Event Date: 2017 Specialist: Danyel   Secondary Diagnosis:  Risk Stratification:High Risk Note Author: Antonette Waldrop RN     Cardiovascular History: Comments First heart event.  Denies any history of cardiovascular disease.     EXERCISE AT HOME  Yes, since her MI.  60min, but at a slow pace in her apartment hallway.  Has experienced some chest pressure if she walks too quickly.  7 days per week    EF: 60%      Source: cath 2017          Ambulatory Status:Independent  Ambulatory Fall Risk Assessed on Initial Visit: yes 6 Minute Walk Pre- Cardiac Rehab:  Distance:621ft      RPE:3  Max. HR: 86       SPO2:93-98    MET: 1.9  MPH: 1.2             RPD: 1  Resting BP: 150/80 LA, 150/80 RA    Peak BP: 160/80  Recovery BP: 138/84  Comments: Walked 5 minutes.  Had 1 rest stop for 1 minute secondary to c/o slight SOA and slight chest pressure.      NUTRITION  Lipids:yes If yes, labs as follows;  Total: No components found for: CHOLESTEROL  HDL:   HDL Cholesterol   Date Value Ref Range Status   2017 27 (L) 40 - 60 mg/dL Final    Lipids continued:  LDL:  LDL Cholesterol    Date Value Ref Range Status   02/10/2016 43 0 - 100 mg/dL Final     Comment:                                                LDL References Ranges   (U.S. Department of Health and Human Services ATP III Classifications)                Optimal                       <100 mg/dL                Near Optimal               100-129 mg/dL                Borderline High            130-159 mg/dL                High                             160-189 mg/dL                Very High                      >189 mg/dL       Triglyceride: No components found for: TRIGLYCERIDE   Weight Management:                 Weight: 208 lbs  Height: 62 inches                                   BMI: Body mass index is 38.04 kg/(m^2).  Waist Circumference: 47  inches   Alcohol Use: none Diabetes:Yes,  Monitors BS at home- no, Frequency: very random, Random BS: not done today.  Pt says she is pre-diabetic.  Never has been given medication for this. No special diet    Last HGBA1C with date if applicable:No components found for: A1C         SOCIAL HISTORY  Social History     Social History   • Marital status:      Spouse name: N/A   • Number of children: N/A   • Years of education: High School     Occupational History   •  Retired     Social History Main Topics   • Smoking status: Never Smoker   • Smokeless tobacco: Never Used   • Alcohol use No   • Drug use: No   • Sexual activity: Defer     Other Topics Concern   • None     Social History Narrative       Educational Level (choose one that applies) 9th grade Learning Barriers:Ready to Learn, but English is pt's second language.  Says she reads English and speaks relatively okay.  Family Support:yes    Living Arrangement: lives with their family:  Brother and sister-in-law.    Risk Factors: Stress  Yes, Clinical Depression  Yes, Heredity  Yes If Yes brother and sister, Hyperlipidemia  Yes, Diabetes  Yes If Yes: Do you check blood glucose daily  No Today's glucose level not done, Exercise prior to event  No If Yes: Activity na, Minutes per shalonda, Days per week na and Obesity  Yes     Tobacco Adjunct: No        Comorbidities: Pulmonary disease with emphysema, Ulcer disease stomach, Diabetes Mellitus (pre-diabetic) and Morbid obesity    Obsessive compulsive disorder       PSYCHOSOCIAL  Clinical Depression: yes, pt takes Lexapro.  Pt states she has stress secondary to socio-economic problems.  Says she has no mother or father and no .  She is living with her  "brother and she worries about that.  Teary-eyed relating this.  Brother-in-law states that pt also has obsessive-compulsive disorder.    Stress: yes   Assess presence or absence of depression using a valid screening tool: yes      PHYSICAL ASSESSMENT  Influenza vaccine: yes  Pneumococcal vaccine: yes         Has slight left ankle edema.  None on right ankle.  Both DP pulses palpated.  Skin warm and dry and pink. Angina: yes    Describe angina scale of 0 - 4: 0 = none at moment, but does have it when walking quickly.  Rates it a \"5\" on the angina scale 1-4 when it happens.  States she has told Dr. Montaño.  Pt says he told her it was caused by getting the stent.  Pt verbalizes use of sublingual NTG correctly.      Today are you having incisional pain? N/A. If, Yes, Scale: na        Today are you having any other pain? No. If, Yes, Scale:      Diagnosed with Hypertension:yes.  Pt admitted to not taking her medication for B/P today.  Explained importance of medication compliance.  (Pt says she did take her Plavix though.)    Heart Sounds: very distant, could not auscultate     Lung Sounds: no rales, rhonchi or wheezing, but lung sounds distant.         Assessment: Very nice lady.  Speaks English okay.  Pt says she reads English with a little difficulty.   Orthopedic Problems: some occasional back pain and knee pain    Are you being hurt, hit, or frightened by anyone at home or in your life? no    Are you being neglected by a caregiver? N/A Shoulder flexibility/Range of motion: Below average     Recommended arm activity: Any    Chair sit and reach within: 11 inches   Leg flexibility: Below average    Leg Strength/Balance/Five times sit to stand: 17 seconds.     Chose one: Average    Recommended stretching: Standing    Assessment: Pt was accompanied by her brother in law (Eleazar Cortez) and sister in law (Barbara Mckeon).  Then toward end of interview, Eleazar left and pt's brother, Sonya Posey came to the appt.  Pt " lives with him.  Son of pt's brother also came toward the end.  Both brother and his son speak English very well.   Family attends IA: yes Time of arrival: 1340    Time of departure: 1530     Patient Goals: MET goal of 3:  Lose 20 lbs while in program, maintain B/P less than or equal to 120/80.  Learn to eat heart healthy and for weight loss.  Learn stress management techniques.         12/18/2017  4:26 PM  Antonette Waldrop RN

## 2017-12-22 ENCOUNTER — TREATMENT (OUTPATIENT)
Dept: CARDIAC REHAB | Facility: HOSPITAL | Age: 52
End: 2017-12-22

## 2017-12-22 DIAGNOSIS — I21.3 ST ELEVATION MYOCARDIAL INFARCTION (STEMI), UNSPECIFIED ARTERY (HCC): Primary | ICD-10-CM

## 2017-12-22 LAB
GLUCOSE BLDC GLUCOMTR-MCNC: 107 MG/DL (ref 70–130)
GLUCOSE BLDC GLUCOMTR-MCNC: 108 MG/DL (ref 70–130)

## 2017-12-22 PROCEDURE — 82962 GLUCOSE BLOOD TEST: CPT

## 2017-12-22 PROCEDURE — 93798 PHYS/QHP OP CAR RHAB W/ECG: CPT

## 2017-12-27 ENCOUNTER — TREATMENT (OUTPATIENT)
Dept: CARDIAC REHAB | Facility: HOSPITAL | Age: 52
End: 2017-12-27

## 2017-12-27 DIAGNOSIS — I21.3 ST ELEVATION MYOCARDIAL INFARCTION (STEMI), UNSPECIFIED ARTERY (HCC): Primary | ICD-10-CM

## 2017-12-27 DIAGNOSIS — Z95.5 S/P RIGHT CORONARY ARTERY (RCA) STENT PLACEMENT: ICD-10-CM

## 2017-12-27 PROCEDURE — 93798 PHYS/QHP OP CAR RHAB W/ECG: CPT

## 2017-12-29 ENCOUNTER — TREATMENT (OUTPATIENT)
Dept: CARDIAC REHAB | Facility: HOSPITAL | Age: 52
End: 2017-12-29

## 2017-12-29 DIAGNOSIS — I21.3 ST ELEVATION MYOCARDIAL INFARCTION (STEMI), UNSPECIFIED ARTERY (HCC): Primary | ICD-10-CM

## 2017-12-29 LAB — GLUCOSE BLDC GLUCOMTR-MCNC: 107 MG/DL (ref 70–130)

## 2017-12-29 PROCEDURE — 82962 GLUCOSE BLOOD TEST: CPT

## 2017-12-29 PROCEDURE — 93798 PHYS/QHP OP CAR RHAB W/ECG: CPT

## 2018-01-03 ENCOUNTER — TREATMENT (OUTPATIENT)
Dept: CARDIAC REHAB | Facility: HOSPITAL | Age: 53
End: 2018-01-03

## 2018-01-03 DIAGNOSIS — I21.3 ST ELEVATION MYOCARDIAL INFARCTION (STEMI), UNSPECIFIED ARTERY (HCC): Primary | ICD-10-CM

## 2018-01-03 PROCEDURE — 93798 PHYS/QHP OP CAR RHAB W/ECG: CPT

## 2018-01-05 ENCOUNTER — TREATMENT (OUTPATIENT)
Dept: CARDIAC REHAB | Facility: HOSPITAL | Age: 53
End: 2018-01-05

## 2018-01-05 DIAGNOSIS — I21.3 ST ELEVATION MYOCARDIAL INFARCTION (STEMI), UNSPECIFIED ARTERY (HCC): Primary | ICD-10-CM

## 2018-01-05 DIAGNOSIS — Z95.5 S/P RIGHT CORONARY ARTERY (RCA) STENT PLACEMENT: ICD-10-CM

## 2018-01-05 LAB — GLUCOSE BLDC GLUCOMTR-MCNC: 113 MG/DL (ref 70–130)

## 2018-01-05 PROCEDURE — 82962 GLUCOSE BLOOD TEST: CPT

## 2018-01-05 PROCEDURE — 93798 PHYS/QHP OP CAR RHAB W/ECG: CPT

## 2018-01-08 ENCOUNTER — TREATMENT (OUTPATIENT)
Dept: CARDIAC REHAB | Facility: HOSPITAL | Age: 53
End: 2018-01-08

## 2018-01-08 DIAGNOSIS — Z95.5 S/P RIGHT CORONARY ARTERY (RCA) STENT PLACEMENT: ICD-10-CM

## 2018-01-08 DIAGNOSIS — I21.3 ST ELEVATION MYOCARDIAL INFARCTION (STEMI), UNSPECIFIED ARTERY (HCC): Primary | ICD-10-CM

## 2018-01-08 PROCEDURE — 93798 PHYS/QHP OP CAR RHAB W/ECG: CPT

## 2018-01-10 ENCOUNTER — TREATMENT (OUTPATIENT)
Dept: CARDIAC REHAB | Facility: HOSPITAL | Age: 53
End: 2018-01-10

## 2018-01-10 DIAGNOSIS — I21.3 ST ELEVATION MYOCARDIAL INFARCTION (STEMI), UNSPECIFIED ARTERY (HCC): Primary | ICD-10-CM

## 2018-01-10 PROCEDURE — 93798 PHYS/QHP OP CAR RHAB W/ECG: CPT

## 2018-01-15 ENCOUNTER — TREATMENT (OUTPATIENT)
Dept: CARDIAC REHAB | Facility: HOSPITAL | Age: 53
End: 2018-01-15

## 2018-01-15 DIAGNOSIS — I21.3 ST ELEVATION MYOCARDIAL INFARCTION (STEMI), UNSPECIFIED ARTERY (HCC): Primary | ICD-10-CM

## 2018-01-15 PROCEDURE — 93798 PHYS/QHP OP CAR RHAB W/ECG: CPT

## 2018-01-19 ENCOUNTER — TREATMENT (OUTPATIENT)
Dept: CARDIAC REHAB | Facility: HOSPITAL | Age: 53
End: 2018-01-19

## 2018-01-19 DIAGNOSIS — I21.3 ST ELEVATION MYOCARDIAL INFARCTION (STEMI), UNSPECIFIED ARTERY (HCC): Primary | ICD-10-CM

## 2018-01-19 DIAGNOSIS — Z95.5 S/P RIGHT CORONARY ARTERY (RCA) STENT PLACEMENT: ICD-10-CM

## 2018-01-19 PROCEDURE — 93798 PHYS/QHP OP CAR RHAB W/ECG: CPT

## 2018-01-22 ENCOUNTER — TREATMENT (OUTPATIENT)
Dept: CARDIAC REHAB | Facility: HOSPITAL | Age: 53
End: 2018-01-22

## 2018-01-22 DIAGNOSIS — I21.3 ST ELEVATION MYOCARDIAL INFARCTION (STEMI), UNSPECIFIED ARTERY (HCC): Primary | ICD-10-CM

## 2018-01-22 PROCEDURE — 93798 PHYS/QHP OP CAR RHAB W/ECG: CPT

## 2018-01-24 ENCOUNTER — TREATMENT (OUTPATIENT)
Dept: CARDIAC REHAB | Facility: HOSPITAL | Age: 53
End: 2018-01-24

## 2018-01-24 DIAGNOSIS — I21.3 ST ELEVATION MYOCARDIAL INFARCTION (STEMI), UNSPECIFIED ARTERY (HCC): Primary | ICD-10-CM

## 2018-01-24 PROCEDURE — 93798 PHYS/QHP OP CAR RHAB W/ECG: CPT

## 2018-01-26 ENCOUNTER — TREATMENT (OUTPATIENT)
Dept: CARDIAC REHAB | Facility: HOSPITAL | Age: 53
End: 2018-01-26

## 2018-01-26 DIAGNOSIS — I21.3 ST ELEVATION MYOCARDIAL INFARCTION (STEMI), UNSPECIFIED ARTERY (HCC): Primary | ICD-10-CM

## 2018-01-26 PROCEDURE — 93798 PHYS/QHP OP CAR RHAB W/ECG: CPT

## 2018-01-29 ENCOUNTER — TREATMENT (OUTPATIENT)
Dept: CARDIAC REHAB | Facility: HOSPITAL | Age: 53
End: 2018-01-29

## 2018-01-29 DIAGNOSIS — Z95.5 S/P RIGHT CORONARY ARTERY (RCA) STENT PLACEMENT: Primary | ICD-10-CM

## 2018-01-29 PROCEDURE — 93798 PHYS/QHP OP CAR RHAB W/ECG: CPT

## 2018-01-31 ENCOUNTER — TREATMENT (OUTPATIENT)
Dept: CARDIAC REHAB | Facility: HOSPITAL | Age: 53
End: 2018-01-31

## 2018-01-31 DIAGNOSIS — I21.3 ST ELEVATION MYOCARDIAL INFARCTION (STEMI), UNSPECIFIED ARTERY (HCC): ICD-10-CM

## 2018-01-31 DIAGNOSIS — Z95.5 S/P RIGHT CORONARY ARTERY (RCA) STENT PLACEMENT: Primary | ICD-10-CM

## 2018-01-31 PROCEDURE — 93798 PHYS/QHP OP CAR RHAB W/ECG: CPT

## 2018-02-02 ENCOUNTER — TREATMENT (OUTPATIENT)
Dept: CARDIAC REHAB | Facility: HOSPITAL | Age: 53
End: 2018-02-02

## 2018-02-02 DIAGNOSIS — I21.3 ST ELEVATION MYOCARDIAL INFARCTION (STEMI), UNSPECIFIED ARTERY (HCC): ICD-10-CM

## 2018-02-02 DIAGNOSIS — Z95.5 S/P RIGHT CORONARY ARTERY (RCA) STENT PLACEMENT: Primary | ICD-10-CM

## 2018-02-02 PROCEDURE — 93798 PHYS/QHP OP CAR RHAB W/ECG: CPT

## 2018-02-05 ENCOUNTER — TREATMENT (OUTPATIENT)
Dept: CARDIAC REHAB | Facility: HOSPITAL | Age: 53
End: 2018-02-05

## 2018-02-05 DIAGNOSIS — Z95.5 S/P RIGHT CORONARY ARTERY (RCA) STENT PLACEMENT: Primary | ICD-10-CM

## 2018-02-05 PROCEDURE — 93798 PHYS/QHP OP CAR RHAB W/ECG: CPT

## 2018-02-05 RX ORDER — SIMVASTATIN 20 MG
TABLET ORAL
Qty: 30 TABLET | Refills: 1 | Status: SHIPPED | OUTPATIENT
Start: 2018-02-05 | End: 2018-04-07 | Stop reason: SDUPTHER

## 2018-02-07 ENCOUNTER — TREATMENT (OUTPATIENT)
Dept: CARDIAC REHAB | Facility: HOSPITAL | Age: 53
End: 2018-02-07

## 2018-02-07 DIAGNOSIS — Z95.5 S/P RIGHT CORONARY ARTERY (RCA) STENT PLACEMENT: Primary | ICD-10-CM

## 2018-02-07 PROCEDURE — 93798 PHYS/QHP OP CAR RHAB W/ECG: CPT

## 2018-02-07 NOTE — PROGRESS NOTES
CARDIAC/PULMONARY REHAB NUTRITION EDUCATION/ASSESSMENT      52 y.o.         Height: 62  in    Weight: 208  lb     BMI: 38 IBW:  120-130 lb.   Diet Survey Score: 60  Cardiac Risk Factors: stress, HTN,Family History, Depression,Sedentary lifestyle Weight Assessment: Obese  Desired Weight: 165 lb.      Current Diet: No specific guidelines followed  Appetite: good    Food records reviewed? Yes     Occupation:  unemployed      Routine Exercise: mild     Who does the patient live with: brother and step sister  Who does the cooking at home:  Sister in law    Patient actively receiving lifestyle support from others at home? Yes       Pertinent Lab Values:   Total: No components found for: CHOLESTEROL  HDL:   HDL Cholesterol   Date Value Ref Range Status   11/14/2017 27 (L) 40 - 60 mg/dL Final     LDL:  LDL Cholesterol    Date Value Ref Range Status   02/10/2016 43 0 - 100 mg/dL Final     Comment:                                                LDL References Ranges   (U.S. Department of Health and Human Services ATP III Classifications)                Optimal                       <100 mg/dL                Near Optimal               100-129 mg/dL                Borderline High            130-159 mg/dL                High                             160-189 mg/dL                Very High                     >189 mg/dL       Triglyceride: No components found for: TRIGLYCERIDE  Last HGBA1C with date if applicable:No components found for: A1C  Glucose:   Glucose   Date Value Ref Range Status   11/16/2017 108 (H) 65 - 99 mg/dL Final    Nutritional Supplements: N/A    Pertinent Nutrition-Related Medications:  N/A         Stated Problem Areas / Concerns: Julissa wants to avoid the health complications of obesity and Heart Disease     Assessment / Recommendations: We reviewed AHA guidelines, the DASH diet and the significance of a plant based dist. Flaca did not demonstrate a committment to make significant diet changes. Supportive  written information was provided    Motivation level toward diet compliance: weak         Goals:  Weight loss, reduce chances for Diabetes and control blood pressure. Plan: making better choices with meals to include fewer poor quality carbohydrates  and more fruits and vegetables             4:31 PM  2/7/2018  Adry Rodarte RD

## 2018-02-09 ENCOUNTER — TREATMENT (OUTPATIENT)
Dept: CARDIAC REHAB | Facility: HOSPITAL | Age: 53
End: 2018-02-09

## 2018-02-09 DIAGNOSIS — I21.3 ST ELEVATION MYOCARDIAL INFARCTION (STEMI), UNSPECIFIED ARTERY (HCC): ICD-10-CM

## 2018-02-09 DIAGNOSIS — Z95.5 S/P RIGHT CORONARY ARTERY (RCA) STENT PLACEMENT: Primary | ICD-10-CM

## 2018-02-09 PROCEDURE — 93798 PHYS/QHP OP CAR RHAB W/ECG: CPT

## 2018-02-14 ENCOUNTER — TREATMENT (OUTPATIENT)
Dept: CARDIAC REHAB | Facility: HOSPITAL | Age: 53
End: 2018-02-14

## 2018-02-14 DIAGNOSIS — I21.3 ST ELEVATION MYOCARDIAL INFARCTION (STEMI), UNSPECIFIED ARTERY (HCC): ICD-10-CM

## 2018-02-14 DIAGNOSIS — Z95.5 S/P RIGHT CORONARY ARTERY (RCA) STENT PLACEMENT: Primary | ICD-10-CM

## 2018-02-14 PROCEDURE — 93798 PHYS/QHP OP CAR RHAB W/ECG: CPT

## 2018-02-16 ENCOUNTER — TREATMENT (OUTPATIENT)
Dept: CARDIAC REHAB | Facility: HOSPITAL | Age: 53
End: 2018-02-16

## 2018-02-16 DIAGNOSIS — I21.3 ST ELEVATION MYOCARDIAL INFARCTION (STEMI), UNSPECIFIED ARTERY (HCC): ICD-10-CM

## 2018-02-16 DIAGNOSIS — Z95.5 S/P RIGHT CORONARY ARTERY (RCA) STENT PLACEMENT: Primary | ICD-10-CM

## 2018-02-16 PROCEDURE — 93798 PHYS/QHP OP CAR RHAB W/ECG: CPT

## 2018-02-19 ENCOUNTER — TREATMENT (OUTPATIENT)
Dept: CARDIAC REHAB | Facility: HOSPITAL | Age: 53
End: 2018-02-19

## 2018-02-19 DIAGNOSIS — Z95.5 S/P RIGHT CORONARY ARTERY (RCA) STENT PLACEMENT: Primary | ICD-10-CM

## 2018-02-19 DIAGNOSIS — I21.3 ST ELEVATION MYOCARDIAL INFARCTION (STEMI), UNSPECIFIED ARTERY (HCC): ICD-10-CM

## 2018-02-19 PROCEDURE — 93798 PHYS/QHP OP CAR RHAB W/ECG: CPT

## 2018-02-21 ENCOUNTER — TREATMENT (OUTPATIENT)
Dept: CARDIAC REHAB | Facility: HOSPITAL | Age: 53
End: 2018-02-21

## 2018-02-21 DIAGNOSIS — Z95.5 S/P RIGHT CORONARY ARTERY (RCA) STENT PLACEMENT: Primary | ICD-10-CM

## 2018-02-21 PROCEDURE — 93798 PHYS/QHP OP CAR RHAB W/ECG: CPT

## 2018-02-23 ENCOUNTER — TREATMENT (OUTPATIENT)
Dept: CARDIAC REHAB | Facility: HOSPITAL | Age: 53
End: 2018-02-23

## 2018-02-23 DIAGNOSIS — I21.3 ST ELEVATION MYOCARDIAL INFARCTION (STEMI), UNSPECIFIED ARTERY (HCC): ICD-10-CM

## 2018-02-23 DIAGNOSIS — Z95.5 S/P RIGHT CORONARY ARTERY (RCA) STENT PLACEMENT: Primary | ICD-10-CM

## 2018-02-23 PROCEDURE — 93798 PHYS/QHP OP CAR RHAB W/ECG: CPT

## 2018-02-26 ENCOUNTER — TREATMENT (OUTPATIENT)
Dept: CARDIAC REHAB | Facility: HOSPITAL | Age: 53
End: 2018-02-26

## 2018-02-26 DIAGNOSIS — Z95.5 S/P RIGHT CORONARY ARTERY (RCA) STENT PLACEMENT: Primary | ICD-10-CM

## 2018-02-26 DIAGNOSIS — I21.3 ST ELEVATION MYOCARDIAL INFARCTION (STEMI), UNSPECIFIED ARTERY (HCC): ICD-10-CM

## 2018-02-26 PROCEDURE — 93798 PHYS/QHP OP CAR RHAB W/ECG: CPT

## 2018-02-28 ENCOUNTER — TREATMENT (OUTPATIENT)
Dept: CARDIAC REHAB | Facility: HOSPITAL | Age: 53
End: 2018-02-28

## 2018-02-28 DIAGNOSIS — Z95.5 S/P RIGHT CORONARY ARTERY (RCA) STENT PLACEMENT: Primary | ICD-10-CM

## 2018-02-28 DIAGNOSIS — I21.3 ST ELEVATION MYOCARDIAL INFARCTION (STEMI), UNSPECIFIED ARTERY (HCC): ICD-10-CM

## 2018-02-28 PROCEDURE — 93798 PHYS/QHP OP CAR RHAB W/ECG: CPT

## 2018-03-02 ENCOUNTER — TREATMENT (OUTPATIENT)
Dept: CARDIAC REHAB | Facility: HOSPITAL | Age: 53
End: 2018-03-02

## 2018-03-02 DIAGNOSIS — Z95.5 S/P RIGHT CORONARY ARTERY (RCA) STENT PLACEMENT: Primary | ICD-10-CM

## 2018-03-02 PROCEDURE — 93798 PHYS/QHP OP CAR RHAB W/ECG: CPT

## 2018-03-05 ENCOUNTER — TREATMENT (OUTPATIENT)
Dept: CARDIAC REHAB | Facility: HOSPITAL | Age: 53
End: 2018-03-05

## 2018-03-05 DIAGNOSIS — I21.3 ST ELEVATION MYOCARDIAL INFARCTION (STEMI), UNSPECIFIED ARTERY (HCC): ICD-10-CM

## 2018-03-05 DIAGNOSIS — Z95.5 S/P RIGHT CORONARY ARTERY (RCA) STENT PLACEMENT: Primary | ICD-10-CM

## 2018-03-05 PROCEDURE — 93798 PHYS/QHP OP CAR RHAB W/ECG: CPT

## 2018-03-07 ENCOUNTER — TREATMENT (OUTPATIENT)
Dept: CARDIAC REHAB | Facility: HOSPITAL | Age: 53
End: 2018-03-07

## 2018-03-07 DIAGNOSIS — Z95.5 S/P RIGHT CORONARY ARTERY (RCA) STENT PLACEMENT: Primary | ICD-10-CM

## 2018-03-07 PROCEDURE — 93798 PHYS/QHP OP CAR RHAB W/ECG: CPT

## 2018-03-09 ENCOUNTER — TREATMENT (OUTPATIENT)
Dept: CARDIAC REHAB | Facility: HOSPITAL | Age: 53
End: 2018-03-09

## 2018-03-09 DIAGNOSIS — Z95.5 S/P RIGHT CORONARY ARTERY (RCA) STENT PLACEMENT: Primary | ICD-10-CM

## 2018-03-09 PROCEDURE — 93798 PHYS/QHP OP CAR RHAB W/ECG: CPT

## 2018-03-12 ENCOUNTER — TREATMENT (OUTPATIENT)
Dept: CARDIAC REHAB | Facility: HOSPITAL | Age: 53
End: 2018-03-12

## 2018-03-12 DIAGNOSIS — Z95.5 S/P RIGHT CORONARY ARTERY (RCA) STENT PLACEMENT: Primary | ICD-10-CM

## 2018-03-12 PROCEDURE — 93798 PHYS/QHP OP CAR RHAB W/ECG: CPT

## 2018-03-13 ENCOUNTER — OFFICE VISIT (OUTPATIENT)
Dept: GASTROENTEROLOGY | Facility: CLINIC | Age: 53
End: 2018-03-13

## 2018-03-13 VITALS
BODY MASS INDEX: 37.02 KG/M2 | SYSTOLIC BLOOD PRESSURE: 126 MMHG | HEIGHT: 62 IN | DIASTOLIC BLOOD PRESSURE: 82 MMHG | TEMPERATURE: 98.6 F | WEIGHT: 201.2 LBS

## 2018-03-13 DIAGNOSIS — A04.8 H. PYLORI INFECTION: Primary | ICD-10-CM

## 2018-03-13 PROCEDURE — 99213 OFFICE O/P EST LOW 20 MIN: CPT | Performed by: INTERNAL MEDICINE

## 2018-03-13 RX ORDER — RANITIDINE 300 MG/1
150 TABLET ORAL 2 TIMES DAILY
Qty: 180 TABLET | Refills: 3 | Status: SHIPPED | OUTPATIENT
Start: 2018-03-13 | End: 2018-07-26 | Stop reason: ALTCHOICE

## 2018-03-13 NOTE — PROGRESS NOTES
Chief Complaint   Patient presents with   • Abdominal Pain   • Follow-up     from [facility]     Subjective     HPI     Flaca Hassan is a 52 y.o. female who presents today for follow up.  Pt is followed in office by Dr. Willson.      Hx PUD and H pylori from prior EGD last year.  Was treated with Prevpac but no follow up H pylori breath test on chart.  Pt states she has some intermittent indigestion made worse by spicy food.  She takes occasional TUMS but nothing regularly.   No melena.  Recent cardiac stent placement late last year, now on Plavix.      Past Medical History:   Diagnosis Date   • Anemia    • Asthma    • Breast cyst    • Diabetes mellitus    • Diverticulosis    • H/O: GI bleed     recurrent   • Hyperlipidemia    • Hypertension    • Irritable bowel syndrome    • Melena    • Obesity        Social History     Social History   • Marital status:    • Years of education: High School     Occupational History   •  Retired     Social History Main Topics   • Smoking status: Never Smoker   • Smokeless tobacco: Never Used   • Alcohol use No   • Drug use: No   • Sexual activity: Defer     Other Topics Concern   • Not on file         Current Outpatient Prescriptions:   •  amLODIPine (NORVASC) 5 MG tablet, take 1 tablet by mouth once daily, Disp: , Rfl: 0  •  aspirin 81 MG EC tablet, Take 81 mg by mouth Daily., Disp: , Rfl:   •  budesonide-formoterol (SYMBICORT) 160-4.5 MCG/ACT inhaler, Inhale 2 puffs 2 (two) times a day., Disp: , Rfl:   •  clopidogrel (PLAVIX) 75 MG tablet, Take 1 tablet by mouth Daily., Disp: 30 tablet, Rfl: 11  •  escitalopram (LEXAPRO) 10 MG tablet, TAKE ONE TABLET BY MOUTH IN THE MORNING                          ...  (REFER TO PRESCRIPTION NOTES)., Disp: , Rfl: 0  •  furosemide (LASIX) 40 MG tablet, Take 1 tablet by mouth Daily., Disp: , Rfl:   •  losartan (COZAAR) 50 MG tablet, Take 50 mg by mouth Daily., Disp: , Rfl: 0  •  metoprolol tartrate (LOPRESSOR) 25 MG tablet, take 1 tablet by  mouth every 12 hours, Disp: 60 tablet, Rfl: 1  •  Montelukast Sodium (SINGULAIR PO), Take 10 mg by mouth daily., Disp: , Rfl:   •  nitroglycerin (NITROSTAT) 0.4 MG SL tablet, 1 under the tongue as needed for angina, may repeat q5mins for up three doses, Disp: 25 tablet, Rfl: 0  •  potassium chloride (MICRO-K) 10 MEQ CR capsule, Take 2 capsules by mouth Daily., Disp: , Rfl:   •  simvastatin (ZOCOR) 20 MG tablet, take 1 tablet by mouth every evening, Disp: 30 tablet, Rfl: 1  •  sulfamethoxazole-trimethoprim (BACTRIM DS,SEPTRA DS) 800-160 MG per tablet, Take 2 tablets by mouth 2 (Two) Times a Day., Disp: 40 tablet, Rfl: 0  •  raNITIdine (ZANTAC) 300 MG tablet, Take 0.5 tablets by mouth 2 (Two) Times a Day., Disp: 180 tablet, Rfl: 3    Review of Systems   Constitutional: Negative.    HENT: Negative.    Respiratory: Negative.    Gastrointestinal:        Dyspepsia       Objective   Vitals:    03/13/18 1356   BP: 126/82   Temp: 98.6 °F (37 °C)       Physical Exam   Constitutional: She is oriented to person, place, and time. She appears well-developed and well-nourished.   HENT:   Head: Normocephalic and atraumatic.   Abdominal: Soft. Bowel sounds are normal. She exhibits no distension and no mass. There is no tenderness. No hernia.   Morbidly obese, exam limited by body habitus   Neurological: She is alert and oriented to person, place, and time.   Skin: Skin is warm and dry.   Psychiatric: She has a normal mood and affect. Her behavior is normal. Judgment and thought content normal.   Vitals reviewed.      Assessment/Plan   Assessment:     1. H. pylori infection    2.      Hx of duodenal ulcer  3.      CAD s/p cardiac stent on Plavix    Plan:   As pt is currently off PPI, will check H pylori breath test in office today  Recommend zantac 150mg 1-2times/day as needed for dyspepsia  Discussed dietary guidelines and recommend attempts at weight reduction    F/U in office with nurse practitioner or Dr. Willson in 3  jacinto Sinha M.D.  [facility]  [address] [place] [address]  Office: [phone]

## 2018-03-14 ENCOUNTER — TREATMENT (OUTPATIENT)
Dept: CARDIAC REHAB | Facility: HOSPITAL | Age: 53
End: 2018-03-14

## 2018-03-14 DIAGNOSIS — Z95.5 S/P RIGHT CORONARY ARTERY (RCA) STENT PLACEMENT: Primary | ICD-10-CM

## 2018-03-14 LAB — UREA BREATH TEST QL: NEGATIVE

## 2018-03-14 PROCEDURE — 93798 PHYS/QHP OP CAR RHAB W/ECG: CPT

## 2018-03-16 ENCOUNTER — TREATMENT (OUTPATIENT)
Dept: CARDIAC REHAB | Facility: HOSPITAL | Age: 53
End: 2018-03-16

## 2018-03-16 DIAGNOSIS — I21.3 ST ELEVATION MYOCARDIAL INFARCTION (STEMI), UNSPECIFIED ARTERY (HCC): Primary | ICD-10-CM

## 2018-03-16 DIAGNOSIS — Z95.5 S/P RIGHT CORONARY ARTERY (RCA) STENT PLACEMENT: ICD-10-CM

## 2018-03-16 PROCEDURE — 93798 PHYS/QHP OP CAR RHAB W/ECG: CPT

## 2018-03-19 ENCOUNTER — TREATMENT (OUTPATIENT)
Dept: CARDIAC REHAB | Facility: HOSPITAL | Age: 53
End: 2018-03-19

## 2018-03-19 DIAGNOSIS — I21.3 ST ELEVATION MYOCARDIAL INFARCTION (STEMI), UNSPECIFIED ARTERY (HCC): Primary | ICD-10-CM

## 2018-03-19 DIAGNOSIS — Z95.5 S/P RIGHT CORONARY ARTERY (RCA) STENT PLACEMENT: ICD-10-CM

## 2018-03-19 PROCEDURE — 93798 PHYS/QHP OP CAR RHAB W/ECG: CPT

## 2018-03-23 ENCOUNTER — TREATMENT (OUTPATIENT)
Dept: CARDIAC REHAB | Facility: HOSPITAL | Age: 53
End: 2018-03-23

## 2018-03-23 DIAGNOSIS — I21.3 ST ELEVATION MYOCARDIAL INFARCTION (STEMI), UNSPECIFIED ARTERY (HCC): Primary | ICD-10-CM

## 2018-03-23 DIAGNOSIS — Z95.5 S/P RIGHT CORONARY ARTERY (RCA) STENT PLACEMENT: ICD-10-CM

## 2018-03-23 PROCEDURE — 93798 PHYS/QHP OP CAR RHAB W/ECG: CPT

## 2018-03-26 ENCOUNTER — TREATMENT (OUTPATIENT)
Dept: CARDIAC REHAB | Facility: HOSPITAL | Age: 53
End: 2018-03-26

## 2018-03-26 DIAGNOSIS — I21.3 ST ELEVATION MYOCARDIAL INFARCTION (STEMI), UNSPECIFIED ARTERY (HCC): Primary | ICD-10-CM

## 2018-03-26 PROCEDURE — 93798 PHYS/QHP OP CAR RHAB W/ECG: CPT

## 2018-03-28 ENCOUNTER — APPOINTMENT (OUTPATIENT)
Dept: CARDIAC REHAB | Facility: HOSPITAL | Age: 53
End: 2018-03-28

## 2018-04-07 ENCOUNTER — TELEPHONE (OUTPATIENT)
Dept: GASTROENTEROLOGY | Facility: CLINIC | Age: 53
End: 2018-04-07

## 2018-04-07 NOTE — TELEPHONE ENCOUNTER
----- Message from Naeem Sinha MD sent at 3/19/2018 12:42 PM EDT -----  Negative HPBT  F/U with Dr. NAVARRETE or NP as scheduled

## 2018-04-09 RX ORDER — SIMVASTATIN 20 MG
TABLET ORAL
Qty: 30 TABLET | Refills: 1 | Status: SHIPPED | OUTPATIENT
Start: 2018-04-09 | End: 2018-09-20 | Stop reason: SDUPTHER

## 2018-05-08 ENCOUNTER — APPOINTMENT (OUTPATIENT)
Dept: WOMENS IMAGING | Facility: HOSPITAL | Age: 53
End: 2018-05-08

## 2018-05-08 PROCEDURE — 77063 BREAST TOMOSYNTHESIS BI: CPT | Performed by: RADIOLOGY

## 2018-05-08 PROCEDURE — MDREVIEWSP: Performed by: RADIOLOGY

## 2018-05-08 PROCEDURE — 77067 SCR MAMMO BI INCL CAD: CPT | Performed by: RADIOLOGY

## 2018-07-26 ENCOUNTER — OFFICE VISIT (OUTPATIENT)
Dept: CARDIOLOGY | Facility: CLINIC | Age: 53
End: 2018-07-26

## 2018-07-26 VITALS
SYSTOLIC BLOOD PRESSURE: 171 MMHG | BODY MASS INDEX: 36.62 KG/M2 | DIASTOLIC BLOOD PRESSURE: 115 MMHG | WEIGHT: 199 LBS | HEART RATE: 71 BPM | HEIGHT: 62 IN

## 2018-07-26 DIAGNOSIS — E78.5 HYPERLIPIDEMIA, UNSPECIFIED HYPERLIPIDEMIA TYPE: ICD-10-CM

## 2018-07-26 DIAGNOSIS — I10 ESSENTIAL HYPERTENSION: ICD-10-CM

## 2018-07-26 DIAGNOSIS — E66.09 CLASS 1 OBESITY DUE TO EXCESS CALORIES WITHOUT SERIOUS COMORBIDITY WITH BODY MASS INDEX (BMI) OF 30.0 TO 30.9 IN ADULT: Primary | ICD-10-CM

## 2018-07-26 PROCEDURE — 99213 OFFICE O/P EST LOW 20 MIN: CPT | Performed by: INTERNAL MEDICINE

## 2018-07-26 RX ORDER — PANTOPRAZOLE SODIUM 40 MG/1
TABLET, DELAYED RELEASE ORAL
Refills: 0 | COMMUNITY
Start: 2018-05-03 | End: 2019-11-11 | Stop reason: SDUPTHER

## 2018-09-21 RX ORDER — SIMVASTATIN 20 MG
TABLET ORAL
Qty: 30 TABLET | Refills: 1 | Status: SHIPPED | OUTPATIENT
Start: 2018-09-21 | End: 2019-11-11 | Stop reason: SDUPTHER

## 2018-10-18 ENCOUNTER — OFFICE VISIT (OUTPATIENT)
Dept: CARDIOLOGY | Facility: CLINIC | Age: 53
End: 2018-10-18

## 2018-10-18 VITALS
DIASTOLIC BLOOD PRESSURE: 107 MMHG | WEIGHT: 202 LBS | HEIGHT: 62 IN | BODY MASS INDEX: 37.17 KG/M2 | HEART RATE: 62 BPM | SYSTOLIC BLOOD PRESSURE: 190 MMHG

## 2018-10-18 DIAGNOSIS — I21.3 ST ELEVATION MYOCARDIAL INFARCTION (STEMI), UNSPECIFIED ARTERY (HCC): ICD-10-CM

## 2018-10-18 DIAGNOSIS — E66.09 CLASS 1 OBESITY DUE TO EXCESS CALORIES WITHOUT SERIOUS COMORBIDITY WITH BODY MASS INDEX (BMI) OF 30.0 TO 30.9 IN ADULT: ICD-10-CM

## 2018-10-18 DIAGNOSIS — I25.10 CORONARY ARTERY DISEASE INVOLVING NATIVE HEART WITHOUT ANGINA PECTORIS, UNSPECIFIED VESSEL OR LESION TYPE: ICD-10-CM

## 2018-10-18 DIAGNOSIS — I10 ESSENTIAL HYPERTENSION: Primary | ICD-10-CM

## 2018-10-18 DIAGNOSIS — E78.5 HYPERLIPIDEMIA, UNSPECIFIED HYPERLIPIDEMIA TYPE: ICD-10-CM

## 2018-10-18 PROCEDURE — 99214 OFFICE O/P EST MOD 30 MIN: CPT | Performed by: INTERNAL MEDICINE

## 2018-10-18 RX ORDER — OLMESARTAN MEDOXOMIL AND HYDROCHLOROTHIAZIDE 40/12.5 40; 12.5 MG/1; MG/1
1 TABLET ORAL DAILY
Qty: 30 TABLET | Refills: 5 | Status: SHIPPED | OUTPATIENT
Start: 2018-10-18 | End: 2019-03-27 | Stop reason: SDUPTHER

## 2018-10-18 RX ORDER — BISOPROLOL FUMARATE AND HYDROCHLOROTHIAZIDE 5; 6.25 MG/1; MG/1
1 TABLET ORAL DAILY
Qty: 30 TABLET | Refills: 6 | Status: SHIPPED | OUTPATIENT
Start: 2018-10-18 | End: 2019-07-15 | Stop reason: SDUPTHER

## 2018-10-18 NOTE — PROGRESS NOTES
Subjective:        Flaca Hassan is a 53 y.o. female who here for follow up    CC  Occasional cp  HPI  53-year-old female with known history of coronary artery disease, benign essential arterial hypertension and hyperlipidemia obesity here for the follow-up continues to complains of shortness of breath but no chest pains     Problem List Items Addressed This Visit        Cardiovascular and Mediastinum    Hypertension - Primary    Relevant Medications    olmesartan-hydrochlorothiazide (BENICAR HCT) 40-12.5 MG per tablet    bisoprolol-hydrochlorothiazide (ZIAC) 5-6.25 MG per tablet    Hyperlipidemia    ST elevation myocardial infarction (STEMI) (CMS/HCC)    Coronary artery disease involving native heart without angina pectoris       Digestive    Obesity        .    The following portions of the patient's history were reviewed and updated as appropriate: allergies, current medications, past family history, past medical history, past social history, past surgical history and problem list.    Past Medical History:   Diagnosis Date   • Anemia    • Asthma    • Breast cyst    • Diabetes mellitus (CMS/HCC)    • Diverticulosis    • H/O: GI bleed     recurrent   • Hyperlipidemia    • Hypertension    • Irritable bowel syndrome    • Melena    • Obesity      reports that she has never smoked. She has never used smokeless tobacco. She reports that she does not drink alcohol or use drugs.   Family History   Problem Relation Age of Onset   • Breast cancer Other    • Lung cancer Brother    • Hypertension Brother    • Hyperlipidemia Brother    • Heart disease Brother    • Throat cancer Paternal Uncle    • Tongue cancer Paternal Grandfather    • Hypertension Father    • Hyperlipidemia Father    • Heart disease Father        Review of Systems  Constitutional: No wt loss, fever, fatigue  Gastrointestinal: No nausea, abdominal pain  Behavioral/Psych: No insomnia or anxiety   Cardiovascular shortness of breath no chest  "pain  Objective:                 Physical Exam  BP (!) 190/107   Pulse 62   Ht 157.5 cm (62\")   Wt 91.6 kg (202 lb)   BMI 36.95 kg/m²     General appearance: NAD, conversant   Eyes: anicteric sclerae, moist conjunctivae; no lid-lag; PERRLA   HENT: Atraumatic; oropharynx clear with moist mucous membranes and no mucosal ulcerations;  normal hard and soft palate   Neck: Trachea midline; FROM, supple, no thyromegaly or lymphadenopathy   Lungs: CTA, with normal respiratory effort and no intercostal retractions   CV: S1-S2 regular, no murmurs, no rub, no gallop   Abdomen: Soft, non-tender; no masses or HSM   Extremities: No peripheral edema or extremity lymphadenopathy  Skin: Normal temperature, turgor and texture; no rash, ulcers or subcutaneous nodules   Psych: Appropriate affect, alert and oriented to person, place and time           Cardiographics  @Procedures    Echocardiogram:        Current Outpatient Prescriptions:   •  amLODIPine (NORVASC) 5 MG tablet, take 1 tablet by mouth once daily, Disp: , Rfl: 0  •  aspirin 81 MG EC tablet, Take 81 mg by mouth Daily., Disp: , Rfl:   •  budesonide-formoterol (SYMBICORT) 160-4.5 MCG/ACT inhaler, Inhale 2 puffs 2 (two) times a day., Disp: , Rfl:   •  clopidogrel (PLAVIX) 75 MG tablet, Take 1 tablet by mouth Daily., Disp: 30 tablet, Rfl: 11  •  escitalopram (LEXAPRO) 10 MG tablet, TAKE ONE TABLET BY MOUTH IN THE MORNING                          ...  (REFER TO PRESCRIPTION NOTES)., Disp: , Rfl: 0  •  furosemide (LASIX) 40 MG tablet, Take 1 tablet by mouth Daily., Disp: , Rfl:   •  losartan (COZAAR) 50 MG tablet, Take 50 mg by mouth Daily., Disp: , Rfl: 0  •  metoprolol tartrate (LOPRESSOR) 25 MG tablet, take 1 tablet by mouth every 12 hours, Disp: 60 tablet, Rfl: 0  •  Montelukast Sodium (SINGULAIR PO), Take 10 mg by mouth daily., Disp: , Rfl:   •  nitroglycerin (NITROSTAT) 0.4 MG SL tablet, 1 under the tongue as needed for angina, may repeat q5mins for up three doses, Disp: " 25 tablet, Rfl: 0  •  pantoprazole (PROTONIX) 40 MG EC tablet, take 1 tablet by mouth twice a day for 14 days, Disp: , Rfl: 0  •  potassium chloride (MICRO-K) 10 MEQ CR capsule, Take 2 capsules by mouth Daily., Disp: , Rfl:   •  simvastatin (ZOCOR) 20 MG tablet, take 1 tablet by mouth every evening, Disp: 30 tablet, Rfl: 1   Assessment:        Patient Active Problem List   Diagnosis   • Hypertension   • Obesity   • Hyperlipidemia   • Iron deficiency anemia due to chronic blood loss   • Upper GI bleeding   • ST elevation myocardial infarction (STEMI) (CMS/HCC)               Plan:            ICD-10-CM ICD-9-CM   1. Essential hypertension I10 401.9   2. ST elevation myocardial infarction (STEMI), unspecified artery (CMS/HCC) I21.3 410.90   3. Hyperlipidemia, unspecified hyperlipidemia type E78.5 272.4   4. Class 1 obesity due to excess calories without serious comorbidity with body mass index (BMI) of 30.0 to 30.9 in adult E66.09 278.00    Z68.30 V85.30   5. Coronary artery disease involving native heart without angina pectoris, unspecified vessel or lesion type I25.10 414.01     1. Essential hypertension  The blood pressures running high will change the medications    2. ST elevation myocardial infarction (STEMI), unspecified artery (CMS/HCC)  Shortness of breath no chest    3. Hyperlipidemia, unspecified hyperlipidemia type  Counseling has been done    4. Class 1 obesity due to excess calories without serious comorbidity with body mass index (BMI) of 30.0 to 30.9 in adult  Significant risk of obesity to CAD, HTN has been explained  Advantages of wt reduction has been explained      5. Coronary artery disease involving native heart without angina pectoris, unspecified vessel or lesion type  Flaca Hassan with coronary artery disease has no angina pectoris    Risk reduction for the coronary artery disease, controlling the blood pressure, blood sugar management, cholesterol management, exercise, stress management,  and proper compliance with medications and follow-up has been discussed      Dc losartan, metoprolol    Start ziac 5/6.25 mg, [id number]/12.5 mg po daily    Pros and cons of this new medication / change medication has been explained to  the patient    Possible side effects has been explained    Associated need of the blood  Work has been explained    Need for the compliance of the medication has been explained      See in 2-4 wks   COUNSELING:    Flaca Gudino was given to patient for the following topics: diagnostic results, risk factor reductions, impressions, risks and benefits of treatment options and importance of treatment compliance .       SMOKING COUNSELING:    Counseling given: Not Answered      EMR Dragon/Transcription disclaimer:   Much of this encounter note is an electronic transcription/translation of spoken language to printed text. The electronic translation of spoken language may permit erroneous, or at times, nonsensical words or phrases to be inadvertently transcribed; Although I have reviewed the note for such errors, some may still exist.

## 2018-10-19 PROBLEM — I25.10 CORONARY ARTERY DISEASE INVOLVING NATIVE HEART WITHOUT ANGINA PECTORIS: Status: ACTIVE | Noted: 2018-10-19

## 2018-11-14 ENCOUNTER — OFFICE VISIT (OUTPATIENT)
Dept: GASTROENTEROLOGY | Facility: CLINIC | Age: 53
End: 2018-11-14

## 2018-11-14 VITALS
DIASTOLIC BLOOD PRESSURE: 80 MMHG | BODY MASS INDEX: 36.7 KG/M2 | TEMPERATURE: 98.1 F | SYSTOLIC BLOOD PRESSURE: 128 MMHG | HEIGHT: 62 IN | WEIGHT: 199.4 LBS

## 2018-11-14 DIAGNOSIS — K59.00 CONSTIPATION, UNSPECIFIED CONSTIPATION TYPE: ICD-10-CM

## 2018-11-14 DIAGNOSIS — K30 INDIGESTION: ICD-10-CM

## 2018-11-14 DIAGNOSIS — R14.0 BLOATING: ICD-10-CM

## 2018-11-14 DIAGNOSIS — R19.5 DARK STOOLS: Primary | ICD-10-CM

## 2018-11-14 PROCEDURE — 99213 OFFICE O/P EST LOW 20 MIN: CPT | Performed by: INTERNAL MEDICINE

## 2018-11-14 NOTE — PATIENT INSTRUCTIONS
Continue fiber supplement daily - could increase to twice daily  Zantac 150 mg daily (once daily)  Trial IBgard prn  Check labs  Avoid spicy foods

## 2018-11-14 NOTE — PROGRESS NOTES
Chief Complaint   Patient presents with   • Abdominal Pain   • Constipation       Flaca Hassan is a  53 y.o. female here for a follow up visit for constipation, abdominal pain.     She reports that when she takes fiber her symptoms are improved.  When she does not take the fiber her abdomen feels tight.  Most of the symptoms she describes she reports are associated with spicy food.  She sometimes has indigestion when she eats spicy food.  She sometimes has loose bowels and incontinence when she eats spicy foods.  She reports that she does not exercise and does not have transportation to do so.  She is on Plavix due to a cardiac stent and she has noticed that her stools are dark brown.  She has 2-3 bowel movements a day but feels like they're small and she does not completely empty.  She sometimes has the urgent need to have a bowel movement.    Hx PUD and H pylori from prior EGD 2016.  Was treated with Prevpac but no follow up H pylori breath test on chart.    HPI  Past Medical History:   Diagnosis Date   • Anemia    • Asthma    • Breast cyst    • Diabetes mellitus (CMS/HCC)    • Diverticulosis    • H/O: GI bleed     recurrent   • Hyperlipidemia    • Hypertension    • Irritable bowel syndrome    • Melena    • Obesity      Past Surgical History:   Procedure Laterality Date   • BREAST CYST EXCISION     • COLONOSCOPY  02/11/2016    VICTOR MANUEL hernandez,    • UPPER GASTROINTESTINAL ENDOSCOPY  02/10/2016    sami-Ruth Ann Willson M.D.       Current Outpatient Medications:   •  amLODIPine (NORVASC) 5 MG tablet, take 1 tablet by mouth once daily, Disp: , Rfl: 0  •  aspirin 81 MG EC tablet, Take 81 mg by mouth Daily., Disp: , Rfl:   •  bisoprolol-hydrochlorothiazide (ZIAC) 5-6.25 MG per tablet, Take 1 tablet by mouth Daily., Disp: 30 tablet, Rfl: 6  •  budesonide-formoterol (SYMBICORT) 160-4.5 MCG/ACT inhaler, Inhale 2 puffs 2 (two) times a day., Disp: , Rfl:   •  clopidogrel (PLAVIX) 75 MG tablet, Take 1 tablet by mouth Daily.,  Disp: 30 tablet, Rfl: 11  •  escitalopram (LEXAPRO) 10 MG tablet, TAKE ONE TABLET BY MOUTH IN THE MORNING                          ...  (REFER TO PRESCRIPTION NOTES)., Disp: , Rfl: 0  •  furosemide (LASIX) 40 MG tablet, Take 1 tablet by mouth Daily., Disp: , Rfl:   •  Montelukast Sodium (SINGULAIR PO), Take 10 mg by mouth daily., Disp: , Rfl:   •  nitroglycerin (NITROSTAT) 0.4 MG SL tablet, 1 under the tongue as needed for angina, may repeat q5mins for up three doses, Disp: 25 tablet, Rfl: 0  •  olmesartan-hydrochlorothiazide (BENICAR HCT) 40-12.5 MG per tablet, Take 1 tablet by mouth Daily., Disp: 30 tablet, Rfl: 5  •  pantoprazole (PROTONIX) 40 MG EC tablet, take 1 tablet by mouth twice a day for 14 days, Disp: , Rfl: 0  •  potassium chloride (MICRO-K) 10 MEQ CR capsule, Take 2 capsules by mouth Daily., Disp: , Rfl:   •  simvastatin (ZOCOR) 20 MG tablet, take 1 tablet by mouth every evening, Disp: 30 tablet, Rfl: 1  PRN Meds:.  No Known Allergies  Social History     Socioeconomic History   • Marital status:      Spouse name: Not on file   • Number of children: Not on file   • Years of education: High School   • Highest education level: Not on file   Social Needs   • Financial resource strain: Not on file   • Food insecurity - worry: Not on file   • Food insecurity - inability: Not on file   • Transportation needs - medical: Not on file   • Transportation needs - non-medical: Not on file   Occupational History     Employer: RETIRED   Tobacco Use   • Smoking status: Never Smoker   • Smokeless tobacco: Never Used   Substance and Sexual Activity   • Alcohol use: No   • Drug use: No   • Sexual activity: Defer   Other Topics Concern   • Not on file   Social History Narrative   • Not on file     Family History   Problem Relation Age of Onset   • Breast cancer Other    • Lung cancer Brother    • Hypertension Brother    • Hyperlipidemia Brother    • Heart disease Brother    • Throat cancer Paternal Uncle    •  Tongue cancer Paternal Grandfather    • Hypertension Father    • Hyperlipidemia Father    • Heart disease Father      Review of Systems   Constitutional: Negative for appetite change and unexpected weight change.   Respiratory: Negative.    Cardiovascular: Negative.    Gastrointestinal: Positive for abdominal distention, abdominal pain and constipation. Negative for blood in stool.   Allergic/Immunologic: Negative.      Vitals:    11/14/18 1537   BP: 128/80   Temp: 98.1 °F (36.7 °C)         11/14/18  1537   Weight: 90.4 kg (199 lb 6.4 oz)     Physical Exam   Constitutional: She appears well-developed and well-nourished.   HENT:   Head: Normocephalic and atraumatic.   Eyes: No scleral icterus.   Abdominal: Soft. She exhibits no distension and no mass. There is no tenderness.   Neurological: She is alert.   Skin: Skin is warm and dry.   Psychiatric: She has a normal mood and affect.     No images are attached to the encounter.  Diagnoses and all orders for this visit:    Dark stools  -     CBC & Differential    Constipation, unspecified constipation type    Bloating    Indigestion    Continue fiber supplement daily - could increase to twice daily  Zantac 150 mg daily (once daily)  Trial IBgard prn  Check labs  Avoid spicy foods

## 2018-11-15 ENCOUNTER — TELEPHONE (OUTPATIENT)
Dept: GASTROENTEROLOGY | Facility: CLINIC | Age: 53
End: 2018-11-15

## 2018-11-15 LAB
BASOPHILS # BLD AUTO: 0.05 10*3/MM3 (ref 0–0.2)
BASOPHILS NFR BLD AUTO: 0.6 % (ref 0–1.5)
EOSINOPHIL # BLD AUTO: 0.28 10*3/MM3 (ref 0–0.7)
EOSINOPHIL NFR BLD AUTO: 3.2 % (ref 0.3–6.2)
ERYTHROCYTE [DISTWIDTH] IN BLOOD BY AUTOMATED COUNT: 12.6 % (ref 11.7–13)
HCT VFR BLD AUTO: 46.9 % (ref 35.6–45.5)
HGB BLD-MCNC: 15 G/DL (ref 11.9–15.5)
IMM GRANULOCYTES # BLD: 0.02 10*3/MM3 (ref 0–0.03)
IMM GRANULOCYTES NFR BLD: 0.2 % (ref 0–0.5)
LYMPHOCYTES # BLD AUTO: 2.39 10*3/MM3 (ref 0.9–4.8)
LYMPHOCYTES NFR BLD AUTO: 26.9 % (ref 19.6–45.3)
MCH RBC QN AUTO: 30.1 PG (ref 26.9–32)
MCHC RBC AUTO-ENTMCNC: 32 G/DL (ref 32.4–36.3)
MCV RBC AUTO: 94.2 FL (ref 80.5–98.2)
MONOCYTES # BLD AUTO: 0.78 10*3/MM3 (ref 0.2–1.2)
MONOCYTES NFR BLD AUTO: 8.8 % (ref 5–12)
NEUTROPHILS # BLD AUTO: 5.35 10*3/MM3 (ref 1.9–8.1)
NEUTROPHILS NFR BLD AUTO: 60.3 % (ref 42.7–76)
NRBC BLD AUTO-RTO: 0 /100 WBC (ref 0–0)
PLATELET # BLD AUTO: 274 10*3/MM3 (ref 140–500)
RBC # BLD AUTO: 4.98 10*6/MM3 (ref 3.9–5.2)
WBC # BLD AUTO: 8.87 10*3/MM3 (ref 4.5–10.7)

## 2018-11-26 NOTE — TELEPHONE ENCOUNTER
Call to Sonya (see [employer] auth).  Advise per Dr Willson that Hgb was normal - no anemia.  Sonya verb understanding.

## 2019-03-29 RX ORDER — OLMESARTAN MEDOXOMIL AND HYDROCHLOROTHIAZIDE 40/12.5 40; 12.5 MG/1; MG/1
1 TABLET ORAL DAILY
Qty: 90 TABLET | Refills: 0 | Status: SHIPPED | OUTPATIENT
Start: 2019-03-29 | End: 2019-06-18 | Stop reason: SDUPTHER

## 2019-04-22 ENCOUNTER — OFFICE VISIT (OUTPATIENT)
Dept: GASTROENTEROLOGY | Facility: CLINIC | Age: 54
End: 2019-04-22

## 2019-04-22 VITALS
BODY MASS INDEX: 37.8 KG/M2 | DIASTOLIC BLOOD PRESSURE: 96 MMHG | WEIGHT: 205.4 LBS | HEIGHT: 62 IN | TEMPERATURE: 98.7 F | SYSTOLIC BLOOD PRESSURE: 124 MMHG

## 2019-04-22 DIAGNOSIS — R10.13 EPIGASTRIC PAIN: Primary | ICD-10-CM

## 2019-04-22 DIAGNOSIS — R11.0 NAUSEA: ICD-10-CM

## 2019-04-22 DIAGNOSIS — R19.4 ALTERED BOWEL HABITS: ICD-10-CM

## 2019-04-22 PROCEDURE — 99214 OFFICE O/P EST MOD 30 MIN: CPT | Performed by: INTERNAL MEDICINE

## 2019-04-22 NOTE — PROGRESS NOTES
Chief Complaint   Patient presents with   • Abdominal Pain       Flaca Hassan is a  54 y.o. female here for a follow up visit for abdominal pain.  She complains of abdominal pain after she eats.  It is worse if she eats spicy food.  The pain is in her mid abdomen and sometimes radiates to the sides.  She sometimes feels nausea without vomiting.  This is also worse after spicy foods.  She is watching her diet.  She reports that when she eats spicy food she has an increase in bowel frequency and looseness.  She is not seeing any blood in her stool.  She has not been taking pantoprazole.  She took this for period of time in 2017 after an ER visit but no longer takes it.  She does not take Metamucil regularly.  HPI  Past Medical History:   Diagnosis Date   • Anemia    • Asthma    • Breast cyst    • Diabetes mellitus (CMS/HCC)    • Diverticulosis    • H/O: GI bleed     recurrent   • Hyperlipidemia    • Hypertension    • Irritable bowel syndrome    • Melena    • Obesity      Past Surgical History:   Procedure Laterality Date   • BREAST CYST EXCISION     • CARDIAC CATHETERIZATION N/A 11/13/2017    Procedure: Left Heart Cath;  Surgeon: Imer Montaño MD;  Location: [facility] INVASIVE LOCATION;  Service:    • CARDIAC CATHETERIZATION N/A 11/13/2017    Procedure: Stent ROBERTO coronary;  Surgeon: Imer Montaño MD;  Location:  [facility]ATH INVASIVE LOCATION;  Service:    • COLONOSCOPY  02/11/2016    mary, VICTOR MANUEL,    • ENDOSCOPY N/A 3/3/2017    erythematous mucosa in stomach, duodenal ulcer , mild to moderate chronic active gastritis, positive for h pylori   • UPPER GASTROINTESTINAL ENDOSCOPY  02/10/2016    sami-Ruth Ann Willson M.D.       Current Outpatient Medications:   •  amLODIPine (NORVASC) 5 MG tablet, take 1 tablet by mouth once daily, Disp: , Rfl: 0  •  aspirin 81 MG EC tablet, Take 81 mg by mouth Daily., Disp: , Rfl:   •  bisoprolol-hydrochlorothiazide (ZIAC) 5-6.25 MG per tablet, Take 1 tablet by mouth  Daily., Disp: 30 tablet, Rfl: 6  •  budesonide-formoterol (SYMBICORT) 160-4.5 MCG/ACT inhaler, Inhale 2 puffs 2 (two) times a day., Disp: , Rfl:   •  clopidogrel (PLAVIX) 75 MG tablet, Take 1 tablet by mouth Daily., Disp: 30 tablet, Rfl: 11  •  escitalopram (LEXAPRO) 10 MG tablet, TAKE ONE TABLET BY MOUTH IN THE MORNING                          ...  (REFER TO PRESCRIPTION NOTES)., Disp: , Rfl: 0  •  furosemide (LASIX) 40 MG tablet, Take 1 tablet by mouth Daily., Disp: , Rfl:   •  Montelukast Sodium (SINGULAIR PO), Take 10 mg by mouth daily., Disp: , Rfl:   •  nitroglycerin (NITROSTAT) 0.4 MG SL tablet, 1 under the tongue as needed for angina, may repeat q5mins for up three doses, Disp: 25 tablet, Rfl: 0  •  olmesartan-hydrochlorothiazide (BENICAR HCT) 40-12.5 MG per tablet, TAKE 1 TABLET BY MOUTH DAILY, Disp: 90 tablet, Rfl: 0  •  pantoprazole (PROTONIX) 40 MG EC tablet, take 1 tablet by mouth twice a day for 14 days, Disp: , Rfl: 0  •  potassium chloride (MICRO-K) 10 MEQ CR capsule, Take 2 capsules by mouth Daily., Disp: , Rfl:   •  simvastatin (ZOCOR) 20 MG tablet, take 1 tablet by mouth every evening, Disp: 30 tablet, Rfl: 1  PRN Meds:.  No Known Allergies  Social History     Socioeconomic History   • Marital status:      Spouse name: Not on file   • Number of children: Not on file   • Years of education: High School   • Highest education level: Not on file   Occupational History     Employer: RETIRED   Tobacco Use   • Smoking status: Never Smoker   • Smokeless tobacco: Never Used   Substance and Sexual Activity   • Alcohol use: No   • Drug use: No   • Sexual activity: Defer     Family History   Problem Relation Age of Onset   • Breast cancer Other    • Lung cancer Brother    • Hypertension Brother    • Hyperlipidemia Brother    • Heart disease Brother    • Throat cancer Paternal Uncle    • Tongue cancer Paternal Grandfather    • Hypertension Father    • Hyperlipidemia Father    • Heart disease Father       Review of Systems   Constitutional: Negative for appetite change and unexpected weight change.   Gastrointestinal: Positive for abdominal pain, diarrhea and nausea.   All other systems reviewed and are negative.    Vitals:    04/22/19 1126   BP: 124/96   Temp: 98.7 °F (37.1 °C)         04/22/19  1126   Weight: 93.2 kg (205 lb 6.4 oz)     Physical Exam   Constitutional: She appears well-developed and well-nourished.   HENT:   Head: Normocephalic and atraumatic.   Eyes: No scleral icterus.   Abdominal: Soft. She exhibits no distension and no mass. There is no tenderness.   Neurological: She is alert.   Skin: Skin is warm and dry.   Psychiatric: She has a normal mood and affect.     No images are attached to the encounter.  Diagnoses and all orders for this visit:    Epigastric pain  -     Comprehensive Metabolic Panel  -     Lipase  -     Amylase  -     CT Abdomen Pelvis With Contrast; Future    Altered bowel habits    Nausea    Plan-  Resume daily pantoprazole prior to morning meal    Resume Metamucil daily    Labs today-we will check LFTs lipase    Plan for CT abdomen and pelvis for further evaluation of her abdominal pain which is been a long-standing issue    Urged to continue monitoring her diet and increase gentle exercise

## 2019-04-23 LAB
ALBUMIN SERPL-MCNC: 4.5 G/DL (ref 3.5–5.2)
ALBUMIN/GLOB SERPL: 1.2 G/DL
ALP SERPL-CCNC: 95 U/L (ref 39–117)
ALT SERPL-CCNC: 26 U/L (ref 1–33)
AMYLASE SERPL-CCNC: 77 U/L (ref 28–100)
AST SERPL-CCNC: 20 U/L (ref 1–32)
BILIRUB SERPL-MCNC: 0.3 MG/DL (ref 0.2–1.2)
BUN SERPL-MCNC: 14 MG/DL (ref 6–20)
BUN/CREAT SERPL: 14.1 (ref 7–25)
CALCIUM SERPL-MCNC: 9.8 MG/DL (ref 8.6–10.5)
CHLORIDE SERPL-SCNC: 103 MMOL/L (ref 98–107)
CO2 SERPL-SCNC: 22.5 MMOL/L (ref 22–29)
CREAT SERPL-MCNC: 0.99 MG/DL (ref 0.57–1)
GLOBULIN SER CALC-MCNC: 3.8 GM/DL
GLUCOSE SERPL-MCNC: 88 MG/DL (ref 65–99)
LIPASE SERPL-CCNC: 62 U/L (ref 13–60)
POTASSIUM SERPL-SCNC: 4.6 MMOL/L (ref 3.5–5.2)
PROT SERPL-MCNC: 8.3 G/DL (ref 6–8.5)
SODIUM SERPL-SCNC: 140 MMOL/L (ref 136–145)

## 2019-04-24 ENCOUNTER — TELEPHONE (OUTPATIENT)
Dept: GASTROENTEROLOGY | Facility: CLINIC | Age: 54
End: 2019-04-24

## 2019-04-24 NOTE — TELEPHONE ENCOUNTER
Called pt and pt is requesting we call and leave vm with results . Called back and on vm advised per Dr Willson that her las looked good and no significant abnormality seen.  Advised to call with questions.

## 2019-05-02 ENCOUNTER — APPOINTMENT (OUTPATIENT)
Dept: CT IMAGING | Facility: HOSPITAL | Age: 54
End: 2019-05-02

## 2019-05-07 ENCOUNTER — HOSPITAL ENCOUNTER (OUTPATIENT)
Dept: CT IMAGING | Facility: HOSPITAL | Age: 54
Discharge: HOME | End: 2019-05-07
Admitting: INTERNAL MEDICINE

## 2019-05-07 VITALS
HEART RATE: 51 BPM | OXYGEN SATURATION: 100 % | RESPIRATION RATE: 16 BRPM | DIASTOLIC BLOOD PRESSURE: 82 MMHG | SYSTOLIC BLOOD PRESSURE: 152 MMHG

## 2019-05-07 DIAGNOSIS — R10.13 EPIGASTRIC PAIN: ICD-10-CM

## 2019-05-07 LAB — CREAT BLDA-MCNC: 0.9 MG/DL (ref 0.6–1.3)

## 2019-05-07 PROCEDURE — 74177 CT ABD & PELVIS W/CONTRAST: CPT

## 2019-05-07 PROCEDURE — 25010000002 IOPAMIDOL 61 % SOLUTION: Performed by: INTERNAL MEDICINE

## 2019-05-07 PROCEDURE — 82565 ASSAY OF CREATININE: CPT

## 2019-05-07 RX ADMIN — IOPAMIDOL 85 ML: 612 INJECTION, SOLUTION INTRAVENOUS at 15:22

## 2019-05-07 NOTE — NURSING NOTE
Brought to X-ray triage c/o itching on her left forearm after CT injection. Skin noted to be very dry, ashy in color, no hives, appears red from scratching. Denies any SOA, CP, itching anywhere else. Spoke with Dr. Atkinson. Will observe for 1 hour, if itching increase or condition changes, will give benadryl other wise no changes, may go. Patient and family informed. Drinking coffee, no distress.. Will observe.

## 2019-05-21 ENCOUNTER — TELEPHONE (OUTPATIENT)
Dept: GASTROENTEROLOGY | Facility: CLINIC | Age: 54
End: 2019-05-21

## 2019-05-21 DIAGNOSIS — K44.9 DIAPHRAGMATIC HERNIA WITHOUT OBSTRUCTION AND WITHOUT GANGRENE: Primary | ICD-10-CM

## 2019-05-21 NOTE — TELEPHONE ENCOUNTER
Please let her know that diaphragmatic hernias were noted on her CT scan.  There is some intestine that is herniating through these openings which may contribute to some of her discomfort and symptoms.  I think she should have a surgical consultation to discuss whether repairing these hernias is feasible or advisable.  Please schedule a thoracic surgery consultation

## 2019-05-23 NOTE — TELEPHONE ENCOUNTER
Called pt and spoke with pt's brother who is on the [employer] form.  Advised per Dr Willson that diaphragmatic hernias were noted on her ct scan.  There is some intestine that is herniating through these openings which may contribute to some of her discomfort and symptoms.  She thinks she should have a surgical consultation to discuss whether repairing these hernias is feasible or advisable.  Advised she has sent a referral to a thoracic surgeon. Pt's brother verb understanding and already had appt scheduled

## 2019-06-18 RX ORDER — OLMESARTAN MEDOXOMIL AND HYDROCHLOROTHIAZIDE 40/12.5 40; 12.5 MG/1; MG/1
1 TABLET ORAL DAILY
Qty: 30 TABLET | Refills: 0 | Status: SHIPPED | OUTPATIENT
Start: 2019-06-18 | End: 2019-07-15 | Stop reason: SDUPTHER

## 2019-06-19 ENCOUNTER — TELEPHONE (OUTPATIENT)
Dept: SURGERY | Facility: CLINIC | Age: 54
End: 2019-06-19

## 2019-06-19 NOTE — TELEPHONE ENCOUNTER
Called patient (Dariel Sandhu) today at 0957 to verify he was still keeping appointment or if she needed to reschedule. No one answered I left a voice message asking the patient to please return my call to reschedule if desired.

## 2019-07-02 RX ORDER — CLOPIDOGREL BISULFATE 75 MG/1
75 TABLET ORAL DAILY
Qty: 90 TABLET | Refills: 0 | Status: SHIPPED | OUTPATIENT
Start: 2019-07-02 | End: 2019-07-15 | Stop reason: SDUPTHER

## 2019-07-02 RX ORDER — CLOPIDOGREL BISULFATE 75 MG/1
75 TABLET ORAL DAILY
Qty: 30 TABLET | Refills: 0 | Status: SHIPPED | OUTPATIENT
Start: 2019-07-02 | End: 2019-07-02 | Stop reason: SDUPTHER

## 2019-07-15 ENCOUNTER — OFFICE VISIT (OUTPATIENT)
Dept: CARDIOLOGY | Facility: CLINIC | Age: 54
End: 2019-07-15

## 2019-07-15 VITALS
BODY MASS INDEX: 38.46 KG/M2 | SYSTOLIC BLOOD PRESSURE: 186 MMHG | HEART RATE: 66 BPM | DIASTOLIC BLOOD PRESSURE: 110 MMHG | HEIGHT: 62 IN | WEIGHT: 209 LBS

## 2019-07-15 DIAGNOSIS — R94.39 ABNORMAL STRESS TEST: ICD-10-CM

## 2019-07-15 DIAGNOSIS — R07.89 CHEST DISCOMFORT: ICD-10-CM

## 2019-07-15 DIAGNOSIS — E78.5 HYPERLIPIDEMIA, UNSPECIFIED HYPERLIPIDEMIA TYPE: ICD-10-CM

## 2019-07-15 DIAGNOSIS — E66.09 CLASS 1 OBESITY DUE TO EXCESS CALORIES WITHOUT SERIOUS COMORBIDITY WITH BODY MASS INDEX (BMI) OF 30.0 TO 30.9 IN ADULT: ICD-10-CM

## 2019-07-15 DIAGNOSIS — I25.10 CORONARY ARTERY DISEASE INVOLVING NATIVE HEART WITHOUT ANGINA PECTORIS, UNSPECIFIED VESSEL OR LESION TYPE: ICD-10-CM

## 2019-07-15 DIAGNOSIS — I10 ESSENTIAL HYPERTENSION: Primary | ICD-10-CM

## 2019-07-15 PROCEDURE — 93000 ELECTROCARDIOGRAM COMPLETE: CPT | Performed by: INTERNAL MEDICINE

## 2019-07-15 PROCEDURE — 99213 OFFICE O/P EST LOW 20 MIN: CPT | Performed by: INTERNAL MEDICINE

## 2019-07-15 RX ORDER — AMLODIPINE BESYLATE 5 MG/1
5 TABLET ORAL DAILY
Qty: 30 TABLET | Refills: 6 | Status: SHIPPED | OUTPATIENT
Start: 2019-07-15 | End: 2019-11-11 | Stop reason: SDUPTHER

## 2019-07-15 RX ORDER — OLMESARTAN MEDOXOMIL AND HYDROCHLOROTHIAZIDE 40/12.5 40; 12.5 MG/1; MG/1
1 TABLET ORAL DAILY
Qty: 30 TABLET | Refills: 6 | Status: SHIPPED | OUTPATIENT
Start: 2019-07-15 | End: 2019-07-24 | Stop reason: SDUPTHER

## 2019-07-15 RX ORDER — CLOPIDOGREL BISULFATE 75 MG/1
75 TABLET ORAL DAILY
Qty: 90 TABLET | Refills: 1 | Status: SHIPPED | OUTPATIENT
Start: 2019-07-15 | End: 2019-11-11 | Stop reason: SDUPTHER

## 2019-07-15 RX ORDER — BISOPROLOL FUMARATE AND HYDROCHLOROTHIAZIDE 5; 6.25 MG/1; MG/1
1 TABLET ORAL DAILY
Qty: 30 TABLET | Refills: 6 | Status: SHIPPED | OUTPATIENT
Start: 2019-07-15 | End: 2019-11-11 | Stop reason: SDUPTHER

## 2019-07-15 NOTE — PROGRESS NOTES
Subjective:        Flaca Hassan is a 54 y.o. female who here for follow up    CC  Occasional cp, sob  HPI  54-year-old female with a history of the benign essential arterial hypertension, hyperlipidemia and coronary artery disease has occasional retrosternal chest pain mild to moderate in intensity associated with shortness of breath     Problem List Items Addressed This Visit        Cardiovascular and Mediastinum    Hypertension - Primary    Relevant Medications    amLODIPine (NORVASC) 5 MG tablet    bisoprolol-hydrochlorothiazide (ZIAC) 5-6.25 MG per tablet    Other Relevant Orders    ECG 12 Lead    Treadmill Stress Test    Adult Transthoracic Echo Complete W/ Cont if Necessary Per Protocol    Hyperlipidemia    Coronary artery disease involving native heart without angina pectoris    Relevant Medications    amLODIPine (NORVASC) 5 MG tablet    clopidogrel (PLAVIX) 75 MG tablet       Digestive    Obesity      Other Visit Diagnoses     Chest discomfort        Relevant Orders    Treadmill Stress Test    Adult Transthoracic Echo Complete W/ Cont if Necessary Per Protocol        .    The following portions of the patient's history were reviewed and updated as appropriate: allergies, current medications, past family history, past medical history, past social history, past surgical history and problem list.    Past Medical History:   Diagnosis Date   • Anemia    • Asthma    • Breast cyst    • Diabetes mellitus (CMS/HCC)    • Diverticulosis    • H/O: GI bleed     recurrent   • Hyperlipidemia    • Hypertension    • Irritable bowel syndrome    • Melena    • Obesity      reports that she has never smoked. She has never used smokeless tobacco. She reports that she does not drink alcohol or use drugs.   Family History   Problem Relation Age of Onset   • Breast cancer Other    • Lung cancer Brother    • Hypertension Brother    • Hyperlipidemia Brother    • Heart disease Brother    • Throat cancer Paternal Uncle    • Tongue  "cancer Paternal Grandfather    • Hypertension Father    • Hyperlipidemia Father    • Heart disease Father        Review of Systems  Constitutional: No wt loss, fever, fatigue  Gastrointestinal: No nausea, abdominal pain  Behavioral/Psych: No insomnia or anxiety   Cardiovascular chest pain and shortness of breath  Objective:       Physical Exam  BP (!) 186/110   Pulse 66   Ht 157.5 cm (62\")   Wt 94.8 kg (209 lb)   BMI 38.23 kg/m²   General appearance: No acute changes   Neck: Trachea midline; NECK, supple, no thyromegaly or lymphadenopathy   Lungs: Normal size and shape, normal breath sounds, equal distribution of air, no rales and rhonchi   CV: S1-S2 regular, no murmurs, no rub, no gallop   Abdomen: Soft, non-tender; no masses , no abnormal abdominal sounds   Extremities: No deformity , normal color , no peripheral edema   Skin: Normal temperature, turgor and texture; no rash, ulcers            ECG 12 Lead  Date/Time: 7/15/2019 1:14 PM  Performed by: Imer Montaño MD  Authorized by: Imer Montaño MD   Comparison: compared with previous ECG   Similar to previous ECG  Rhythm: sinus rhythm  ST Flattening: all    Clinical impression: non-specific ECG              Echocardiogram:        Current Outpatient Medications:   •  amLODIPine (NORVASC) 5 MG tablet, take 1 tablet by mouth once daily, Disp: , Rfl: 0  •  aspirin 81 MG EC tablet, Take 81 mg by mouth Daily., Disp: , Rfl:   •  bisoprolol-hydrochlorothiazide (ZIAC) 5-6.25 MG per tablet, Take 1 tablet by mouth Daily., Disp: 30 tablet, Rfl: 6  •  budesonide-formoterol (SYMBICORT) 160-4.5 MCG/ACT inhaler, Inhale 2 puffs 2 (two) times a day., Disp: , Rfl:   •  clopidogrel (PLAVIX) 75 MG tablet, TAKE 1 TABLET BY MOUTH DAILY, Disp: 90 tablet, Rfl: 0  •  escitalopram (LEXAPRO) 10 MG tablet, TAKE ONE TABLET BY MOUTH IN THE MORNING                          ...  (REFER TO PRESCRIPTION NOTES)., Disp: , Rfl: 0  •  furosemide (LASIX) 40 MG tablet, Take 1 tablet " by mouth Daily., Disp: , Rfl:   •  Montelukast Sodium (SINGULAIR PO), Take 10 mg by mouth daily., Disp: , Rfl:   •  nitroglycerin (NITROSTAT) 0.4 MG SL tablet, 1 under the tongue as needed for angina, may repeat q5mins for up three doses, Disp: 25 tablet, Rfl: 0  •  olmesartan-hydrochlorothiazide (BENICAR HCT) 40-12.5 MG per tablet, TAKE 1 TABLET BY MOUTH DAILY, Disp: 30 tablet, Rfl: 0  •  pantoprazole (PROTONIX) 40 MG EC tablet, take 1 tablet by mouth twice a day for 14 days, Disp: , Rfl: 0  •  potassium chloride (MICRO-K) 10 MEQ CR capsule, Take 2 capsules by mouth Daily., Disp: , Rfl:   •  simvastatin (ZOCOR) 20 MG tablet, take 1 tablet by mouth every evening, Disp: 30 tablet, Rfl: 1   Assessment:        Patient Active Problem List   Diagnosis   • Hypertension   • Obesity   • Hyperlipidemia   • Iron deficiency anemia due to chronic blood loss   • Upper GI bleeding   • ST elevation myocardial infarction (STEMI) (CMS/[facility])   • Coronary artery disease involving native heart without angina pectoris               Plan:            ICD-10-CM ICD-9-CM   1. Essential hypertension I10 401.9   2. Chest discomfort R07.89 786.59   3. Coronary artery disease involving native heart without angina pectoris, unspecified vessel or lesion type I25.10 414.01   4. Hyperlipidemia, unspecified hyperlipidemia type E78.5 272.4   5. Class 1 obesity due to excess calories without serious comorbidity with body mass index (BMI) of 30.0 to 30.9 in adult E66.09 278.00    Z68.30 V85.30     1. Essential hypertension  Blood pressure controlled  - ECG 12 Lead  - Treadmill Stress Test  - Adult Transthoracic Echo Complete W/ Cont if Necessary Per Protocol    2. Chest discomfort  Considering the patient's symptoms as well as clinical situation and  EKG findings, along with cardiac risk factors, ischemic workup is necessary to rule out ischemic cardiomyopathy, stress induced arrhythmias, and functional capacity for diagnosis as well as prognostic  consideration    - Treadmill Stress Test  - Adult Transthoracic Echo Complete W/ Cont if Necessary Per Protocol    3. Coronary artery disease involving native heart without angina pectoris, unspecified vessel or lesion type  Considering patient's medical condition as well as the risk factors, patient will require echocardiogram for further evaluation for the LV function, four-chamber evaluation, including the pressures, valvular function and  pericardial disease and pericardial effusion      4. Hyperlipidemia, unspecified hyperlipidemia type  Continue current medications    5. Class 1 obesity due to excess calories without serious comorbidity with body mass index (BMI) of 30.0 to 30.9 in adult  Counseling has been done       Ett, echo    See in 2wks  COUNSELING:    Flaca Gudino was given to patient for the following topics: diagnostic results, risk factor reductions, impressions, risks and benefits of treatment options and importance of treatment compliance .       SMOKING COUNSELING:    Counseling given: Not Answered      Dictated using Dragon dictation

## 2019-07-23 ENCOUNTER — OFFICE VISIT (OUTPATIENT)
Dept: SURGERY | Facility: CLINIC | Age: 54
End: 2019-07-23

## 2019-07-23 VITALS
OXYGEN SATURATION: 96 % | SYSTOLIC BLOOD PRESSURE: 181 MMHG | DIASTOLIC BLOOD PRESSURE: 78 MMHG | WEIGHT: 209.2 LBS | BODY MASS INDEX: 38.5 KG/M2 | HEIGHT: 62 IN

## 2019-07-23 DIAGNOSIS — Q79.0 MORGAGNI HERNIA: Primary | ICD-10-CM

## 2019-07-23 PROCEDURE — 99204 OFFICE O/P NEW MOD 45 MIN: CPT | Performed by: THORACIC SURGERY (CARDIOTHORACIC VASCULAR SURGERY)

## 2019-07-23 NOTE — PROGRESS NOTES
Subjective   Patient ID: Flaca Hassan is a 54 y.o. female is being seen for consultation today at the request of Ruth Ann Willson MD    History of Present Illness  Dear Colleague,  Flaca Hassan was seen in our office today for evaluation and treatment of the diaphragmatic hernia.  I have seen and examined the patient.  I have reviewed her history and her x-rays.  Thank you for asking me to participate in the care of this pleasant lady.  She was accompanied by her niece who acted as her .    Mrs. Hassan is a 54-year-old female.  Approximately 2 years ago she was admitted to the hospital for upper GI bleed.  She was found to have bleeding ulcers.  She underwent endoscopy and treatment to control the bleeding.  Bleeding eventually stopped and the patient was discharged.  She has been followed since then.  For the last 2 to 3 months she has been having severe episodes of diarrhea.  She is also been having epigastric pain.  This is been associated with some constipation.  She has had no hematemesis or melena.  When she eats spicy foods she gets nausea and vomiting.  She has not had any endoscopy in the last 2 years.  She has lost weight intentionally from 216 down to 209 pounds.  Recent CT of the abdomen to evaluate her pain and diarrhea revealed a large diaphragmatic hernia of Morgagni.  She has been referred here for further evaluation and treatment.    She had a myocardial infarction in November.  She was treated with angioplasty.  She is obese with hypertension and borderline diabetes.  She has had no prior abdominal surgery.  She has a history of fibrocystic breast disease but no history of cancer.  She has asthma but is a non-smoker and has had no other pulmonary issues.  She uses oxygen at night because of obstructive sleep apnea.  She gets short of breath with modest exertion.    The following portions of the patient's history were reviewed and updated as appropriate: allergies, current  medications, past family history, past medical history, past social history, past surgical history and problem list.  Review of Systems   Constitution: Positive for weight loss.   HENT: Negative.    Eyes: Negative.    Cardiovascular: Negative.    Respiratory: Positive for cough.    Endocrine: Negative.    Hematologic/Lymphatic: Negative.    Skin: Negative.    Musculoskeletal: Negative.    Gastrointestinal: Positive for bloating, abdominal pain, change in bowel habit, bowel incontinence, diarrhea, nausea and vomiting.   Genitourinary: Negative.    Neurological: Negative.    Psychiatric/Behavioral: Negative.    Allergic/Immunologic: Negative.      Patient Active Problem List   Diagnosis   • Hypertension   • Obesity   • Hyperlipidemia   • Iron deficiency anemia due to chronic blood loss   • Upper GI bleeding   • ST elevation myocardial infarction (STEMI) (CMS/[facility])   • Coronary artery disease involving native heart without angina pectoris   • Morgagni hernia     Past Medical History:   Diagnosis Date   • Anemia    • Asthma    • Breast cyst    • Diabetes mellitus (CMS/[facility])    • Diverticulosis    • H/O: GI bleed     recurrent   • Hyperlipidemia    • Hypertension    • Irritable bowel syndrome    • Melena    • Obesity      Past Surgical History:   Procedure Laterality Date   • BREAST CYST EXCISION     • CARDIAC CATHETERIZATION N/A 11/13/2017    Procedure: Left Heart Cath;  Surgeon: Imer Montaño MD;  Location:  [facility];  Service:    • CARDIAC CATHETERIZATION N/A 11/13/2017    Procedure: Stent ROBERTO coronary;  Surgeon: Imer Montaño MD;  Location:  [facility];  Service:    • COLONOSCOPY  02/11/2016    mary, NBI[facility],    • ENDOSCOPY N/A 3/3/2017    erythematous mucosa in stomach, duodenal ulcer , mild to moderate chronic active gastritis, positive for h pylori   • UPPER GASTROINTESTINAL ENDOSCOPY  02/10/2016    sami-Ruth Ann Willosn M.D.     Family History   Problem Relation Age  of Onset   • Breast cancer Other    • Lung cancer Brother    • Hypertension Brother    • Hyperlipidemia Brother    • Heart disease Brother    • Throat cancer Paternal Uncle    • Tongue cancer Paternal Grandfather    • Hypertension Father    • Hyperlipidemia Father    • Heart disease Father      Social History     Socioeconomic History   • Marital status:      Spouse name: Not on file   • Number of children: Not on file   • Years of education: High School   • Highest education level: Not on file   Occupational History     Employer: RETIRED   Tobacco Use   • Smoking status: Never Smoker   • Smokeless tobacco: Never Used   Substance and Sexual Activity   • Alcohol use: No   • Drug use: No   • Sexual activity: Defer       Current Outpatient Medications:   •  amLODIPine (NORVASC) 5 MG tablet, Take 1 tablet by mouth Daily., Disp: 30 tablet, Rfl: 6  •  aspirin 81 MG EC tablet, Take 81 mg by mouth Daily., Disp: , Rfl:   •  bisoprolol-hydrochlorothiazide (ZIAC) 5-6.25 MG per tablet, Take 1 tablet by mouth Daily., Disp: 30 tablet, Rfl: 6  •  budesonide-formoterol (SYMBICORT) 160-4.5 MCG/ACT inhaler, Inhale 2 puffs 2 (two) times a day., Disp: , Rfl:   •  clopidogrel (PLAVIX) 75 MG tablet, Take 1 tablet by mouth Daily., Disp: 90 tablet, Rfl: 1  •  escitalopram (LEXAPRO) 10 MG tablet, TAKE ONE TABLET BY MOUTH IN THE MORNING                          ...  (REFER TO PRESCRIPTION NOTES)., Disp: , Rfl: 0  •  furosemide (LASIX) 40 MG tablet, Take 1 tablet by mouth Daily., Disp: , Rfl:   •  Montelukast Sodium (SINGULAIR PO), Take 10 mg by mouth daily., Disp: , Rfl:   •  nitroglycerin (NITROSTAT) 0.4 MG SL tablet, 1 under the tongue as needed for angina, may repeat q5mins for up three doses, Disp: 25 tablet, Rfl: 0  •  olmesartan-hydrochlorothiazide (BENICAR HCT) 40-12.5 MG per tablet, Take 1 tablet by mouth Daily., Disp: 30 tablet, Rfl: 6  •  pantoprazole (PROTONIX) 40 MG EC tablet, take 1 tablet by mouth twice a day for 14  days, Disp: , Rfl: 0  •  potassium chloride (MICRO-K) 10 MEQ CR capsule, Take 2 capsules by mouth Daily., Disp: , Rfl:   •  simvastatin (ZOCOR) 20 MG tablet, take 1 tablet by mouth every evening, Disp: 30 tablet, Rfl: 1  No Known Allergies     Objective   Vitals:    07/23/19 1437   BP: (!) 181/78   SpO2: 96%     Physical Exam   Constitutional: She is oriented to person, place, and time. She appears well-developed and well-nourished.   HENT:   Head: Normocephalic.   Eyes: Conjunctivae, EOM and lids are normal. Pupils are equal, round, and reactive to light.   Neck: Trachea normal and normal range of motion. Neck supple. No hepatojugular reflux and no JVD present. Carotid bruit is not present. No thyroid mass and no thyromegaly present.   Cardiovascular: Normal rate, regular rhythm, S1 normal, S2 normal, normal heart sounds and normal pulses.  No extrasystoles are present. PMI is not displaced.   Pulmonary/Chest: Effort normal and breath sounds normal.   Abdominal: Soft. Normal appearance and bowel sounds are normal. She exhibits no mass. There is no hepatosplenomegaly. There is no tenderness. No hernia.   Rounded obese abdomen.  Soft.  Nontender.  No masses or organomegaly.  Good bowel sounds in all quadrants.  No surgical scars.   Musculoskeletal: Normal range of motion.   Neurological: She is alert and oriented to person, place, and time. She has normal strength and normal reflexes. No cranial nerve deficit or sensory deficit. She displays a negative Romberg sign.   Skin: Skin is warm, dry and intact.   Psychiatric: She has a normal mood and affect. Her speech is normal and behavior is normal. Judgment and thought content normal. Cognition and memory are normal.     Independent Review of Radiographic Studies:    CT of the abdomen and pelvis performed May 7, 2019 was independently reviewed.  There is a large anterior diaphragmatic hernia of Morgagni.  There is a large amount of abdominal fat herniated into the right  chest and somewhat into the left chest.  There is a segment of transverse colon within the right chest as well.    Assessment/Plan   Assessment: Large diaphragmatic hernia of Morgagni. Colon  Is up in the right chest.  This will require surgical repair.  Patient is only a few months out from myocardial infarction.  She is markedly obese.  She has hypertension, diabetes, sleep apnea, and asthma.  These all greatly increase her risk of problems postoperatively.  She is trying to lose weight and I have recommended that she continue to lose weight.  I would like to see her under 200 pounds before we consider surgery.  I would also like to give her more time to recover from her myocardial infarction.  I have recommended that she return to see me in 3 months with a repeat CT of the chest without contrast.  I will keep you informed of her progress.      Plan: Return to our office in 3 months with a CT of the chest without contrast.    Diagnoses and all orders for this visit:    Morgagni hernia  -     CT Chest Without Contrast; Future

## 2019-07-24 RX ORDER — OLMESARTAN MEDOXOMIL AND HYDROCHLOROTHIAZIDE 40/12.5 40; 12.5 MG/1; MG/1
1 TABLET ORAL DAILY
Qty: 30 TABLET | Refills: 0 | Status: SHIPPED | OUTPATIENT
Start: 2019-07-24 | End: 2019-11-11 | Stop reason: SDUPTHER

## 2019-08-12 ENCOUNTER — APPOINTMENT (OUTPATIENT)
Dept: CARDIOLOGY | Facility: HOSPITAL | Age: 54
End: 2019-08-12

## 2019-09-16 ENCOUNTER — HOSPITAL ENCOUNTER (OUTPATIENT)
Dept: CARDIOLOGY | Facility: HOSPITAL | Age: 54
Discharge: HOME | End: 2019-09-16
Admitting: INTERNAL MEDICINE

## 2019-09-16 ENCOUNTER — HOSPITAL ENCOUNTER (OUTPATIENT)
Dept: CARDIOLOGY | Facility: HOSPITAL | Age: 54
Discharge: HOME | End: 2019-09-16

## 2019-09-16 VITALS
SYSTOLIC BLOOD PRESSURE: 140 MMHG | HEIGHT: 62 IN | DIASTOLIC BLOOD PRESSURE: 80 MMHG | BODY MASS INDEX: 38.46 KG/M2 | HEART RATE: 57 BPM | WEIGHT: 209 LBS

## 2019-09-16 VITALS — HEART RATE: 57 BPM | DIASTOLIC BLOOD PRESSURE: 80 MMHG | SYSTOLIC BLOOD PRESSURE: 140 MMHG

## 2019-09-16 PROCEDURE — 93306 TTE W/DOPPLER COMPLETE: CPT | Performed by: INTERNAL MEDICINE

## 2019-09-16 PROCEDURE — 93018 CV STRESS TEST I&R ONLY: CPT | Performed by: INTERNAL MEDICINE

## 2019-09-16 PROCEDURE — 93016 CV STRESS TEST SUPVJ ONLY: CPT | Performed by: INTERNAL MEDICINE

## 2019-09-16 PROCEDURE — 93306 TTE W/DOPPLER COMPLETE: CPT

## 2019-09-16 PROCEDURE — 93017 CV STRESS TEST TRACING ONLY: CPT

## 2019-09-17 LAB
BH CV ECHO MEAS - ACS: 2.1 CM
BH CV ECHO MEAS - AO MAX PG (FULL): 3.8 MMHG
BH CV ECHO MEAS - AO MAX PG: 15.2 MMHG
BH CV ECHO MEAS - AO MEAN PG (FULL): 2 MMHG
BH CV ECHO MEAS - AO MEAN PG: 8 MMHG
BH CV ECHO MEAS - AO ROOT AREA (BSA CORRECTED): 1.5
BH CV ECHO MEAS - AO ROOT AREA: 6.6 CM^2
BH CV ECHO MEAS - AO ROOT DIAM: 2.9 CM
BH CV ECHO MEAS - AO V2 MAX: 195 CM/SEC
BH CV ECHO MEAS - AO V2 MEAN: 133 CM/SEC
BH CV ECHO MEAS - AO V2 VTI: 46.3 CM
BH CV ECHO MEAS - AVA(I,A): 2.8 CM^2
BH CV ECHO MEAS - AVA(I,D): 2.8 CM^2
BH CV ECHO MEAS - AVA(V,A): 2.7 CM^2
BH CV ECHO MEAS - AVA(V,D): 2.7 CM^2
BH CV ECHO MEAS - BSA(HAYCOCK): 2.1 M^2
BH CV ECHO MEAS - BSA: 1.9 M^2
BH CV ECHO MEAS - BZI_BMI: 38.2 KILOGRAMS/M^2
BH CV ECHO MEAS - BZI_METRIC_HEIGHT: 157.5 CM
BH CV ECHO MEAS - BZI_METRIC_WEIGHT: 94.8 KG
BH CV ECHO MEAS - EDV(CUBED): 54.9 ML
BH CV ECHO MEAS - EDV(MOD-SP2): 64 ML
BH CV ECHO MEAS - EDV(MOD-SP4): 67 ML
BH CV ECHO MEAS - EDV(TEICH): 62 ML
BH CV ECHO MEAS - EF(CUBED): 68 %
BH CV ECHO MEAS - EF(MOD-BP): 68 %
BH CV ECHO MEAS - EF(MOD-SP2): 71.9 %
BH CV ECHO MEAS - EF(MOD-SP4): 64.2 %
BH CV ECHO MEAS - EF(TEICH): 60.3 %
BH CV ECHO MEAS - ESV(CUBED): 17.6 ML
BH CV ECHO MEAS - ESV(MOD-SP2): 18 ML
BH CV ECHO MEAS - ESV(MOD-SP4): 24 ML
BH CV ECHO MEAS - ESV(TEICH): 24.6 ML
BH CV ECHO MEAS - FS: 31.6 %
BH CV ECHO MEAS - IVS/LVPW: 0.93
BH CV ECHO MEAS - IVSD: 1.4 CM
BH CV ECHO MEAS - LA DIMENSION: 4.2 CM
BH CV ECHO MEAS - LA/AO: 1.4
BH CV ECHO MEAS - LAT PEAK E' VEL: 12.3 CM/SEC
BH CV ECHO MEAS - LV DIASTOLIC VOL/BSA (35-75): 34.4 ML/M^2
BH CV ECHO MEAS - LV MASS(C)D: 205.2 GRAMS
BH CV ECHO MEAS - LV MASS(C)DI: 105.4 GRAMS/M^2
BH CV ECHO MEAS - LV MAX PG: 11.4 MMHG
BH CV ECHO MEAS - LV MEAN PG: 6 MMHG
BH CV ECHO MEAS - LV SYSTOLIC VOL/BSA (12-30): 12.3 ML/M^2
BH CV ECHO MEAS - LV V1 MAX: 169 CM/SEC
BH CV ECHO MEAS - LV V1 MEAN: 111 CM/SEC
BH CV ECHO MEAS - LV V1 VTI: 40.6 CM
BH CV ECHO MEAS - LVIDD: 3.8 CM
BH CV ECHO MEAS - LVIDS: 2.6 CM
BH CV ECHO MEAS - LVLD AP2: 7.5 CM
BH CV ECHO MEAS - LVLD AP4: 7.5 CM
BH CV ECHO MEAS - LVLS AP2: 5.5 CM
BH CV ECHO MEAS - LVLS AP4: 5.9 CM
BH CV ECHO MEAS - LVOT AREA (M): 3.1 CM^2
BH CV ECHO MEAS - LVOT AREA: 3.1 CM^2
BH CV ECHO MEAS - LVOT DIAM: 2 CM
BH CV ECHO MEAS - LVPWD: 1.5 CM
BH CV ECHO MEAS - MED PEAK E' VEL: 9.7 CM/SEC
BH CV ECHO MEAS - MV A DUR: 0.19 SEC
BH CV ECHO MEAS - MV A MAX VEL: 107 CM/SEC
BH CV ECHO MEAS - MV DEC SLOPE: 463 CM/SEC^2
BH CV ECHO MEAS - MV DEC TIME: 0.25 SEC
BH CV ECHO MEAS - MV E MAX VEL: 115 CM/SEC
BH CV ECHO MEAS - MV E/A: 1.1
BH CV ECHO MEAS - MV MAX PG: 5.9 MMHG
BH CV ECHO MEAS - MV MEAN PG: 2 MMHG
BH CV ECHO MEAS - MV P1/2T MAX VEL: 114 CM/SEC
BH CV ECHO MEAS - MV P1/2T: 72.1 MSEC
BH CV ECHO MEAS - MV V2 MAX: 121 CM/SEC
BH CV ECHO MEAS - MV V2 MEAN: 64.1 CM/SEC
BH CV ECHO MEAS - MV V2 VTI: 36.9 CM
BH CV ECHO MEAS - MVA P1/2T LCG: 1.9 CM^2
BH CV ECHO MEAS - MVA(P1/2T): 3.1 CM^2
BH CV ECHO MEAS - MVA(VTI): 3.5 CM^2
BH CV ECHO MEAS - PA ACC TIME: 0.09 SEC
BH CV ECHO MEAS - PA MAX PG (FULL): 2.8 MMHG
BH CV ECHO MEAS - PA MAX PG: 5.2 MMHG
BH CV ECHO MEAS - PA PR(ACCEL): 37.6 MMHG
BH CV ECHO MEAS - PA V2 MAX: 114 CM/SEC
BH CV ECHO MEAS - PULM A REVS DUR: 0.19 SEC
BH CV ECHO MEAS - PULM A REVS VEL: 25.2 CM/SEC
BH CV ECHO MEAS - PULM DIAS VEL: 42.2 CM/SEC
BH CV ECHO MEAS - PULM S/D: 1.3
BH CV ECHO MEAS - PULM SYS VEL: 56.8 CM/SEC
BH CV ECHO MEAS - PVA(V,A): 2.3 CM^2
BH CV ECHO MEAS - PVA(V,D): 2.3 CM^2
BH CV ECHO MEAS - QP/QS: 0.51
BH CV ECHO MEAS - RAP SYSTOLE: 3 MMHG
BH CV ECHO MEAS - RV MAX PG: 2.4 MMHG
BH CV ECHO MEAS - RV MEAN PG: 1 MMHG
BH CV ECHO MEAS - RV V1 MAX: 76.9 CM/SEC
BH CV ECHO MEAS - RV V1 MEAN: 50.9 CM/SEC
BH CV ECHO MEAS - RV V1 VTI: 18.6 CM
BH CV ECHO MEAS - RVOT AREA: 3.5 CM^2
BH CV ECHO MEAS - RVOT DIAM: 2.1 CM
BH CV ECHO MEAS - RVSP: 16.7 MMHG
BH CV ECHO MEAS - SI(AO): 157 ML/M^2
BH CV ECHO MEAS - SI(CUBED): 19.1 ML/M^2
BH CV ECHO MEAS - SI(LVOT): 65.5 ML/M^2
BH CV ECHO MEAS - SI(MOD-SP2): 23.6 ML/M^2
BH CV ECHO MEAS - SI(MOD-SP4): 22.1 ML/M^2
BH CV ECHO MEAS - SI(TEICH): 19.2 ML/M^2
BH CV ECHO MEAS - SV(AO): 305.8 ML
BH CV ECHO MEAS - SV(CUBED): 37.3 ML
BH CV ECHO MEAS - SV(LVOT): 127.5 ML
BH CV ECHO MEAS - SV(MOD-SP2): 46 ML
BH CV ECHO MEAS - SV(MOD-SP4): 43 ML
BH CV ECHO MEAS - SV(RVOT): 64.4 ML
BH CV ECHO MEAS - SV(TEICH): 37.3 ML
BH CV ECHO MEAS - TAPSE (>1.6): 2.1 CM
BH CV ECHO MEAS - TR MAX VEL: 185 CM/SEC
BH CV ECHO MEASUREMENTS AVERAGE E/E' RATIO: 10.45
BH CV XLRA - RV BASE: 2.5 CM
BH CV XLRA - RV LENGTH: 6.1 CM
BH CV XLRA - RV MID: 2.1 CM
BH CV XLRA - TDI S': 10.6 CM/SEC
LEFT ATRIUM VOLUME INDEX: 19 ML/M2
MAXIMAL PREDICTED HEART RATE: 166 BPM
STRESS TARGET HR: 141 BPM

## 2019-09-19 LAB
BH CV STRESS BP STAGE 1: NORMAL
BH CV STRESS BP STAGE 3: NORMAL
BH CV STRESS DURATION MIN STAGE 1: 3
BH CV STRESS DURATION MIN STAGE 2: 3
BH CV STRESS DURATION MIN STAGE 3: 2
BH CV STRESS DURATION SEC STAGE 1: 0
BH CV STRESS DURATION SEC STAGE 2: 0
BH CV STRESS DURATION SEC STAGE 3: 35
BH CV STRESS GRADE STAGE 1: 0
BH CV STRESS GRADE STAGE 2: 5
BH CV STRESS GRADE STAGE 3: 10
BH CV STRESS HR STAGE 1: 78
BH CV STRESS HR STAGE 2: 87
BH CV STRESS HR STAGE 3: 95
BH CV STRESS METS STAGE 1: 2
BH CV STRESS METS STAGE 2: 2.8
BH CV STRESS METS STAGE 3: 3.5
BH CV STRESS PROTOCOL 1: NORMAL
BH CV STRESS RECOVERY BP: NORMAL MMHG
BH CV STRESS RECOVERY HR: 63 BPM
BH CV STRESS SPEED STAGE 1: 1.3
BH CV STRESS SPEED STAGE 2: 1.3
BH CV STRESS SPEED STAGE 3: 1.3
BH CV STRESS STAGE 1: 1
BH CV STRESS STAGE 2: 2
BH CV STRESS STAGE 3: 3
MAXIMAL PREDICTED HEART RATE: 166 BPM
PERCENT MAX PREDICTED HR: 57.83 %
STRESS BASELINE BP: NORMAL MMHG
STRESS BASELINE HR: 55 BPM
STRESS PERCENT HR: 68 %
STRESS POST ESTIMATED WORKLOAD: 3.6 METS
STRESS POST EXERCISE DUR MIN: 8 MIN
STRESS POST EXERCISE DUR SEC: 35 SEC
STRESS POST PEAK BP: NORMAL MMHG
STRESS POST PEAK HR: 96 BPM
STRESS TARGET HR: 141 BPM

## 2019-10-14 ENCOUNTER — HOSPITAL ENCOUNTER (OUTPATIENT)
Dept: CARDIOLOGY | Facility: HOSPITAL | Age: 54
Discharge: HOME | End: 2019-10-14

## 2019-10-14 VITALS
HEART RATE: 47 BPM | WEIGHT: 209 LBS | OXYGEN SATURATION: 95 % | DIASTOLIC BLOOD PRESSURE: 80 MMHG | BODY MASS INDEX: 38.46 KG/M2 | HEIGHT: 62 IN | SYSTOLIC BLOOD PRESSURE: 138 MMHG

## 2019-10-14 DIAGNOSIS — I25.10 CORONARY ARTERY DISEASE INVOLVING NATIVE HEART WITHOUT ANGINA PECTORIS, UNSPECIFIED VESSEL OR LESION TYPE: ICD-10-CM

## 2019-10-14 DIAGNOSIS — R94.39 ABNORMAL STRESS TEST: ICD-10-CM

## 2019-10-14 DIAGNOSIS — R07.89 CHEST DISCOMFORT: ICD-10-CM

## 2019-10-14 LAB
BH CV STRESS BP STAGE 1: NORMAL
BH CV STRESS COMMENTS STAGE 1: NORMAL
BH CV STRESS DOSE REGADENOSON STAGE 1: 0.4
BH CV STRESS DURATION MIN STAGE 1: 2
BH CV STRESS DURATION SEC STAGE 1: 31
BH CV STRESS GRADE STAGE 1: 0
BH CV STRESS HR STAGE 1: 83
BH CV STRESS METS STAGE 1: 1.6
BH CV STRESS PROTOCOL 1: NORMAL
BH CV STRESS RECOVERY BP: NORMAL MMHG
BH CV STRESS RECOVERY HR: 62 BPM
BH CV STRESS RECOVERY O2: 95 %
BH CV STRESS SPEED STAGE 1: 0.9
BH CV STRESS STAGE 1: 1
LV EF NUC BP: 60 %
MAXIMAL PREDICTED HEART RATE: 166 BPM
PERCENT MAX PREDICTED HR: 50 %
STRESS BASELINE BP: NORMAL MMHG
STRESS BASELINE HR: 48 BPM
STRESS O2 SAT REST: 95 %
STRESS PERCENT HR: 59 %
STRESS POST ESTIMATED WORKLOAD: 1.6 METS
STRESS POST EXERCISE DUR MIN: 2 MIN
STRESS POST EXERCISE DUR SEC: 31 SEC
STRESS POST PEAK BP: NORMAL MMHG
STRESS POST PEAK HR: 83 BPM
STRESS TARGET HR: 141 BPM

## 2019-10-14 PROCEDURE — 93018 CV STRESS TEST I&R ONLY: CPT | Performed by: INTERNAL MEDICINE

## 2019-10-14 PROCEDURE — 93017 CV STRESS TEST TRACING ONLY: CPT

## 2019-10-14 PROCEDURE — 78452 HT MUSCLE IMAGE SPECT MULT: CPT | Performed by: INTERNAL MEDICINE

## 2019-10-14 PROCEDURE — 0 TECHNETIUM SESTAMIBI: Performed by: INTERNAL MEDICINE

## 2019-10-14 PROCEDURE — 93016 CV STRESS TEST SUPVJ ONLY: CPT | Performed by: INTERNAL MEDICINE

## 2019-10-14 PROCEDURE — A9500 TC99M SESTAMIBI: HCPCS | Performed by: INTERNAL MEDICINE

## 2019-10-14 PROCEDURE — 78452 HT MUSCLE IMAGE SPECT MULT: CPT

## 2019-10-14 PROCEDURE — 25010000002 REGADENOSON 0.4 MG/5ML SOLUTION: Performed by: INTERNAL MEDICINE

## 2019-10-14 RX ORDER — AMLODIPINE BESYLATE 10 MG/1
10 TABLET ORAL DAILY
Refills: 0 | COMMUNITY
Start: 2019-09-17 | End: 2019-11-11 | Stop reason: SDUPTHER

## 2019-10-14 RX ORDER — FOLIC ACID 1 MG/1
1 TABLET ORAL DAILY
Refills: 0 | COMMUNITY
Start: 2019-09-17

## 2019-10-14 RX ORDER — LOSARTAN POTASSIUM 100 MG/1
100 TABLET ORAL DAILY
Refills: 0 | COMMUNITY
Start: 2019-09-17 | End: 2019-10-14 | Stop reason: ALTCHOICE

## 2019-10-14 RX ORDER — ALBUTEROL SULFATE 90 UG/1
2 AEROSOL, METERED RESPIRATORY (INHALATION) EVERY 4 HOURS PRN
Refills: 6 | COMMUNITY
Start: 2019-07-11

## 2019-10-14 RX ORDER — CHOLECALCIFEROL (VITAMIN D3) 125 MCG
1 CAPSULE ORAL DAILY
Refills: 1 | COMMUNITY
Start: 2019-09-17

## 2019-10-14 RX ADMIN — TECHNETIUM TC 99M SESTAMIBI 1 DOSE: 1 INJECTION INTRAVENOUS at 08:18

## 2019-10-14 RX ADMIN — TECHNETIUM TC 99M SESTAMIBI 1 DOSE: 1 INJECTION INTRAVENOUS at 10:54

## 2019-10-14 RX ADMIN — REGADENOSON 0.4 MG: 0.08 INJECTION, SOLUTION INTRAVENOUS at 10:54

## 2019-11-06 ENCOUNTER — TELEPHONE (OUTPATIENT)
Dept: SURGERY | Facility: CLINIC | Age: 54
End: 2019-11-06

## 2019-11-06 NOTE — TELEPHONE ENCOUNTER
Called patient to inform them of a missed CT visit and to let her know I would have to reschedule both office visit and CT appointment. Patients brother answered and stated she doesn't speak english and he would call back from another number. I believe there may be a lack of understanding due to language barriers.

## 2019-11-11 ENCOUNTER — OFFICE VISIT (OUTPATIENT)
Dept: CARDIOLOGY | Facility: CLINIC | Age: 54
End: 2019-11-11

## 2019-11-11 VITALS
BODY MASS INDEX: 38.23 KG/M2 | HEART RATE: 64 BPM | HEIGHT: 62 IN | DIASTOLIC BLOOD PRESSURE: 84 MMHG | SYSTOLIC BLOOD PRESSURE: 178 MMHG

## 2019-11-11 DIAGNOSIS — E66.09 CLASS 1 OBESITY DUE TO EXCESS CALORIES WITHOUT SERIOUS COMORBIDITY WITH BODY MASS INDEX (BMI) OF 30.0 TO 30.9 IN ADULT: ICD-10-CM

## 2019-11-11 DIAGNOSIS — I10 ESSENTIAL HYPERTENSION: ICD-10-CM

## 2019-11-11 DIAGNOSIS — I25.10 CORONARY ARTERY DISEASE INVOLVING NATIVE HEART WITHOUT ANGINA PECTORIS, UNSPECIFIED VESSEL OR LESION TYPE: Primary | ICD-10-CM

## 2019-11-11 DIAGNOSIS — E78.5 HYPERLIPIDEMIA, UNSPECIFIED HYPERLIPIDEMIA TYPE: ICD-10-CM

## 2019-11-11 PROCEDURE — 99213 OFFICE O/P EST LOW 20 MIN: CPT | Performed by: INTERNAL MEDICINE

## 2019-11-11 RX ORDER — BISOPROLOL FUMARATE AND HYDROCHLOROTHIAZIDE 5; 6.25 MG/1; MG/1
1 TABLET ORAL DAILY
Qty: 30 TABLET | Refills: 6 | Status: SHIPPED | OUTPATIENT
Start: 2019-11-11 | End: 2020-08-17 | Stop reason: SDUPTHER

## 2019-11-11 RX ORDER — CLOPIDOGREL BISULFATE 75 MG/1
75 TABLET ORAL DAILY
Qty: 30 TABLET | Refills: 6 | Status: SHIPPED | OUTPATIENT
Start: 2019-11-11 | End: 2020-04-01

## 2019-11-11 RX ORDER — PANTOPRAZOLE SODIUM 40 MG/1
40 TABLET, DELAYED RELEASE ORAL DAILY
Qty: 30 TABLET | Refills: 6 | Status: SHIPPED | OUTPATIENT
Start: 2019-11-11 | End: 2020-10-26 | Stop reason: SDUPTHER

## 2019-11-11 RX ORDER — OLMESARTAN MEDOXOMIL AND HYDROCHLOROTHIAZIDE 40/12.5 40; 12.5 MG/1; MG/1
1 TABLET ORAL DAILY
Qty: 30 TABLET | Refills: 6 | Status: SHIPPED | OUTPATIENT
Start: 2019-11-11 | End: 2020-08-17 | Stop reason: SDUPTHER

## 2019-11-11 RX ORDER — AMLODIPINE BESYLATE 10 MG/1
10 TABLET ORAL DAILY
Qty: 30 TABLET | Refills: 6 | Status: SHIPPED | OUTPATIENT
Start: 2019-11-11 | End: 2020-08-17 | Stop reason: SDUPTHER

## 2019-11-11 RX ORDER — POTASSIUM CHLORIDE 750 MG/1
CAPSULE, EXTENDED RELEASE ORAL
Qty: 30 CAPSULE | Refills: 6 | Status: SHIPPED | OUTPATIENT
Start: 2019-11-11 | End: 2023-02-21 | Stop reason: SDUPTHER

## 2019-11-11 RX ORDER — NITROGLYCERIN 0.4 MG/1
TABLET SUBLINGUAL
Qty: 25 TABLET | Refills: 3 | Status: SHIPPED | OUTPATIENT
Start: 2019-11-11 | End: 2022-03-21 | Stop reason: SDUPTHER

## 2019-11-11 RX ORDER — FUROSEMIDE 40 MG/1
40 TABLET ORAL DAILY
Qty: 30 TABLET | Refills: 6 | Status: SHIPPED | OUTPATIENT
Start: 2019-11-11 | End: 2023-02-21 | Stop reason: SDUPTHER

## 2019-11-11 RX ORDER — SIMVASTATIN 20 MG
20 TABLET ORAL EVERY EVENING
Qty: 30 TABLET | Refills: 6 | Status: SHIPPED | OUTPATIENT
Start: 2019-11-11 | End: 2021-05-04 | Stop reason: SDUPTHER

## 2019-11-11 NOTE — PROGRESS NOTES
Subjective:        Flaca Hassan is a 54 y.o. female who here for follow up    CC  The follow-up of the coronary artery disease hypertension hyperlipidemia  HPI  54-year-old female with coronary artery disease previous myocardial infarction angioplasty and stent recently underwent stress test and echocardiogram which are normal continues to complaints of shortness of breath,     Problem List Items Addressed This Visit        Cardiovascular and Mediastinum    Hypertension    Hyperlipidemia    Coronary artery disease involving native heart without angina pectoris - Primary       Digestive    Obesity      Interpretation Summary        · Findings consistent with an equivocal ECG stress test.  · Left ventricular ejection fraction is normal (Calculated EF = 60%).  · Myocardial perfusion imaging indicates a normal myocardial perfusion study with no evidence of ischemia.  · Impressions are consistent with a low risk study.  · Small apical ischemia can not be ruled out       .Interpretation Summary     · Mild pulmonic valve regurgitation is present.  · Calculated EF = 68%.  · There is no evidence of pericardial effusion.         The following portions of the patient's history were reviewed and updated as appropriate: allergies, current medications, past family history, past medical history, past social history, past surgical history and problem list.    Past Medical History:   Diagnosis Date   • Anemia    • Asthma    • Breast cyst    • Diabetes mellitus (CMS/HCC)    • Diverticulosis    • H/O: GI bleed     recurrent   • Hyperlipidemia    • Hypertension    • Irritable bowel syndrome    • Melena    • Obesity      reports that she has never smoked. She has never used smokeless tobacco. She reports that she does not drink alcohol or use drugs.   Family History   Problem Relation Age of Onset   • Breast cancer Other    • Lung cancer Brother    • Hypertension Brother    • Hyperlipidemia Brother    • Heart disease Brother    •  "Throat cancer Paternal Uncle    • Tongue cancer Paternal Grandfather    • Hypertension Father    • Hyperlipidemia Father    • Heart disease Father        Review of Systems  Constitutional: No wt loss, fever, fatigue  Gastrointestinal: No nausea, abdominal pain  Behavioral/Psych: No insomnia or anxiety   Cardiovascular shortness of breath  Objective:       Physical Exam  /84 (BP Location: Left arm, Patient Position: Sitting)   Pulse 64   Ht 157.5 cm (62\")   BMI 38.23 kg/m²   General appearance: No acute changes   Neck: Trachea midline; NECK, supple, no thyromegaly or lymphadenopathy   Lungs: Normal size and shape, normal breath sounds, equal distribution of air, no rales and rhonchi   CV: S1-S2 regular, no murmurs, no rub, no gallop   Abdomen: Soft, non-tender; no masses , no abnormal abdominal sounds   Extremities: No deformity , normal color , no peripheral edema   Skin: Normal temperature, turgor and texture; no rash, ulcers          Procedures      Echocardiogram:        Current Outpatient Medications:   •  amLODIPine (NORVASC) 10 MG tablet, Take 10 mg by mouth Daily., Disp: , Rfl: 0  •  amLODIPine (NORVASC) 5 MG tablet, Take 1 tablet by mouth Daily., Disp: 30 tablet, Rfl: 6  •  aspirin 81 MG EC tablet, Take 81 mg by mouth Daily., Disp: , Rfl:   •  bisoprolol-hydrochlorothiazide (ZIAC) 5-6.25 MG per tablet, Take 1 tablet by mouth Daily., Disp: 30 tablet, Rfl: 6  •  BREO ELLIPTA 200-25 MCG/INH inhaler, INL 2 PFS PO TWICE DAILY. RM AFTER U, Disp: , Rfl: 5  •  budesonide-formoterol (SYMBICORT) 160-4.5 MCG/ACT inhaler, Inhale 2 puffs 2 (two) times a day., Disp: , Rfl:   •  Cholecalciferol (VITAMIN D3) 2000 units tablet, Take 1 tablet by mouth Daily., Disp: , Rfl: 1  •  clopidogrel (PLAVIX) 75 MG tablet, Take 1 tablet by mouth Daily., Disp: 90 tablet, Rfl: 1  •  escitalopram (LEXAPRO) 10 MG tablet, TAKE ONE TABLET BY MOUTH IN THE MORNING                          ...  (REFER TO PRESCRIPTION NOTES)., Disp: , " Rfl: 0  •  folic acid (FOLVITE) 1 MG tablet, Take 1,000 mcg by mouth Daily., Disp: , Rfl: 0  •  furosemide (LASIX) 40 MG tablet, Take 1 tablet by mouth Daily., Disp: , Rfl:   •  Montelukast Sodium (SINGULAIR PO), Take 10 mg by mouth daily., Disp: , Rfl:   •  nitroglycerin (NITROSTAT) 0.4 MG SL tablet, 1 under the tongue as needed for angina, may repeat q5mins for up three doses, Disp: 25 tablet, Rfl: 0  •  olmesartan-hydrochlorothiazide (BENICAR HCT) 40-12.5 MG per tablet, TAKE 1 TABLET BY MOUTH DAILY, Disp: 30 tablet, Rfl: 0  •  pantoprazole (PROTONIX) 40 MG EC tablet, take 1 tablet by mouth twice a day for 14 days, Disp: , Rfl: 0  •  potassium chloride (MICRO-K) 10 MEQ CR capsule, Take 2 capsules by mouth Daily., Disp: , Rfl:   •  simvastatin (ZOCOR) 20 MG tablet, take 1 tablet by mouth every evening, Disp: 30 tablet, Rfl: 1  •  VENTOLIN  (90 Base) MCG/ACT inhaler, INL 1 TO 2 PFS PO Q 4 TO 6 H PRN, Disp: , Rfl: 6   Assessment:        Patient Active Problem List   Diagnosis   • Hypertension   • Obesity   • Hyperlipidemia   • Iron deficiency anemia due to chronic blood loss   • Upper GI bleeding   • ST elevation myocardial infarction (STEMI) (CMS/[facility])   • Coronary artery disease involving native heart without angina pectoris   • Morgagni hernia               Plan:            ICD-10-CM ICD-9-CM   1. Coronary artery disease involving native heart without angina pectoris, unspecified vessel or lesion type I25.10 414.01   2. Hyperlipidemia, unspecified hyperlipidemia type E78.5 272.4   3. Essential hypertension I10 401.9   4. Class 1 obesity due to excess calories without serious comorbidity with body mass index (BMI) of 30.0 to 30.9 in adult E66.09 278.00    Z68.30 V85.30     1. Coronary artery disease involving native heart without angina pectoris, unspecified vessel or lesion type  Flaca Hassan with coronary artery disease has no angina pectoris    Risk reduction for the coronary artery disease,  controlling the blood pressure, blood sugar management, cholesterol management, exercise, stress management, and proper compliance with medications and follow-up has been discussed      2. Hyperlipidemia, unspecified hyperlipidemia type  Risk of the hyperlipidemia, importance of the treatment has been explained    Pros and cons of the antilipemic drugs has been explained    Regular blood workup as well as side effects including the liver failure, myelopathy death has been explained          3. Essential hypertension  Blood pressure controlled    4. Class 1 obesity due to excess calories without serious comorbidity with body mass index (BMI) of 30.0 to 30.9 in adult  Significant risk of obesity to CAD, HTN has been explained  Advantages of wt reduction has been explained      COUNSELING:    Flaca Gudino was given to patient for the following topics: diagnostic results, risk factor reductions, impressions, risks and benefits of treatment options and importance of treatment compliance .       SMOKING COUNSELING:    [unfilled]    Dictated using Dragon dictation

## 2020-04-01 RX ORDER — CLOPIDOGREL BISULFATE 75 MG/1
75 TABLET ORAL DAILY
Qty: 90 TABLET | Refills: 1 | Status: SHIPPED | OUTPATIENT
Start: 2020-04-01 | End: 2020-08-17 | Stop reason: SDUPTHER

## 2020-07-10 ENCOUNTER — OFFICE VISIT (OUTPATIENT)
Dept: FAMILY MEDICINE CLINIC | Facility: CLINIC | Age: 55
End: 2020-07-10

## 2020-07-10 VITALS
HEART RATE: 63 BPM | BODY MASS INDEX: 38.02 KG/M2 | OXYGEN SATURATION: 96 % | DIASTOLIC BLOOD PRESSURE: 90 MMHG | TEMPERATURE: 97.8 F | WEIGHT: 206.6 LBS | SYSTOLIC BLOOD PRESSURE: 126 MMHG | HEIGHT: 62 IN

## 2020-07-10 DIAGNOSIS — R10.84 GENERALIZED ABDOMINAL PAIN: Primary | ICD-10-CM

## 2020-07-10 DIAGNOSIS — K44.9 DIAPHRAGMATIC HERNIA WITHOUT OBSTRUCTION AND WITHOUT GANGRENE: ICD-10-CM

## 2020-07-10 DIAGNOSIS — R73.03 PREDIABETES: ICD-10-CM

## 2020-07-10 DIAGNOSIS — R31.9 HEMATURIA, UNSPECIFIED TYPE: ICD-10-CM

## 2020-07-10 DIAGNOSIS — R10.2 PELVIC PAIN: ICD-10-CM

## 2020-07-10 DIAGNOSIS — I10 ESSENTIAL HYPERTENSION: ICD-10-CM

## 2020-07-10 DIAGNOSIS — K62.5 RECTAL BLEEDING: ICD-10-CM

## 2020-07-10 DIAGNOSIS — R30.0 DYSURIA: ICD-10-CM

## 2020-07-10 DIAGNOSIS — E78.5 HYPERLIPIDEMIA, UNSPECIFIED HYPERLIPIDEMIA TYPE: ICD-10-CM

## 2020-07-10 PROCEDURE — 99214 OFFICE O/P EST MOD 30 MIN: CPT | Performed by: INTERNAL MEDICINE

## 2020-07-10 RX ORDER — MONTELUKAST SODIUM 10 MG/1
10 TABLET ORAL NIGHTLY
COMMUNITY
Start: 2020-05-25 | End: 2022-08-04 | Stop reason: SDUPTHER

## 2020-07-10 NOTE — PROGRESS NOTES
Subjective Chief complaint is to establish care but also abdominal pain  Flaca Hassan is a 55 y.o. female.     History of Present Illness Flaca is here today to establish care.  She is experiencing abdominal pain.  This seems to be fairly generalized but at times can localize to the left side of the abdomen.  She is having some rectal bleeding.  She reports that there is some discomfort with bowel movements in the rectal area.  She is also having some dysuria and blood in the urine.  She is on Plavix.  She reports that she is no longer taking the 81 mg aspirin.  I did review a CAT scan of the abdomen pelvis from approximately a year ago.  She does have a diaphragmatic hernia.  She has never done anything to fix this.  She did have an EGD in 2017.  They do report that she had a colonoscopy around the same time but I cannot find that result.  She does have an upcoming appointment with a gastroenterologist.  She does have a cardiologist.  She sees Dr. Montaño.   Past history is remarkable for hypertension and prediabetes.  She also has a prior history of anemia.    The following portions of the patient's history were reviewed and updated as appropriate: allergies, current medications, past family history, past medical history, past social history, past surgical history and problem list.    Review of Systems   Gastrointestinal: Positive for abdominal pain and blood in stool.   Genitourinary: Positive for dysuria, hematuria, pelvic pain and vaginal pain.       Objective   Physical Exam   Constitutional:   She is morbidly obese   HENT:   Head: Normocephalic and atraumatic.   Neck: Carotid bruit is not present. No thyromegaly present.   Cardiovascular: Normal rate, regular rhythm, normal heart sounds and intact distal pulses.   Pulmonary/Chest: Effort normal and breath sounds normal. She has no wheezes. She has no rales.   Abdominal: Soft. Bowel sounds are normal.   Abdomen is diffusely tender with no rebound or  guarding   Musculoskeletal: She exhibits no edema.   Nursing note and vitals reviewed.        Assessment/Plan   Flaca was seen today for establish care and abdominal pain.    Diagnoses and all orders for this visit:    Generalized abdominal pain  -     CBC & Differential    Rectal bleeding  -     CBC & Differential    Hematuria, unspecified type  -     CBC & Differential    Dysuria  -     Urine Culture - Urine, Urine, Clean Catch  -     Urinalysis With Microscopic - Urine, Clean Catch    Diaphragmatic hernia without obstruction and without gangrene    Essential hypertension  -     Comprehensive Metabolic Panel  -     TSH+Free T4    Hyperlipidemia, unspecified hyperlipidemia type  -     Lipid Panel    Pelvic pain  -     Ambulatory Referral to Gynecology    Prediabetes  -     Hemoglobin A1c      Flaca is here today for abdominal pain.  This is fairly generalized.  I am going to check a CBC to see what her white count looks like.  With the bleeding from the urine and rectum I am going to make sure she is not anemic.  We will check a urinalysis and culture.  For her pelvic discomfort I am going to refer her to gynecologist.  I suspect some of her bleeding in the urine may actually be from atrophic vaginal changes and being on Plavix.

## 2020-07-12 LAB
ALBUMIN SERPL-MCNC: 4.3 G/DL (ref 3.8–4.9)
ALBUMIN/GLOB SERPL: 1.2 {RATIO} (ref 1.2–2.2)
ALP SERPL-CCNC: 101 IU/L (ref 39–117)
ALT SERPL-CCNC: 23 IU/L (ref 0–32)
APPEARANCE UR: CLEAR
AST SERPL-CCNC: 17 IU/L (ref 0–40)
BACTERIA #/AREA URNS HPF: ABNORMAL /[HPF]
BACTERIA UR CULT: NORMAL
BACTERIA UR CULT: NORMAL
BASOPHILS # BLD AUTO: 0 X10E3/UL (ref 0–0.2)
BASOPHILS NFR BLD AUTO: 0 %
BILIRUB SERPL-MCNC: 0.6 MG/DL (ref 0–1.2)
BILIRUB UR QL STRIP: NEGATIVE
BUN SERPL-MCNC: 10 MG/DL (ref 6–24)
BUN/CREAT SERPL: 10 (ref 9–23)
CALCIUM SERPL-MCNC: 9.6 MG/DL (ref 8.7–10.2)
CHLORIDE SERPL-SCNC: 97 MMOL/L (ref 96–106)
CHOLEST SERPL-MCNC: 143 MG/DL (ref 100–199)
CO2 SERPL-SCNC: 22 MMOL/L (ref 20–29)
COLOR UR: YELLOW
CREAT SERPL-MCNC: 1 MG/DL (ref 0.57–1)
EOSINOPHIL # BLD AUTO: 0.1 X10E3/UL (ref 0–0.4)
EOSINOPHIL NFR BLD AUTO: 2 %
EPI CELLS #/AREA URNS HPF: ABNORMAL /HPF (ref 0–10)
ERYTHROCYTE [DISTWIDTH] IN BLOOD BY AUTOMATED COUNT: 11.9 % (ref 11.7–15.4)
GLOBULIN SER CALC-MCNC: 3.5 G/DL (ref 1.5–4.5)
GLUCOSE SERPL-MCNC: 124 MG/DL (ref 65–99)
GLUCOSE UR QL: NEGATIVE
HBA1C MFR BLD: 5.9 % (ref 4.8–5.6)
HCT VFR BLD AUTO: 42.7 % (ref 34–46.6)
HDLC SERPL-MCNC: 36 MG/DL
HGB BLD-MCNC: 14.4 G/DL (ref 11.1–15.9)
HGB UR QL STRIP: ABNORMAL
IMM GRANULOCYTES # BLD AUTO: 0 X10E3/UL (ref 0–0.1)
IMM GRANULOCYTES NFR BLD AUTO: 0 %
KETONES UR QL STRIP: NEGATIVE
LDLC SERPL CALC-MCNC: 76 MG/DL (ref 0–99)
LEUKOCYTE ESTERASE UR QL STRIP: ABNORMAL
LYMPHOCYTES # BLD AUTO: 2 X10E3/UL (ref 0.7–3.1)
LYMPHOCYTES NFR BLD AUTO: 21 %
MCH RBC QN AUTO: 31.4 PG (ref 26.6–33)
MCHC RBC AUTO-ENTMCNC: 33.7 G/DL (ref 31.5–35.7)
MCV RBC AUTO: 93 FL (ref 79–97)
MICRO URNS: ABNORMAL
MONOCYTES # BLD AUTO: 0.7 X10E3/UL (ref 0.1–0.9)
MONOCYTES NFR BLD AUTO: 7 %
NEUTROPHILS # BLD AUTO: 6.4 X10E3/UL (ref 1.4–7)
NEUTROPHILS NFR BLD AUTO: 70 %
NITRITE UR QL STRIP: NEGATIVE
PH UR STRIP: 6.5 [PH] (ref 5–7.5)
PLATELET # BLD AUTO: 261 X10E3/UL (ref 150–450)
POTASSIUM SERPL-SCNC: 4 MMOL/L (ref 3.5–5.2)
PROT SERPL-MCNC: 7.8 G/DL (ref 6–8.5)
PROT UR QL STRIP: NEGATIVE
RBC # BLD AUTO: 4.58 X10E6/UL (ref 3.77–5.28)
RBC #/AREA URNS HPF: ABNORMAL /HPF (ref 0–2)
SODIUM SERPL-SCNC: 134 MMOL/L (ref 134–144)
SP GR UR: 1.01 (ref 1–1.03)
T4 FREE SERPL-MCNC: 1.27 NG/DL (ref 0.82–1.77)
TRIGL SERPL-MCNC: 156 MG/DL (ref 0–149)
TSH SERPL DL<=0.005 MIU/L-ACNC: 3.83 UIU/ML (ref 0.45–4.5)
UROBILINOGEN UR STRIP-MCNC: 0.2 MG/DL (ref 0.2–1)
VLDLC SERPL CALC-MCNC: 31 MG/DL (ref 5–40)
WBC # BLD AUTO: 9.2 X10E3/UL (ref 3.4–10.8)
WBC #/AREA URNS HPF: ABNORMAL /HPF (ref 0–5)

## 2020-07-13 ENCOUNTER — TELEPHONE (OUTPATIENT)
Dept: FAMILY MEDICINE CLINIC | Facility: CLINIC | Age: 55
End: 2020-07-13

## 2020-07-13 RX ORDER — SULFAMETHOXAZOLE AND TRIMETHOPRIM 800; 160 MG/1; MG/1
1 TABLET ORAL 2 TIMES DAILY
Qty: 14 TABLET | Refills: 0 | Status: SHIPPED | OUTPATIENT
Start: 2020-07-13 | End: 2020-09-17

## 2020-07-13 NOTE — TELEPHONE ENCOUNTER
PATIENT REQUESTED TO GET THE RESULTS FROM HER LABS SHE HAD DONE 7/10    BEST CALL BACK  [phone]

## 2020-07-28 ENCOUNTER — OFFICE VISIT (OUTPATIENT)
Dept: GASTROENTEROLOGY | Facility: CLINIC | Age: 55
End: 2020-07-28

## 2020-07-28 VITALS — WEIGHT: 206 LBS | HEIGHT: 62 IN | TEMPERATURE: 98.7 F | BODY MASS INDEX: 37.91 KG/M2

## 2020-07-28 DIAGNOSIS — R19.4 ALTERED BOWEL HABITS: Primary | ICD-10-CM

## 2020-07-28 DIAGNOSIS — K64.9 HEMORRHOIDS, UNSPECIFIED HEMORRHOID TYPE: ICD-10-CM

## 2020-07-28 DIAGNOSIS — K92.1 BLOOD IN STOOL: ICD-10-CM

## 2020-07-28 PROCEDURE — 99213 OFFICE O/P EST LOW 20 MIN: CPT | Performed by: INTERNAL MEDICINE

## 2020-07-28 RX ORDER — HYDROCORTISONE ACETATE 25 MG/1
25 SUPPOSITORY RECTAL DAILY PRN
Qty: 30 SUPPOSITORY | Refills: 1 | Status: SHIPPED | OUTPATIENT
Start: 2020-07-28 | End: 2020-11-24 | Stop reason: SDUPTHER

## 2020-07-28 NOTE — PROGRESS NOTES
Subjective   Chief Complaint   Patient presents with   • Abdominal Pain   • Constipation   • Black or Bloody Stool       Flaca Hassan is a  55 y.o. female here for a follow up visit for abdominal pain, constipation and black stool.     History of EGD in 2017 with duodenal ulcer.    She reports lower abdominal pain and blood in urine which resolved with taking antibiotic.  She reports blood in the stool as well which has resolved - she describes the blood as BRB - she is on plavix.  Bowels are erratic - if she eats spicy food she will have loose BMs.  She sometimes has incontinence.  She reports some abdominal pain in the lower abdomen associated with BMs. She denies melena.    She had a colonoscopy 2/16 - she had diverticula and IH.      HPI  Past Medical History:   Diagnosis Date   • Anemia    • Asthma    • Breast cyst    • Diabetes mellitus (CMS/HCC)    • Diverticulosis    • H/O: GI bleed     recurrent   • Hyperlipidemia    • Hypertension    • Irritable bowel syndrome    • Melena    • Obesity      Past Surgical History:   Procedure Laterality Date   • BREAST CYST EXCISION     • CARDIAC CATHETERIZATION N/A 11/13/2017    Procedure: Left Heart Cath;  Surgeon: Imer Montaño MD;  Location:  [facility];  Service:    • CARDIAC CATHETERIZATION N/A 11/13/2017    Procedure: Stent ROBERTO coronary;  Surgeon: Imer Montaño MD;  Location:  [facility];  Service:    • COLONOSCOPY  02/11/2016    mary, VICTOR MANUEL,    • ENDOSCOPY N/A 3/3/2017    erythematous mucosa in stomach, duodenal ulcer , mild to moderate chronic active gastritis, positive for h pylori   • UPPER GASTROINTESTINAL ENDOSCOPY  02/10/2016    normal-Ruth Ann Willson M.D.       Current Outpatient Medications:   •  amLODIPine (NORVASC) 10 MG tablet, Take 1 tablet by mouth Daily., Disp: 30 tablet, Rfl: 6  •  bisoprolol-hydrochlorothiazide (ZIAC) 5-6.25 MG per tablet, Take 1 tablet by mouth Daily., Disp: 30 tablet, Rfl: 6  •   BREO ELLIPTA 200-25 MCG/INH inhaler, INL 2 PFS PO TWICE DAILY. RM AFTER U, Disp: , Rfl: 5  •  budesonide-formoterol (SYMBICORT) 160-4.5 MCG/ACT inhaler, Inhale 2 puffs 2 (two) times a day., Disp: , Rfl:   •  Cholecalciferol (VITAMIN D3) 2000 units tablet, Take 1 tablet by mouth Daily., Disp: , Rfl: 1  •  clopidogrel (PLAVIX) 75 MG tablet, TAKE 1 TABLET BY MOUTH DAILY, Disp: 90 tablet, Rfl: 1  •  escitalopram (LEXAPRO) 10 MG tablet, TAKE ONE TABLET BY MOUTH IN THE MORNING                          ...  (REFER TO PRESCRIPTION NOTES)., Disp: , Rfl: 0  •  folic acid (FOLVITE) 1 MG tablet, Take 1,000 mcg by mouth Daily., Disp: , Rfl: 0  •  furosemide (LASIX) 40 MG tablet, Take 1 tablet by mouth Daily., Disp: 30 tablet, Rfl: 6  •  montelukast (SINGULAIR) 10 MG tablet, Take 10 mg by mouth Daily., Disp: , Rfl:   •  nitroglycerin (NITROSTAT) 0.4 MG SL tablet, 1 under the tongue as needed for angina, may repeat q5mins for up three doses, Disp: 25 tablet, Rfl: 3  •  olmesartan-hydrochlorothiazide (BENICAR HCT) 40-12.5 MG per tablet, Take 1 tablet by mouth Daily., Disp: 30 tablet, Rfl: 6  •  pantoprazole (PROTONIX) 40 MG EC tablet, Take 1 tablet by mouth Daily., Disp: 30 tablet, Rfl: 6  •  potassium chloride (MICRO-K) 10 MEQ CR capsule, 1 DAILY, Disp: 30 capsule, Rfl: 6  •  simvastatin (ZOCOR) 20 MG tablet, Take 1 tablet by mouth Every Evening., Disp: 30 tablet, Rfl: 6  •  sulfamethoxazole-trimethoprim (BACTRIM DS,SEPTRA DS) 800-160 MG per tablet, Take 1 tablet by mouth 2 (Two) Times a Day., Disp: 14 tablet, Rfl: 0  •  VENTOLIN  (90 Base) MCG/ACT inhaler, INL 1 TO 2 PFS PO Q 4 TO 6 H PRN, Disp: , Rfl: 6  •  aspirin 81 MG EC tablet, Take 81 mg by mouth Daily., Disp: , Rfl:   •  hydrocortisone (ANUSOL-HC) 25 MG suppository, Insert 1 suppository into the rectum Daily As Needed for Hemorrhoids (rectal discomfort)., Disp: 30 suppository, Rfl: 1  PRN Meds:.  No Known Allergies  Social History     Socioeconomic History   •  Marital status:      Spouse name: Not on file   • Number of children: Not on file   • Years of education: High School   • Highest education level: Not on file   Occupational History     Employer: RETIRED   Tobacco Use   • Smoking status: Never Smoker   • Smokeless tobacco: Never Used   Substance and Sexual Activity   • Alcohol use: No   • Drug use: No   • Sexual activity: Never     Partners: Male     Birth control/protection: Abstinence     Family History   Problem Relation Age of Onset   • Breast cancer Other    • Lung cancer Brother    • Hypertension Brother    • Hyperlipidemia Brother    • Heart disease Brother    • Throat cancer Paternal Uncle    • Tongue cancer Paternal Grandfather    • Hypertension Father    • Hyperlipidemia Father    • Heart disease Father      Review of Systems   Constitutional: Negative for appetite change and unexpected weight change.   Gastrointestinal: Positive for abdominal pain, blood in stool and diarrhea.   Genitourinary: Positive for hematuria.   All other systems reviewed and are negative.    Vitals:    07/28/20 1455   Temp: 98.7 °F (37.1 °C)         07/28/20  1455   Weight: 93.4 kg (206 lb)       Objective   Physical Exam   Constitutional: She appears well-developed and well-nourished.   HENT:   Head: Normocephalic and atraumatic.   Eyes: No scleral icterus.   Pulmonary/Chest: Effort normal. No respiratory distress.   Abdominal: Soft. She exhibits no distension. There is no tenderness.   Neurological: She is alert.   Skin: Skin is warm and dry.   Psychiatric: She has a normal mood and affect.     No radiology results for the last 7 days    Assessment/Plan   Diagnoses and all orders for this visit:    Altered bowel habits    Blood in stool  -     CBC & Differential    Hemorrhoids, unspecified hemorrhoid type    Other orders  -     hydrocortisone (ANUSOL-HC) 25 MG suppository; Insert 1 suppository into the rectum Daily As Needed for Hemorrhoids (rectal  discomfort).      Plan:  · Recommend benefiber daily to promote bowel regularity - I think her primary issue is constipation - hopefully fiber will improve regularity and bulk to improve control  · continue pantoprazole daily w/h/o pud, need for ongoing asa and plavix  · Check cbc today with reported rectal bleeding - nml 2 weeks ago but she reports bleeding since  · Trial anusol suppositories for rectal bleeding presumed to be hemorrhoidal in nature based on previous c/s

## 2020-07-29 ENCOUNTER — TELEPHONE (OUTPATIENT)
Dept: GASTROENTEROLOGY | Facility: CLINIC | Age: 55
End: 2020-07-29

## 2020-07-29 LAB
BASOPHILS # BLD AUTO: 0.04 10*3/MM3 (ref 0–0.2)
BASOPHILS NFR BLD AUTO: 0.5 % (ref 0–1.5)
EOSINOPHIL # BLD AUTO: 0.12 10*3/MM3 (ref 0–0.4)
EOSINOPHIL NFR BLD AUTO: 1.5 % (ref 0.3–6.2)
ERYTHROCYTE [DISTWIDTH] IN BLOOD BY AUTOMATED COUNT: 11.6 % (ref 12.3–15.4)
HCT VFR BLD AUTO: 40.9 % (ref 34–46.6)
HGB BLD-MCNC: 13.8 G/DL (ref 12–15.9)
IMM GRANULOCYTES # BLD AUTO: 0.04 10*3/MM3 (ref 0–0.05)
IMM GRANULOCYTES NFR BLD AUTO: 0.5 % (ref 0–0.5)
LYMPHOCYTES # BLD AUTO: 1.83 10*3/MM3 (ref 0.7–3.1)
LYMPHOCYTES NFR BLD AUTO: 22.9 % (ref 19.6–45.3)
MCH RBC QN AUTO: 30.6 PG (ref 26.6–33)
MCHC RBC AUTO-ENTMCNC: 33.7 G/DL (ref 31.5–35.7)
MCV RBC AUTO: 90.7 FL (ref 79–97)
MONOCYTES # BLD AUTO: 0.5 10*3/MM3 (ref 0.1–0.9)
MONOCYTES NFR BLD AUTO: 6.3 % (ref 5–12)
NEUTROPHILS # BLD AUTO: 5.47 10*3/MM3 (ref 1.7–7)
NEUTROPHILS NFR BLD AUTO: 68.3 % (ref 42.7–76)
NRBC BLD AUTO-RTO: 0 /100 WBC (ref 0–0.2)
PLATELET # BLD AUTO: 273 10*3/MM3 (ref 140–450)
RBC # BLD AUTO: 4.51 10*6/MM3 (ref 3.77–5.28)
WBC # BLD AUTO: 8 10*3/MM3 (ref 3.4–10.8)

## 2020-07-29 NOTE — TELEPHONE ENCOUNTER
Called pt and spoke with pt's family with her permission. ADvised of Dr Willson's note. Verb understanding and requesting copy of note to be mailed.  Note mailed to home address.

## 2020-07-30 NOTE — TELEPHONE ENCOUNTER
Per [facility]- Anucort HC suppository are not covered w/plan   Unable to obtain a PA  Not on formulary per Heidi Kahn NP and Dr Willson out of office. Message sent to Saundra EWING.

## 2020-07-31 NOTE — TELEPHONE ENCOUNTER
Called pt 's  and advised of Saundra's note. Advised that the samples will be at the  for them to .   He verb understanding.

## 2020-08-12 ENCOUNTER — TELEMEDICINE (OUTPATIENT)
Dept: CARDIOLOGY | Age: 55
End: 2020-08-12

## 2020-08-12 VITALS
BODY MASS INDEX: 37.91 KG/M2 | DIASTOLIC BLOOD PRESSURE: 65 MMHG | WEIGHT: 206 LBS | HEIGHT: 62 IN | SYSTOLIC BLOOD PRESSURE: 139 MMHG | HEART RATE: 65 BPM

## 2020-08-12 DIAGNOSIS — I25.10 CORONARY ARTERY DISEASE INVOLVING NATIVE HEART WITHOUT ANGINA PECTORIS, UNSPECIFIED VESSEL OR LESION TYPE: ICD-10-CM

## 2020-08-12 DIAGNOSIS — E66.09 CLASS 1 OBESITY DUE TO EXCESS CALORIES WITHOUT SERIOUS COMORBIDITY WITH BODY MASS INDEX (BMI) OF 30.0 TO 30.9 IN ADULT: ICD-10-CM

## 2020-08-12 DIAGNOSIS — E78.5 HYPERLIPIDEMIA, UNSPECIFIED HYPERLIPIDEMIA TYPE: ICD-10-CM

## 2020-08-12 DIAGNOSIS — I10 ESSENTIAL HYPERTENSION: Primary | ICD-10-CM

## 2020-08-12 PROCEDURE — 99213 OFFICE O/P EST LOW 20 MIN: CPT | Performed by: INTERNAL MEDICINE

## 2020-08-12 NOTE — PROGRESS NOTES
Subjective:        Flaca Hassan is a 55 y.o. female who here for follow up    CC  Video conferance  HPI  55-year-old female with known history of coronary artery disease,  patient denies any chest pains tightness heaviness of the pressure sensation     Problem List Items Addressed This Visit        Cardiovascular and Mediastinum    Hypertension - Primary    Hyperlipidemia    Coronary artery disease involving native heart without angina pectoris       Digestive    Obesity        .    The following portions of the patient's history were reviewed and updated as appropriate: allergies, current medications, past family history, past medical history, past social history, past surgical history and problem list.    Past Medical History:   Diagnosis Date   • Anemia    • Asthma    • Breast cyst    • Diabetes mellitus (CMS/HCC)    • Diverticulosis    • H/O: GI bleed     recurrent   • Hyperlipidemia    • Hypertension    • Irritable bowel syndrome    • Melena    • Obesity      reports that she has never smoked. She has never used smokeless tobacco. She reports that she does not drink alcohol or use drugs.   Family History   Problem Relation Age of Onset   • Breast cancer Other    • Lung cancer Brother    • Hypertension Brother    • Hyperlipidemia Brother    • Heart disease Brother    • Throat cancer Paternal Uncle    • Tongue cancer Paternal Grandfather    • Hypertension Father    • Hyperlipidemia Father    • Heart disease Father        Review of Systems  Constitutional: No wt loss, fever, fatigue  Gastrointestinal: No nausea, abdominal pain  Behavioral/Psych: No insomnia or anxiety   Cardiovascular no chest pains or tightness in the chest  Objective:       Physical Exam*  Video conf    Procedures      Echocardiogram:        Current Outpatient Medications:   •  amLODIPine (NORVASC) 10 MG tablet, Take 1 tablet by mouth Daily., Disp: 30 tablet, Rfl: 6  •  aspirin 81 MG EC tablet, Take 81 mg by mouth Daily., Disp: , Rfl:   •   bisoprolol-hydrochlorothiazide (ZIAC) 5-6.25 MG per tablet, Take 1 tablet by mouth Daily., Disp: 30 tablet, Rfl: 6  •  BREO ELLIPTA 200-25 MCG/INH inhaler, INL 2 PFS PO TWICE DAILY. RM AFTER U, Disp: , Rfl: 5  •  budesonide-formoterol (SYMBICORT) 160-4.5 MCG/ACT inhaler, Inhale 2 puffs 2 (two) times a day., Disp: , Rfl:   •  Cholecalciferol (VITAMIN D3) 2000 units tablet, Take 1 tablet by mouth Daily., Disp: , Rfl: 1  •  clopidogrel (PLAVIX) 75 MG tablet, TAKE 1 TABLET BY MOUTH DAILY, Disp: 90 tablet, Rfl: 1  •  escitalopram (LEXAPRO) 10 MG tablet, TAKE ONE TABLET BY MOUTH IN THE MORNING                          ...  (REFER TO PRESCRIPTION NOTES)., Disp: , Rfl: 0  •  folic acid (FOLVITE) 1 MG tablet, Take 1,000 mcg by mouth Daily., Disp: , Rfl: 0  •  furosemide (LASIX) 40 MG tablet, Take 1 tablet by mouth Daily., Disp: 30 tablet, Rfl: 6  •  hydrocortisone (ANUSOL-HC) 25 MG suppository, Insert 1 suppository into the rectum Daily As Needed for Hemorrhoids (rectal discomfort)., Disp: 30 suppository, Rfl: 1  •  montelukast (SINGULAIR) 10 MG tablet, Take 10 mg by mouth Daily., Disp: , Rfl:   •  nitroglycerin (NITROSTAT) 0.4 MG SL tablet, 1 under the tongue as needed for angina, may repeat q5mins for up three doses, Disp: 25 tablet, Rfl: 3  •  olmesartan-hydrochlorothiazide (BENICAR HCT) 40-12.5 MG per tablet, Take 1 tablet by mouth Daily., Disp: 30 tablet, Rfl: 6  •  pantoprazole (PROTONIX) 40 MG EC tablet, Take 1 tablet by mouth Daily., Disp: 30 tablet, Rfl: 6  •  potassium chloride (MICRO-K) 10 MEQ CR capsule, 1 DAILY, Disp: 30 capsule, Rfl: 6  •  simvastatin (ZOCOR) 20 MG tablet, Take 1 tablet by mouth Every Evening., Disp: 30 tablet, Rfl: 6  •  sulfamethoxazole-trimethoprim (BACTRIM DS,SEPTRA DS) 800-160 MG per tablet, Take 1 tablet by mouth 2 (Two) Times a Day., Disp: 14 tablet, Rfl: 0  •  VENTOLIN  (90 Base) MCG/ACT inhaler, INL 1 TO 2 PFS PO Q 4 TO 6 H PRN, Disp: , Rfl: 6   Assessment:        Patient Active  Problem List   Diagnosis   • Hypertension   • Obesity   • Hyperlipidemia   • Iron deficiency anemia due to chronic blood loss   • Upper GI bleeding   • ST elevation myocardial infarction (STEMI) (CMS/[facility])   • Coronary artery disease involving native heart without angina pectoris   • Morgagni hernia               Plan:            ICD-10-CM ICD-9-CM   1. Essential hypertension I10 401.9   2. Hyperlipidemia, unspecified hyperlipidemia type E78.5 272.4   3. Class 1 obesity due to excess calories without serious comorbidity with body mass index (BMI) of 30.0 to 30.9 in adult E66.09 278.00    Z68.30 V85.30   4. Coronary artery disease involving native heart without angina pectoris, unspecified vessel or lesion type I25.10 414.01     1. Essential hypertension  Blood pressure under control    2. Hyperlipidemia, unspecified hyperlipidemia type  Continue current treatment    3. Class 1 obesity due to excess calories without serious comorbidity with body mass index (BMI) of 30.0 to 30.9 in adult  Counseling done    4. Coronary artery disease involving native heart without angina pectoris, unspecified vessel or lesion type  No angina pectoris     cv stable see in 6 months  This patient has consented to a telehealth visit via video. The visit was scheduled as a video visit to comply with patient safety concerns in accordance with CDC recommendations.  All vitals recorded within this visit are reported by the patient.  I spent 12 minutes in total including but not limited to the 12 minutes spent in direct conversation with this patient.     COUNSELING:    Flaca Gudino was given to patient for the following topics: diagnostic results, risk factor reductions, impressions, risks and benefits of treatment options and importance of treatment compliance .       SMOKING COUNSELING:    [unfilled]    Dictated using Dragon dictation

## 2020-08-17 RX ORDER — CLOPIDOGREL BISULFATE 75 MG/1
75 TABLET ORAL DAILY
Qty: 90 TABLET | Refills: 1 | Status: SHIPPED | OUTPATIENT
Start: 2020-08-17 | End: 2021-05-18

## 2020-08-17 RX ORDER — OLMESARTAN MEDOXOMIL AND HYDROCHLOROTHIAZIDE 40/12.5 40; 12.5 MG/1; MG/1
1 TABLET ORAL DAILY
Qty: 30 TABLET | Refills: 6 | Status: SHIPPED | OUTPATIENT
Start: 2020-08-17 | End: 2021-09-16 | Stop reason: SDUPTHER

## 2020-08-17 RX ORDER — AMLODIPINE BESYLATE 10 MG/1
10 TABLET ORAL DAILY
Qty: 30 TABLET | Refills: 6 | Status: SHIPPED | OUTPATIENT
Start: 2020-08-17 | End: 2021-05-03

## 2020-08-17 RX ORDER — BISOPROLOL FUMARATE AND HYDROCHLOROTHIAZIDE 5; 6.25 MG/1; MG/1
1 TABLET ORAL DAILY
Qty: 30 TABLET | Refills: 6 | Status: SHIPPED | OUTPATIENT
Start: 2020-08-17 | End: 2021-03-25

## 2020-09-17 ENCOUNTER — OFFICE VISIT (OUTPATIENT)
Dept: FAMILY MEDICINE CLINIC | Facility: CLINIC | Age: 55
End: 2020-09-17

## 2020-09-17 VITALS
WEIGHT: 202 LBS | HEIGHT: 62 IN | HEART RATE: 58 BPM | DIASTOLIC BLOOD PRESSURE: 68 MMHG | SYSTOLIC BLOOD PRESSURE: 122 MMHG | TEMPERATURE: 98.1 F | RESPIRATION RATE: 16 BRPM | BODY MASS INDEX: 37.17 KG/M2

## 2020-09-17 DIAGNOSIS — E78.5 HYPERLIPIDEMIA, UNSPECIFIED HYPERLIPIDEMIA TYPE: ICD-10-CM

## 2020-09-17 DIAGNOSIS — R73.01 IFG (IMPAIRED FASTING GLUCOSE): ICD-10-CM

## 2020-09-17 DIAGNOSIS — I10 ESSENTIAL HYPERTENSION: Primary | ICD-10-CM

## 2020-09-17 DIAGNOSIS — Z01.419 GYNECOLOGIC EXAM NORMAL: ICD-10-CM

## 2020-09-17 DIAGNOSIS — R30.0 DYSURIA: ICD-10-CM

## 2020-09-17 DIAGNOSIS — F51.01 PRIMARY INSOMNIA: ICD-10-CM

## 2020-09-17 PROCEDURE — 99203 OFFICE O/P NEW LOW 30 MIN: CPT | Performed by: FAMILY MEDICINE

## 2020-09-17 RX ORDER — TRAZODONE HYDROCHLORIDE 50 MG/1
TABLET ORAL
Qty: 60 TABLET | Refills: 5 | Status: SHIPPED | OUTPATIENT
Start: 2020-09-17 | End: 2021-04-05

## 2020-09-17 NOTE — PROGRESS NOTES
Subjective   Flaca Hassan is a 55 y.o. female.     CC: Establishment of Care for Medical Management    History of Present Illness     Chief Complaint:   Chief Complaint   Patient presents with   • IFG     NO ENGLISH / NEW PT    • Hypertension     PT DID NOT BRING MEDICATIONS TODAY    • Hyperlipidemia   • Asthma       Flaca Hassan 55 y.o. female who presents today to establish care for Medical Management of the below listed issues and medication refills.  she has a problem list of   Patient Active Problem List   Diagnosis   • Hypertension   • Obesity   • Hyperlipidemia   • Iron deficiency anemia due to chronic blood loss   • Upper GI bleeding   • ST elevation myocardial infarction (STEMI) (CMS/[facility])   • Coronary artery disease involving native heart without angina pectoris   • Morgagni hernia   • IFG (impaired fasting glucose)   • Primary insomnia   .  Since the last visit with her prior MD, she has overall felt well. she sees cardiology and is UTD. She has family here today for language help. Pt has a long h/o insomnia and, although uses a CPAP, has a lot of fatigue issues and difficulty going/staying asleep. she has been compliant with   Current Outpatient Medications:   •  amLODIPine (NORVASC) 10 MG tablet, Take 1 tablet by mouth Daily., Disp: 30 tablet, Rfl: 6  •  aspirin 81 MG EC tablet, Take 81 mg by mouth Daily., Disp: , Rfl:   •  bisoprolol-hydrochlorothiazide (Ziac) 5-6.25 MG per tablet, Take 1 tablet by mouth Daily., Disp: 30 tablet, Rfl: 6  •  BREO ELLIPTA 200-25 MCG/INH inhaler, INL 2 PFS PO TWICE DAILY. RM AFTER U, Disp: , Rfl: 5  •  budesonide-formoterol (SYMBICORT) 160-4.5 MCG/ACT inhaler, Inhale 2 puffs 2 (two) times a day., Disp: , Rfl:   •  Cholecalciferol (VITAMIN D3) 2000 units tablet, Take 1 tablet by mouth Daily., Disp: , Rfl: 1  •  clopidogrel (PLAVIX) 75 MG tablet, Take 1 tablet by mouth Daily., Disp: 90 tablet, Rfl: 1  •  escitalopram (LEXAPRO) 10 MG tablet, TAKE ONE TABLET BY MOUTH  "IN THE MORNING                          ...  (REFER TO PRESCRIPTION NOTES)., Disp: , Rfl: 0  •  folic acid (FOLVITE) 1 MG tablet, Take 1,000 mcg by mouth Daily., Disp: , Rfl: 0  •  furosemide (LASIX) 40 MG tablet, Take 1 tablet by mouth Daily., Disp: 30 tablet, Rfl: 6  •  hydrocortisone (ANUSOL-HC) 25 MG suppository, Insert 1 suppository into the rectum Daily As Needed for Hemorrhoids (rectal discomfort)., Disp: 30 suppository, Rfl: 1  •  montelukast (SINGULAIR) 10 MG tablet, Take 10 mg by mouth Daily., Disp: , Rfl:   •  nitroglycerin (NITROSTAT) 0.4 MG SL tablet, 1 under the tongue as needed for angina, may repeat q5mins for up three doses, Disp: 25 tablet, Rfl: 3  •  olmesartan-hydrochlorothiazide (BENICAR HCT) 40-12.5 MG per tablet, Take 1 tablet by mouth Daily., Disp: 30 tablet, Rfl: 6  •  pantoprazole (PROTONIX) 40 MG EC tablet, Take 1 tablet by mouth Daily., Disp: 30 tablet, Rfl: 6  •  potassium chloride (MICRO-K) 10 MEQ CR capsule, 1 DAILY, Disp: 30 capsule, Rfl: 6  •  simvastatin (ZOCOR) 20 MG tablet, Take 1 tablet by mouth Every Evening., Disp: 30 tablet, Rfl: 6  •  traZODone (DESYREL) 50 MG tablet, Take 1-2 tabs QHS prn sleep, Disp: 60 tablet, Rfl: 5  •  VENTOLIN  (90 Base) MCG/ACT inhaler, INL 1 TO 2 PFS PO Q 4 TO 6 H PRN, Disp: , Rfl: 6.  she denies medication side effects.    All of the other chronic condition(s) listed above are stable w/o issues.    /68   Pulse 58   Temp 98.1 °F (36.7 °C) (Oral)   Resp 16   Ht 157.5 cm (62\")   Wt 91.6 kg (202 lb)   BMI 36.95 kg/m²     Results for orders placed or performed in visit on 07/28/20   CBC & Differential    Specimen: Blood   Result Value Ref Range    WBC 8.00 3.40 - 10.80 10*3/mm3    RBC 4.51 3.77 - 5.28 10*6/mm3    Hemoglobin 13.8 12.0 - 15.9 g/dL    Hematocrit 40.9 34.0 - 46.6 %    MCV 90.7 79.0 - 97.0 fL    MCH 30.6 26.6 - 33.0 pg    MCHC 33.7 31.5 - 35.7 g/dL    RDW 11.6 (L) 12.3 - 15.4 %    Platelets 273 140 - 450 10*3/mm3    " Neutrophil Rel % 68.3 42.7 - 76.0 %    Lymphocyte Rel % 22.9 19.6 - 45.3 %    Monocyte Rel % 6.3 5.0 - 12.0 %    Eosinophil Rel % 1.5 0.3 - 6.2 %    Basophil Rel % 0.5 0.0 - 1.5 %    Neutrophils Absolute 5.47 1.70 - 7.00 10*3/mm3    Lymphocytes Absolute 1.83 0.70 - 3.10 10*3/mm3    Monocytes Absolute 0.50 0.10 - 0.90 10*3/mm3    Eosinophils Absolute 0.12 0.00 - 0.40 10*3/mm3    Basophils Absolute 0.04 0.00 - 0.20 10*3/mm3    Immature Granulocyte Rel % 0.5 0.0 - 0.5 %    Immature Grans Absolute 0.04 0.00 - 0.05 10*3/mm3    nRBC 0.0 0.0 - 0.2 /100 WBC           The following portions of the patient's history were reviewed and updated as appropriate: allergies, current medications, past family history, past medical history, past social history, past surgical history and problem list.    Review of Systems   Constitutional: Negative for activity change, chills and fever.   Respiratory: Negative for cough.    Cardiovascular: Negative for chest pain.   Genitourinary: Positive for dysuria.   Psychiatric/Behavioral: Negative for dysphoric mood.       Objective   Physical Exam  Constitutional:       General: She is not in acute distress.     Appearance: She is well-developed.   Cardiovascular:      Rate and Rhythm: Normal rate and regular rhythm.   Pulmonary:      Effort: Pulmonary effort is normal.      Breath sounds: Normal breath sounds.   Neurological:      Mental Status: She is alert and oriented to person, place, and time.   Psychiatric:         Behavior: Behavior normal.         Thought Content: Thought content normal.     Labs from 7/20 reviewed with visit today.    Assessment/Plan   Flaca was seen today for ifg, hypertension, hyperlipidemia and asthma.    Diagnoses and all orders for this visit:    Essential hypertension    Hyperlipidemia, unspecified hyperlipidemia type    IFG (impaired fasting glucose)    Primary insomnia  -     traZODone (DESYREL) 50 MG tablet; Take 1-2 tabs QHS prn sleep    Gynecologic exam  normal  Comments:  FOR REFERRAL ONLY  Orders:  -     Ambulatory Referral to Gynecology    Dysuria  -     Urinalysis With Culture If Indicated - Urine, Clean Catch

## 2020-10-26 RX ORDER — PANTOPRAZOLE SODIUM 40 MG/1
40 TABLET, DELAYED RELEASE ORAL DAILY
Qty: 30 TABLET | Refills: 6 | Status: SHIPPED | OUTPATIENT
Start: 2020-10-26 | End: 2021-10-25

## 2020-11-16 RX ORDER — SULFAMETHOXAZOLE AND TRIMETHOPRIM 800; 160 MG/1; MG/1
TABLET ORAL
Qty: 14 TABLET | Refills: 0 | OUTPATIENT
Start: 2020-11-16

## 2020-11-18 RX ORDER — SULFAMETHOXAZOLE AND TRIMETHOPRIM 800; 160 MG/1; MG/1
TABLET ORAL
Qty: 14 TABLET | Refills: 0 | OUTPATIENT
Start: 2020-11-18

## 2020-11-24 ENCOUNTER — OFFICE VISIT (OUTPATIENT)
Dept: FAMILY MEDICINE CLINIC | Facility: CLINIC | Age: 55
End: 2020-11-24

## 2020-11-24 VITALS
HEART RATE: 58 BPM | TEMPERATURE: 95.9 F | DIASTOLIC BLOOD PRESSURE: 70 MMHG | HEIGHT: 62 IN | WEIGHT: 202 LBS | BODY MASS INDEX: 37.17 KG/M2 | SYSTOLIC BLOOD PRESSURE: 121 MMHG

## 2020-11-24 DIAGNOSIS — Z23 IMMUNIZATION DUE: ICD-10-CM

## 2020-11-24 DIAGNOSIS — K62.5 RECTAL BLEEDING: ICD-10-CM

## 2020-11-24 DIAGNOSIS — N30.01 ACUTE CYSTITIS WITH HEMATURIA: Primary | ICD-10-CM

## 2020-11-24 DIAGNOSIS — K59.04 CHRONIC IDIOPATHIC CONSTIPATION: ICD-10-CM

## 2020-11-24 LAB
BILIRUB BLD-MCNC: NEGATIVE MG/DL
GLUCOSE UR STRIP-MCNC: NEGATIVE MG/DL
KETONES UR QL: NEGATIVE
LEUKOCYTE EST, POC: ABNORMAL
NITRITE UR-MCNC: NEGATIVE MG/ML
PH UR: 5.5 [PH] (ref 5–8)
PROT UR STRIP-MCNC: NEGATIVE MG/DL
RBC # UR STRIP: ABNORMAL /UL
SP GR UR: 1.01 (ref 1–1.03)
UROBILINOGEN UR QL: NORMAL

## 2020-11-24 PROCEDURE — 90686 IIV4 VACC NO PRSV 0.5 ML IM: CPT | Performed by: FAMILY MEDICINE

## 2020-11-24 PROCEDURE — 81003 URINALYSIS AUTO W/O SCOPE: CPT | Performed by: FAMILY MEDICINE

## 2020-11-24 PROCEDURE — 99214 OFFICE O/P EST MOD 30 MIN: CPT | Performed by: FAMILY MEDICINE

## 2020-11-24 PROCEDURE — 90471 IMMUNIZATION ADMIN: CPT | Performed by: FAMILY MEDICINE

## 2020-11-24 RX ORDER — HYDROCORTISONE ACETATE 25 MG/1
25 SUPPOSITORY RECTAL DAILY PRN
Qty: 30 SUPPOSITORY | Refills: 5 | Status: SHIPPED | OUTPATIENT
Start: 2020-11-24 | End: 2022-05-12

## 2020-11-24 RX ORDER — SENNA PLUS 8.6 MG/1
1 TABLET ORAL DAILY
Qty: 30 TABLET | Refills: 5 | Status: SHIPPED | OUTPATIENT
Start: 2020-11-24 | End: 2023-03-21

## 2020-11-24 RX ORDER — CIPROFLOXACIN 250 MG/1
250 TABLET, FILM COATED ORAL
Qty: 20 TABLET | Refills: 0 | Status: SHIPPED | OUTPATIENT
Start: 2020-11-24 | End: 2020-12-04

## 2020-11-24 NOTE — PROGRESS NOTES
"Subjective   Flaca Hassan is a 55 y.o. female.     CC: Blood in the Urine    History of Present Illness     Pt comes in today reporting some blood in the urine     Pt had a U/A ordered 9/17/20 for some dysuria that was never completed.    Pt also has been seeing Dr Willson (GI), last 7/20, for rectal bleeding/hemorrhodid issues and was given a suppository to use, as c-scope UTD. CBC normal, too.    The following portions of the patient's history were reviewed and updated as appropriate: allergies, current medications, past family history, past medical history, past social history, past surgical history and problem list.    Review of Systems   Constitutional: Negative for activity change, chills and fever.   Respiratory: Negative for cough.    Cardiovascular: Negative for chest pain.   Genitourinary: Positive for hematuria.   Psychiatric/Behavioral: Negative for dysphoric mood.       /70   Pulse 58   Temp 95.9 °F (35.5 °C)   Ht 157.5 cm (62.01\")   Wt 91.6 kg (202 lb)   BMI 36.94 kg/m²     Objective   Physical Exam  Constitutional:       General: She is not in acute distress.     Appearance: She is well-developed.   Pulmonary:      Effort: Pulmonary effort is normal.   Neurological:      Mental Status: She is alert and oriented to person, place, and time.   Psychiatric:         Behavior: Behavior normal.         Thought Content: Thought content normal.     U/A: positive blood and LE    Assessment/Plan   Diagnoses and all orders for this visit:    1. Acute cystitis with hematuria (Primary)  -     ciprofloxacin (Cipro) 250 MG tablet; Take 1 tablet by mouth 2 (Two) Times a Day for 10 days.  Dispense: 20 tablet; Refill: 0  -     POCT urinalysis dipstick, automated    2. Rectal bleeding  -     hydrocortisone (ANUSOL-HC) 25 MG suppository; Insert 1 suppository into the rectum Daily As Needed for Hemorrhoids (rectal discomfort).  Dispense: 30 suppository; Refill: 5    3. Immunization due  -     " Fluarix/Fluzone/Afluria Quad>6 Months    4. Chronic idiopathic constipation  -     senna (Senokot) 8.6 MG tablet; Take 1 tablet by mouth Daily.  Dispense: 30 tablet; Refill: 5    Other orders  -     Cancel: Ambulatory Referral to Gastroenterology    SPent 30 minutes going over PMH and mechanism of UTIs and hemorrhoids with pt and  and all questions answered. Increase in fiber/water and not straining with BM's discussed. Will send to urology if this current UTI returns quickly after the abx is finished.

## 2020-11-25 RX ORDER — SULFAMETHOXAZOLE AND TRIMETHOPRIM 800; 160 MG/1; MG/1
TABLET ORAL
Qty: 14 TABLET | Refills: 0 | OUTPATIENT
Start: 2020-11-25

## 2020-12-17 ENCOUNTER — OFFICE VISIT (OUTPATIENT)
Dept: OBSTETRICS AND GYNECOLOGY | Facility: CLINIC | Age: 55
End: 2020-12-17

## 2020-12-17 VITALS
BODY MASS INDEX: 36.8 KG/M2 | DIASTOLIC BLOOD PRESSURE: 90 MMHG | HEIGHT: 62 IN | SYSTOLIC BLOOD PRESSURE: 150 MMHG | WEIGHT: 200 LBS

## 2020-12-17 DIAGNOSIS — R31.9 HEMATURIA, UNSPECIFIED TYPE: ICD-10-CM

## 2020-12-17 DIAGNOSIS — N95.0 PMB (POSTMENOPAUSAL BLEEDING): ICD-10-CM

## 2020-12-17 DIAGNOSIS — L98.9 SKIN LESION: ICD-10-CM

## 2020-12-17 DIAGNOSIS — N89.8 VAGINAL IRRITATION: Primary | ICD-10-CM

## 2020-12-17 LAB
BILIRUB BLD-MCNC: NEGATIVE MG/DL
CLARITY, POC: CLEAR
COLOR UR: ABNORMAL
GLUCOSE UR STRIP-MCNC: NEGATIVE MG/DL
KETONES UR QL: NEGATIVE
LEUKOCYTE EST, POC: ABNORMAL
NITRITE UR-MCNC: NEGATIVE MG/ML
PH UR: 6 [PH] (ref 5–8)
PROT UR STRIP-MCNC: ABNORMAL MG/DL
RBC # UR STRIP: ABNORMAL /UL
SP GR UR: 1.02 (ref 1–1.03)
UROBILINOGEN UR QL: NORMAL

## 2020-12-17 PROCEDURE — 81002 URINALYSIS NONAUTO W/O SCOPE: CPT | Performed by: NURSE PRACTITIONER

## 2020-12-17 PROCEDURE — 99204 OFFICE O/P NEW MOD 45 MIN: CPT | Performed by: NURSE PRACTITIONER

## 2020-12-17 RX ORDER — SENNOSIDES 8.6 MG
TABLET ORAL
COMMUNITY
Start: 2020-11-24 | End: 2021-01-06

## 2020-12-17 RX ORDER — SULFAMETHOXAZOLE AND TRIMETHOPRIM 800; 160 MG/1; MG/1
1 TABLET ORAL 2 TIMES DAILY
Qty: 14 TABLET | Refills: 0 | Status: SHIPPED | OUTPATIENT
Start: 2020-12-17 | End: 2021-01-06

## 2020-12-17 NOTE — PROGRESS NOTES
No chief complaint on file.       SUBJECTIVE:     Flaca Hassan is a 55 y.o. No obstetric history on file. who presents with c/o blood in her urine and blood in her stool. This is not a new problem, but this is the first time I have seen the pt for this problem. C/o 2 month hx of blood in urine, she was treated with antibiotics which improved symptoms, however symptoms returned soon after. Last dose of antibiotic was  12/12/2020.  She denies vaginal discharge, but has some itching, denies odor. She has abdominal and pelvic pain.  She is a nonsmoker, has never smoked. Treated with cipro 11/24/2020, bactrim 7/13/2020. She has a tissue with a small amount of bright red blood tucked between her legs. She reports noting blood in her underpants for approx 2-3 months. She has not been having periods for the last 4 years.     She currently sees Dr Willson for melena and is current on colonoscopy, states was dx with an ulcer    C/o lower abdominal lesion she first noted yesterday. She denies any injury. This area itches and she is scratching at it during exam.     This is my first time meeting Flaca Hassan  She is new to our practice    Past Medical History:   Diagnosis Date   • Anemia    • Asthma    • Breast cyst    • Diabetes mellitus (CMS/HCC)    • Diverticulosis    • H/O: GI bleed     recurrent   • Hyperlipidemia    • Hypertension    • Irritable bowel syndrome    • Melena    • Obesity       Past Surgical History:   Procedure Laterality Date   • BREAST CYST EXCISION     • CARDIAC CATHETERIZATION N/A 11/13/2017    Procedure: Left Heart Cath;  Surgeon: Imer Montaño MD;  Location:  [facility];  Service:    • CARDIAC CATHETERIZATION N/A 11/13/2017    Procedure: Stent ROBERTO coronary;  Surgeon: Imer Montaño MD;  Location:  [facility];  Service:    • COLONOSCOPY  02/11/2016    mary, VICTOR MANUEL,    • ENDOSCOPY N/A 3/3/2017    erythematous mucosa in stomach, duodenal ulcer ,  "mild to moderate chronic active gastritis, positive for h pylori   • UPPER GASTROINTESTINAL ENDOSCOPY  02/10/2016    sami-Ruth Ann Willson M.D.      Social History     Tobacco Use   • Smoking status: Never Smoker   • Smokeless tobacco: Never Used   Substance Use Topics   • Alcohol use: No   • Drug use: No     OB History   No obstetric history on file.        Review of Systems   Constitutional: Negative for chills, fatigue and fever.   Gastrointestinal: Positive for abdominal pain and blood in stool. Negative for abdominal distention, nausea and vomiting.   Endocrine: Negative for cold intolerance and heat intolerance.   Genitourinary: Positive for dysuria, flank pain, frequency, hematuria, pelvic pain and vaginal bleeding. Negative for difficulty urinating, genital sores, menstrual problem, urgency, vaginal discharge and vaginal pain.   Musculoskeletal: Negative for gait problem.   Skin: Negative for rash.   Neurological: Negative for dizziness and headaches.   Hematological: Does not bruise/bleed easily.   Psychiatric/Behavioral: Negative for behavioral problems.       OBJECTIVE:   Vitals:    12/17/20 1454   BP: 150/90   Weight: 90.7 kg (200 lb)   Height: 157.5 cm (62\")        Physical Exam  Vitals signs and nursing note reviewed.   HENT:      Head: Normocephalic and atraumatic.   Neck:      Musculoskeletal: Normal range of motion.   Cardiovascular:      Rate and Rhythm: Normal rate.   Pulmonary:      Effort: Pulmonary effort is normal.   Abdominal:      General: There is no distension.      Palpations: Abdomen is soft. There is no mass.      Tenderness: There is no abdominal tenderness. There is no guarding.      Hernia: No hernia is present. There is no hernia in the left inguinal area or right inguinal area.   Genitourinary:     General: Normal vulva.      Exam position: Lithotomy position.      Pubic Area: No rash or pubic lice.       Labia:         Right: Tenderness present. No rash, lesion or injury.         " Left: Tenderness present. No rash, lesion or injury.       Comments: Unable to assess urethra, unable to complete pelvic exam. Pt c/o vagina is very tender to touch and repeatedly closes her legs. Attempted to place speculum, but was did not tolerate this. Bright red blood noted on vaginal speculum when removed. I was able to partially complete a bimanual exam, no CMT, no pain with palpation of uterus or ovaries, no palpable masses noted  Musculoskeletal: Normal range of motion.   Lymphadenopathy:      Lower Body: No right inguinal adenopathy. No left inguinal adenopathy.   Skin:     General: Skin is warm and dry.      Findings: Wound (Approx 2cm below umbilicus, there is a 2-3cm ulcerated lesion with skin sloughing off, consistent with a bulla that has ruptured, no drainage, no erythema, no ecchymosis) present.   Neurological:      General: No focal deficit present.      Mental Status: She is alert and oriented to person, place, and time.      Cranial Nerves: No cranial nerve deficit.   Psychiatric:         Mood and Affect: Mood normal.         Behavior: Behavior normal.         Thought Content: Thought content normal.         Judgment: Judgment normal.       ASSESSMENT:   1) PMB  2) Hematuria  3) Abdominal lesion    PLAN:   I have reviewed prior lab results and do not see a completed urine culture. Appears she did not complete. Discussed with pt difficult to determine if there is actual blood in her urine or if this is contaminate from vaginal bleeding  NuSwab collected  Transvaginal u/s ordered, endometrial lining is thickend, needs endometrial biopsy, however she is very intolerant of pelvic exams, so D&C may be our next option. Will have her f/u with MD in the office to further discuss and schedule if indicated  Urine dip in office + for large blood, protein, and moderate leukocytes. Sent for culture  Discussed referral to urology if hematuria persists  Bactrim sent to pharmacy  Abdominal incision consistent  with a burn. Pt does not recall any injury and reports this lesion does not hurt. Encouraged to keep abdominal lesion clean and dry, covered with a clean bandage. Clean with warm soap and water, apply triple antibiotic ointment once a day and to call with any signs of infection.     Follow up: 1 week with MD Angie Henry, APRN  12/17/2020  15:00 EST

## 2020-12-21 LAB
A VAGINAE DNA VAG QL NAA+PROBE: NORMAL SCORE
BVAB2 DNA VAG QL NAA+PROBE: NORMAL SCORE
C ALBICANS DNA VAG QL NAA+PROBE: NEGATIVE
C GLABRATA DNA VAG QL NAA+PROBE: NEGATIVE
MEGA1 DNA VAG QL NAA+PROBE: NORMAL SCORE

## 2020-12-23 ENCOUNTER — TELEPHONE (OUTPATIENT)
Dept: OBSTETRICS AND GYNECOLOGY | Facility: CLINIC | Age: 55
End: 2020-12-23

## 2020-12-23 ENCOUNTER — OFFICE VISIT (OUTPATIENT)
Dept: OBSTETRICS AND GYNECOLOGY | Facility: CLINIC | Age: 55
End: 2020-12-23

## 2020-12-23 VITALS
DIASTOLIC BLOOD PRESSURE: 77 MMHG | BODY MASS INDEX: 36.8 KG/M2 | HEIGHT: 62 IN | WEIGHT: 200 LBS | HEART RATE: 90 BPM | SYSTOLIC BLOOD PRESSURE: 167 MMHG

## 2020-12-23 DIAGNOSIS — N95.0 PMB (POSTMENOPAUSAL BLEEDING): Primary | ICD-10-CM

## 2020-12-23 PROCEDURE — 99214 OFFICE O/P EST MOD 30 MIN: CPT | Performed by: STUDENT IN AN ORGANIZED HEALTH CARE EDUCATION/TRAINING PROGRAM

## 2020-12-23 RX ORDER — MEDROXYPROGESTERONE ACETATE 10 MG/1
10 TABLET ORAL DAILY
Qty: 30 TABLET | Refills: 2 | Status: SHIPPED | OUTPATIENT
Start: 2020-12-23 | End: 2022-05-12

## 2020-12-23 NOTE — TELEPHONE ENCOUNTER
Good afternoon,  Patient nephew called and stated that patient was supposed to get a medication to help her stop bleeding and nothing has been called into their pharmacy.  Patient would like to know what the name of this medication is and when it might be called in if possible.    Please advise,  Thank you      Pharmacy confirmed - [id number] - [place], [place] - [address] AT [facility] [phone] [facility] [phone] FX  
Left general VM to call office  
Detail Level: Zone

## 2020-12-23 NOTE — PROGRESS NOTES
Chief Complaint   Patient presents with   • Gynecologic Exam     Discuss endo thickness on US and postmenopausal bleeding   HPI:  Flaca Hassan is a 55 y.o. F who presents for follow up of postmenopausal bleeding. The patient was recently seen by our Angie Henry NP, for blood in her urine and stool that has been going on for several months now. Upon discussion, her nephew, Ines Posey, reports it has been about 6 months. She was unsure of the source of bleeding and did not know if it was from her urethra, anus, or vagina. She underwent a pelvic exam with Angie Henry NP, but was unable to tolerate most of the exam due to discomfort of the vagina. It was noted that the speculum was covered in blood when removed from the vagina. A pelvic ultrasound was then performed to evaluate for pelvic abnormalities and the patient was unable to tolerate the transvaginal probe. The transabdominal ultrasound did reveal an endometrial thickness of 1.82 cm. She was thus referred today for discussion of surgery to sample the endometrial lining. She reports that she is still bleeding. This has been intermittent over the last 6 months. She underwent menopause 4 years ago.      Can walk up 2 flights of stairs and/or walk four blocks? no    Past Medical History:   Diagnosis Date   • Anemia    • Asthma    • Breast cyst    • Diabetes mellitus (CMS/HCC)    • Diverticulosis    • H/O: GI bleed     recurrent   • Hyperlipidemia    • Hypertension    • Irritable bowel syndrome    • Melena    • Obesity      Past Surgical History:   Procedure Laterality Date   • BREAST CYST EXCISION     • CARDIAC CATHETERIZATION N/A 11/13/2017    Procedure: Left Heart Cath;  Surgeon: Imer Montaño MD;  Location:  [facility];  Service:    • CARDIAC CATHETERIZATION N/A 11/13/2017    Procedure: Stent ROBERTO coronary;  Surgeon: Imer Montaño MD;  Location:  [facility];  Service:    • COLONOSCOPY  02/11/2016     "tics, NBIH,    • ENDOSCOPY N/A 3/3/2017    erythematous mucosa in stomach, duodenal ulcer , mild to moderate chronic active gastritis, positive for h pylori   • UPPER GASTROINTESTINAL ENDOSCOPY  02/10/2016    sami-Ruth Ann Willson M.D.     Social History     Tobacco Use   • Smoking status: Never Smoker   • Smokeless tobacco: Never Used   Substance Use Topics   • Alcohol use: No   • Drug use: No     Family History   Problem Relation Age of Onset   • Breast cancer Other    • Lung cancer Brother    • Hypertension Brother    • Hyperlipidemia Brother    • Heart disease Brother    • Throat cancer Paternal Uncle    • Tongue cancer Paternal Grandfather    • Hypertension Father    • Hyperlipidemia Father    • Heart disease Father        Review of Systems   Constitutional: Negative for chills and fever.   Gastrointestinal: Positive for abdominal pain.   Genitourinary: Positive for vaginal bleeding. Negative for vaginal discharge.       OBJECTIVE:   Vitals:    12/23/20 1403   BP: 167/77   Pulse: 90   Weight: 90.7 kg (200 lb)   Height: 157.5 cm (62\")      Physical Exam  Vitals signs reviewed.   Constitutional:       Appearance: Normal appearance. She is obese.   HENT:      Head: Normocephalic and atraumatic.      Right Ear: External ear normal.      Left Ear: External ear normal.   Eyes:      Extraocular Movements: Extraocular movements intact.      Pupils: Pupils are equal, round, and reactive to light.   Neck:      Musculoskeletal: Normal range of motion and neck supple.   Pulmonary:      Effort: Pulmonary effort is normal. No respiratory distress.   Musculoskeletal: Normal range of motion.         General: No deformity.   Skin:     General: Skin is warm and dry.   Neurological:      General: No focal deficit present.      Mental Status: She is alert and oriented to person, place, and time.   Psychiatric:         Mood and Affect: Mood normal.         Behavior: Behavior normal.         Lab Results   Component Value Date    WBC " 8.00 07/28/2020    HGB 13.8 07/28/2020    HCT 40.9 07/28/2020    MCV 90.7 07/28/2020     07/28/2020        ASSESSMENT:   Postmenopausal bleeding     PLAN:   Reviewed pelvic ultrasound results with the patient and that endometrial sampling needs to be performed to further workup postmenopausal bleeding to rule out malignancy.    Options for treatment were discussed with patient including endometrial biopsy in office versus hysteroscopy, dilatation and curettage, endometrial resection.   After discussing risks and benefits of each alternative, patient would like to proceed with hysteroscopy, dilatation and curettage, and endometrial resection given that she is unable to tolerate procedure in the office.   She is understanding of the risks include but are not limited to bleeding, infection, electrolyte disturbances, pulmonary edema, and damage to surrounding structures such as bowel, bladder, blood vessels, and nerves that may require additional surgery.     She was verbally consented for the procedure and had all questions answered to her apparent satisfaction upon completion of the discussion.    If she were to need blood or a blood transfusion she is accepting of this.  She has the following medical problems of concern: H/o STEMI, Coronary artery disease and hypertension. Patient will need Cardiology clearance prior to proceeding and will need to be off plavix 7 days prior to the procedure if cardiology approves.   Preoperatively, she will need CBC, BMP, COVID-19 testing and lastly cardiology clearance.   Plan to follow up 2 weeks postoperatively to discuss results.     Patient desired her nephew Ines Posey translate for her today and declined .     Thu Blake MD  12/23/2020  21:28 EST

## 2020-12-29 ENCOUNTER — TELEPHONE (OUTPATIENT)
Dept: OBSTETRICS AND GYNECOLOGY | Facility: CLINIC | Age: 55
End: 2020-12-29

## 2020-12-29 PROBLEM — N95.0 PMB (POSTMENOPAUSAL BLEEDING): Status: ACTIVE | Noted: 2020-12-29

## 2020-12-30 ENCOUNTER — TELEPHONE (OUTPATIENT)
Dept: CARDIOLOGY | Facility: CLINIC | Age: 55
End: 2020-12-30

## 2020-12-30 NOTE — TELEPHONE ENCOUNTER
Called s/w Sonja at Dr Blake office [phone].  Pt will need cardiac clearance as well as ok to hold plavix.

## 2020-12-30 NOTE — TELEPHONE ENCOUNTER
See previous message    ----- Message from Tari Melton sent at 12/30/2020  8:04 AM EST -----  Regarding: CARDIAC CLEARANCE FOR DILATATION AND CURETTAGE HYSTEROSCOPY ENDOMETRIAL ABLATION ON 1/8  NUVIA GONZALEZ (BROTHER) [phone]  FAX TO DR JOSELYN CRISOSTOMO [phone]      NEEDS FAXED TODAY TO [employer]     LOOKS LIKE THE OFFICE SENT IN A MSG TO [facility] AND ASKING ABOUT STOPPING PLAVIX

## 2020-12-30 NOTE — TELEPHONE ENCOUNTER
----- Message from Flaca Hassan sent at 2020 11:50 PM EST -----  Regarding: Referral Request  Contact: [phone]  Dr. Guzman,  Greetings!  Reference : Flaca Hassan ( - ).     Clearance Letter for Surgical Procedure.     Flaca  is having some Gynecological issues.  Dr. Thu Blake want to do a hysteroscopy, D&C Procedure. She will need her cardiologist clearance before she undergoes such procedure because of the Plavix she is on.   I request you to please send a Fax  ( Fax No.  [phone]) to Dr. Blake,  clearing  Flaca about the Plavix issue.  DO you want Flaca to stop the Plavix  a day before.      The procedure is scheduled for .     But to reserve the spot, Dr. Blake wants the clearance letter from [employer]  (20).    Appreciate accommodating us  at such a short notice.    Regards,  Eleazar Cortez  [phone]

## 2020-12-31 ENCOUNTER — TRANSCRIBE ORDERS (OUTPATIENT)
Dept: SLEEP MEDICINE | Facility: HOSPITAL | Age: 55
End: 2020-12-31

## 2020-12-31 ENCOUNTER — TELEPHONE (OUTPATIENT)
Dept: OBSTETRICS AND GYNECOLOGY | Facility: CLINIC | Age: 55
End: 2020-12-31

## 2020-12-31 DIAGNOSIS — I10 HYPERTENSION, UNSPECIFIED TYPE: Primary | ICD-10-CM

## 2020-12-31 DIAGNOSIS — Z01.818 PREOPERATIVE CLEARANCE: ICD-10-CM

## 2020-12-31 DIAGNOSIS — I21.3 ST ELEVATION MYOCARDIAL INFARCTION (STEMI), UNSPECIFIED ARTERY (HCC): ICD-10-CM

## 2020-12-31 DIAGNOSIS — Z01.818 OTHER SPECIFIED PRE-OPERATIVE EXAMINATION: Primary | ICD-10-CM

## 2020-12-31 DIAGNOSIS — E78.5 HYPERLIPIDEMIA, UNSPECIFIED HYPERLIPIDEMIA TYPE: ICD-10-CM

## 2020-12-31 NOTE — TELEPHONE ENCOUNTER
Per Dr RAMIREZ cardiolite and echo prior to clearance.    S/w albina Wallace she scheduled testing and office visit on Monday 1/4/20     Called s/w Rea at Dr Blake office and informed.

## 2020-12-31 NOTE — TELEPHONE ENCOUNTER
Dr. Montaño office called and stated that provider does want tests done and patient is scheduled for Monday to have those done.

## 2021-01-02 ENCOUNTER — LAB (OUTPATIENT)
Dept: LAB | Facility: HOSPITAL | Age: 56
End: 2021-01-02

## 2021-01-02 DIAGNOSIS — Z01.818 OTHER SPECIFIED PRE-OPERATIVE EXAMINATION: ICD-10-CM

## 2021-01-02 PROCEDURE — C9803 HOPD COVID-19 SPEC COLLECT: HCPCS

## 2021-01-02 PROCEDURE — U0004 COV-19 TEST NON-CDC HGH THRU: HCPCS

## 2021-01-04 ENCOUNTER — OFFICE VISIT (OUTPATIENT)
Dept: CARDIOLOGY | Facility: CLINIC | Age: 56
End: 2021-01-04

## 2021-01-04 ENCOUNTER — HOSPITAL ENCOUNTER (OUTPATIENT)
Dept: CARDIOLOGY | Facility: HOSPITAL | Age: 56
Discharge: HOME | End: 2021-01-04

## 2021-01-04 ENCOUNTER — HOSPITAL ENCOUNTER (OUTPATIENT)
Dept: CARDIOLOGY | Facility: HOSPITAL | Age: 56
Discharge: HOME | End: 2021-01-04
Admitting: INTERNAL MEDICINE

## 2021-01-04 VITALS
HEART RATE: 60 BPM | BODY MASS INDEX: 36.8 KG/M2 | DIASTOLIC BLOOD PRESSURE: 85 MMHG | WEIGHT: 200 LBS | HEIGHT: 62 IN | SYSTOLIC BLOOD PRESSURE: 172 MMHG

## 2021-01-04 VITALS
SYSTOLIC BLOOD PRESSURE: 142 MMHG | HEIGHT: 62 IN | BODY MASS INDEX: 36.44 KG/M2 | HEART RATE: 57 BPM | WEIGHT: 198 LBS | DIASTOLIC BLOOD PRESSURE: 84 MMHG

## 2021-01-04 VITALS
OXYGEN SATURATION: 99 % | SYSTOLIC BLOOD PRESSURE: 160 MMHG | DIASTOLIC BLOOD PRESSURE: 84 MMHG | RESPIRATION RATE: 18 BRPM | WEIGHT: 200 LBS | BODY MASS INDEX: 36.8 KG/M2 | HEIGHT: 62 IN | HEART RATE: 53 BPM

## 2021-01-04 DIAGNOSIS — I10 HYPERTENSION, UNSPECIFIED TYPE: ICD-10-CM

## 2021-01-04 DIAGNOSIS — E66.09 CLASS 1 OBESITY DUE TO EXCESS CALORIES WITHOUT SERIOUS COMORBIDITY WITH BODY MASS INDEX (BMI) OF 30.0 TO 30.9 IN ADULT: ICD-10-CM

## 2021-01-04 DIAGNOSIS — I21.3 ST ELEVATION MYOCARDIAL INFARCTION (STEMI), UNSPECIFIED ARTERY (HCC): ICD-10-CM

## 2021-01-04 DIAGNOSIS — Z01.818 PREOPERATIVE CLEARANCE: ICD-10-CM

## 2021-01-04 DIAGNOSIS — E78.5 HYPERLIPIDEMIA, UNSPECIFIED HYPERLIPIDEMIA TYPE: ICD-10-CM

## 2021-01-04 DIAGNOSIS — I25.10 CORONARY ARTERY DISEASE INVOLVING NATIVE HEART WITHOUT ANGINA PECTORIS, UNSPECIFIED VESSEL OR LESION TYPE: Primary | ICD-10-CM

## 2021-01-04 DIAGNOSIS — I15.8 OTHER SECONDARY HYPERTENSION: ICD-10-CM

## 2021-01-04 LAB
AORTIC DIMENSIONLESS INDEX: 0.8 (DI)
BH CV ECHO MEAS - ACS: 1.6 CM
BH CV ECHO MEAS - AO MAX PG (FULL): 5.4 MMHG
BH CV ECHO MEAS - AO MAX PG: 13.2 MMHG
BH CV ECHO MEAS - AO MEAN PG (FULL): 2.2 MMHG
BH CV ECHO MEAS - AO MEAN PG: 6.9 MMHG
BH CV ECHO MEAS - AO ROOT AREA (BSA CORRECTED): 1.3
BH CV ECHO MEAS - AO ROOT AREA: 4.9 CM^2
BH CV ECHO MEAS - AO ROOT DIAM: 2.5 CM
BH CV ECHO MEAS - AO V2 MAX: 181.9 CM/SEC
BH CV ECHO MEAS - AO V2 MEAN: 120.7 CM/SEC
BH CV ECHO MEAS - AO V2 VTI: 42.1 CM
BH CV ECHO MEAS - ASC AORTA: 3 CM
BH CV ECHO MEAS - AVA(I,A): 2.1 CM^2
BH CV ECHO MEAS - AVA(I,D): 2.1 CM^2
BH CV ECHO MEAS - AVA(V,A): 2.1 CM^2
BH CV ECHO MEAS - AVA(V,D): 2.1 CM^2
BH CV ECHO MEAS - BSA(HAYCOCK): 2 M^2
BH CV ECHO MEAS - BSA: 1.9 M^2
BH CV ECHO MEAS - BZI_BMI: 36.6 KILOGRAMS/M^2
BH CV ECHO MEAS - BZI_METRIC_HEIGHT: 157.5 CM
BH CV ECHO MEAS - BZI_METRIC_WEIGHT: 90.7 KG
BH CV ECHO MEAS - EDV(CUBED): 67.4 ML
BH CV ECHO MEAS - EDV(MOD-SP2): 42 ML
BH CV ECHO MEAS - EDV(MOD-SP4): 37 ML
BH CV ECHO MEAS - EDV(TEICH): 72.9 ML
BH CV ECHO MEAS - EF(CUBED): 76.3 %
BH CV ECHO MEAS - EF(MOD-BP): 62.9 %
BH CV ECHO MEAS - EF(MOD-SP2): 59.5 %
BH CV ECHO MEAS - EF(MOD-SP4): 64.9 %
BH CV ECHO MEAS - EF(TEICH): 68.8 %
BH CV ECHO MEAS - ESV(CUBED): 16 ML
BH CV ECHO MEAS - ESV(MOD-SP2): 17 ML
BH CV ECHO MEAS - ESV(MOD-SP4): 13 ML
BH CV ECHO MEAS - ESV(TEICH): 22.7 ML
BH CV ECHO MEAS - FS: 38.1 %
BH CV ECHO MEAS - IVS/LVPW: 1
BH CV ECHO MEAS - IVSD: 1.1 CM
BH CV ECHO MEAS - LAT PEAK E' VEL: 14 CM/SEC
BH CV ECHO MEAS - LV DIASTOLIC VOL/BSA (35-75): 19.4 ML/M^2
BH CV ECHO MEAS - LV MASS(C)D: 152.3 GRAMS
BH CV ECHO MEAS - LV MASS(C)DI: 79.7 GRAMS/M^2
BH CV ECHO MEAS - LV MAX PG: 7.8 MMHG
BH CV ECHO MEAS - LV MEAN PG: 4.7 MMHG
BH CV ECHO MEAS - LV SYSTOLIC VOL/BSA (12-30): 6.8 ML/M^2
BH CV ECHO MEAS - LV V1 MAX: 140 CM/SEC
BH CV ECHO MEAS - LV V1 MEAN: 103.1 CM/SEC
BH CV ECHO MEAS - LV V1 VTI: 33.2 CM
BH CV ECHO MEAS - LVIDD: 4.1 CM
BH CV ECHO MEAS - LVIDS: 2.5 CM
BH CV ECHO MEAS - LVLD AP2: 7 CM
BH CV ECHO MEAS - LVLD AP4: 5.9 CM
BH CV ECHO MEAS - LVLS AP2: 6 CM
BH CV ECHO MEAS - LVLS AP4: 5 CM
BH CV ECHO MEAS - LVOT AREA (M): 2.8 CM^2
BH CV ECHO MEAS - LVOT AREA: 2.7 CM^2
BH CV ECHO MEAS - LVOT DIAM: 1.9 CM
BH CV ECHO MEAS - LVPWD: 1.1 CM
BH CV ECHO MEAS - MED PEAK E' VEL: 8.7 CM/SEC
BH CV ECHO MEAS - MV A DUR: 0.14 SEC
BH CV ECHO MEAS - MV A MAX VEL: 112.1 CM/SEC
BH CV ECHO MEAS - MV DEC SLOPE: 408.4 CM/SEC^2
BH CV ECHO MEAS - MV DEC TIME: 198 SEC
BH CV ECHO MEAS - MV E MAX VEL: 112.7 CM/SEC
BH CV ECHO MEAS - MV E/A: 1
BH CV ECHO MEAS - MV MAX PG: 5.6 MMHG
BH CV ECHO MEAS - MV MEAN PG: 2.3 MMHG
BH CV ECHO MEAS - MV P1/2T MAX VEL: 130.3 CM/SEC
BH CV ECHO MEAS - MV P1/2T: 93.4 MSEC
BH CV ECHO MEAS - MV V2 MAX: 118.1 CM/SEC
BH CV ECHO MEAS - MV V2 MEAN: 68.7 CM/SEC
BH CV ECHO MEAS - MV V2 VTI: 38.8 CM
BH CV ECHO MEAS - MVA P1/2T LCG: 1.7 CM^2
BH CV ECHO MEAS - MVA(P1/2T): 2.4 CM^2
BH CV ECHO MEAS - MVA(VTI): 2.3 CM^2
BH CV ECHO MEAS - PA ACC TIME: 0.06 SEC
BH CV ECHO MEAS - PA MAX PG (FULL): 3.4 MMHG
BH CV ECHO MEAS - PA MAX PG: 8.8 MMHG
BH CV ECHO MEAS - PA PR(ACCEL): 52.1 MMHG
BH CV ECHO MEAS - PA V2 MAX: 148 CM/SEC
BH CV ECHO MEAS - PULM A REVS DUR: 0.1 SEC
BH CV ECHO MEAS - PULM A REVS VEL: 31.3 CM/SEC
BH CV ECHO MEAS - PULM DIAS VEL: 43.9 CM/SEC
BH CV ECHO MEAS - PULM S/D: 1.3
BH CV ECHO MEAS - PULM SYS VEL: 58.9 CM/SEC
BH CV ECHO MEAS - RV MAX PG: 5.4 MMHG
BH CV ECHO MEAS - RV MEAN PG: 2.7 MMHG
BH CV ECHO MEAS - RV V1 MAX: 116 CM/SEC
BH CV ECHO MEAS - RV V1 MEAN: 75.4 CM/SEC
BH CV ECHO MEAS - RV V1 VTI: 25.8 CM
BH CV ECHO MEAS - SI(AO): 107.1 ML/M^2
BH CV ECHO MEAS - SI(CUBED): 26.9 ML/M^2
BH CV ECHO MEAS - SI(LVOT): 47 ML/M^2
BH CV ECHO MEAS - SI(MOD-SP2): 13.1 ML/M^2
BH CV ECHO MEAS - SI(MOD-SP4): 12.6 ML/M^2
BH CV ECHO MEAS - SI(TEICH): 26.3 ML/M^2
BH CV ECHO MEAS - SV(AO): 204.8 ML
BH CV ECHO MEAS - SV(CUBED): 51.5 ML
BH CV ECHO MEAS - SV(LVOT): 89.8 ML
BH CV ECHO MEAS - SV(MOD-SP2): 25 ML
BH CV ECHO MEAS - SV(MOD-SP4): 24 ML
BH CV ECHO MEAS - SV(TEICH): 50.2 ML
BH CV ECHO MEAS - TAPSE (>1.6): 2.3 CM
BH CV ECHO MEASUREMENTS AVERAGE E/E' RATIO: 9.93
BH CV STRESS BP STAGE 1: NORMAL
BH CV STRESS COMMENTS STAGE 1: NORMAL
BH CV STRESS DOSE REGADENOSON STAGE 1: 0.4
BH CV STRESS DURATION MIN STAGE 1: 1
BH CV STRESS DURATION SEC STAGE 1: 0
BH CV STRESS HR STAGE 1: 88
BH CV STRESS O2 STAGE 1: 99
BH CV STRESS PROTOCOL 1: NORMAL
BH CV STRESS RECOVERY BP: NORMAL MMHG
BH CV STRESS RECOVERY HR: 61 BPM
BH CV STRESS RECOVERY O2: 99 %
BH CV STRESS STAGE 1: 1
BH CV XLRA - RV BASE: 2.3 CM
BH CV XLRA - RV LENGTH: 6.1 CM
BH CV XLRA - RV MID: 2 CM
BH CV XLRA - TDI S': 14.5 CM/SEC
LEFT ATRIUM VOLUME INDEX: 19 ML/M2
LV EF NUC BP: 60 %
MAXIMAL PREDICTED HEART RATE: 165 BPM
MAXIMAL PREDICTED HEART RATE: 165 BPM
PERCENT MAX PREDICTED HR: 53.33 %
SARS-COV-2 RNA RESP QL NAA+PROBE: NOT DETECTED
STRESS BASELINE BP: NORMAL MMHG
STRESS BASELINE HR: 54 BPM
STRESS O2 SAT REST: 99 %
STRESS PERCENT HR: 63 %
STRESS POST ESTIMATED WORKLOAD: 1 METS
STRESS POST EXERCISE DUR MIN: 1 MIN
STRESS POST EXERCISE DUR SEC: 0 SEC
STRESS POST O2 SAT PEAK: 99 %
STRESS POST PEAK BP: NORMAL MMHG
STRESS POST PEAK HR: 88 BPM
STRESS TARGET HR: 140 BPM
STRESS TARGET HR: 140 BPM

## 2021-01-04 PROCEDURE — 0 TECHNETIUM SESTAMIBI: Performed by: INTERNAL MEDICINE

## 2021-01-04 PROCEDURE — 93016 CV STRESS TEST SUPVJ ONLY: CPT | Performed by: INTERNAL MEDICINE

## 2021-01-04 PROCEDURE — 78452 HT MUSCLE IMAGE SPECT MULT: CPT | Performed by: INTERNAL MEDICINE

## 2021-01-04 PROCEDURE — 93018 CV STRESS TEST I&R ONLY: CPT | Performed by: INTERNAL MEDICINE

## 2021-01-04 PROCEDURE — 78452 HT MUSCLE IMAGE SPECT MULT: CPT

## 2021-01-04 PROCEDURE — 99213 OFFICE O/P EST LOW 20 MIN: CPT | Performed by: INTERNAL MEDICINE

## 2021-01-04 PROCEDURE — 93306 TTE W/DOPPLER COMPLETE: CPT | Performed by: INTERNAL MEDICINE

## 2021-01-04 PROCEDURE — A9500 TC99M SESTAMIBI: HCPCS | Performed by: INTERNAL MEDICINE

## 2021-01-04 PROCEDURE — 93306 TTE W/DOPPLER COMPLETE: CPT

## 2021-01-04 PROCEDURE — 93017 CV STRESS TEST TRACING ONLY: CPT

## 2021-01-04 PROCEDURE — 25010000002 REGADENOSON 0.4 MG/5ML SOLUTION: Performed by: INTERNAL MEDICINE

## 2021-01-04 RX ORDER — AMINOPHYLLINE DIHYDRATE 25 MG/ML
125 INJECTION, SOLUTION INTRAVENOUS ONCE
Status: DISCONTINUED | OUTPATIENT
Start: 2021-01-04 | End: 2021-01-05 | Stop reason: HOSPADM

## 2021-01-04 RX ADMIN — TECHNETIUM TC 99M SESTAMIBI 1 DOSE: 1 INJECTION INTRAVENOUS at 11:30

## 2021-01-04 RX ADMIN — REGADENOSON 0.4 MG: 0.08 INJECTION, SOLUTION INTRAVENOUS at 11:30

## 2021-01-04 RX ADMIN — TECHNETIUM TC 99M SESTAMIBI 1 DOSE: 1 INJECTION INTRAVENOUS at 08:20

## 2021-01-04 NOTE — PROGRESS NOTES
CLEARANCE FOR D&C   Subjective:        Flaca Hassan is a 55 y.o. female who here for follow up    CC  SURG CLEARANCE for GYN surgery  HPI  55-year-old female with known history of coronary artery disease, benign essential arterial hypertension, hyperlipidemia and obesity here for the surgical clearance underwent the stress test and echocardiogram     Problems Addressed this Visit        Other    Hypertension    Obesity    Hyperlipidemia    Coronary artery disease involving native heart without angina pectoris - Primary      Diagnoses       Codes Comments    Coronary artery disease involving native heart without angina pectoris, unspecified vessel or lesion type    -  Primary ICD-10-CM: I25.10  ICD-9-CM: 414.01     Hyperlipidemia, unspecified hyperlipidemia type     ICD-10-CM: E78.5  ICD-9-CM: 272.4     Other secondary hypertension     ICD-10-CM: I15.8  ICD-9-CM: 405.99     Class 1 obesity due to excess calories without serious comorbidity with body mass index (BMI) of 30.0 to 30.9 in adult     ICD-10-CM: E66.09, Z68.30  ICD-9-CM: 278.00, V85.30         .    The following portions of the patient's history were reviewed and updated as appropriate: allergies, current medications, past family history, past medical history, past social history, past surgical history and problem list.    Past Medical History:   Diagnosis Date   • Anemia    • Asthma    • Breast cyst    • Diabetes mellitus (CMS/HCC)    • Diverticulosis    • H/O: GI bleed     recurrent   • Hyperlipidemia    • Hypertension    • Irritable bowel syndrome    • Melena    • Obesity      reports that she has never smoked. She has never used smokeless tobacco. She reports that she does not drink alcohol or use drugs.   Family History   Problem Relation Age of Onset   • Breast cancer Other    • Lung cancer Brother    • Hypertension Brother    • Hyperlipidemia Brother    • Heart disease Brother    • Throat cancer Paternal Uncle    • Tongue cancer Paternal  "Grandfather    • Hypertension Father    • Hyperlipidemia Father    • Heart disease Father        Review of Systems  Constitutional: No wt loss, fever, fatigue  Gastrointestinal: No nausea, abdominal pain  Behavioral/Psych: No insomnia or anxiety   Cardiovascular no chest pains or tightness in the chest  Objective:       Physical Exam    /84   Pulse 57   Ht 157.5 cm (62\")   Wt 89.8 kg (198 lb)   LMP  (LMP Unknown)   BMI 36.21 kg/m²   General appearance: No acute changes   Neck: Trachea midline; NECK, supple, no thyromegaly or lymphadenopathy   Lungs: Normal size and shape, normal breath sounds, equal distribution of air, no rales and rhonchi   CV: S1-S2 regular, no murmurs, no rub, no gallop   Abdomen: Soft, non-tender; no masses , no abnormal abdominal sounds   Extremities: No deformity , normal color , no peripheral edema   Skin: Normal temperature, turgor and texture; no rash, ulcers          Procedures      Echocardiogram:        Current Outpatient Medications:   •  amLODIPine (NORVASC) 10 MG tablet, Take 1 tablet by mouth Daily., Disp: 30 tablet, Rfl: 6  •  aspirin 81 MG EC tablet, Take 81 mg by mouth Daily., Disp: , Rfl:   •  bisoprolol-hydrochlorothiazide (Ziac) 5-6.25 MG per tablet, Take 1 tablet by mouth Daily., Disp: 30 tablet, Rfl: 6  •  BREO ELLIPTA 200-25 MCG/INH inhaler, INL 2 PFS PO TWICE DAILY. RM AFTER U, Disp: , Rfl: 5  •  budesonide-formoterol (SYMBICORT) 160-4.5 MCG/ACT inhaler, Inhale 2 puffs 2 (two) times a day., Disp: , Rfl:   •  Cholecalciferol (VITAMIN D3) 2000 units tablet, Take 1 tablet by mouth Daily., Disp: , Rfl: 1  •  clopidogrel (PLAVIX) 75 MG tablet, Take 1 tablet by mouth Daily., Disp: 90 tablet, Rfl: 1  •  escitalopram (LEXAPRO) 10 MG tablet, TAKE ONE TABLET BY MOUTH IN THE MORNING                          ...  (REFER TO PRESCRIPTION NOTES)., Disp: , Rfl: 0  •  folic acid (FOLVITE) 1 MG tablet, Take 1,000 mcg by mouth Daily., Disp: , Rfl: 0  •  furosemide (LASIX) 40 MG " tablet, Take 1 tablet by mouth Daily., Disp: 30 tablet, Rfl: 6  •  hydrocortisone (ANUSOL-HC) 25 MG suppository, Insert 1 suppository into the rectum Daily As Needed for Hemorrhoids (rectal discomfort)., Disp: 30 suppository, Rfl: 5  •  medroxyPROGESTERone (Provera) 10 MG tablet, Take 1 tablet by mouth Daily., Disp: 30 tablet, Rfl: 2  •  montelukast (SINGULAIR) 10 MG tablet, Take 10 mg by mouth Daily., Disp: , Rfl:   •  olmesartan-hydrochlorothiazide (BENICAR HCT) 40-12.5 MG per tablet, Take 1 tablet by mouth Daily., Disp: 30 tablet, Rfl: 6  •  pantoprazole (PROTONIX) 40 MG EC tablet, Take 1 tablet by mouth Daily., Disp: 30 tablet, Rfl: 6  •  potassium chloride (MICRO-K) 10 MEQ CR capsule, 1 DAILY, Disp: 30 capsule, Rfl: 6  •  Proctozone-HC 2.5 % rectal cream, ALVA AA ON RECTUM DAILY PRF HEMORRHOIDS, Disp: , Rfl:   •  senna (Senokot) 8.6 MG tablet, Take 1 tablet by mouth Daily., Disp: 30 tablet, Rfl: 5  •  senna 8.6 MG tablet, TK 1 T PO ONCE D, Disp: , Rfl:   •  simvastatin (ZOCOR) 20 MG tablet, Take 1 tablet by mouth Every Evening., Disp: 30 tablet, Rfl: 6  •  sulfamethoxazole-trimethoprim (Bactrim DS) 800-160 MG per tablet, Take 1 tablet by mouth 2 (Two) Times a Day., Disp: 14 tablet, Rfl: 0  •  traZODone (DESYREL) 50 MG tablet, Take 1-2 tabs QHS prn sleep, Disp: 60 tablet, Rfl: 5  •  VENTOLIN  (90 Base) MCG/ACT inhaler, INL 1 TO 2 PFS PO Q 4 TO 6 H PRN, Disp: , Rfl: 6  •  nitroglycerin (NITROSTAT) 0.4 MG SL tablet, 1 under the tongue as needed for angina, may repeat q5mins for up three doses, Disp: 25 tablet, Rfl: 3  No current facility-administered medications for this visit.     Facility-Administered Medications Ordered in Other Visits:   •  aminophylline injection 125 mg, 125 mg, Intravenous, Once, Imer Motnaño MD   Assessment:        Patient Active Problem List   Diagnosis   • Hypertension   • Obesity   • Hyperlipidemia   • Iron deficiency anemia due to chronic blood loss   • Upper GI bleeding    • ST elevation myocardial infarction (STEMI) (CMS/[facility])   • Coronary artery disease involving native heart without angina pectoris   • Morgagni hernia   • IFG (impaired fasting glucose)   • Primary insomnia   • PMB (postmenopausal bleeding)               Plan:            ICD-10-CM ICD-9-CM   1. Coronary artery disease involving native heart without angina pectoris, unspecified vessel or lesion type  I25.10 414.01   2. Hyperlipidemia, unspecified hyperlipidemia type  E78.5 272.4   3. Other secondary hypertension  I15.8 405.99   4. Class 1 obesity due to excess calories without serious comorbidity with body mass index (BMI) of 30.0 to 30.9 in adult  E66.09 278.00    Z68.30 V85.30     1. Coronary artery disease involving native heart without angina pectoris, unspecified vessel or lesion type  Stress test is positive but it is a very small area of ischemia as well as patient has no chest pains will be treated medically    2. Hyperlipidemia, unspecified hyperlipidemia type  Continue current treatment    3. Other secondary hypertension  Blood pressure under control    4. Class 1 obesity due to excess calories without serious comorbidity with body mass index (BMI) of 30.0 to 30.9 in adult  Counseling has been done       STRESS TEST POSITIVE BUT NO CP    TREAT MEDICALLY  STOP PLAVIX  5 DAYS    6 MONTHS    HAVE NOT TAKEN ASA    Flaca Fountainammed seen and examined with no clinical signs of angina or chf, pt is cleared for surgery with non modifiable risk factors.  Flaca Hassan has been advised to take cardiac meds with sip of water on the day of surgery.    Please use beta blocker for tachycardia perioperatively    Anticoagulation to be managed appropriately    Watch for chest pain, shortness of breath, palpitations, arrhythmias, and significant change in the blood pressure perioperatively,     Please check EKG preop and postop if any questions, notify us if any change in patient's cardiovascular  conditions    COUNSELING:    Flaca Gudino was given to patient for the following topics: diagnostic results, risk factor reductions, impressions, risks and benefits of treatment options and importance of treatment compliance .       SMOKING COUNSELING:    [unfilled]    Dictated using Dragon dictation

## 2021-01-05 ENCOUNTER — PREP FOR SURGERY (OUTPATIENT)
Dept: OTHER | Facility: HOSPITAL | Age: 56
End: 2021-01-05

## 2021-01-05 DIAGNOSIS — N95.0 PMB (POSTMENOPAUSAL BLEEDING): Primary | ICD-10-CM

## 2021-01-06 ENCOUNTER — APPOINTMENT (OUTPATIENT)
Dept: PREADMISSION TESTING | Facility: HOSPITAL | Age: 56
End: 2021-01-06

## 2021-01-06 ENCOUNTER — PROCEDURE VISIT (OUTPATIENT)
Dept: OBSTETRICS AND GYNECOLOGY | Facility: CLINIC | Age: 56
End: 2021-01-06

## 2021-01-06 ENCOUNTER — APPOINTMENT (OUTPATIENT)
Dept: WOMENS IMAGING | Facility: HOSPITAL | Age: 56
End: 2021-01-06

## 2021-01-06 VITALS
HEART RATE: 63 BPM | RESPIRATION RATE: 20 BRPM | OXYGEN SATURATION: 98 % | SYSTOLIC BLOOD PRESSURE: 164 MMHG | DIASTOLIC BLOOD PRESSURE: 89 MMHG | BODY MASS INDEX: 36.8 KG/M2 | TEMPERATURE: 98.3 F | WEIGHT: 200 LBS | HEIGHT: 62 IN

## 2021-01-06 DIAGNOSIS — N95.0 PMB (POSTMENOPAUSAL BLEEDING): ICD-10-CM

## 2021-01-06 DIAGNOSIS — Z12.31 VISIT FOR SCREENING MAMMOGRAM: Primary | ICD-10-CM

## 2021-01-06 LAB
ANION GAP SERPL CALCULATED.3IONS-SCNC: 11.2 MMOL/L (ref 5–15)
BASOPHILS # BLD AUTO: 0.07 10*3/MM3 (ref 0–0.2)
BASOPHILS NFR BLD AUTO: 0.6 % (ref 0–1.5)
BUN SERPL-MCNC: 18 MG/DL (ref 6–20)
BUN/CREAT SERPL: 17.1 (ref 7–25)
CALCIUM SPEC-SCNC: 8.6 MG/DL (ref 8.6–10.5)
CHLORIDE SERPL-SCNC: 103 MMOL/L (ref 98–107)
CO2 SERPL-SCNC: 19.8 MMOL/L (ref 22–29)
CREAT SERPL-MCNC: 1.05 MG/DL (ref 0.57–1)
DEPRECATED RDW RBC AUTO: 39.9 FL (ref 37–54)
EOSINOPHIL # BLD AUTO: 0.16 10*3/MM3 (ref 0–0.4)
EOSINOPHIL NFR BLD AUTO: 1.4 % (ref 0.3–6.2)
ERYTHROCYTE [DISTWIDTH] IN BLOOD BY AUTOMATED COUNT: 11.9 % (ref 12.3–15.4)
GFR SERPL CREATININE-BSD FRML MDRD: 54 ML/MIN/1.73
GFR SERPL CREATININE-BSD FRML MDRD: 66 ML/MIN/1.73
GLUCOSE SERPL-MCNC: 112 MG/DL (ref 65–99)
HCT VFR BLD AUTO: 36.7 % (ref 34–46.6)
HGB BLD-MCNC: 12.2 G/DL (ref 12–15.9)
IMM GRANULOCYTES # BLD AUTO: 0.04 10*3/MM3 (ref 0–0.05)
IMM GRANULOCYTES NFR BLD AUTO: 0.4 % (ref 0–0.5)
LYMPHOCYTES # BLD AUTO: 2.08 10*3/MM3 (ref 0.7–3.1)
LYMPHOCYTES NFR BLD AUTO: 18.7 % (ref 19.6–45.3)
MCH RBC QN AUTO: 30.8 PG (ref 26.6–33)
MCHC RBC AUTO-ENTMCNC: 33.2 G/DL (ref 31.5–35.7)
MCV RBC AUTO: 92.7 FL (ref 79–97)
MONOCYTES # BLD AUTO: 0.87 10*3/MM3 (ref 0.1–0.9)
MONOCYTES NFR BLD AUTO: 7.8 % (ref 5–12)
NEUTROPHILS NFR BLD AUTO: 7.91 10*3/MM3 (ref 1.7–7)
NEUTROPHILS NFR BLD AUTO: 71.1 % (ref 42.7–76)
NRBC BLD AUTO-RTO: 0 /100 WBC (ref 0–0.2)
PLATELET # BLD AUTO: 266 10*3/MM3 (ref 140–450)
PMV BLD AUTO: 11.1 FL (ref 6–12)
POTASSIUM SERPL-SCNC: 4.1 MMOL/L (ref 3.5–5.2)
QT INTERVAL: 401 MS
RBC # BLD AUTO: 3.96 10*6/MM3 (ref 3.77–5.28)
SODIUM SERPL-SCNC: 134 MMOL/L (ref 136–145)
WBC # BLD AUTO: 11.13 10*3/MM3 (ref 3.4–10.8)

## 2021-01-06 PROCEDURE — U0004 COV-19 TEST NON-CDC HGH THRU: HCPCS | Performed by: NURSE PRACTITIONER

## 2021-01-06 PROCEDURE — 93010 ELECTROCARDIOGRAM REPORT: CPT | Performed by: INTERNAL MEDICINE

## 2021-01-06 PROCEDURE — 85025 COMPLETE CBC W/AUTO DIFF WBC: CPT

## 2021-01-06 PROCEDURE — 77067 SCR MAMMO BI INCL CAD: CPT | Performed by: RADIOLOGY

## 2021-01-06 PROCEDURE — 77063 BREAST TOMOSYNTHESIS BI: CPT | Performed by: STUDENT IN AN ORGANIZED HEALTH CARE EDUCATION/TRAINING PROGRAM

## 2021-01-06 PROCEDURE — 80048 BASIC METABOLIC PNL TOTAL CA: CPT

## 2021-01-06 PROCEDURE — 77063 BREAST TOMOSYNTHESIS BI: CPT | Performed by: RADIOLOGY

## 2021-01-06 PROCEDURE — C9803 HOPD COVID-19 SPEC COLLECT: HCPCS | Performed by: NURSE PRACTITIONER

## 2021-01-06 PROCEDURE — 93005 ELECTROCARDIOGRAM TRACING: CPT

## 2021-01-06 PROCEDURE — 36415 COLL VENOUS BLD VENIPUNCTURE: CPT

## 2021-01-06 PROCEDURE — 77067 SCR MAMMO BI INCL CAD: CPT | Performed by: STUDENT IN AN ORGANIZED HEALTH CARE EDUCATION/TRAINING PROGRAM

## 2021-01-06 RX ORDER — ACETAMINOPHEN 500 MG
1000 TABLET ORAL EVERY 6 HOURS PRN
COMMUNITY

## 2021-01-06 NOTE — DISCHARGE INSTRUCTIONS
Take the following medications the morning of surgery:    Amlodipine  protonix  lexapro  bisoprolol    If you are on prescription narcotic pain medication to control your pain you may also take that medication the morning of surgery.    General Instructions:  • Do not eat solid food after midnight the night before surgery.  • You may drink clear liquids day of surgery but must stop at least one hour before your hospital arrival time.  • It is beneficial for you to have a clear drink that contains carbohydrates the day of surgery.  We suggest a 12 to 20 ounce bottle of Gatorade or Powerade for non-diabetic patients or a 12 to 20 ounce bottle of G2 or Powerade Zero for diabetic patients. (Pediatric patients, are not advised to drink a 12 to 20 ounce carbohydrate drink)    Clear liquids are liquids you can see through.  Nothing red in color.     Plain water                               Sports drinks  Sodas                                   Gelatin (Jell-O)  Fruit juices without pulp such as white grape juice and apple juice  Popsicles that contain no fruit or yogurt  Tea or coffee (no cream or milk added)  Gatorade / Powerade  G2 / Powerade Zero    • Infants may have breast milk up to four hours before surgery.  • Infants drinking formula may drink formula up to six hours before surgery.   • Patients who avoid smoking, chewing tobacco and alcohol for 4 weeks prior to surgery have a reduced risk of post-operative complications.  Quit smoking as many days before surgery as you can.  • Do not smoke, use chewing tobacco or drink alcohol the day of surgery.   • If applicable bring your C-PAP/ BI-PAP machine.  • Bring any papers given to you in the doctor’s office.  • Wear clean comfortable clothes.  • Do not wear contact lenses, false eyelashes or make-up.  Bring a case for your glasses.   • Bring crutches or walker if applicable.  • Remove all piercings.  Leave jewelry and any other valuables at home.  • Hair extensions  with metal clips must be removed prior to surgery.  • The Pre-Admission Testing nurse will instruct you to bring medications if unable to obtain an accurate list in Pre-Admission Testing.        If you were given a blood bank ID arm band remember to bring it with you the day of surgery.    Preventing a Surgical Site Infection:  • For 2 to 3 days before surgery, avoid shaving with a razor because the razor can irritate skin and make it easier to develop an infection.    • Any areas of open skin can increase the risk of a post-operative wound infection by allowing bacteria to enter and travel throughout the body.  Notify your surgeon if you have any skin wounds / rashes even if it is not near the expected surgical site.  The area will need assessed to determine if surgery should be delayed until it is healed.  • The night prior to surgery shower using a fresh bar of anti-bacterial soap (such as Dial) and clean washcloth.  Sleep in a clean bed with clean clothing.  Do not allow pets to sleep with you.  • Shower on the morning of surgery using a fresh bar of anti-bacterial soap (such as Dial) and clean washcloth.  Dry with a clean towel and dress in clean clothing.  • Ask your surgeon if you will be receiving antibiotics prior to surgery.  • Make sure you, your family, and all healthcare providers clean their hands with soap and water or an alcohol based hand  before caring for you or your wound.    Day of surgery:1/8/2021  0830  Your arrival time is approximately two hours before your scheduled surgery time.  Upon arrival, a Pre-op nurse and Anesthesiologist will review your health history, obtain vital signs, and answer questions you may have.  The only belongings needed at this time will be a list of your home medications and if applicable your C-PAP/BI-PAP machine.  A Pre-op nurse will start an IV and you may receive medication in preparation for surgery, including something to help you relax.     Please be  aware that surgery does come with discomfort.  We want to make every effort to control your discomfort so please discuss any uncontrolled symptoms with your nurse.   Your doctor will most likely have prescribed pain medications.      If you are going home after surgery you will receive individualized written care instructions before being discharged.  A responsible adult must drive you to and from the hospital on the day of your surgery and stay with you for 24 hours.  Discharge prescriptions can be filled by the hospital pharmacy during regular pharmacy hours.  If you are having surgery late in the day/evening your prescription may be e-prescribed to your pharmacy.  Please verify your pharmacy hours or chose a 24 hour pharmacy to avoid not having access to your prescription because your pharmacy has closed for the day.    If you are staying overnight following surgery, you will be transported to your hospital room following the recovery period.  [facility] has all private rooms.    If you have any questions please call Pre-Admission Testing at [phone].  Deductibles and co-payments are collected on the day of service. Please be prepared to pay the required co-pay, deductible or deposit on the day of service as defined by your plan.    Patient Education for Self-Quarantine Process    Following your COVID testing, we strongly recommend that you do not leave your home after you have been tested for COVID except to get medical care. This includes not going to work, school or to public areas.  If this is not possible for you to do please limit your activities to only required outings.  Be sure to wear a mask when you are with other people, practice social distancing and wash your hands frequently.      The following items provide additional details to keep you safe.  • Wash your hands with soap and water frequently for at least 20 seconds.   • Avoid touching your eyes, nose and mouth with unwashed  hands.  • Do not share anything - utensils, towels, food from the same bowl.   • Have your own utensils, drinking glass, dishes, towels and bedding.   • Do not have visitors.   • Do use FaceTime to stay in touch with family and friends.  • You should stay in a specific room away from others if possible.   • Stay at least 6 feet away from others in the home if you cannot have a dedicated room to yourself.   • Do not snuggle with your pet. While the CDC says there is no evidence that pets can spread COVID-19 or be infected from humans, it is probably best to avoid “petting, snuggling, being kissed or licked and sharing food (during self-quarantine)”, according to the CDC.   • Sanitize household surfaces daily. Include all high touch areas (door handles, light switches, phones, countertops, etc.)  • Do not share a bathroom with others, if possible.   • Wear a mask around others in your home if you are unable to stay in a separate room or 6 feet apart. If  you are unable to wear a mask, have your family member wear a mask if they must be within 6 feet of you.   Call your surgeon immediately if you experience any of the following symptoms:  • Sore Throat  • Shortness of Breath or difficulty breathing  • Cough  • Chills  • Body soreness or muscle pain  • Headache  • Fever  • New loss of taste or smell  • Do not arrive for your surgery ill.  Your procedure will need to be rescheduled to another time.  You will need to call your physician before the day of surgery to avoid any unnecessary exposure to hospital staff as well as other patients.

## 2021-01-06 NOTE — PAT
Pt's nephew  Doesn't want to come in facility because he is caring for his medically fragile mother.

## 2021-01-07 ENCOUNTER — ANESTHESIA EVENT (OUTPATIENT)
Dept: PERIOP | Facility: HOSPITAL | Age: 56
End: 2021-01-07

## 2021-01-07 LAB — SARS-COV-2 RNA RESP QL NAA+PROBE: NOT DETECTED

## 2021-01-08 ENCOUNTER — HOSPITAL ENCOUNTER (OUTPATIENT)
Facility: HOSPITAL | Age: 56
Setting detail: HOSPITAL OUTPATIENT SURGERY
Discharge: HOME | End: 2021-01-08
Attending: STUDENT IN AN ORGANIZED HEALTH CARE EDUCATION/TRAINING PROGRAM | Admitting: STUDENT IN AN ORGANIZED HEALTH CARE EDUCATION/TRAINING PROGRAM

## 2021-01-08 ENCOUNTER — ANESTHESIA (OUTPATIENT)
Dept: PERIOP | Facility: HOSPITAL | Age: 56
End: 2021-01-08

## 2021-01-08 VITALS
DIASTOLIC BLOOD PRESSURE: 81 MMHG | SYSTOLIC BLOOD PRESSURE: 140 MMHG | BODY MASS INDEX: 36.57 KG/M2 | HEART RATE: 74 BPM | RESPIRATION RATE: 16 BRPM | TEMPERATURE: 98.7 F | WEIGHT: 199.96 LBS | OXYGEN SATURATION: 98 %

## 2021-01-08 DIAGNOSIS — N95.0 PMB (POSTMENOPAUSAL BLEEDING): ICD-10-CM

## 2021-01-08 LAB — QT INTERVAL: 357 MS

## 2021-01-08 PROCEDURE — 93005 ELECTROCARDIOGRAM TRACING: CPT | Performed by: NURSE ANESTHETIST, CERTIFIED REGISTERED

## 2021-01-08 PROCEDURE — 25010000002 PROPOFOL 10 MG/ML EMULSION: Performed by: NURSE ANESTHETIST, CERTIFIED REGISTERED

## 2021-01-08 PROCEDURE — S0260 H&P FOR SURGERY: HCPCS | Performed by: STUDENT IN AN ORGANIZED HEALTH CARE EDUCATION/TRAINING PROGRAM

## 2021-01-08 PROCEDURE — 88342 IMHCHEM/IMCYTCHM 1ST ANTB: CPT | Performed by: STUDENT IN AN ORGANIZED HEALTH CARE EDUCATION/TRAINING PROGRAM

## 2021-01-08 PROCEDURE — 88341 IMHCHEM/IMCYTCHM EA ADD ANTB: CPT | Performed by: STUDENT IN AN ORGANIZED HEALTH CARE EDUCATION/TRAINING PROGRAM

## 2021-01-08 PROCEDURE — 58558 HYSTEROSCOPY BIOPSY: CPT | Performed by: STUDENT IN AN ORGANIZED HEALTH CARE EDUCATION/TRAINING PROGRAM

## 2021-01-08 PROCEDURE — 25010000002 ONDANSETRON PER 1 MG: Performed by: NURSE ANESTHETIST, CERTIFIED REGISTERED

## 2021-01-08 PROCEDURE — 88305 TISSUE EXAM BY PATHOLOGIST: CPT | Performed by: STUDENT IN AN ORGANIZED HEALTH CARE EDUCATION/TRAINING PROGRAM

## 2021-01-08 PROCEDURE — 25010000002 FENTANYL CITRATE (PF) 100 MCG/2ML SOLUTION: Performed by: NURSE ANESTHETIST, CERTIFIED REGISTERED

## 2021-01-08 PROCEDURE — 63710000001 PROMETHAZINE PER 25 MG: Performed by: NURSE ANESTHETIST, CERTIFIED REGISTERED

## 2021-01-08 PROCEDURE — C1782 MORCELLATOR: HCPCS | Performed by: STUDENT IN AN ORGANIZED HEALTH CARE EDUCATION/TRAINING PROGRAM

## 2021-01-08 PROCEDURE — 88360 TUMOR IMMUNOHISTOCHEM/MANUAL: CPT | Performed by: STUDENT IN AN ORGANIZED HEALTH CARE EDUCATION/TRAINING PROGRAM

## 2021-01-08 PROCEDURE — 93010 ELECTROCARDIOGRAM REPORT: CPT | Performed by: INTERNAL MEDICINE

## 2021-01-08 PROCEDURE — 88341 IMHCHEM/IMCYTCHM EA ADD ANTB: CPT

## 2021-01-08 PROCEDURE — 25010000002 DEXAMETHASONE PER 1 MG: Performed by: NURSE ANESTHETIST, CERTIFIED REGISTERED

## 2021-01-08 RX ORDER — HYDROMORPHONE HYDROCHLORIDE 1 MG/ML
0.5 INJECTION, SOLUTION INTRAMUSCULAR; INTRAVENOUS; SUBCUTANEOUS
Status: DISCONTINUED | OUTPATIENT
Start: 2021-01-08 | End: 2021-01-08 | Stop reason: SDUPTHER

## 2021-01-08 RX ORDER — HYDRALAZINE HYDROCHLORIDE 20 MG/ML
5 INJECTION INTRAMUSCULAR; INTRAVENOUS
Status: DISCONTINUED | OUTPATIENT
Start: 2021-01-08 | End: 2021-01-08 | Stop reason: HOSPADM

## 2021-01-08 RX ORDER — SODIUM CHLORIDE 0.9 % (FLUSH) 0.9 %
3 SYRINGE (ML) INJECTION EVERY 12 HOURS SCHEDULED
Status: DISCONTINUED | OUTPATIENT
Start: 2021-01-08 | End: 2021-01-08 | Stop reason: HOSPADM

## 2021-01-08 RX ORDER — FENTANYL CITRATE 50 UG/ML
50 INJECTION, SOLUTION INTRAMUSCULAR; INTRAVENOUS
Status: DISCONTINUED | OUTPATIENT
Start: 2021-01-08 | End: 2021-01-08 | Stop reason: HOSPADM

## 2021-01-08 RX ORDER — DIPHENHYDRAMINE HYDROCHLORIDE 50 MG/ML
12.5 INJECTION INTRAMUSCULAR; INTRAVENOUS
Status: DISCONTINUED | OUTPATIENT
Start: 2021-01-08 | End: 2021-01-08 | Stop reason: HOSPADM

## 2021-01-08 RX ORDER — SODIUM CHLORIDE, SODIUM LACTATE, POTASSIUM CHLORIDE, CALCIUM CHLORIDE 600; 310; 30; 20 MG/100ML; MG/100ML; MG/100ML; MG/100ML
9 INJECTION, SOLUTION INTRAVENOUS CONTINUOUS
Status: DISCONTINUED | OUTPATIENT
Start: 2021-01-08 | End: 2021-01-08 | Stop reason: HOSPADM

## 2021-01-08 RX ORDER — OXYCODONE AND ACETAMINOPHEN 7.5; 325 MG/1; MG/1
1 TABLET ORAL ONCE AS NEEDED
Status: DISCONTINUED | OUTPATIENT
Start: 2021-01-08 | End: 2021-01-08 | Stop reason: SDUPTHER

## 2021-01-08 RX ORDER — TRAMADOL HYDROCHLORIDE 50 MG/1
50 TABLET ORAL EVERY 6 HOURS PRN
Qty: 5 TABLET | Refills: 0 | Status: SHIPPED | OUTPATIENT
Start: 2021-01-08 | End: 2022-05-12

## 2021-01-08 RX ORDER — HYDROMORPHONE HYDROCHLORIDE 1 MG/ML
0.5 INJECTION, SOLUTION INTRAMUSCULAR; INTRAVENOUS; SUBCUTANEOUS
Status: DISCONTINUED | OUTPATIENT
Start: 2021-01-08 | End: 2021-01-08 | Stop reason: HOSPADM

## 2021-01-08 RX ORDER — PROMETHAZINE HYDROCHLORIDE 25 MG/1
25 SUPPOSITORY RECTAL ONCE AS NEEDED
Status: COMPLETED | OUTPATIENT
Start: 2021-01-08 | End: 2021-01-08

## 2021-01-08 RX ORDER — FENTANYL CITRATE 50 UG/ML
INJECTION, SOLUTION INTRAMUSCULAR; INTRAVENOUS AS NEEDED
Status: DISCONTINUED | OUTPATIENT
Start: 2021-01-08 | End: 2021-01-08 | Stop reason: SURG

## 2021-01-08 RX ORDER — EPHEDRINE SULFATE 50 MG/ML
5 INJECTION, SOLUTION INTRAVENOUS ONCE AS NEEDED
Status: DISCONTINUED | OUTPATIENT
Start: 2021-01-08 | End: 2021-01-08 | Stop reason: HOSPADM

## 2021-01-08 RX ORDER — DIPHENHYDRAMINE HCL 25 MG
25 CAPSULE ORAL
Status: DISCONTINUED | OUTPATIENT
Start: 2021-01-08 | End: 2021-01-08 | Stop reason: HOSPADM

## 2021-01-08 RX ORDER — NALOXONE HCL 0.4 MG/ML
0.2 VIAL (ML) INJECTION AS NEEDED
Status: DISCONTINUED | OUTPATIENT
Start: 2021-01-08 | End: 2021-01-08 | Stop reason: HOSPADM

## 2021-01-08 RX ORDER — MAGNESIUM HYDROXIDE 1200 MG/15ML
LIQUID ORAL AS NEEDED
Status: DISCONTINUED | OUTPATIENT
Start: 2021-01-08 | End: 2021-01-08 | Stop reason: HOSPADM

## 2021-01-08 RX ORDER — LABETALOL HYDROCHLORIDE 5 MG/ML
5 INJECTION, SOLUTION INTRAVENOUS
Status: DISCONTINUED | OUTPATIENT
Start: 2021-01-08 | End: 2021-01-08 | Stop reason: HOSPADM

## 2021-01-08 RX ORDER — OXYCODONE HYDROCHLORIDE AND ACETAMINOPHEN 5; 325 MG/1; MG/1
1 TABLET ORAL ONCE AS NEEDED
Status: DISCONTINUED | OUTPATIENT
Start: 2021-01-08 | End: 2021-01-08 | Stop reason: HOSPADM

## 2021-01-08 RX ORDER — FLUMAZENIL 0.1 MG/ML
0.2 INJECTION INTRAVENOUS AS NEEDED
Status: DISCONTINUED | OUTPATIENT
Start: 2021-01-08 | End: 2021-01-08 | Stop reason: HOSPADM

## 2021-01-08 RX ORDER — PROPOFOL 10 MG/ML
VIAL (ML) INTRAVENOUS AS NEEDED
Status: DISCONTINUED | OUTPATIENT
Start: 2021-01-08 | End: 2021-01-08 | Stop reason: SURG

## 2021-01-08 RX ORDER — FAMOTIDINE 10 MG/ML
20 INJECTION, SOLUTION INTRAVENOUS ONCE
Status: COMPLETED | OUTPATIENT
Start: 2021-01-08 | End: 2021-01-08

## 2021-01-08 RX ORDER — EPHEDRINE SULFATE 50 MG/ML
INJECTION, SOLUTION INTRAVENOUS AS NEEDED
Status: DISCONTINUED | OUTPATIENT
Start: 2021-01-08 | End: 2021-01-08 | Stop reason: SURG

## 2021-01-08 RX ORDER — LIDOCAINE HYDROCHLORIDE 10 MG/ML
0.5 INJECTION, SOLUTION EPIDURAL; INFILTRATION; INTRACAUDAL; PERINEURAL ONCE AS NEEDED
Status: DISCONTINUED | OUTPATIENT
Start: 2021-01-08 | End: 2021-01-08 | Stop reason: HOSPADM

## 2021-01-08 RX ORDER — ONDANSETRON 2 MG/ML
4 INJECTION INTRAMUSCULAR; INTRAVENOUS ONCE AS NEEDED
Status: COMPLETED | OUTPATIENT
Start: 2021-01-08 | End: 2021-01-08

## 2021-01-08 RX ORDER — SODIUM CHLORIDE 9 MG/ML
INJECTION, SOLUTION INTRAVENOUS AS NEEDED
Status: DISCONTINUED | OUTPATIENT
Start: 2021-01-08 | End: 2021-01-08 | Stop reason: HOSPADM

## 2021-01-08 RX ORDER — LIDOCAINE HYDROCHLORIDE 20 MG/ML
INJECTION, SOLUTION INFILTRATION; PERINEURAL AS NEEDED
Status: DISCONTINUED | OUTPATIENT
Start: 2021-01-08 | End: 2021-01-08 | Stop reason: SURG

## 2021-01-08 RX ORDER — DEXAMETHASONE SODIUM PHOSPHATE 10 MG/ML
INJECTION INTRAMUSCULAR; INTRAVENOUS AS NEEDED
Status: DISCONTINUED | OUTPATIENT
Start: 2021-01-08 | End: 2021-01-08 | Stop reason: SURG

## 2021-01-08 RX ORDER — HYDROCODONE BITARTRATE AND ACETAMINOPHEN 7.5; 325 MG/1; MG/1
1 TABLET ORAL ONCE AS NEEDED
Status: DISCONTINUED | OUTPATIENT
Start: 2021-01-08 | End: 2021-01-08 | Stop reason: HOSPADM

## 2021-01-08 RX ORDER — PROMETHAZINE HYDROCHLORIDE 25 MG/1
25 TABLET ORAL ONCE AS NEEDED
Status: COMPLETED | OUTPATIENT
Start: 2021-01-08 | End: 2021-01-08

## 2021-01-08 RX ORDER — SODIUM CHLORIDE 0.9 % (FLUSH) 0.9 %
3-10 SYRINGE (ML) INJECTION AS NEEDED
Status: DISCONTINUED | OUTPATIENT
Start: 2021-01-08 | End: 2021-01-08 | Stop reason: HOSPADM

## 2021-01-08 RX ADMIN — PROPOFOL 200 MG: 10 INJECTION, EMULSION INTRAVENOUS at 11:04

## 2021-01-08 RX ADMIN — ONDANSETRON 4 MG: 2 INJECTION INTRAMUSCULAR; INTRAVENOUS at 12:35

## 2021-01-08 RX ADMIN — FENTANYL CITRATE 50 MCG: 50 INJECTION INTRAMUSCULAR; INTRAVENOUS at 11:04

## 2021-01-08 RX ADMIN — PROMETHAZINE HYDROCHLORIDE 25 MG: 25 TABLET ORAL at 13:00

## 2021-01-08 RX ADMIN — FENTANYL CITRATE 25 MCG: 50 INJECTION INTRAMUSCULAR; INTRAVENOUS at 11:21

## 2021-01-08 RX ADMIN — PROPOFOL 50 MG: 10 INJECTION, EMULSION INTRAVENOUS at 11:55

## 2021-01-08 RX ADMIN — EPHEDRINE SULFATE 10 MG: 50 INJECTION INTRAVENOUS at 11:33

## 2021-01-08 RX ADMIN — DEXAMETHASONE SODIUM PHOSPHATE 8 MG: 10 INJECTION INTRAMUSCULAR; INTRAVENOUS at 11:14

## 2021-01-08 RX ADMIN — SODIUM CHLORIDE, POTASSIUM CHLORIDE, SODIUM LACTATE AND CALCIUM CHLORIDE 9 ML/HR: 600; 310; 30; 20 INJECTION, SOLUTION INTRAVENOUS at 10:09

## 2021-01-08 RX ADMIN — FENTANYL CITRATE 50 MCG: 50 INJECTION, SOLUTION INTRAMUSCULAR; INTRAVENOUS at 12:34

## 2021-01-08 RX ADMIN — FENTANYL CITRATE 25 MCG: 50 INJECTION INTRAMUSCULAR; INTRAVENOUS at 11:20

## 2021-01-08 RX ADMIN — EPHEDRINE SULFATE 10 MG: 50 INJECTION INTRAVENOUS at 11:29

## 2021-01-08 RX ADMIN — FENTANYL CITRATE 50 MCG: 50 INJECTION, SOLUTION INTRAMUSCULAR; INTRAVENOUS at 12:56

## 2021-01-08 RX ADMIN — EPHEDRINE SULFATE 20 MG: 50 INJECTION INTRAVENOUS at 11:25

## 2021-01-08 RX ADMIN — FAMOTIDINE 20 MG: 10 INJECTION INTRAVENOUS at 10:09

## 2021-01-08 RX ADMIN — LIDOCAINE HYDROCHLORIDE 100 MG: 20 INJECTION, SOLUTION INFILTRATION; PERINEURAL at 11:04

## 2021-01-08 NOTE — OP NOTE
OPERATIVE REPORT     Flaca Hassan  1965  [id number]    Pre-Operative Diagnosis: Postmenopausal bleeding     Post-Operative Diagnosis: Postmenopausal bleeding, endometrial mass, endometrial polyp      Procedure: Hysteroscopy, Dilation and Curettage, Endometrial Resection with Myosure      Surgeon: Thu Blake MD     Anesthetic: GETA     Estimated Blood Loss: 25 cc      Complications: no complications were noted.     Disposition: PACU - hemodynamically stable.      Findings: Normal external female genitalia. Asymmetric labial minora with right elongated compared to left. Narrow vaginal introitus that only allowed passage of small Venu speculum. Normal appearing cervix with minimal bleeding. Uterine cavity contained vascularized endometrial mass projecting from anterior uterine wall and a necrotic endometrial polyp projecting from the right lateral uterine wall. Unable to visualize bilateral tubal ostia due to fluffy endometrial. Polypoid endometrium located along the left lateral uterine wall.     Indications for surgery: Flaca Hassan is a 55 y.o. Female who presented with postmenopausal bleeding unable to be sampled in the office due patient discomfort. Options for management were reviewed with patient and she decided to proceed with operative hysteroscopy, dilation and curettage, endometrial resection. Risks, benefits, alternatives, and complications were discussed in detail. Patient's questions were answered to her satisfaction prior to the procedure.     Details of Procedure:   The patient was taken to the operating room and positioned supine. General anesthesia was then administered. She was positioned in dorsal lithotomy and prepped and draped in usual sterile fashion. A timeout was performed. A small Venu speculum was placed into the vaginal and the cervix was visualized. The cervix was grasped on the anterior lip and easily dilated to accommodate the 0 degree operative hysteroscope.  The hysteroscope was then passed into the uterine cavity and distension revealed the aforementioned findings. The Myosure Reach device was then passed into the operative hysteroscope. The Myosure Reach was used to resect the endometrial mass on the anterior uterine wall and the necrotic endometrial polyp. Global sampling of the endometrium was the performed with the Myosure Reach. The hysteroscope was then removed and a sharp serrated curettage was performed with minimal amount of currettings. The tenaculum was removed from the cervix and hemostasis of the tenaculum sites was obtained with pressure using sponge stick. The patient was then positioned supine and awoken from general anesthesia and taken to the PACU in good condition. Pictures were taken throughout the case.     Thu Blake MD

## 2021-01-08 NOTE — BRIEF OP NOTE
Brief Operative Report    Flaca Hassan  1/8/2021    Pre-op Diagnosis:   PMB (postmenopausal bleeding) [N95.0]       Post-Op Diagnosis Codes:     * PMB (postmenopausal bleeding) [N95.0]    Procedure(s):  Hysteroscopy, dilatation and curettage, endometrial resection with Myosure Reach     Surgeon(s):  Thu Blake MD    Anesthesia: General    Staff:   Circulator: Eboni Recinos RN  Scrub Person: Radha Walsh  Vendor Representative: Jaden Hager     Estimated Blood Loss: 25 cc     Urine Voided: * No values recorded between 1/8/2021 10:56 AM and 1/8/2021 12:05 PM *    Specimens:                Specimens     ID Source Type Tests Collected By Collected At Frozen?      A Endometrium Curettings · TISSUE PATHOLOGY EXAM   Thu Blake MD 1/8/21 1144 No     Description: ENDOMETRIAL CURETTINGS    This specimen was not marked as sent.        Drains: * No LDAs found *    Findings: Normal external female genitalia. Asymmetric labial minora with right elongated compared to left. Narrow vaginal introitus that only allowed passage of small Venu speculum. Normal appearing cervix with minimal bleeding. Uterine cavity contained vascularized endometrial mass projecting from anterior wall and a necrotic endometrial polyp from right lateral wall. Unable to visualize bilateral tubal ostia due to fluffy endometrial. Polypoid endometrium located on left lateral uterine wall.     Complications: No apparent complications     Thu Blake MD     Date: 1/8/2021  Time: 12:09 EST

## 2021-01-08 NOTE — ANESTHESIA POSTPROCEDURE EVALUATION
Patient: Flaca Hassan    Procedure Summary     Date: 01/08/21 Room / Location:  [facility]  [facility]    Anesthesia Start: 1056 Anesthesia Stop: 1216    Procedure: DILATATION AND CURETTAGE HYSTEROSCOPY ENDOMETRIAL  RESECTION (N/A Vagina) Diagnosis:       PMB (postmenopausal bleeding)      (PMB (postmenopausal bleeding) [N95.0])    Surgeon: Thu Blake MD Provider: Jozef Samuels MD    Anesthesia Type: general ASA Status: 3          Anesthesia Type: general    Vitals  Vitals Value Taken Time   /76 01/08/21 1315   Temp 37.1 °C (98.7 °F) 01/08/21 1245   Pulse 74 01/08/21 1316   Resp 18 01/08/21 1315   SpO2 98 % 01/08/21 1316   Vitals shown include unvalidated device data.        Post Anesthesia Care and Evaluation    Patient location during evaluation: bedside  Patient participation: complete - patient participated  Level of consciousness: awake and alert  Pain management: adequate  Airway patency: patent  Anesthetic complications: No anesthetic complications    Cardiovascular status: acceptable  Respiratory status: acceptable  Hydration status: acceptable    Comments: /81 (BP Location: Right arm, Patient Position: Lying)   Pulse 74   Temp 37.1 °C (98.7 °F) (Tympanic)   Resp 16   Wt 90.7 kg (199 lb 15.3 oz)   LMP  (LMP Unknown)   SpO2 98%   BMI 36.57 kg/m²

## 2021-01-08 NOTE — DISCHARGE INSTRUCTIONS
Discharge instructions reviewed including but not limited to: bleeding precautions (soaking a pad in an hour for 2 consecutive hours); infection precautions (redness around incision, purulent drainage, temp of 100.4 or greater), return with any chest pain, shortness of breath at rest, unilateral leg swelling or calf pain, or other concerning signs or symptoms.          Hysteroscopy, Care After  This sheet gives you information about how to care for yourself after your procedure. Your health care provider may also give you more specific instructions. If you have problems or questions, contact your health care provider.  What can I expect after the procedure?  After the procedure, it is common to have:  · Cramping.  · Bleeding. This can vary from light spotting to menstrual-like bleeding.  Follow these instructions at home:  Activity  · Rest for 1-2 days after the procedure.  · Do not douche, use tampons, or have sex for 2 weeks after the procedure, or until your health care provider approves.  · Do not drive for 24 hours after the procedure, or for as long as told by your health care provider.  · Do not drive, use heavy machinery, or drink alcohol while taking prescription pain medicines.  Medicines    · Take over-the-counter and prescription medicines only as told by your health care provider.  · Do not take aspirin during recovery. It can increase the risk of bleeding.  General instructions  · Do not take baths, swim, or use a hot tub until your health care provider approves. Take showers instead of baths for 2 weeks, or for as long as told by your health care provider.  · To prevent or treat constipation while you are taking prescription pain medicine, your health care provider may recommend that you:  ? Drink enough fluid to keep your urine clear or pale yellow.  ? Take over-the-counter or prescription medicines.  ? Eat foods that are high in fiber, such as fresh fruits and vegetables, whole grains, and  beans.  ? Limit foods that are high in fat and processed sugars, such as fried and sweet foods.  · Keep all follow-up visits as told by your health care provider. This is important.  Contact a health care provider if:  · You feel dizzy or lightheaded.  · You feel nauseous.  · You have abnormal vaginal discharge.  · You have a rash.  · You have pain that does not get better with medicine.  · You have chills.  Get help right away if:  · You have bleeding that is heavier than a normal menstrual period.  · You have a fever.  · You have pain or cramps that get worse.  · You develop new abdominal pain.  · You faint.  · You have pain in your shoulders.  · You have shortness of breath.  Summary  · After the procedure, you may have cramping and some vaginal bleeding.  · Do not douche, use tampons, or have sex for 2 weeks after the procedure, or until your health care provider approves.  · Do not take baths, swim, or use a hot tub until your health care provider approves. Take showers instead of baths for 2 weeks, or for as long as told by your health care provider.  · Report any unusual symptoms to your health care provider.  · Keep all follow-up visits as told by your health care provider. This is important.  This information is not intended to replace advice given to you by your health care provider. Make sure you discuss any questions you have with your health care provider.  Document Revised: 11/30/2018 Document Reviewed: 01/16/2018  Elsevier Patient Education © 2020 [employer]

## 2021-01-08 NOTE — ANESTHESIA PREPROCEDURE EVALUATION
Anesthesia Evaluation     Patient summary reviewed and Nursing notes reviewed   no history of anesthetic complications:  NPO Solid Status: > 8 hours  NPO Liquid Status: > 2 hours           Airway   Mallampati: III  TM distance: >3 FB  Neck ROM: full  Possible difficult intubation  Dental - normal exam     Pulmonary    (+) asthma,home oxygen, shortness of breath, sleep apnea on CPAP, decreased breath sounds,   (-) COPD, not a smoker, lung cancer    ROS comment: Moderate Asthma, uses bid MDI's w/ frequent albuterol as well.  Has LI and uses CPAP w/ 2L O2 qhs.  Was mildly SOB upon arrival to pre op this morning.  Improved w/ 2L O2 NC and use of home albuterol MDI.  Cardiovascular - normal exam  Exercise tolerance: good (4-7 METS)    ECG reviewed  Rhythm: regular  Rate: normal    (+) hypertension well controlled, past MI  >12 months, CAD, cardiac stents more than 12 months ago hyperlipidemia,   (-) valvular problems/murmurs, dysrhythmias, angina, CHF, CABG    ROS comment: Known CAD s/p MI 2017 w/ single stent placed per pt report.  She remains on Plavix and ASA, both stopped >5d ago.      TTE 01/2021:  ·Calculated left ventricular EF = 62.9% Estimated left ventricular EF was in agreement with the calculated left ventricular EF.  ·Left ventricular diastolic function was normal.  ·Calculated left ventricular EF = 62.9    Neuro/Psych  (+) psychiatric history Depression,     (-) seizures, TIA, CVA  GI/Hepatic/Renal/Endo    (+) obesity,  GI bleeding upper resolved,   (-) hiatal hernia, GERD, PUD, hepatitis, liver disease, no renal disease, diabetes, no thyroid disorder    Musculoskeletal (-) negative ROS    Abdominal   (+) obese,     Abdomen: soft.   Substance History - negative use     OB/GYN negative ob/gyn ROS         Other - negative ROS                       Anesthesia Plan    ASA 3     general     intravenous induction     Anesthetic plan, all risks, benefits, and alternatives have been provided, discussed and  informed consent has been obtained with: patient.    Plan discussed with CRNA and attending.

## 2021-01-08 NOTE — ANESTHESIA PROCEDURE NOTES
Airway  Urgency: elective    Date/Time: 1/8/2021 11:05 AM    General Information and Staff    Patient location during procedure: OR  Anesthesiologist: Arvind, Jozef Ray, MD  CRNA: Taty Mcgrath CRNA    Indications and Patient Condition    Preoxygenated: yes  Mask difficulty assessment: 0 - not attempted    Final Airway Details  Final airway type: supraglottic airway      Successful airway: unique  Size 4    Number of attempts at approach: 1  Assessment: lips, teeth, and gum same as pre-op and atraumatic intubation    Additional Comments  Atraumatic, adeq seal, secured, MV/AV until SV

## 2021-01-08 NOTE — H&P
H&P Note    Patient Identification:  Name: Flaca Hassan  Age: 55 y.o.  Sex: female  :  1965  MRN: [id number]                       Chief Complaint:  Scheduled surgery     History of Present Illness:   Flaca Hassan is a 55 y.o. postmenopausal female who presents for scheduled surgery for evaluation of postmenopausal bleeding. The patient reports a 6 month history of bleeding of uncertain source as she did not know if she was bleeding from her urethra, vagina or anus. She was evaluated in our office on 20 and was unable to tolerate a full pelvic exam but it was noted that blood was covering the speculum after it was removed from the vagina. The patient then underwent a pelvic ultrasound but again was only able to tolerate the transabdominal portion and this demonstrated a thickened endometrial stripe of 1.82 cm. She thus presents today for hysteroscopy, dilatation and curettage, and endometrial resection for postmenopausal bleeding due to inability to sample in office. She reports continued bleeding since office visit on 20 that has not improved with provera daily. She states she is nervous and shaky. She has some shortness of breath because she has had increased activity today from baseline and has had to use albuterol inhaler. She also reports intermittent weakness in hands and legs and feels weak.     She has a history of coronary artery disease s/p cardiac catheterization with stents, benign essential hypertension, hyperlipidemia, and obesity for which she received cardiac clearance from Dr. Danyel Willams prior to surgery. She has stopped her plavix 5 days prior to surgery.        Past Medical History:  Past Medical History:   Diagnosis Date   • Anemia    • Asthma    • Breast cyst    • Coronary artery disease     stent   • Depression    • Diverticulosis    • H/O: GI bleed     recurrent   • Hematuria    • History of transfusion     no reaction   • Hyperlipidemia    • Hypertension     • Incontinence of urine     wears pads   • Irritable bowel syndrome    • Melena    • Obesity    • Oxygen dependent     uses oxygen 2 prn when walking if SOB   • UTI (urinary tract infection)      Past Surgical History:  Past Surgical History:   Procedure Laterality Date   • BREAST CYST EXCISION Left    • CARDIAC CATHETERIZATION N/A 11/13/2017    Procedure: Left Heart Cath;  Surgeon: Imer Montaño MD;  Location:  [facility];  Service:    • CARDIAC CATHETERIZATION N/A 11/13/2017    Procedure: Stent ROBERTO coronary;  Surgeon: Imer Montaño MD;  Location:  [facility];  Service:    • COLONOSCOPY  02/11/2016    tics, NBIH,    • ENDOSCOPY N/A 3/3/2017    erythematous mucosa in stomach, duodenal ulcer , mild to moderate chronic active gastritis, positive for h pylori   • UPPER GASTROINTESTINAL ENDOSCOPY  02/10/2016    Dieudonne Willson M.D.      Home Meds:  Medications Prior to Admission   Medication Sig Dispense Refill Last Dose   • acetaminophen (TYLENOL) 500 MG tablet Take 1,000 mg by mouth Every 6 (Six) Hours As Needed for Mild Pain .      • amLODIPine (NORVASC) 10 MG tablet Take 1 tablet by mouth Daily. 30 tablet 6    • bisoprolol-hydrochlorothiazide (Ziac) 5-6.25 MG per tablet Take 1 tablet by mouth Daily. 30 tablet 6    • budesonide-formoterol (SYMBICORT) 160-4.5 MCG/ACT inhaler Inhale 2 puffs 2 (two) times a day.      • Cholecalciferol (VITAMIN D3) 2000 units tablet Take 1 tablet by mouth Daily.  1    • clopidogrel (PLAVIX) 75 MG tablet Take 1 tablet by mouth Daily. 90 tablet 1    • escitalopram (LEXAPRO) 10 MG tablet Take 10 mg by mouth Every Morning.  0    • folic acid (FOLVITE) 1 MG tablet Take 1 mg by mouth Daily.  0    • furosemide (LASIX) 40 MG tablet Take 1 tablet by mouth Daily. 30 tablet 6    • hydrocortisone (ANUSOL-HC) 25 MG suppository Insert 1 suppository into the rectum Daily As Needed for Hemorrhoids (rectal discomfort). 30 suppository 5    •  medroxyPROGESTERone (Provera) 10 MG tablet Take 1 tablet by mouth Daily. 30 tablet 2    • montelukast (SINGULAIR) 10 MG tablet Take 10 mg by mouth Every Night.      • nitroglycerin (NITROSTAT) 0.4 MG SL tablet 1 under the tongue as needed for angina, may repeat q5mins for up three doses 25 tablet 3    • O2 (OXYGEN) Inhale 2 L/min As Needed.      • olmesartan-hydrochlorothiazide (BENICAR HCT) 40-12.5 MG per tablet Take 1 tablet by mouth Daily. 30 tablet 6    • pantoprazole (PROTONIX) 40 MG EC tablet Take 1 tablet by mouth Daily. 30 tablet 6    • potassium chloride (MICRO-K) 10 MEQ CR capsule 1 DAILY 30 capsule 6    • Proctozone-HC 2.5 % rectal cream ALVA AA ON RECTUM DAILY PRF HEMORRHOIDS      • senna (Senokot) 8.6 MG tablet Take 1 tablet by mouth Daily. 30 tablet 5    • simvastatin (ZOCOR) 20 MG tablet Take 1 tablet by mouth Every Evening. 30 tablet 6    • traZODone (DESYREL) 50 MG tablet Take 1-2 tabs QHS prn sleep 60 tablet 5    • VENTOLIN  (90 Base) MCG/ACT inhaler Inhale 2 puffs Every 4 (Four) Hours As Needed for Wheezing or Shortness of Air.  6      Allergies:  No Known Allergies  Immunizations:  Immunization History   Administered Date(s) Administered   • Flulaval/Fluarix/Fluzone Quad 2020   • Influenza, Unspecified 2017     Social History:   Social History     Tobacco Use   • Smoking status: Never Smoker   • Smokeless tobacco: Never Used   Substance Use Topics   • Alcohol use: No      Family History:  Family History   Problem Relation Age of Onset   • Breast cancer Other    • Lung cancer Brother    • Hypertension Brother    • Hyperlipidemia Brother    • Heart disease Brother    • Throat cancer Paternal Uncle    • Tongue cancer Paternal Grandfather    • Hypertension Father    • Hyperlipidemia Father    • Heart disease Father    • Malig Hyperthermia Neg Hx         Review of Systems  Pertinent items are noted in HPI.    Objective:  tMax 24 hrs: Temp (24hrs), Av.3 °F (37.4 °C), Min:99.3 °F  (37.4 °C), Max:99.3 °F (37.4 °C)    Vitals Ranges:   Temp:  [99.3 °F (37.4 °C)] 99.3 °F (37.4 °C)  Heart Rate:  [65] 65  Resp:  [18] 18  BP: (160)/(79) 160/79  Intake and Output Last 3 Shifts:   No intake/output data recorded.    Exam:     General Appearance:    Alert, cooperative, no distress, appears older than stated age.   Head:    Normocephalic, without obvious abnormality, atraumatic   Back:     Symmetric, no curvature, ROM normal   Lungs:     Slightly increased work of breathing, regular respirations    Chest Wall:    No tenderness or deformity    Heart:    Regular rate and rhythm   Abdomen:     Soft, non-tender, no masses, no organomegaly   Extremities:   Extremities normal, atraumatic, no cyanosis or edema   Skin:   Skin color, texture, turgor normal, no rashes or lesions   Lymph nodes:   Cervical, supraclavicular, and axillary nodes normal       Data Review:  1/6/21:   COVID-19- not detected   BMP- Cr 1.05, Na 134, CO2 19.8, GFR 54   CBC- 11.1\12.2/266   EKG- sinus rhythm, low voltage in precordial leads, non-specific T abnormalities in lateral leads, no change from prior EKG     Assessment:    PMB (postmenopausal bleeding)    Plan:  - Reviewed benefits, risks, and alternatives of hysteroscopy, dilatation and curettage, endometrial resection. Consents signed.  - Patient has undergone previous preoperative testing with cardiac clearance given. She will need a postoperative EKG performed today. She has held her plavix for the last 5 days.  - Will proceed to the OR for above stated procedure.   - No antibiotics indicated.     Thu Blake MD  1/8/2021

## 2021-01-13 ENCOUNTER — OFFICE VISIT (OUTPATIENT)
Dept: OBSTETRICS AND GYNECOLOGY | Facility: CLINIC | Age: 56
End: 2021-01-13

## 2021-01-13 VITALS
HEIGHT: 62 IN | SYSTOLIC BLOOD PRESSURE: 160 MMHG | BODY MASS INDEX: 36.62 KG/M2 | WEIGHT: 199 LBS | DIASTOLIC BLOOD PRESSURE: 84 MMHG

## 2021-01-13 DIAGNOSIS — R30.0 DYSURIA: ICD-10-CM

## 2021-01-13 DIAGNOSIS — Z09 POSTOPERATIVE FOLLOW-UP: Primary | ICD-10-CM

## 2021-01-13 DIAGNOSIS — C54.1 ENDOMETRIAL CANCER (HCC): ICD-10-CM

## 2021-01-13 PROCEDURE — 99024 POSTOP FOLLOW-UP VISIT: CPT | Performed by: STUDENT IN AN ORGANIZED HEALTH CARE EDUCATION/TRAINING PROGRAM

## 2021-01-13 NOTE — PROGRESS NOTES
Chief Complaint   Patient presents with   • Post-op Follow-up     discuss results of surgery      Flaca Hassan is a 55 y.o. female who presents to the clinic 5 days status post Hysteroscopy, Dilation and Curettage, Endometrial Resection with Myosure for postmenopausal bleeding on 1/8/21. She reports that she is tolerating a regular diet without nausea or vomiting. She reports that she is having some lower abdominal pain since surgery but it is controlled on tylenol. She is having bowel movements. She has small amount of vagina bleeding and is still taking provera.     Patient is accompanied by her brother today and he is translating for her as they declined .       Past Medical History:   Diagnosis Date   • Anemia    • Asthma    • Breast cyst    • Coronary artery disease     stent   • Depression    • Diverticulosis    • H/O: GI bleed     recurrent   • Hematuria    • History of transfusion     no reaction   • Hyperlipidemia    • Hypertension    • Incontinence of urine     wears pads   • Irritable bowel syndrome    • Melena    • Obesity    • Oxygen dependent     uses oxygen 2 prn when walking if SOB   • UTI (urinary tract infection)      Past Surgical History:   Procedure Laterality Date   • BREAST CYST EXCISION Left    • CARDIAC CATHETERIZATION N/A 11/13/2017    Procedure: Left Heart Cath;  Surgeon: Imer Montaño MD;  Location: [facility] CATH INVASIVE LOCATION;  Service:    • CARDIAC CATHETERIZATION N/A 11/13/2017    Procedure: Stent ROBERTO coronary;  Surgeon: Imer Montaño MD;  Location: [facility] CATH INVASIVE LOCATION;  Service:    • COLONOSCOPY  02/11/2016    VICTOR MANUEL hernandez,    • D&C HYSTEROSCOPY ENDOMETRIAL ABLATION N/A 1/8/2021    Procedure: DILATATION AND CURETTAGE HYSTEROSCOPY ENDOMETRIAL  RESECTION;  Surgeon: Thu Blake MD;  Location: [facility] OR [facility];  Service: Obstetrics/Gynecology;  Laterality: N/A;   • ENDOSCOPY N/A 3/3/2017    erythematous mucosa in stomach, duodenal ulcer , mild  "to moderate chronic active gastritis, positive for h pylori   • UPPER GASTROINTESTINAL ENDOSCOPY  02/10/2016    Dieudonne Willson M.D.     Social History     Tobacco Use   • Smoking status: Never Smoker   • Smokeless tobacco: Never Used   Substance Use Topics   • Alcohol use: No   • Drug use: Never     Family History   Problem Relation Age of Onset   • Breast cancer Other    • Lung cancer Brother    • Hypertension Brother    • Hyperlipidemia Brother    • Heart disease Brother    • Throat cancer Paternal Uncle    • Tongue cancer Paternal Grandfather    • Hypertension Father    • Hyperlipidemia Father    • Heart disease Father    • Malig Hyperthermia Neg Hx          Review of Systems   Constitutional: Negative for chills and fever.   Respiratory: Negative for cough.    Cardiovascular: Negative for chest pain.   Gastrointestinal: Positive for abdominal pain. Negative for nausea and vomiting.   Genitourinary: Positive for dysuria and vaginal bleeding. Negative for vaginal discharge.       OBJECTIVE:   Vitals:    01/13/21 1517   BP: 160/84   Weight: 90.3 kg (199 lb)   Height: 157.5 cm (62.01\")        Physical Exam  Vitals signs reviewed. Exam conducted with a chaperone present.   Constitutional:       Appearance: Normal appearance. She is obese.   HENT:      Head: Normocephalic and atraumatic.      Right Ear: External ear normal.      Left Ear: External ear normal.   Eyes:      Extraocular Movements: Extraocular movements intact.      Pupils: Pupils are equal, round, and reactive to light.   Neck:      Musculoskeletal: Normal range of motion and neck supple.   Pulmonary:      Effort: Pulmonary effort is normal. No respiratory distress.   Abdominal:      General: There is no distension.      Palpations: Abdomen is soft. There is no mass.      Tenderness: There is abdominal tenderness in the right lower quadrant, suprapubic area and left lower quadrant. There is no guarding or rebound.      Hernia: No hernia is present. "   Musculoskeletal: Normal range of motion.         General: No deformity.   Skin:     General: Skin is warm and dry.   Neurological:      General: No focal deficit present.      Mental Status: She is alert and oriented to person, place, and time.   Psychiatric:         Mood and Affect: Mood normal.         Behavior: Behavior normal.       ASSESSMENT:  1. POD# 5 s/p operative hysteroscopy, dilatation and curettage, endometrial resection with Myosure  2. Endometrial cancer FIGO Grade III/III   3. 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 I discussed the next steps will be referral to GYN/ONC for ongoing recommendations for treatment and further evaluation of endometrial cancer. This was explained to her brother who demonstrated a breath of knowledge in regards to next steps and he explained this to the patient. All questions and concerns answered. Referral sent to [facility].   Urine culture collected today for dysuria to rule out UTI following surgery. Will notify patient of results.   Patient to continue provera 10 mg until she is seen by GYN/ONC for management of bleeding.   Doing well postoperatively and to call with concerns.     Thu Blake MD   1/13/2021  17:27 EST

## 2021-01-16 LAB
BACTERIA UR CULT: NORMAL
BACTERIA UR CULT: NORMAL

## 2021-01-19 ENCOUNTER — OFFICE VISIT (OUTPATIENT)
Dept: GASTROENTEROLOGY | Facility: CLINIC | Age: 56
End: 2021-01-19

## 2021-01-19 VITALS — HEIGHT: 62 IN | WEIGHT: 197.6 LBS | TEMPERATURE: 97.7 F | BODY MASS INDEX: 36.36 KG/M2

## 2021-01-19 DIAGNOSIS — Z87.11 HISTORY OF PEPTIC ULCER: ICD-10-CM

## 2021-01-19 DIAGNOSIS — K59.04 CHRONIC IDIOPATHIC CONSTIPATION: Primary | ICD-10-CM

## 2021-01-19 DIAGNOSIS — K64.0 GRADE I HEMORRHOIDS: ICD-10-CM

## 2021-01-19 PROCEDURE — 99213 OFFICE O/P EST LOW 20 MIN: CPT | Performed by: INTERNAL MEDICINE

## 2021-01-19 NOTE — PROGRESS NOTES
Subjective   Chief Complaint   Patient presents with   • Abdominal Pain   • Rectal Bleeding   • Constipation       Flaca Hassan is a  55 y.o. female here for a follow up visit for abdominal pain, blood in the stool and constipation.  She was last seen in July 2020.    History of EGD in 2017 with duodenal ulcer.  She had a colonoscopy 2/16 - she had diverticula and IH.    She reports that she was recently diagnosed with uterine cancer - she is meeting with oncology tomorrow.  She is having some lower abdominal discomfort.  She is using senna and reports that her constipation is not well controlled.  No n/v.  She reports that she had been having vaginal bleeding.    She reports some burning at times in the perianal area after multiple bms.  HPI  Past Medical History:   Diagnosis Date   • Anemia    • Asthma    • Breast cyst    • Coronary artery disease     stent   • Depression    • Diverticulosis    • H/O: GI bleed     recurrent   • Hematuria    • History of transfusion     no reaction   • Hyperlipidemia    • Hypertension    • Incontinence of urine     wears pads   • Irritable bowel syndrome    • Melena    • Obesity    • Oxygen dependent     uses oxygen 2 prn when walking if SOB   • UTI (urinary tract infection)      Past Surgical History:   Procedure Laterality Date   • BREAST CYST EXCISION Left    • CARDIAC CATHETERIZATION N/A 11/13/2017    Procedure: Left Heart Cath;  Surgeon: Imer Montaño MD;  Location: [facility] CATH INVASIVE LOCATION;  Service:    • CARDIAC CATHETERIZATION N/A 11/13/2017    Procedure: Stent ROBERTO coronary;  Surgeon: Imer Montaño MD;  Location: [facility] CATH INVASIVE LOCATION;  Service:    • COLONOSCOPY  02/11/2016    mary, VICTOR MANUEL,    • D&C HYSTEROSCOPY ENDOMETRIAL ABLATION N/A 1/8/2021    Procedure: DILATATION AND CURETTAGE HYSTEROSCOPY ENDOMETRIAL  RESECTION;  Surgeon: Thu Blake MD;  [place] [facility] [place] [facility] [place]  Service: Obstetrics/Gynecology;  Laterality: N/A;   • ENDOSCOPY  N/A 3/3/2017    erythematous mucosa in stomach, duodenal ulcer , mild to moderate chronic active gastritis, positive for h pylori   • UPPER GASTROINTESTINAL ENDOSCOPY  02/10/2016    Dieudonne Willson M.D.       Current Outpatient Medications:   •  acetaminophen (TYLENOL) 500 MG tablet, Take 1,000 mg by mouth Every 6 (Six) Hours As Needed for Mild Pain ., Disp: , Rfl:   •  amLODIPine (NORVASC) 10 MG tablet, Take 1 tablet by mouth Daily., Disp: 30 tablet, Rfl: 6  •  bisoprolol-hydrochlorothiazide (Ziac) 5-6.25 MG per tablet, Take 1 tablet by mouth Daily., Disp: 30 tablet, Rfl: 6  •  budesonide-formoterol (SYMBICORT) 160-4.5 MCG/ACT inhaler, Inhale 2 puffs 2 (two) times a day., Disp: , Rfl:   •  Cholecalciferol (VITAMIN D3) 2000 units tablet, Take 1 tablet by mouth Daily., Disp: , Rfl: 1  •  clopidogrel (PLAVIX) 75 MG tablet, Take 1 tablet by mouth Daily., Disp: 90 tablet, Rfl: 1  •  escitalopram (LEXAPRO) 10 MG tablet, Take 10 mg by mouth Every Morning., Disp: , Rfl: 0  •  folic acid (FOLVITE) 1 MG tablet, Take 1 mg by mouth Daily., Disp: , Rfl: 0  •  furosemide (LASIX) 40 MG tablet, Take 1 tablet by mouth Daily., Disp: 30 tablet, Rfl: 6  •  hydrocortisone (ANUSOL-HC) 25 MG suppository, Insert 1 suppository into the rectum Daily As Needed for Hemorrhoids (rectal discomfort)., Disp: 30 suppository, Rfl: 5  •  medroxyPROGESTERone (Provera) 10 MG tablet, Take 1 tablet by mouth Daily., Disp: 30 tablet, Rfl: 2  •  montelukast (SINGULAIR) 10 MG tablet, Take 10 mg by mouth Every Night., Disp: , Rfl:   •  nitroglycerin (NITROSTAT) 0.4 MG SL tablet, 1 under the tongue as needed for angina, may repeat q5mins for up three doses, Disp: 25 tablet, Rfl: 3  •  O2 (OXYGEN), Inhale 2 L/min As Needed., Disp: , Rfl:   •  olmesartan-hydrochlorothiazide (BENICAR HCT) 40-12.5 MG per tablet, Take 1 tablet by mouth Daily., Disp: 30 tablet, Rfl: 6  •  pantoprazole (PROTONIX) 40 MG EC tablet, Take 1 tablet by mouth Daily., Disp: 30 tablet,  Rfl: 6  •  potassium chloride (MICRO-K) 10 MEQ CR capsule, 1 DAILY, Disp: 30 capsule, Rfl: 6  •  Proctozone-HC 2.5 % rectal cream, ALVA AA ON RECTUM DAILY PRF HEMORRHOIDS, Disp: , Rfl:   •  senna (Senokot) 8.6 MG tablet, Take 1 tablet by mouth Daily., Disp: 30 tablet, Rfl: 5  •  simvastatin (ZOCOR) 20 MG tablet, Take 1 tablet by mouth Every Evening., Disp: 30 tablet, Rfl: 6  •  traMADol (ULTRAM) 50 MG tablet, Take 1 tablet by mouth Every 6 (Six) Hours As Needed for Moderate Pain ., Disp: 5 tablet, Rfl: 0  •  traZODone (DESYREL) 50 MG tablet, Take 1-2 tabs QHS prn sleep, Disp: 60 tablet, Rfl: 5  •  VENTOLIN  (90 Base) MCG/ACT inhaler, Inhale 2 puffs Every 4 (Four) Hours As Needed for Wheezing or Shortness of Air., Disp: , Rfl: 6  PRN Meds:.  No Known Allergies  Social History     Socioeconomic History   • Marital status:      Spouse name: Not on file   • Number of children: Not on file   • Years of education: High School   • Highest education level: Not on file   Occupational History     Employer: RETIRED   Tobacco Use   • Smoking status: Never Smoker   • Smokeless tobacco: Never Used   Substance and Sexual Activity   • Alcohol use: No   • Drug use: Never     Family History   Problem Relation Age of Onset   • Breast cancer Other    • Lung cancer Brother    • Hypertension Brother    • Hyperlipidemia Brother    • Heart disease Brother    • Throat cancer Paternal Uncle    • Tongue cancer Paternal Grandfather    • Hypertension Father    • Hyperlipidemia Father    • Heart disease Father    • Malig Hyperthermia Neg Hx      Review of Systems   Constitutional: Negative for appetite change and unexpected weight change.   Gastrointestinal: Positive for abdominal pain and constipation.   Genitourinary: Positive for vaginal bleeding.     Vitals:    01/19/21 1500   Temp: 97.7 °F (36.5 °C)         01/19/21  1500   Weight: 89.6 kg (197 lb 9.6 oz)       Objective   Physical Exam  Constitutional:       Appearance:  Normal appearance.   Pulmonary:      Effort: Pulmonary effort is normal. No respiratory distress.   Abdominal:      General: There is no distension.      Tenderness: There is no abdominal tenderness.   Neurological:      Mental Status: She is alert.       No radiology results for the last 7 days    Assessment/Plan   Diagnoses and all orders for this visit:    Chronic idiopathic constipation    Grade I hemorrhoids    History of peptic ulcer      Plan:  · Recommend stopping senna and using miralax for constipation daily vs bid if needed - we discussed that with probable upcoming hysterectomy, her constipation may worsen  · Use proctosol prn for hemorrhoidal burning  · I think that her abdominal pain may be more related to her uterine cancer but if symptoms of discomfort and rectal bleeding persist after treatment, would recommend repeating her c/s.  She also wants to have an egd to ensure previous DU has healed  · Recent labs reviewed - normal h/h

## 2021-01-20 ENCOUNTER — TELEPHONE (OUTPATIENT)
Dept: CARDIOLOGY | Facility: CLINIC | Age: 56
End: 2021-01-20

## 2021-01-20 NOTE — TELEPHONE ENCOUNTER
Needs surgical clearance for a total robotic hysterectomy along with 2 other procedures on Saturday 1/23/2021 with Dr Jewels Melendez OB/GYN Oncologist please fax clearance to [phone]. Any questions can be directed to Lupe in the office at [phone]

## 2021-01-21 NOTE — TELEPHONE ENCOUNTER
S/w Lupe she is faxing a clearance form.   Pt was instructed by Dr. Melendez to hold plavix starting yesterday.

## 2021-01-21 NOTE — TELEPHONE ENCOUNTER
Per Dr RAMIREZ pt ok to proceed due to concerns of cancer. pt does have positive stress test with very small area of ischemia being treated medically.

## 2021-01-21 NOTE — TELEPHONE ENCOUNTER
Pt was cleared on 1/4/21 for D&C by Dr JULI Andrade at  to discuss what procedures is to be done with Hysterectomy no answer.  Will call again.

## 2021-03-24 ENCOUNTER — BULK ORDERING (OUTPATIENT)
Dept: CASE MANAGEMENT | Facility: OTHER | Age: 56
End: 2021-03-24

## 2021-03-24 DIAGNOSIS — Z23 IMMUNIZATION DUE: ICD-10-CM

## 2021-03-25 RX ORDER — BISOPROLOL FUMARATE AND HYDROCHLOROTHIAZIDE 5; 6.25 MG/1; MG/1
1 TABLET ORAL DAILY
Qty: 30 TABLET | Refills: 4 | Status: SHIPPED | OUTPATIENT
Start: 2021-03-25 | End: 2021-08-23

## 2021-04-01 ENCOUNTER — TELEPHONE (OUTPATIENT)
Dept: CARDIOLOGY | Facility: CLINIC | Age: 56
End: 2021-04-01

## 2021-04-01 NOTE — TELEPHONE ENCOUNTER
----- Message from Johnathan Longoria Rep sent at 4/1/2021  2:08 PM EDT -----  Regarding: vaccine on plavix  Pt is wanting to make sure she is able to take vaccine while on Plavix    Scheduled for 04/03/2021 (Saturday afternoon)    Please call [phone] (Essiericardod-brother)

## 2021-04-03 ENCOUNTER — IMMUNIZATION (OUTPATIENT)
Dept: VACCINE CLINIC | Facility: HOSPITAL | Age: 56
End: 2021-04-03

## 2021-04-03 PROCEDURE — 91300 HC SARSCOV02 VAC 30MCG/0.3ML IM: CPT | Performed by: INTERNAL MEDICINE

## 2021-04-03 PROCEDURE — 0001A: CPT | Performed by: INTERNAL MEDICINE

## 2021-04-04 RX ORDER — TRAZODONE HYDROCHLORIDE 50 MG/1
TABLET ORAL
Qty: 60 TABLET | Refills: 11 | Status: CANCELLED | OUTPATIENT
Start: 2021-04-04

## 2021-04-05 ENCOUNTER — OFFICE VISIT (OUTPATIENT)
Dept: FAMILY MEDICINE CLINIC | Facility: CLINIC | Age: 56
End: 2021-04-05

## 2021-04-05 VITALS
HEART RATE: 68 BPM | HEIGHT: 62 IN | BODY MASS INDEX: 37.36 KG/M2 | OXYGEN SATURATION: 97 % | DIASTOLIC BLOOD PRESSURE: 80 MMHG | WEIGHT: 203 LBS | RESPIRATION RATE: 16 BRPM | TEMPERATURE: 98.6 F | SYSTOLIC BLOOD PRESSURE: 130 MMHG

## 2021-04-05 DIAGNOSIS — J45.909 UNCOMPLICATED ASTHMA, UNSPECIFIED ASTHMA SEVERITY, UNSPECIFIED WHETHER PERSISTENT: Primary | ICD-10-CM

## 2021-04-05 DIAGNOSIS — E78.5 HYPERLIPIDEMIA, UNSPECIFIED HYPERLIPIDEMIA TYPE: ICD-10-CM

## 2021-04-05 DIAGNOSIS — I10 ESSENTIAL HYPERTENSION: ICD-10-CM

## 2021-04-05 DIAGNOSIS — R73.01 IFG (IMPAIRED FASTING GLUCOSE): ICD-10-CM

## 2021-04-05 DIAGNOSIS — G47.33 OSA (OBSTRUCTIVE SLEEP APNEA): ICD-10-CM

## 2021-04-05 PROCEDURE — 99214 OFFICE O/P EST MOD 30 MIN: CPT | Performed by: FAMILY MEDICINE

## 2021-04-05 NOTE — PROGRESS NOTES
Subjective   Flaca Hassan is a 55 y.o. female.     History of Present Illness     Chief Complaint:   Chief Complaint   Patient presents with   • Asthma   • Sleep Apnea     did not like dr. Hickman, prefers Jonatan Hassan 55 y.o. female who presents today for Medical Management of the below listed issues and medication refills.  she has a problem list of   Patient Active Problem List   Diagnosis   • Hypertension   • Obesity   • Hyperlipidemia   • Iron deficiency anemia due to chronic blood loss   • Upper GI bleeding   • ST elevation myocardial infarction (STEMI) (CMS/HCC)   • Coronary artery disease involving native heart without angina pectoris   • Morgagni hernia   • IFG (impaired fasting glucose)   • Primary insomnia   • PMB (postmenopausal bleeding)   • Endometrial cancer (CMS/HCC)   • Uncomplicated asthma   • LI (obstructive sleep apnea)   .  Since the last visit, she has undergone CHERYL/BSO for endometrial cancer and is in f/u with her specialists.  she has been compliant with   Current Outpatient Medications:   •  acetaminophen (TYLENOL) 500 MG tablet, Take 1,000 mg by mouth Every 6 (Six) Hours As Needed for Mild Pain ., Disp: , Rfl:   •  amLODIPine (NORVASC) 10 MG tablet, Take 1 tablet by mouth Daily., Disp: 30 tablet, Rfl: 6  •  bisoprolol-hydrochlorothiazide (ZIAC) 5-6.25 MG per tablet, TAKE 1 TABLET BY MOUTH DAILY, Disp: 30 tablet, Rfl: 4  •  budesonide-formoterol (SYMBICORT) 160-4.5 MCG/ACT inhaler, Inhale 2 puffs 2 (two) times a day., Disp: , Rfl:   •  Cholecalciferol (VITAMIN D3) 2000 units tablet, Take 1 tablet by mouth Daily., Disp: , Rfl: 1  •  clopidogrel (PLAVIX) 75 MG tablet, Take 1 tablet by mouth Daily., Disp: 90 tablet, Rfl: 1  •  escitalopram (LEXAPRO) 10 MG tablet, Take 10 mg by mouth Every Morning., Disp: , Rfl: 0  •  folic acid (FOLVITE) 1 MG tablet, Take 1 mg by mouth Daily., Disp: , Rfl: 0  •  furosemide (LASIX) 40 MG tablet, Take 1 tablet by mouth Daily., Disp: 30 tablet,  "Rfl: 6  •  hydrocortisone (ANUSOL-HC) 25 MG suppository, Insert 1 suppository into the rectum Daily As Needed for Hemorrhoids (rectal discomfort)., Disp: 30 suppository, Rfl: 5  •  medroxyPROGESTERone (Provera) 10 MG tablet, Take 1 tablet by mouth Daily., Disp: 30 tablet, Rfl: 2  •  montelukast (SINGULAIR) 10 MG tablet, Take 10 mg by mouth Every Night., Disp: , Rfl:   •  nitroglycerin (NITROSTAT) 0.4 MG SL tablet, 1 under the tongue as needed for angina, may repeat q5mins for up three doses, Disp: 25 tablet, Rfl: 3  •  O2 (OXYGEN), Inhale 2 L/min As Needed., Disp: , Rfl:   •  olmesartan-hydrochlorothiazide (BENICAR HCT) 40-12.5 MG per tablet, Take 1 tablet by mouth Daily., Disp: 30 tablet, Rfl: 6  •  pantoprazole (PROTONIX) 40 MG EC tablet, Take 1 tablet by mouth Daily., Disp: 30 tablet, Rfl: 6  •  potassium chloride (MICRO-K) 10 MEQ CR capsule, 1 DAILY, Disp: 30 capsule, Rfl: 6  •  Proctozone-HC 2.5 % rectal cream, ALVA AA ON RECTUM DAILY PRF HEMORRHOIDS, Disp: , Rfl:   •  senna (Senokot) 8.6 MG tablet, Take 1 tablet by mouth Daily., Disp: 30 tablet, Rfl: 5  •  simvastatin (ZOCOR) 20 MG tablet, Take 1 tablet by mouth Every Evening., Disp: 30 tablet, Rfl: 6  •  traMADol (ULTRAM) 50 MG tablet, Take 1 tablet by mouth Every 6 (Six) Hours As Needed for Moderate Pain ., Disp: 5 tablet, Rfl: 0  •  traZODone (DESYREL) 50 MG tablet, Take 1-2 tabs QHS prn sleep, Disp: 60 tablet, Rfl: 5  •  VENTOLIN  (90 Base) MCG/ACT inhaler, Inhale 2 puffs Every 4 (Four) Hours As Needed for Wheezing or Shortness of Air., Disp: , Rfl: 6.  she denies medication side effects.    All of the other chronic condition(s) listed above are stable w/o issues.    /80   Pulse 68   Temp 98.6 °F (37 °C)   Resp 16   Ht 157.5 cm (62\")   Wt 92.1 kg (203 lb)   LMP  (LMP Unknown)   SpO2 97%   BMI 37.13 kg/m²     Results for orders placed or performed in visit on 01/13/21   Urine Culture - Urine, Urine, Clean Catch    Specimen: Urine, Clean " Catch    UC   Result Value Ref Range    Urine Culture Final report     Result 1 Comment            The following portions of the patient's history were reviewed and updated as appropriate: allergies, current medications, past family history, past medical history, past social history, past surgical history and problem list.    Review of Systems    Objective   Physical Exam    Assessment/Plan   Diagnoses and all orders for this visit:    1. Uncomplicated asthma, unspecified asthma severity, unspecified whether persistent (Primary)  -     Ambulatory Referral to Pulmonology    2. LI (obstructive sleep apnea)  -     Ambulatory Referral to Pulmonology    3. IFG (impaired fasting glucose)  -     Hemoglobin A1c    4. Essential hypertension  -     Comprehensive metabolic panel  -     Lipid panel  -     CBC and Differential  -     TSH    5. Hyperlipidemia, unspecified hyperlipidemia type  -     Lipid panel    Other orders  -     Cancel: traZODone (DESYREL) 50 MG tablet; Take 1-2 tabs QHS prn sleep  Dispense: 60 tablet; Refill: 11

## 2021-04-14 ENCOUNTER — TELEPHONE (OUTPATIENT)
Dept: FAMILY MEDICINE CLINIC | Facility: CLINIC | Age: 56
End: 2021-04-14

## 2021-04-14 ENCOUNTER — OUTSIDE FACILITY SERVICE (OUTPATIENT)
Dept: FAMILY MEDICINE CLINIC | Facility: CLINIC | Age: 56
End: 2021-04-14

## 2021-04-14 PROCEDURE — OUTSIDEPOS PR OUTSIDE POS PLACEHOLDER: Performed by: FAMILY MEDICINE

## 2021-04-14 NOTE — TELEPHONE ENCOUNTER
Caller: Sonya Posey    Relationship to patient: Emergency Contact    Best call back number: [phone]     Chief complaint: POST LABS    Type of visit: PULMONOLOGY    Requested date: ASAP    Additional notes: THE PATIENT STATES THAT SHE DOES NOT WANT TO SEE DR. BELTRE FOR PULMONOLOGY. THE PATIENT WOULD LIKE TO SEE ANOTHER PROVIDER. THE PATIENT PREFERS ANY OTHER DOCTOR. PLEASE RETURN CALL AND ADVISE.     Caller: Sonya Posey    Relationship: Emergency Contact    Best call back number: [phone]     What orders are you requesting (i.e. lab or imaging): PRIOR APPROVAL FOR SLEEP APNEA MACHINE     In what timeframe would the patient need to come in: ASAP    Where will you receive your lab/imaging services: [facility]     Additional notes:THE PATIENT WILL NEED APPROVAL FOR HER SLEEP APNEA MACHINE. THE PATIENTS INSURANCE IS REQUESTING IT ASA.

## 2021-04-15 LAB
ALBUMIN SERPL-MCNC: 4.4 G/DL (ref 3.5–5.2)
ALBUMIN/GLOB SERPL: 1.3 G/DL
ALP SERPL-CCNC: 111 U/L (ref 39–117)
ALT SERPL-CCNC: 23 U/L (ref 1–33)
AST SERPL-CCNC: 20 U/L (ref 1–32)
BASOPHILS # BLD AUTO: 0.05 10*3/MM3 (ref 0–0.2)
BASOPHILS NFR BLD AUTO: 0.7 % (ref 0–1.5)
BILIRUB SERPL-MCNC: 0.5 MG/DL (ref 0–1.2)
BUN SERPL-MCNC: 15 MG/DL (ref 6–20)
BUN/CREAT SERPL: 15.3 (ref 7–25)
CALCIUM SERPL-MCNC: 9.7 MG/DL (ref 8.6–10.5)
CHLORIDE SERPL-SCNC: 101 MMOL/L (ref 98–107)
CHOLEST SERPL-MCNC: 147 MG/DL (ref 0–200)
CO2 SERPL-SCNC: 25.9 MMOL/L (ref 22–29)
CREAT SERPL-MCNC: 0.98 MG/DL (ref 0.57–1)
EOSINOPHIL # BLD AUTO: 0.16 10*3/MM3 (ref 0–0.4)
EOSINOPHIL NFR BLD AUTO: 2.2 % (ref 0.3–6.2)
ERYTHROCYTE [DISTWIDTH] IN BLOOD BY AUTOMATED COUNT: 12.2 % (ref 12.3–15.4)
GLOBULIN SER CALC-MCNC: 3.4 GM/DL
GLUCOSE SERPL-MCNC: 114 MG/DL (ref 65–99)
HBA1C MFR BLD: 5.8 % (ref 4.8–5.6)
HCT VFR BLD AUTO: 41.9 % (ref 34–46.6)
HDLC SERPL-MCNC: 36 MG/DL (ref 40–60)
HGB BLD-MCNC: 13.7 G/DL (ref 12–15.9)
IMM GRANULOCYTES # BLD AUTO: 0.03 10*3/MM3 (ref 0–0.05)
IMM GRANULOCYTES NFR BLD AUTO: 0.4 % (ref 0–0.5)
LDLC SERPL CALC-MCNC: 83 MG/DL (ref 0–100)
LYMPHOCYTES # BLD AUTO: 1.89 10*3/MM3 (ref 0.7–3.1)
LYMPHOCYTES NFR BLD AUTO: 25.7 % (ref 19.6–45.3)
MCH RBC QN AUTO: 30.3 PG (ref 26.6–33)
MCHC RBC AUTO-ENTMCNC: 32.7 G/DL (ref 31.5–35.7)
MCV RBC AUTO: 92.7 FL (ref 79–97)
MONOCYTES # BLD AUTO: 0.69 10*3/MM3 (ref 0.1–0.9)
MONOCYTES NFR BLD AUTO: 9.4 % (ref 5–12)
NEUTROPHILS # BLD AUTO: 4.53 10*3/MM3 (ref 1.7–7)
NEUTROPHILS NFR BLD AUTO: 61.6 % (ref 42.7–76)
NRBC BLD AUTO-RTO: 0 /100 WBC (ref 0–0.2)
PLATELET # BLD AUTO: 231 10*3/MM3 (ref 140–450)
POTASSIUM SERPL-SCNC: 4 MMOL/L (ref 3.5–5.2)
PROT SERPL-MCNC: 7.8 G/DL (ref 6–8.5)
RBC # BLD AUTO: 4.52 10*6/MM3 (ref 3.77–5.28)
SODIUM SERPL-SCNC: 136 MMOL/L (ref 136–145)
TRIGL SERPL-MCNC: 158 MG/DL (ref 0–150)
TSH SERPL DL<=0.005 MIU/L-ACNC: 4.77 UIU/ML (ref 0.27–4.2)
VLDLC SERPL CALC-MCNC: 28 MG/DL (ref 5–40)
WBC # BLD AUTO: 7.35 10*3/MM3 (ref 3.4–10.8)

## 2021-04-24 ENCOUNTER — IMMUNIZATION (OUTPATIENT)
Dept: VACCINE CLINIC | Facility: HOSPITAL | Age: 56
End: 2021-04-24

## 2021-04-24 PROCEDURE — 0002A: CPT | Performed by: INTERNAL MEDICINE

## 2021-04-24 PROCEDURE — 91300 HC SARSCOV02 VAC 30MCG/0.3ML IM: CPT | Performed by: INTERNAL MEDICINE

## 2021-04-28 LAB
CYTO UR: NORMAL
DX PRELIMINARY: NORMAL
LAB AP CASE REPORT: NORMAL
LAB AP DIAGNOSIS COMMENT: NORMAL
LAB AP SPECIAL STAINS: NORMAL
PATH REPORT.ADDENDUM SPEC: NORMAL
PATH REPORT.FINAL DX SPEC: NORMAL
PATH REPORT.GROSS SPEC: NORMAL

## 2021-05-03 RX ORDER — AMLODIPINE BESYLATE 10 MG/1
10 TABLET ORAL DAILY
Qty: 30 TABLET | Refills: 3 | Status: SHIPPED | OUTPATIENT
Start: 2021-05-03 | End: 2021-11-30

## 2021-05-04 RX ORDER — SIMVASTATIN 20 MG
20 TABLET ORAL EVERY EVENING
Qty: 30 TABLET | Refills: 6 | Status: SHIPPED | OUTPATIENT
Start: 2021-05-04 | End: 2021-11-30

## 2021-05-18 ENCOUNTER — OFFICE VISIT (OUTPATIENT)
Dept: GASTROENTEROLOGY | Facility: CLINIC | Age: 56
End: 2021-05-18

## 2021-05-18 VITALS — HEIGHT: 62 IN | BODY MASS INDEX: 36.8 KG/M2 | WEIGHT: 200 LBS

## 2021-05-18 DIAGNOSIS — K59.04 CHRONIC IDIOPATHIC CONSTIPATION: Primary | ICD-10-CM

## 2021-05-18 DIAGNOSIS — Z87.11 HISTORY OF PEPTIC ULCER: ICD-10-CM

## 2021-05-18 DIAGNOSIS — R12 HEARTBURN: ICD-10-CM

## 2021-05-18 PROCEDURE — 99213 OFFICE O/P EST LOW 20 MIN: CPT | Performed by: INTERNAL MEDICINE

## 2021-05-18 RX ORDER — CLOPIDOGREL BISULFATE 75 MG/1
75 TABLET ORAL DAILY
Qty: 90 TABLET | Refills: 1 | Status: SHIPPED | OUTPATIENT
Start: 2021-05-18 | End: 2021-09-22

## 2021-05-18 RX ORDER — FLUTICASONE PROPIONATE 50 MCG
2 SPRAY, SUSPENSION (ML) NASAL DAILY
COMMUNITY

## 2021-05-18 NOTE — PATIENT INSTRUCTIONS
· Stop senna - start colace 100 mg-150 mg daily  · Start benefiber once daily - could increase to twice daily after several weeks if needed  · Continue pantoprazole once daily  · Start walking 5 days per week 10 minutes daily - increase to goal of 20-30 minutes daily  · Work to improve diet and lose weight with goal of 20 lbs by end of year

## 2021-05-18 NOTE — PROGRESS NOTES
Subjective   Chief Complaint   Patient presents with   • Constipation   • Abdominal Pain   • Heartburn       Flaca Hassan is a  56 y.o. female here for a follow up visit for abdominal pain and rectal bleeding.  She was last seen in January 2021.    History of EGD in 2017 with duodenal ulcer.  She had a colonoscopy 2/16 - she had diverticula and IH.    She reports that she was recently diagnosed with uterine cancer.  She underwent a hysterectomy and is reportedly cancer free.  She does not require any further therapy.  She is had no further bleeding.  In retrospect she feels like this was more uterine bleeding related to her cancer.  She has not seen any blood in the stool.    Recent labs from her PCP in April reviewed-hemoglobin was 13.7.    She takes Senokot daily.  She reports that her bowel movements range from loose to hard.  Sometimes when she has a hard bowel movement she has some left upper quadrant pain.  She also reports that this radiates to her back.  She has episodic heartburn.  When she eats spicy food she notes that her stools a different color and it can be sometimes loose.  HPI  Past Medical History:   Diagnosis Date   • Anemia    • Asthma    • Breast cyst    • Coronary artery disease     stent   • Depression    • Diverticulosis    • H/O: GI bleed     recurrent   • Hematuria    • History of transfusion     no reaction   • Hyperlipidemia    • Hypertension    • Incontinence of urine     wears pads   • Irritable bowel syndrome    • Melena    • Obesity    • Oxygen dependent     uses oxygen 2 prn when walking if SOB   • UTI (urinary tract infection)      Past Surgical History:   Procedure Laterality Date   • BREAST CYST EXCISION Left    • CARDIAC CATHETERIZATION N/A 11/13/2017    Procedure: Left Heart Cath;  Surgeon: Imer Montaño MD;  Location: [facility] INVASIVE LOCATION;  Service:    • CARDIAC CATHETERIZATION N/A 11/13/2017    Procedure: Stent ROBERTO coronary;  Surgeon: Imer  MD Danyel;  Location: [facility] CATH INVASIVE LOCATION;  Service:    • COLONOSCOPY  02/11/2016    tics, NBIH,    • D & C HYSTEROSCOPY ENDOMETRIAL ABLATION N/A 1/8/2021    Procedure: DILATATION AND CURETTAGE HYSTEROSCOPY ENDOMETRIAL  RESECTION;  Surgeon: Thu Blake MD;  Location: [facility] OR [facility] [place];  Service: Obstetrics/Gynecology;  Laterality: N/A;   • ENDOSCOPY N/A 3/3/2017    erythematous mucosa in stomach, duodenal ulcer , mild to moderate chronic active gastritis, positive for h pylori   • UPPER GASTROINTESTINAL ENDOSCOPY  02/10/2016    Dieudonne Willson M.D.       Current Outpatient Medications:   •  acetaminophen (TYLENOL) 500 MG tablet, Take 1,000 mg by mouth Every 6 (Six) Hours As Needed for Mild Pain ., Disp: , Rfl:   •  amLODIPine (NORVASC) 10 MG tablet, TAKE 1 TABLET BY MOUTH DAILY, Disp: 30 tablet, Rfl: 3  •  bisoprolol-hydrochlorothiazide (ZIAC) 5-6.25 MG per tablet, TAKE 1 TABLET BY MOUTH DAILY, Disp: 30 tablet, Rfl: 4  •  budesonide-formoterol (SYMBICORT) 160-4.5 MCG/ACT inhaler, Inhale 2 puffs 2 (two) times a day., Disp: , Rfl:   •  Cholecalciferol (VITAMIN D3) 2000 units tablet, Take 1 tablet by mouth Daily., Disp: , Rfl: 1  •  clopidogrel (PLAVIX) 75 MG tablet, TAKE 1 TABLET BY MOUTH DAILY, Disp: 90 tablet, Rfl: 1  •  fluticasone (FLONASE) 50 MCG/ACT nasal spray, 2 sprays into the nostril(s) as directed by provider Daily., Disp: , Rfl:   •  Fluticasone Furoate-Vilanterol (Breo Ellipta) 200-25 MCG/INH inhaler, Inhale 1 puff Daily., Disp: , Rfl:   •  furosemide (LASIX) 40 MG tablet, Take 1 tablet by mouth Daily., Disp: 30 tablet, Rfl: 6  •  montelukast (SINGULAIR) 10 MG tablet, Take 10 mg by mouth Every Night., Disp: , Rfl:   •  nitroglycerin (NITROSTAT) 0.4 MG SL tablet, 1 under the tongue as needed for angina, may repeat q5mins for up three doses, Disp: 25 tablet, Rfl: 3  •  O2 (OXYGEN), Inhale 2 L/min As Needed., Disp: , Rfl:   •  olmesartan-hydrochlorothiazide (BENICAR HCT) 40-12.5 MG per  tablet, Take 1 tablet by mouth Daily., Disp: 30 tablet, Rfl: 6  •  pantoprazole (PROTONIX) 40 MG EC tablet, Take 1 tablet by mouth Daily., Disp: 30 tablet, Rfl: 6  •  potassium chloride (MICRO-K) 10 MEQ CR capsule, 1 DAILY, Disp: 30 capsule, Rfl: 6  •  Proctozone-HC 2.5 % rectal cream, ALVA AA ON RECTUM DAILY PRF HEMORRHOIDS, Disp: , Rfl:   •  senna (Senokot) 8.6 MG tablet, Take 1 tablet by mouth Daily., Disp: 30 tablet, Rfl: 5  •  simvastatin (ZOCOR) 20 MG tablet, Take 1 tablet by mouth Every Evening., Disp: 30 tablet, Rfl: 6  •  traMADol (ULTRAM) 50 MG tablet, Take 1 tablet by mouth Every 6 (Six) Hours As Needed for Moderate Pain ., Disp: 5 tablet, Rfl: 0  •  VENTOLIN  (90 Base) MCG/ACT inhaler, Inhale 2 puffs Every 4 (Four) Hours As Needed for Wheezing or Shortness of Air., Disp: , Rfl: 6  •  escitalopram (LEXAPRO) 10 MG tablet, Take 10 mg by mouth Every Morning., Disp: , Rfl: 0  •  folic acid (FOLVITE) 1 MG tablet, Take 1 mg by mouth Daily., Disp: , Rfl: 0  •  hydrocortisone (ANUSOL-HC) 25 MG suppository, Insert 1 suppository into the rectum Daily As Needed for Hemorrhoids (rectal discomfort)., Disp: 30 suppository, Rfl: 5  •  medroxyPROGESTERone (Provera) 10 MG tablet, Take 1 tablet by mouth Daily., Disp: 30 tablet, Rfl: 2  PRN Meds:.  No Known Allergies  Social History     Socioeconomic History   • Marital status:      Spouse name: Not on file   • Number of children: Not on file   • Years of education: High School   • Highest education level: Not on file   Tobacco Use   • Smoking status: Never Smoker   • Smokeless tobacco: Never Used   Vaping Use   • Vaping Use: Never used   Substance and Sexual Activity   • Alcohol use: No   • Drug use: Never     Family History   Problem Relation Age of Onset   • Breast cancer Other    • Lung cancer Brother    • Hypertension Brother    • Hyperlipidemia Brother    • Heart disease Brother    • Throat cancer Paternal Uncle    • Tongue cancer Paternal Grandfather     • Hypertension Father    • Hyperlipidemia Father    • Heart disease Father    • Malig Hyperthermia Neg Hx      Review of Systems   Gastrointestinal: Positive for abdominal pain, constipation and diarrhea. Negative for blood in stool.     There were no vitals filed for this visit.      05/18/21  1525   Weight: 90.7 kg (200 lb)       Objective   Physical Exam  Constitutional:       Appearance: Normal appearance.   Abdominal:      Comments: abdominal obesity, not distended   Neurological:      Mental Status: She is alert.       No radiology results for the last 7 days    Assessment/Plan   Diagnoses and all orders for this visit:    Chronic idiopathic constipation    History of peptic ulcer    Heartburn    Other orders  -     Fluticasone Furoate-Vilanterol (Breo Ellipta) 200-25 MCG/INH inhaler; Inhale 1 puff Daily.  -     fluticasone (FLONASE) 50 MCG/ACT nasal spray; 2 sprays into the nostril(s) as directed by provider Daily.      Plan:  · Stop senna - start colace 100 mg-150 mg daily  · Start benefiber once daily - could increase to twice daily after several weeks if needed  · Continue pantoprazole once daily  · We discussed that she needs diet and lifestyle modification to improve her symptoms as well-we gave her some goals in the hopes that this would motivate her to start watching her diet and increasing her activity.  · Start walking 5 days per week 10 minutes daily - increase to goal of 20-30 minutes daily  · Work to improve diet and lose weight with goal of 20 lbs by end of year

## 2021-06-23 ENCOUNTER — TRANSCRIBE ORDERS (OUTPATIENT)
Dept: SLEEP MEDICINE | Facility: HOSPITAL | Age: 56
End: 2021-06-23

## 2021-06-23 DIAGNOSIS — Z01.818 OTHER SPECIFIED PRE-OPERATIVE EXAMINATION: Primary | ICD-10-CM

## 2021-07-12 ENCOUNTER — OFFICE VISIT (OUTPATIENT)
Dept: CARDIOLOGY | Facility: CLINIC | Age: 56
End: 2021-07-12

## 2021-07-12 VITALS
BODY MASS INDEX: 37.54 KG/M2 | SYSTOLIC BLOOD PRESSURE: 177 MMHG | WEIGHT: 204 LBS | HEIGHT: 62 IN | HEART RATE: 59 BPM | DIASTOLIC BLOOD PRESSURE: 85 MMHG

## 2021-07-12 DIAGNOSIS — E78.5 HYPERLIPIDEMIA, UNSPECIFIED HYPERLIPIDEMIA TYPE: ICD-10-CM

## 2021-07-12 DIAGNOSIS — I25.10 CORONARY ARTERY DISEASE INVOLVING NATIVE CORONARY ARTERY OF NATIVE HEART WITHOUT ANGINA PECTORIS: Primary | ICD-10-CM

## 2021-07-12 DIAGNOSIS — I10 ESSENTIAL HYPERTENSION: ICD-10-CM

## 2021-07-12 PROCEDURE — 99213 OFFICE O/P EST LOW 20 MIN: CPT | Performed by: INTERNAL MEDICINE

## 2021-07-12 NOTE — PROGRESS NOTES
6 month follow up    Subjective:        Flaca Hassan is a 56 y.o. female who here for follow up    CC  CP OCCASIONAL  SOB  RECENT ENDOMETRIOSIS  HPI  56 years old female with known history of the coronary artery disease, benign essential arterial hypertension and hyperlipidemia has rare episodes of chest pain under stress recently also underwent the treatment for endometriosis     Problems Addressed this Visit        Cardiac and Vasculature    Hypertension    Hyperlipidemia    Coronary artery disease involving native heart without angina pectoris - Primary      Diagnoses       Codes Comments    Coronary artery disease involving native coronary artery of native heart without angina pectoris    -  Primary ICD-10-CM: I25.10  ICD-9-CM: 414.01     Hyperlipidemia, unspecified hyperlipidemia type     ICD-10-CM: E78.5  ICD-9-CM: 272.4     Essential hypertension     ICD-10-CM: I10  ICD-9-CM: 401.9         .Interpretation Summary       · Findings consistent with an equivocal ECG stress test.  · Left ventricular ejection fraction is normal. (Calculated EF = 60%).  · Myocardial perfusion imaging indicates a small-sized, mildly severe area of ischemia located in the lateral wall.  · Impressions are consistent with an intermediate risk study   Interpretation Summary    · Calculated left ventricular EF = 62.9% Estimated left ventricular EF was in agreement with the calculated left ventricular EF.  · Left ventricular diastolic function was normal.  · Calculated left ventricular EF = 62.9  · There is no evidence of pericardial effusion.     Units 2 mo ago   Total Cholesterol   0 - 200 mg/dL 147    Comment: Cholesterol Reference Ranges   (U.S. Department of Health and Human Services ATP III   Classifications)   Desirable          <200 mg/dL   Borderline High    200-239 mg/dL   High Risk          >240 mg/dL   Triglyceride Reference Ranges   (U.S. Department of Health and Human Services ATP III   Classifications)   Normal            <150 mg/dL   Borderline High  150-199 mg/dL   High             200-499 mg/dL   Very High        >500 mg/dL   HDL Reference Ranges   (U.S. Department of Health and Human Services ATP III   Classifcations)   Low     <40 mg/dl (major risk factor for CHD)   High    >60 mg/dl ('negative' risk factor for CHD)   LDL Reference Ranges   (U.S. Department of Health and Human Services ATP III   Classifcations)   Optimal          <100 mg/dL   Near Optimal     100-129 mg/dL   Borderline High  130-159 mg/dL   High             160-189 mg/dL   Very High        >189 mg/dL    Triglycerides   0 - 150 mg/dL 158High     HDL Cholesterol   40 - 60 mg/dL 36Low     VLDL Cholesterol Rom   5 - 40 mg/dL 28    LDL Chol Calc ([facility])   0 - 100 mg/dL 83            The following portions of the patient's history were reviewed and updated as appropriate: allergies, current medications, past family history, past medical history, past social history, past surgical history and problem list.    Past Medical History:   Diagnosis Date   • Anemia    • Asthma    • Breast cyst    • Coronary artery disease     stent   • Depression    • Diverticulosis    • H/O: GI bleed     recurrent   • Hematuria    • History of transfusion     no reaction   • Hyperlipidemia    • Hypertension    • Incontinence of urine     wears pads   • Irritable bowel syndrome    • Melena    • Obesity    • Oxygen dependent     uses oxygen 2 prn when walking if SOB   • UTI (urinary tract infection)      reports that she has never smoked. She has never used smokeless tobacco. She reports that she does not drink alcohol and does not use drugs.   Family History   Problem Relation Age of Onset   • Breast cancer Other    • Lung cancer Brother    • Hypertension Brother    • Hyperlipidemia Brother    • Heart disease Brother    • Throat cancer Paternal Uncle    • Tongue cancer Paternal Grandfather    • Hypertension Father    • Hyperlipidemia Father    • Heart disease Father    • Malig Hyperthermia  "Neg Hx        Review of Systems  Constitutional: No wt loss, fever, fatigue  Gastrointestinal: No nausea, abdominal pain  Behavioral/Psych: No insomnia or anxiety   Cardiovascular no chest pains or tightness in the chest  Objective:       Physical Exam  /85   Pulse 59   Ht 157.5 cm (62\")   Wt 92.5 kg (204 lb)   LMP  (LMP Unknown)   BMI 37.31 kg/m²   General appearance: No acute changes   Neck: Trachea midline; NECK, supple, no thyromegaly or lymphadenopathy   Lungs: Normal size and shape, normal breath sounds, equal distribution of air, no rales and rhonchi   CV: S1-S2 regular, no murmurs, no rub, no gallop   Abdomen: Soft, non-tender; no masses , no abnormal abdominal sounds   Extremities: No deformity , normal color , no peripheral edema   Skin: Normal temperature, turgor and texture; no rash, ulcers          Procedures      Echocardiogram:        Current Outpatient Medications:   •  acetaminophen (TYLENOL) 500 MG tablet, Take 1,000 mg by mouth Every 6 (Six) Hours As Needed for Mild Pain ., Disp: , Rfl:   •  amLODIPine (NORVASC) 10 MG tablet, TAKE 1 TABLET BY MOUTH DAILY, Disp: 30 tablet, Rfl: 3  •  bisoprolol-hydrochlorothiazide (ZIAC) 5-6.25 MG per tablet, TAKE 1 TABLET BY MOUTH DAILY, Disp: 30 tablet, Rfl: 4  •  budesonide-formoterol (SYMBICORT) 160-4.5 MCG/ACT inhaler, Inhale 2 puffs 2 (two) times a day., Disp: , Rfl:   •  Cholecalciferol (VITAMIN D3) 2000 units tablet, Take 1 tablet by mouth Daily., Disp: , Rfl: 1  •  clopidogrel (PLAVIX) 75 MG tablet, TAKE 1 TABLET BY MOUTH DAILY, Disp: 90 tablet, Rfl: 1  •  escitalopram (LEXAPRO) 10 MG tablet, Take 10 mg by mouth Every Morning., Disp: , Rfl: 0  •  fluticasone (FLONASE) 50 MCG/ACT nasal spray, 2 sprays into the nostril(s) as directed by provider Daily., Disp: , Rfl:   •  Fluticasone Furoate-Vilanterol (Breo Ellipta) 200-25 MCG/INH inhaler, Inhale 1 puff Daily., Disp: , Rfl:   •  folic acid (FOLVITE) 1 MG tablet, Take 1 mg by mouth Daily., Disp: , " Rfl: 0  •  furosemide (LASIX) 40 MG tablet, Take 1 tablet by mouth Daily., Disp: 30 tablet, Rfl: 6  •  hydrocortisone (ANUSOL-HC) 25 MG suppository, Insert 1 suppository into the rectum Daily As Needed for Hemorrhoids (rectal discomfort)., Disp: 30 suppository, Rfl: 5  •  medroxyPROGESTERone (Provera) 10 MG tablet, Take 1 tablet by mouth Daily., Disp: 30 tablet, Rfl: 2  •  montelukast (SINGULAIR) 10 MG tablet, Take 10 mg by mouth Every Night., Disp: , Rfl:   •  nitroglycerin (NITROSTAT) 0.4 MG SL tablet, 1 under the tongue as needed for angina, may repeat q5mins for up three doses, Disp: 25 tablet, Rfl: 3  •  O2 (OXYGEN), Inhale 2 L/min As Needed., Disp: , Rfl:   •  olmesartan-hydrochlorothiazide (BENICAR HCT) 40-12.5 MG per tablet, Take 1 tablet by mouth Daily., Disp: 30 tablet, Rfl: 6  •  pantoprazole (PROTONIX) 40 MG EC tablet, Take 1 tablet by mouth Daily., Disp: 30 tablet, Rfl: 6  •  potassium chloride (MICRO-K) 10 MEQ CR capsule, 1 DAILY, Disp: 30 capsule, Rfl: 6  •  Proctozone-HC 2.5 % rectal cream, ALVA AA ON RECTUM DAILY PRF HEMORRHOIDS, Disp: , Rfl:   •  senna (Senokot) 8.6 MG tablet, Take 1 tablet by mouth Daily., Disp: 30 tablet, Rfl: 5  •  simvastatin (ZOCOR) 20 MG tablet, Take 1 tablet by mouth Every Evening., Disp: 30 tablet, Rfl: 6  •  traMADol (ULTRAM) 50 MG tablet, Take 1 tablet by mouth Every 6 (Six) Hours As Needed for Moderate Pain ., Disp: 5 tablet, Rfl: 0  •  VENTOLIN  (90 Base) MCG/ACT inhaler, Inhale 2 puffs Every 4 (Four) Hours As Needed for Wheezing or Shortness of Air., Disp: , Rfl: 6   Assessment:        Patient Active Problem List   Diagnosis   • Hypertension   • Obesity   • Hyperlipidemia   • Iron deficiency anemia due to chronic blood loss   • Upper GI bleeding   • ST elevation myocardial infarction (STEMI) (CMS/HCC)   • Coronary artery disease involving native heart without angina pectoris   • Morgagni hernia   • IFG (impaired fasting glucose)   • Primary insomnia   • PMB  (postmenopausal bleeding)   • Endometrial cancer (CMS/HCC)   • Uncomplicated asthma   • LI (obstructive sleep apnea)               Plan:            ICD-10-CM ICD-9-CM   1. Coronary artery disease involving native coronary artery of native heart without angina pectoris  I25.10 414.01   2. Hyperlipidemia, unspecified hyperlipidemia type  E78.5 272.4   3. Essential hypertension  I10 401.9     1. Coronary artery disease involving native coronary artery of native heart without angina pectoris  No angina pectoris    2. Hyperlipidemia, unspecified hyperlipidemia type  Continue current treatment    3. Essential hypertension  Blood pressure under control       GRADUAL INCREASE EXERCISE  SEE IN 6 MONTHS  COUNSELING:    Flaca Gudino was given to patient for the following topics: diagnostic results, risk factor reductions, impressions, risks and benefits of treatment options and importance of treatment compliance .       SMOKING COUNSELING:    [unfilled]    Dictated using Dragon dictation

## 2021-07-15 ENCOUNTER — TELEPHONE (OUTPATIENT)
Dept: CARDIOLOGY | Facility: CLINIC | Age: 56
End: 2021-07-15

## 2021-07-15 NOTE — TELEPHONE ENCOUNTER
----- Message from Tari FREEMAN Geronimo sent at 7/15/2021 12:53 PM EDT -----  Regarding: REFILL ON BENICAR PT'S FAMILY MEMBER IS SAYING IT NEEDS A PA?? DOESN'T HAVE ANY MEDICATION TO TAKE FOR TODAY  OR CAN SUBSTITUTE    LISA (BROTHER) [phone]

## 2021-07-16 ENCOUNTER — TELEPHONE (OUTPATIENT)
Dept: CARDIOLOGY | Facility: CLINIC | Age: 56
End: 2021-07-16

## 2021-07-16 NOTE — TELEPHONE ENCOUNTER
----- Message from Tari Melton sent at 7/15/2021 12:53 PM EDT -----  Regarding: REFILL ON BENICAR PT'S FAMILY MEMBER IS SAYING IT NEEDS A PA?? DOESN'T HAVE ANY MEDICATION TO TAKE FOR TODAY  OR CAN SUBSTITUTE    LISA (BROTHER) [phone]      Spoke with pharmacy to see if we could have two sperate prescriptions and they didn't not get approved by insurance. They are suppose to send over PA for Benicar

## 2021-08-23 RX ORDER — BISOPROLOL FUMARATE AND HYDROCHLOROTHIAZIDE 5; 6.25 MG/1; MG/1
1 TABLET ORAL DAILY
Qty: 30 TABLET | Refills: 3 | Status: SHIPPED | OUTPATIENT
Start: 2021-08-23 | End: 2021-09-16 | Stop reason: SDUPTHER

## 2021-09-10 ENCOUNTER — APPOINTMENT (OUTPATIENT)
Dept: SLEEP MEDICINE | Facility: HOSPITAL | Age: 56
End: 2021-09-10

## 2021-09-16 NOTE — TELEPHONE ENCOUNTER
PATIENT CALLED IN NEEDING A REFILL ON BISOPROLOL HCTZ AND OLMESARTAN HCTZ   Requested Prescriptions     Pending Prescriptions Disp Refills   • bisoprolol-hydrochlorothiazide (ZIAC) 5-6.25 MG per tablet 30 tablet 3     Sig: Take 1 tablet by mouth Daily.   • olmesartan-hydrochlorothiazide (BENICAR HCT) 40-12.5 MG per tablet 30 tablet 3     Sig: Take 1 tablet by mouth Daily.

## 2021-09-17 RX ORDER — BISOPROLOL FUMARATE AND HYDROCHLOROTHIAZIDE 5; 6.25 MG/1; MG/1
1 TABLET ORAL DAILY
Qty: 30 TABLET | Refills: 3 | Status: SHIPPED | OUTPATIENT
Start: 2021-09-17 | End: 2022-01-24

## 2021-09-17 RX ORDER — OLMESARTAN MEDOXOMIL AND HYDROCHLOROTHIAZIDE 40/12.5 40; 12.5 MG/1; MG/1
1 TABLET ORAL DAILY
Qty: 30 TABLET | Refills: 3 | Status: SHIPPED | OUTPATIENT
Start: 2021-09-17 | End: 2021-09-27

## 2021-09-22 RX ORDER — CLOPIDOGREL BISULFATE 75 MG/1
75 TABLET ORAL DAILY
Qty: 90 TABLET | Refills: 1 | Status: SHIPPED | OUTPATIENT
Start: 2021-09-22 | End: 2021-11-22

## 2021-09-22 NOTE — TELEPHONE ENCOUNTER
Rx Refill Note  Requested Prescriptions     Pending Prescriptions Disp Refills   • olmesartan-hydrochlorothiazide (BENICAR HCT) 40-12.5 MG per tablet [Pharmacy Med Name: OLMESARTAN MEDOX/HCTZ 40-12.5MG TAB] 30 tablet 3     Sig: TAKE 1 TABLET BY MOUTH DAILY     Signed Prescriptions Disp Refills   • clopidogrel (PLAVIX) 75 MG tablet 90 tablet 1     Sig: TAKE 1 TABLET BY MOUTH DAILY     Authorizing Provider: TRISHA PADILLA     Ordering User: HUSSEIN SAMUELS      Last office visit with prescribing clinician: 7/12/2021      Next office visit with prescribing clinician: 1/10/2022            Hussein Samuels CMA  09/22/21, 08:57 EDT

## 2021-09-23 ENCOUNTER — TELEPHONE (OUTPATIENT)
Dept: CARDIOLOGY | Facility: HOSPITAL | Age: 56
End: 2021-09-23

## 2021-09-23 NOTE — TELEPHONE ENCOUNTER
----- Message from Tari Melton sent at 9/22/2021  3:46 PM EDT -----  Regarding: PA FOR BENICAR ONLY HAS 2 PILLS LEFT  Contact: [phone]

## 2021-09-23 NOTE — TELEPHONE ENCOUNTER
S/w pt gave instructions and called Benicar to [facility]     Informed pt son, Papo of cost and PA was sent to insurance company

## 2021-09-23 NOTE — TELEPHONE ENCOUNTER
PA has been sent through cover my meds.    Pt can also get through [facility]     [facility] #[phone]   #90  30.68  [facility]  #30  20.85   #90  44.25  [facility]  #30  27.30  #90  55.69

## 2021-09-27 RX ORDER — OLMESARTAN MEDOXOMIL AND HYDROCHLOROTHIAZIDE 40/12.5 40; 12.5 MG/1; MG/1
1 TABLET ORAL DAILY
Qty: 30 TABLET | Refills: 3 | Status: SHIPPED | OUTPATIENT
Start: 2021-09-27 | End: 2022-01-12 | Stop reason: CLARIF

## 2021-10-25 RX ORDER — PANTOPRAZOLE SODIUM 40 MG/1
40 TABLET, DELAYED RELEASE ORAL DAILY
Qty: 30 TABLET | Refills: 3 | Status: SHIPPED | OUTPATIENT
Start: 2021-10-25 | End: 2021-10-27 | Stop reason: SDUPTHER

## 2021-10-26 ENCOUNTER — APPOINTMENT (OUTPATIENT)
Dept: SLEEP MEDICINE | Facility: HOSPITAL | Age: 56
End: 2021-10-26

## 2021-10-27 ENCOUNTER — OFFICE VISIT (OUTPATIENT)
Dept: GASTROENTEROLOGY | Facility: CLINIC | Age: 56
End: 2021-10-27

## 2021-10-27 ENCOUNTER — PREP FOR SURGERY (OUTPATIENT)
Dept: OTHER | Facility: HOSPITAL | Age: 56
End: 2021-10-27

## 2021-10-27 ENCOUNTER — TELEPHONE (OUTPATIENT)
Dept: GASTROENTEROLOGY | Facility: CLINIC | Age: 56
End: 2021-10-27

## 2021-10-27 VITALS — WEIGHT: 203 LBS | HEIGHT: 62 IN | BODY MASS INDEX: 37.36 KG/M2 | TEMPERATURE: 97.3 F

## 2021-10-27 DIAGNOSIS — Z87.11 HISTORY OF PEPTIC ULCER DISEASE: ICD-10-CM

## 2021-10-27 DIAGNOSIS — K59.04 CHRONIC IDIOPATHIC CONSTIPATION: ICD-10-CM

## 2021-10-27 DIAGNOSIS — R10.13 DYSPEPSIA: Primary | ICD-10-CM

## 2021-10-27 DIAGNOSIS — K92.1 BLOOD IN THE STOOL: ICD-10-CM

## 2021-10-27 PROCEDURE — 99214 OFFICE O/P EST MOD 30 MIN: CPT | Performed by: INTERNAL MEDICINE

## 2021-10-27 RX ORDER — PANTOPRAZOLE SODIUM 40 MG/1
40 TABLET, DELAYED RELEASE ORAL DAILY
Qty: 30 TABLET | Refills: 5 | Status: SHIPPED | OUTPATIENT
Start: 2021-10-27 | End: 2022-08-26

## 2021-10-27 RX ORDER — POLYETHYLENE GLYCOL 3350 17 G/17G
17 POWDER, FOR SOLUTION ORAL DAILY
Qty: 30 PACKET | Refills: 5 | Status: SHIPPED | OUTPATIENT
Start: 2021-10-27 | End: 2022-05-12

## 2021-10-27 NOTE — TELEPHONE ENCOUNTER
LEONAROD patient in office for EGD. Scheduled [facility] 1/08/2021 arrive at 2:30pm. Prep packet given to patient. Spoke with María

## 2021-10-27 NOTE — PROGRESS NOTES
Subjective   Chief Complaint   Patient presents with   • Heartburn   • Constipation   • Rectal Bleeding       Flaca Hassan is a  56 y.o. female here for a follow up visit for heartburn, constipation, rectal bleeding.     History of EGD in 2017 with duodenal ulcer.  She had a colonoscopy 2/16 - she had diverticula and IH.    She was last seen in May 2021.  At that time we stopped her Senokot and started her on Colace and fiber.  Encouraged her to start a diet regimen with goal of weight loss.  Weight has been stable.  She is try to walk on the treadmill but only few times.  She gets short of breath when she stops.    She has not been using the fiber the Colace that was recommended.  She is waiting until she gets constipated and taking MiraLAX.  She says she sometimes sees blood in her stool.  She reports that when she eats spicy foods her stool looks like coffee grounds.  HPI  Past Medical History:   Diagnosis Date   • Anemia    • Asthma    • Breast cyst    • Coronary artery disease     stent   • Depression    • Diverticulosis    • H/O: GI bleed     recurrent   • Hematuria    • History of transfusion     no reaction   • Hyperlipidemia    • Hypertension    • Incontinence of urine     wears pads   • Irritable bowel syndrome    • Melena    • Obesity    • Oxygen dependent     uses oxygen 2 prn when walking if SOB   • UTI (urinary tract infection)      Past Surgical History:   Procedure Laterality Date   • BREAST CYST EXCISION Left    • CARDIAC CATHETERIZATION N/A 11/13/2017    Procedure: Left Heart Cath;  Surgeon: Imer Montaño MD;  Location:  [facility];  Service:    • CARDIAC CATHETERIZATION N/A 11/13/2017    Procedure: Stent ROBERTO coronary;  Surgeon: Imer Montaño MD;  Location:  [facility];  Service:    • COLONOSCOPY  02/11/2016    VICTOR MANUEL hernandez,    • D & C HYSTEROSCOPY ENDOMETRIAL ABLATION N/A 1/8/2021    Procedure: DILATATION AND CURETTAGE HYSTEROSCOPY ENDOMETRIAL   RESECTION;  Surgeon: Thu Blake MD;  Location: [facility];  Service: Obstetrics/Gynecology;  Laterality: N/A;   • ENDOSCOPY N/A 3/3/2017    erythematous mucosa in stomach, duodenal ulcer , mild to moderate chronic active gastritis, positive for h pylori   • UPPER GASTROINTESTINAL ENDOSCOPY  02/10/2016    sami-Ruth Ann Willson M.D.       Current Outpatient Medications:   •  acetaminophen (TYLENOL) 500 MG tablet, Take 1,000 mg by mouth Every 6 (Six) Hours As Needed for Mild Pain ., Disp: , Rfl:   •  amLODIPine (NORVASC) 10 MG tablet, TAKE 1 TABLET BY MOUTH DAILY, Disp: 30 tablet, Rfl: 3  •  bisoprolol-hydrochlorothiazide (ZIAC) 5-6.25 MG per tablet, Take 1 tablet by mouth Daily., Disp: 30 tablet, Rfl: 3  •  budesonide-formoterol (SYMBICORT) 160-4.5 MCG/ACT inhaler, Inhale 2 puffs 2 (two) times a day., Disp: , Rfl:   •  Cholecalciferol (VITAMIN D3) 2000 units tablet, Take 1 tablet by mouth Daily., Disp: , Rfl: 1  •  clopidogrel (PLAVIX) 75 MG tablet, TAKE 1 TABLET BY MOUTH DAILY, Disp: 90 tablet, Rfl: 1  •  escitalopram (LEXAPRO) 10 MG tablet, Take 10 mg by mouth Every Morning., Disp: , Rfl: 0  •  fluticasone (FLONASE) 50 MCG/ACT nasal spray, 2 sprays into the nostril(s) as directed by provider Daily., Disp: , Rfl:   •  Fluticasone Furoate-Vilanterol (Breo Ellipta) 200-25 MCG/INH inhaler, Inhale 1 puff Daily., Disp: , Rfl:   •  folic acid (FOLVITE) 1 MG tablet, Take 1 mg by mouth Daily., Disp: , Rfl: 0  •  furosemide (LASIX) 40 MG tablet, Take 1 tablet by mouth Daily., Disp: 30 tablet, Rfl: 6  •  hydrocortisone (ANUSOL-HC) 25 MG suppository, Insert 1 suppository into the rectum Daily As Needed for Hemorrhoids (rectal discomfort)., Disp: 30 suppository, Rfl: 5  •  medroxyPROGESTERone (Provera) 10 MG tablet, Take 1 tablet by mouth Daily., Disp: 30 tablet, Rfl: 2  •  montelukast (SINGULAIR) 10 MG tablet, Take 10 mg by mouth Every Night., Disp: , Rfl:   •  nitroglycerin (NITROSTAT) 0.4 MG SL tablet, 1 under the  tongue as needed for angina, may repeat q5mins for up three doses, Disp: 25 tablet, Rfl: 3  •  O2 (OXYGEN), Inhale 2 L/min As Needed., Disp: , Rfl:   •  olmesartan-hydrochlorothiazide (BENICAR HCT) 40-12.5 MG per tablet, TAKE 1 TABLET BY MOUTH DAILY, Disp: 30 tablet, Rfl: 3  •  pantoprazole (PROTONIX) 40 MG EC tablet, Take 1 tablet by mouth Daily., Disp: 30 tablet, Rfl: 5  •  potassium chloride (MICRO-K) 10 MEQ CR capsule, 1 DAILY, Disp: 30 capsule, Rfl: 6  •  Proctozone-HC 2.5 % rectal cream, ALVA AA ON RECTUM DAILY PRF HEMORRHOIDS, Disp: , Rfl:   •  senna (Senokot) 8.6 MG tablet, Take 1 tablet by mouth Daily., Disp: 30 tablet, Rfl: 5  •  simvastatin (ZOCOR) 20 MG tablet, Take 1 tablet by mouth Every Evening., Disp: 30 tablet, Rfl: 6  •  traMADol (ULTRAM) 50 MG tablet, Take 1 tablet by mouth Every 6 (Six) Hours As Needed for Moderate Pain ., Disp: 5 tablet, Rfl: 0  •  VENTOLIN  (90 Base) MCG/ACT inhaler, Inhale 2 puffs Every 4 (Four) Hours As Needed for Wheezing or Shortness of Air., Disp: , Rfl: 6  •  polyethylene glycol (MIRALAX) 17 g packet, Take 17 g by mouth Daily., Disp: 30 packet, Rfl: 5  PRN Meds:.  No Known Allergies  Social History     Socioeconomic History   • Marital status:    • Years of education: High School   Tobacco Use   • Smoking status: Never Smoker   • Smokeless tobacco: Never Used   Vaping Use   • Vaping Use: Never used   Substance and Sexual Activity   • Alcohol use: No   • Drug use: Never     Family History   Problem Relation Age of Onset   • Breast cancer Other    • Lung cancer Brother    • Hypertension Brother    • Hyperlipidemia Brother    • Heart disease Brother    • Throat cancer Paternal Uncle    • Tongue cancer Paternal Grandfather    • Hypertension Father    • Hyperlipidemia Father    • Heart disease Father    • Malig Hyperthermia Neg Hx      Review of Systems   Constitutional: Negative for appetite change and unexpected weight change.   Gastrointestinal: Positive for  abdominal pain, blood in stool and constipation.     Vitals:    10/27/21 1431   Temp: 97.3 °F (36.3 °C)         10/27/21  1431   Weight: 92.1 kg (203 lb)       Objective   Physical Exam  Constitutional:       Appearance: She is well-developed.   HENT:      Head: Normocephalic and atraumatic.   Eyes:      General: No scleral icterus.  Pulmonary:      Effort: Pulmonary effort is normal.   Abdominal:      General: There is no distension.      Palpations: Abdomen is soft.      Tenderness: There is no abdominal tenderness.   Skin:     General: Skin is warm and dry.   Neurological:      Mental Status: She is alert.       No radiology results for the last 7 days    Assessment/Plan   Diagnoses and all orders for this visit:    Dyspepsia  -     Case Request; Standing  -     COVID PRE-OP / PRE-PROCEDURE SCREENING ORDER (NO ISOLATION) - Swab, Nasopharynx; Future  -     Case Request    History of peptic ulcer disease  -     Case Request; Standing  -     COVID PRE-OP / PRE-PROCEDURE SCREENING ORDER (NO ISOLATION) - Swab, Nasopharynx; Future  -     Case Request    Blood in the stool    Chronic idiopathic constipation    Other orders  -     Follow Anesthesia Guidelines / Protocol; Future  -     Obtain Informed Consent; Future  -     pantoprazole (PROTONIX) 40 MG EC tablet; Take 1 tablet by mouth Daily.  -     polyethylene glycol (MIRALAX) 17 g packet; Take 17 g by mouth Daily.      Plan:  · I suspect that her rectal bleeding is from hemorrhoids in the setting of poorly controlled constipation.  However she is not had a colonoscopy since 2016.  We are planning on repeat upper endoscopy to rule out recurrent peptic ulcer disease and we will plan on repeat colonoscopy at that time.  · Encouraged her to start taking MiraLAX every day to prevent constipation and potential complications such as hemorrhoidal bleeding  · Encouraged initiation of daily exercise-I think her shortness of air is due to deconditioning as she is very sedentary  and not at all active.  · We discussed that she will need to hold her Plavix for 5 days prior to her colonoscopy.

## 2021-11-01 ENCOUNTER — TELEPHONE (OUTPATIENT)
Dept: CARDIOLOGY | Facility: CLINIC | Age: 56
End: 2021-11-01

## 2021-11-01 NOTE — TELEPHONE ENCOUNTER
Spoke with Patient's nephew. He states that she is suppose to have a colonoscopy and EGD on Monday. Needing to stop her Plavix after tomorrow. Will discuss    Per Sarah BANG patient is cleared for Colonoscopy and EGD. Patient can hold Plavix for 5 days prior to procedure.     Patient informed

## 2021-11-04 ENCOUNTER — TRANSCRIBE ORDERS (OUTPATIENT)
Dept: GASTROENTEROLOGY | Facility: CLINIC | Age: 56
End: 2021-11-04

## 2021-11-04 DIAGNOSIS — Z01.818 OTHER SPECIFIED PRE-OPERATIVE EXAMINATION: Primary | ICD-10-CM

## 2021-11-05 ENCOUNTER — LAB (OUTPATIENT)
Dept: LAB | Facility: HOSPITAL | Age: 56
End: 2021-11-05

## 2021-11-05 PROCEDURE — U0004 COV-19 TEST NON-CDC HGH THRU: HCPCS | Performed by: INTERNAL MEDICINE

## 2021-11-08 ENCOUNTER — ANESTHESIA EVENT (OUTPATIENT)
Dept: GASTROENTEROLOGY | Facility: HOSPITAL | Age: 56
End: 2021-11-08

## 2021-11-08 ENCOUNTER — HOSPITAL ENCOUNTER (OUTPATIENT)
Facility: HOSPITAL | Age: 56
Setting detail: HOSPITAL OUTPATIENT SURGERY
Discharge: HOME | End: 2021-11-08
Attending: INTERNAL MEDICINE | Admitting: INTERNAL MEDICINE

## 2021-11-08 ENCOUNTER — ANESTHESIA (OUTPATIENT)
Dept: GASTROENTEROLOGY | Facility: HOSPITAL | Age: 56
End: 2021-11-08

## 2021-11-08 VITALS
RESPIRATION RATE: 16 BRPM | SYSTOLIC BLOOD PRESSURE: 143 MMHG | HEIGHT: 62 IN | OXYGEN SATURATION: 96 % | WEIGHT: 201 LBS | BODY MASS INDEX: 36.99 KG/M2 | DIASTOLIC BLOOD PRESSURE: 62 MMHG | HEART RATE: 51 BPM

## 2021-11-08 DIAGNOSIS — Z87.11 HISTORY OF PEPTIC ULCER DISEASE: ICD-10-CM

## 2021-11-08 DIAGNOSIS — R10.13 DYSPEPSIA: ICD-10-CM

## 2021-11-08 PROCEDURE — S0260 H&P FOR SURGERY: HCPCS | Performed by: INTERNAL MEDICINE

## 2021-11-08 PROCEDURE — 88305 TISSUE EXAM BY PATHOLOGIST: CPT | Performed by: INTERNAL MEDICINE

## 2021-11-08 PROCEDURE — 25010000002 PROPOFOL 10 MG/ML EMULSION: Performed by: ANESTHESIOLOGY

## 2021-11-08 PROCEDURE — 43239 EGD BIOPSY SINGLE/MULTIPLE: CPT | Performed by: INTERNAL MEDICINE

## 2021-11-08 RX ORDER — PROPOFOL 10 MG/ML
VIAL (ML) INTRAVENOUS CONTINUOUS PRN
Status: DISCONTINUED | OUTPATIENT
Start: 2021-11-08 | End: 2021-11-08 | Stop reason: SURG

## 2021-11-08 RX ORDER — LIDOCAINE HYDROCHLORIDE 20 MG/ML
INJECTION, SOLUTION INFILTRATION; PERINEURAL AS NEEDED
Status: DISCONTINUED | OUTPATIENT
Start: 2021-11-08 | End: 2021-11-08 | Stop reason: SURG

## 2021-11-08 RX ORDER — PROPOFOL 10 MG/ML
VIAL (ML) INTRAVENOUS AS NEEDED
Status: DISCONTINUED | OUTPATIENT
Start: 2021-11-08 | End: 2021-11-08 | Stop reason: SURG

## 2021-11-08 RX ORDER — SODIUM CHLORIDE, SODIUM LACTATE, POTASSIUM CHLORIDE, CALCIUM CHLORIDE 600; 310; 30; 20 MG/100ML; MG/100ML; MG/100ML; MG/100ML
30 INJECTION, SOLUTION INTRAVENOUS CONTINUOUS PRN
Status: DISCONTINUED | OUTPATIENT
Start: 2021-11-08 | End: 2021-11-08 | Stop reason: HOSPADM

## 2021-11-08 RX ADMIN — SODIUM CHLORIDE, POTASSIUM CHLORIDE, SODIUM LACTATE AND CALCIUM CHLORIDE 30 ML/HR: 600; 310; 30; 20 INJECTION, SOLUTION INTRAVENOUS at 14:56

## 2021-11-08 RX ADMIN — LIDOCAINE HYDROCHLORIDE 30 MG: 20 INJECTION, SOLUTION INFILTRATION; PERINEURAL at 15:20

## 2021-11-08 RX ADMIN — PROPOFOL 150 MG: 10 INJECTION, EMULSION INTRAVENOUS at 15:20

## 2021-11-08 RX ADMIN — Medication 200 MCG/KG/MIN: at 15:21

## 2021-11-08 NOTE — H&P
[facility]  Pre Procedure History & Physical    Chief Complaint:   Epigastric pain, h/o PUD    Subjective     HPI:   Flaca Hassan is a  56 y.o. female here for a follow up visit for heartburn, constipation, rectal bleeding.      History of EGD in 2017 with duodenal ulcer.  She had a colonoscopy 2/16 - she had diverticula and IH.    She was last seen in May 2021.  At that time we stopped her Senokot and started her on Colace and fiber.  Encouraged her to start a diet regimen with goal of weight loss.  Weight has been stable.  She is try to walk on the treadmill but only few times.  She gets short of breath when she stops.     She has not been using the fiber the Colace that was recommended.  She is waiting until she gets constipated and taking MiraLAX.  She says she sometimes sees blood in her stool.  She reports that when she eats spicy foods her stool looks like coffee grounds.    Past Medical History:   Past Medical History:   Diagnosis Date   • Anemia    • Asthma    • Breast cyst    • Coronary artery disease     stent   • Depression    • Diverticulosis    • H/O: GI bleed     recurrent   • Hematuria    • History of transfusion     no reaction   • Hyperlipidemia    • Hypertension    • Incontinence of urine     wears pads   • Irritable bowel syndrome    • Melena    • Obesity    • Oxygen dependent     uses oxygen 2 prn when walking if SOB   • UTI (urinary tract infection)        Past Surgical History:  Past Surgical History:   Procedure Laterality Date   • BREAST CYST EXCISION Left    • CARDIAC CATHETERIZATION N/A 11/13/2017    Procedure: Left Heart Cath;  Surgeon: Imer Montaño MD;  Location:  [facility];  Service:    • CARDIAC CATHETERIZATION N/A 11/13/2017    Procedure: Stent ROBERTO coronary;  Surgeon: Imer Montaño MD;  Location:  [facility];  Service:    • COLONOSCOPY  02/11/2016    mary, VICTOR MANUEL,    • D & C HYSTEROSCOPY ENDOMETRIAL  ABLATION N/A 1/8/2021    Procedure: DILATATION AND CURETTAGE HYSTEROSCOPY ENDOMETRIAL  RESECTION;  Surgeon: Thu Blake MD;  [facility]  Service: Obstetrics/Gynecology;  Laterality: N/A;   • ENDOSCOPY N/A 3/3/2017    erythematous mucosa in stomach, duodenal ulcer , mild to moderate chronic active gastritis, positive for h pylori   • UPPER GASTROINTESTINAL ENDOSCOPY  02/10/2016    sami-Ruth Ann Willson M.D.       Family History:  Family History   Problem Relation Age of Onset   • Breast cancer Other    • Lung cancer Brother    • Hypertension Brother    • Hyperlipidemia Brother    • Heart disease Brother    • Throat cancer Paternal Uncle    • Tongue cancer Paternal Grandfather    • Hypertension Father    • Hyperlipidemia Father    • Heart disease Father    • Malig Hyperthermia Neg Hx        Social History:   reports that she has never smoked. She has never used smokeless tobacco. She reports that she does not drink alcohol and does not use drugs.    Medications:   Medications Prior to Admission   Medication Sig Dispense Refill Last Dose   • acetaminophen (TYLENOL) 500 MG tablet Take 1,000 mg by mouth Every 6 (Six) Hours As Needed for Mild Pain .   11/7/2021 at Unknown time   • amLODIPine (NORVASC) 10 MG tablet TAKE 1 TABLET BY MOUTH DAILY 30 tablet 3 11/7/2021 at Unknown time   • bisoprolol-hydrochlorothiazide (ZIAC) 5-6.25 MG per tablet Take 1 tablet by mouth Daily. 30 tablet 3 11/7/2021 at Unknown time   • budesonide-formoterol (SYMBICORT) 160-4.5 MCG/ACT inhaler Inhale 2 puffs 2 (two) times a day.   11/7/2021 at Unknown time   • Cholecalciferol (VITAMIN D3) 2000 units tablet Take 1 tablet by mouth Daily.  1 11/7/2021 at Unknown time   • escitalopram (LEXAPRO) 10 MG tablet Take 10 mg by mouth Every Morning.  0 11/7/2021 at Unknown time   • fluticasone (FLONASE) 50 MCG/ACT nasal spray 2 sprays into the nostril(s) as directed by provider Daily.   11/7/2021 at Unknown time   • Fluticasone  Furoate-Vilanterol (Breo Ellipta) 200-25 MCG/INH inhaler Inhale 1 puff Daily.   11/7/2021 at Unknown time   • folic acid (FOLVITE) 1 MG tablet Take 1 mg by mouth Daily.  0 11/7/2021 at Unknown time   • furosemide (LASIX) 40 MG tablet Take 1 tablet by mouth Daily. 30 tablet 6 11/7/2021 at Unknown time   • hydrocortisone (ANUSOL-HC) 25 MG suppository Insert 1 suppository into the rectum Daily As Needed for Hemorrhoids (rectal discomfort). 30 suppository 5 11/7/2021 at Unknown time   • medroxyPROGESTERone (Provera) 10 MG tablet Take 1 tablet by mouth Daily. 30 tablet 2 11/7/2021 at Unknown time   • montelukast (SINGULAIR) 10 MG tablet Take 10 mg by mouth Every Night.   11/7/2021 at Unknown time   • olmesartan-hydrochlorothiazide (BENICAR HCT) 40-12.5 MG per tablet TAKE 1 TABLET BY MOUTH DAILY 30 tablet 3 11/7/2021 at Unknown time   • pantoprazole (PROTONIX) 40 MG EC tablet Take 1 tablet by mouth Daily. 30 tablet 5 11/7/2021 at Unknown time   • polyethylene glycol (MIRALAX) 17 g packet Take 17 g by mouth Daily. 30 packet 5 11/7/2021 at Unknown time   • potassium chloride (MICRO-K) 10 MEQ CR capsule 1 DAILY 30 capsule 6 11/7/2021 at Unknown time   • Proctozone-HC 2.5 % rectal cream ALVA AA ON RECTUM DAILY PRF HEMORRHOIDS   11/7/2021 at Unknown time   • senna (Senokot) 8.6 MG tablet Take 1 tablet by mouth Daily. 30 tablet 5 11/7/2021 at Unknown time   • simvastatin (ZOCOR) 20 MG tablet Take 1 tablet by mouth Every Evening. 30 tablet 6 11/7/2021 at Unknown time   • traMADol (ULTRAM) 50 MG tablet Take 1 tablet by mouth Every 6 (Six) Hours As Needed for Moderate Pain . 5 tablet 0 11/7/2021 at Unknown time   • clopidogrel (PLAVIX) 75 MG tablet TAKE 1 TABLET BY MOUTH DAILY 90 tablet 1 11/3/2021   • nitroglycerin (NITROSTAT) 0.4 MG SL tablet 1 under the tongue as needed for angina, may repeat q5mins for up three doses 25 tablet 3    • O2 (OXYGEN) Inhale 2 L/min As Needed.      • VENTOLIN  (90 Base) MCG/ACT inhaler Inhale  "2 puffs Every 4 (Four) Hours As Needed for Wheezing or Shortness of Air.  6        Allergies:  Patient has no known allergies.    ROS:    Pertinent items are noted in HPI     Objective     Blood pressure 165/77, pulse 60, resp. rate 13, height 157.5 cm (62\"), weight 91.2 kg (201 lb), SpO2 97 %, not currently breastfeeding.    Physical Exam   Constitutional: Pt is oriented to person, place, and time and well-developed, well-nourished, and in no distress.   Mouth/Throat: Oropharynx is clear and moist.   Neck: Normal range of motion.   Cardiovascular: Normal rate, regular rhythm    Pulmonary/Chest: Effort normal    Abdominal: Soft. Nontender  Skin: Skin is warm and dry.   Psychiatric: Mood, memory, affect and judgment normal.     Assessment/Plan     Diagnosis:  Epigastric pain, h/o PUD    Anticipated Surgical Procedure:  egd with possible interventions    The risks, benefits, and alternatives of this procedure have been discussed with the patient or the responsible party- the patient understands and agrees to proceed.                                                              "

## 2021-11-08 NOTE — ANESTHESIA POSTPROCEDURE EVALUATION
"Patient: Flaca Hassan    Procedure Summary     Date: 11/08/21 Room / Location:  [facility]    Anesthesia Start: 1519 Anesthesia Stop: 1545    Procedure: ESOPHAGOGASTRODUODENOSCOPY WITH BX'S (N/A Esophagus) Diagnosis:       Dyspepsia      History of peptic ulcer disease      (Dyspepsia [R10.13])      (History of peptic ulcer disease [Z87.11])    Surgeons: Ruth Ann Willson MD Provider: Mary Kay Andrews MD    Anesthesia Type: MAC ASA Status: 3          Anesthesia Type: MAC    Vitals  Vitals Value Taken Time   /62 11/08/21 1558   Temp     Pulse 51 11/08/21 1558   Resp 16 11/08/21 1558   SpO2 96 % 11/08/21 1558           Post Anesthesia Care and Evaluation    Patient location during evaluation: PHASE II  Patient participation: complete - patient participated  Level of consciousness: awake and alert  Pain management: adequate  Airway patency: patent  Anesthetic complications: No anesthetic complications    Cardiovascular status: acceptable  Respiratory status: acceptable  Hydration status: acceptable    Comments: /62 (BP Location: Left arm, Patient Position: Sitting)   Pulse 51   Resp 16   Ht 157.5 cm (62\")   Wt 91.2 kg (201 lb)   LMP  (LMP Unknown)   SpO2 96%   BMI 36.76 kg/m²         "

## 2021-11-08 NOTE — ANESTHESIA PREPROCEDURE EVALUATION
Anesthesia Evaluation     no history of anesthetic complications:               Airway   Mallampati: III  TM distance: >3 FB  Neck ROM: full  Large neck circumference  Dental - normal exam     Pulmonary    (+) asthma,home oxygen (at night), sleep apnea,   (-) not a smoker  Cardiovascular     (+) hypertension, past MI , CAD, cardiac stents more than 12 months ago hyperlipidemia,   (-) dysrhythmias, angina      Neuro/Psych    ROS Comment: Special Needs  GI/Hepatic/Renal/Endo    (+) obesity,  GI bleeding ,   (-) diabetes    Musculoskeletal     Abdominal    Substance History      OB/GYN          Other                      Anesthesia Plan    ASA 3     MAC     intravenous induction     Anesthetic plan, all risks, benefits, and alternatives have been provided, discussed and informed consent has been obtained with: patient.

## 2021-11-12 ENCOUNTER — TELEPHONE (OUTPATIENT)
Dept: GASTROENTEROLOGY | Facility: CLINIC | Age: 56
End: 2021-11-12

## 2021-11-12 LAB
LAB AP CASE REPORT: NORMAL
PATH REPORT.FINAL DX SPEC: NORMAL
PATH REPORT.GROSS SPEC: NORMAL

## 2021-11-12 NOTE — TELEPHONE ENCOUNTER
Christ, is patients brother.  He wants to talk to you about results.     Called and spoke with pt's brother Sonya (on HIPAA release form).  He advises pt has more questions regarding test results. Advised brother of Dr. Willson's note, verb understanding.  Brother states patient is also concerned because she has been having some burning around her rectum and sees a small amount of bright red blood occasionally with some bm's but not all.  Advises pt has not gotten Miralax yet because her insurance will not cover it and pt is asking for something to be called in to her pharmacy in a tablet form.  Brother advises that pt is not having constant constipation, but occasional.  Advised would send message to Dr. Willson.

## 2021-11-12 NOTE — TELEPHONE ENCOUNTER
Called pt and spoke with pt's , pt speaks little english.   Advised of Dr Huddleston' note. Verb understanding.

## 2021-11-12 NOTE — TELEPHONE ENCOUNTER
----- Message from Ruth Ann Willson MD sent at 11/12/2021 12:50 PM EST -----  Gastric biopsies were benign - no inflammation or H pylori.  Continue daily pantoprazole

## 2021-11-15 NOTE — TELEPHONE ENCOUNTER
rec she try colace if she wants something in tablet form - this is otc and not covered by insurance typically.  Once her constipation is better controlled, if the bleeding persists I recommend she have  Colonoscopy.   pls try this for now and schedule f/u with me in [place]

## 2021-11-15 NOTE — TELEPHONE ENCOUNTER
Call to Sonya.  Advise per Dr Willson note.  Verb understanding.     Attempt schedule f/u appt with DR Willson without success - message to Dr Willson.

## 2021-11-22 RX ORDER — CLOPIDOGREL BISULFATE 75 MG/1
75 TABLET ORAL DAILY
Qty: 90 TABLET | Refills: 1 | Status: SHIPPED | OUTPATIENT
Start: 2021-11-22 | End: 2022-03-21 | Stop reason: SDUPTHER

## 2021-11-30 RX ORDER — AMLODIPINE BESYLATE 10 MG/1
10 TABLET ORAL DAILY
Qty: 30 TABLET | Refills: 1 | Status: SHIPPED | OUTPATIENT
Start: 2021-11-30 | End: 2022-03-21 | Stop reason: SDUPTHER

## 2021-11-30 RX ORDER — SIMVASTATIN 20 MG
20 TABLET ORAL EVERY EVENING
Qty: 30 TABLET | Refills: 6 | Status: SHIPPED | OUTPATIENT
Start: 2021-11-30 | End: 2022-03-21 | Stop reason: SDUPTHER

## 2021-12-01 ENCOUNTER — OFFICE VISIT (OUTPATIENT)
Dept: FAMILY MEDICINE CLINIC | Facility: CLINIC | Age: 56
End: 2021-12-01

## 2021-12-01 VITALS
SYSTOLIC BLOOD PRESSURE: 128 MMHG | RESPIRATION RATE: 16 BRPM | WEIGHT: 203 LBS | BODY MASS INDEX: 37.36 KG/M2 | DIASTOLIC BLOOD PRESSURE: 74 MMHG | OXYGEN SATURATION: 95 % | HEART RATE: 68 BPM | HEIGHT: 62 IN | TEMPERATURE: 97.8 F

## 2021-12-01 DIAGNOSIS — Z23 IMMUNIZATION DUE: ICD-10-CM

## 2021-12-01 DIAGNOSIS — K62.5 RECTAL BLEEDING: Primary | ICD-10-CM

## 2021-12-01 PROCEDURE — 90686 IIV4 VACC NO PRSV 0.5 ML IM: CPT | Performed by: FAMILY MEDICINE

## 2021-12-01 PROCEDURE — 1170F FXNL STATUS ASSESSED: CPT | Performed by: FAMILY MEDICINE

## 2021-12-01 PROCEDURE — 99213 OFFICE O/P EST LOW 20 MIN: CPT | Performed by: FAMILY MEDICINE

## 2021-12-01 PROCEDURE — 1160F RVW MEDS BY RX/DR IN RCRD: CPT | Performed by: FAMILY MEDICINE

## 2021-12-01 PROCEDURE — G0439 PPPS, SUBSEQ VISIT: HCPCS | Performed by: FAMILY MEDICINE

## 2021-12-01 PROCEDURE — G0008 ADMIN INFLUENZA VIRUS VAC: HCPCS | Performed by: FAMILY MEDICINE

## 2021-12-01 NOTE — PROGRESS NOTES
Subjective   Flaca Hassan is a 56 y.o. female.     History of Present Illness     Pt returns after seeing GI extensively, most recent office visit 10/27/21 with this HPI:    Flaca Hassan is a  56 y.o. female here for a follow up visit for heartburn, constipation, rectal bleeding.      History of EGD in 2017 with duodenal ulcer.  She had a colonoscopy 2/16 - she had diverticula and IH.    She was last seen in May 2021.  At that time we stopped her Senokot and started her on Colace and fiber.  Encouraged her to start a diet regimen with goal of weight loss.  Weight has been stable.  She is try to walk on the treadmill but only few times.  She gets short of breath when she stops.     She has not been using the fiber the Colace that was recommended.  She is waiting until she gets constipated and taking MiraLAX.  She says she sometimes sees blood in her stool.  She reports that when she eats spicy foods her stool looks like coffee grounds.  HPI    Plan:   I suspect that her rectal bleeding is from hemorrhoids in the setting of poorly controlled constipation. However she is not had a colonoscopy since 2016. We are planning on repeat upper endoscopy to rule out recurrent peptic ulcer disease and we will plan on repeat colonoscopy at that time.   Encouraged her to start taking MiraLAX every day to prevent constipation and potential complications such as hemorrhoidal bleeding   Encouraged initiation of daily exercise-I think her shortness of air is due to deconditioning as she is very sedentary and not at all active.   We discussed that she will need to hold her Plavix for 5 days prior to her colonoscopy.    Pt has NEGATIVE EGD on 11/8/21.  Gastric biopsies were benign - no inflammation or H pylori. Continue daily pantoprazole    Called and spoke with pt's brother Sonya (on HIPAA release form).  He advises pt has more questions regarding test results. Advised brother of Dr. Willson's note, verb understanding.   "Brother states patient is also concerned because she has been having some burning around her rectum and sees a small amount of bright red blood occasionally with some bm's but not all.  Advises pt has not gotten Miralax yet because her insurance will not cover it and pt is asking for something to be called in to her pharmacy in a tablet form.  Brother advises that pt is not having constant constipation, but occasional.  Advised would send message to Dr. Willson.    Ruth Ann Willson MD    8:12 AM   Note   rec she try colace if she wants something in tablet form - this is otc and not covered by insurance typically. Once her constipation is better controlled, if the bleeding persists I recommend she have Colonoscopy. pls try this for now and schedule f/u with me in Jan     Pt still with some continued sporadic rectal bleeding issues.      The following portions of the patient's history were reviewed and updated as appropriate: allergies, current medications, past family history, past medical history, past social history, past surgical history and problem list.    Review of Systems   Constitutional: Negative for activity change, chills and fever.   Respiratory: Negative for cough.    Cardiovascular: Negative for chest pain.   Psychiatric/Behavioral: Negative for dysphoric mood.       /74   Pulse 68   Temp 97.8 °F (36.6 °C) (Oral)   Resp 16   Ht 157.5 cm (62\")   Wt 92.1 kg (203 lb)   LMP  (LMP Unknown)   SpO2 95%   BMI 37.13 kg/m²     Objective   Physical Exam  Constitutional:       General: She is not in acute distress.     Appearance: She is well-developed.   Pulmonary:      Effort: Pulmonary effort is normal.   Neurological:      Mental Status: She is alert and oriented to person, place, and time.   Psychiatric:         Behavior: Behavior normal.         Thought Content: Thought content normal.         Assessment/Plan   Diagnoses and all orders for this visit:    1. Rectal bleeding (Primary)  -     CBC " & Differential    2. Immunization due  -     FluLaval/Fluarix/Fluzone >6 Months ([id number])    OTC constipation rx stressed. Pt to get the c-scope with GI to look into this further. Will check CBC today to ensure stability.

## 2021-12-01 NOTE — PATIENT INSTRUCTIONS
Medicare Wellness  Personal Prevention Plan of Service     Date of Office Visit:  2021  Encounter Provider:  Isidro Hernandez MD  Place of Service:  [facility]  Patient Name: Flaca Hassan  :  1965    As part of the Medicare Wellness portion of your visit today, we are providing you with this personalized preventive plan of services (PPPS). This plan is based upon recommendations of the [place] Preventive Services Task Force (USPSTF) and the Advisory Committee on Immunization Practices (ACIP).    This lists the preventive care services that should be considered, and provides dates of when you are due. Items listed as completed are up-to-date and do not require any further intervention.    Health Maintenance   Topic Date Due   • URINE MICROALBUMIN  Never done   • Pneumococcal Vaccine 0-64 (1 of 2 - PPSV23) Never done   • TDAP/TD VACCINES (1 - Tdap) Never done   • DIABETIC FOOT EXAM  Never done   • PAP SMEAR  Never done   • HEMOGLOBIN A1C  10/14/2021   • COVID-19 Vaccine (3 - Booster for Pfizer series) 10/24/2021   • ANNUAL WELLNESS VISIT  2021 (Originally 2019)   • ZOSTER VACCINE (1 of 2) 12/10/2022 (Originally 2015)   • LIPID PANEL  2022   • DIABETIC EYE EXAM  2022   • MAMMOGRAM  2023   • COLORECTAL CANCER SCREENING  2026   • HEPATITIS C SCREENING  Completed   • INFLUENZA VACCINE  Completed       Orders Placed This Encounter   Procedures   • FluLaval/Fluarix/Fluzone >6 Months ([id number])   • CBC & Differential     Order Specific Question:   Manual Differential     Answer:   No       No follow-ups on file.

## 2021-12-01 NOTE — PROGRESS NOTES
The ABCs of the Annual Wellness Visit  Subsequent Medicare Wellness Visit    Chief Complaint   Patient presents with   • wellness exam     per family member  / NO ENGLISH   • Immunizations     FLU VACCINE LEFT DELTOID TODAY    • Rectal Bleeding      Subjective    History of Present Illness:  Flaca Hassan is a 56 y.o. female who presents for a Subsequent Medicare Wellness Visit.    The following portions of the patient's history were reviewed and   updated as appropriate: allergies, current medications, past family history, past medical history, past social history, past surgical history and problem list.    Compared to one year ago, the patient feels her physical   health is the same.    Compared to one year ago, the patient feels her mental   health is the same.    Recent Hospitalizations:  She was not admitted to the hospital during the last year.       Current Medical Providers:  Patient Care Team:  Isidro Hernandez MD as PCP - General (Family Medicine)  Maxine Rose MD (Inactive) as Consulting Physician (Cardiology)  Ruth Ann Willson MD as Consulting Physician (Gastroenterology)  Imer Montaño MD as Consulting Physician (Cardiology)  Michael Malik MD as Consulting Physician (Psychiatry)  Thu Blake MD as Obstetrician (Obstetrics and Gynecology)    Outpatient Medications Prior to Visit   Medication Sig Dispense Refill   • acetaminophen (TYLENOL) 500 MG tablet Take 1,000 mg by mouth Every 6 (Six) Hours As Needed for Mild Pain .     • amLODIPine (NORVASC) 10 MG tablet TAKE 1 TABLET BY MOUTH DAILY 30 tablet 1   • bisoprolol-hydrochlorothiazide (ZIAC) 5-6.25 MG per tablet Take 1 tablet by mouth Daily. 30 tablet 3   • budesonide-formoterol (SYMBICORT) 160-4.5 MCG/ACT inhaler Inhale 2 puffs 2 (two) times a day.     • Cholecalciferol (VITAMIN D3) 2000 units tablet Take 1 tablet by mouth Daily.  1   • clopidogrel (PLAVIX) 75 MG tablet TAKE 1 TABLET BY MOUTH DAILY 90 tablet 1   •  escitalopram (LEXAPRO) 10 MG tablet Take 10 mg by mouth Every Morning.  0   • fluticasone (FLONASE) 50 MCG/ACT nasal spray 2 sprays into the nostril(s) as directed by provider Daily.     • Fluticasone Furoate-Vilanterol (Breo Ellipta) 200-25 MCG/INH inhaler Inhale 1 puff Daily.     • folic acid (FOLVITE) 1 MG tablet Take 1 mg by mouth Daily.  0   • furosemide (LASIX) 40 MG tablet Take 1 tablet by mouth Daily. 30 tablet 6   • hydrocortisone (ANUSOL-HC) 25 MG suppository Insert 1 suppository into the rectum Daily As Needed for Hemorrhoids (rectal discomfort). 30 suppository 5   • medroxyPROGESTERone (Provera) 10 MG tablet Take 1 tablet by mouth Daily. 30 tablet 2   • montelukast (SINGULAIR) 10 MG tablet Take 10 mg by mouth Every Night.     • nitroglycerin (NITROSTAT) 0.4 MG SL tablet 1 under the tongue as needed for angina, may repeat q5mins for up three doses 25 tablet 3   • O2 (OXYGEN) Inhale 2 L/min As Needed.     • olmesartan-hydrochlorothiazide (BENICAR HCT) 40-12.5 MG per tablet TAKE 1 TABLET BY MOUTH DAILY 30 tablet 3   • pantoprazole (PROTONIX) 40 MG EC tablet Take 1 tablet by mouth Daily. 30 tablet 5   • polyethylene glycol (MIRALAX) 17 g packet Take 17 g by mouth Daily. 30 packet 5   • potassium chloride (MICRO-K) 10 MEQ CR capsule 1 DAILY 30 capsule 6   • Proctozone-HC 2.5 % rectal cream ALVA AA ON RECTUM DAILY PRF HEMORRHOIDS     • senna (Senokot) 8.6 MG tablet Take 1 tablet by mouth Daily. 30 tablet 5   • simvastatin (ZOCOR) 20 MG tablet TAKE 1 TABLET BY MOUTH EVERY EVENING 30 tablet 6   • traMADol (ULTRAM) 50 MG tablet Take 1 tablet by mouth Every 6 (Six) Hours As Needed for Moderate Pain . 5 tablet 0   • VENTOLIN  (90 Base) MCG/ACT inhaler Inhale 2 puffs Every 4 (Four) Hours As Needed for Wheezing or Shortness of Air.  6     No facility-administered medications prior to visit.       Opioid medication/s are on active medication list.  and I have evaluated her active treatment plan and pain score  "trends (see table).  There were no vitals filed for this visit.  I have reviewed the chart for potential of high risk medication and harmful drug interactions in the elderly.            Aspirin is not on active medication list.  Aspirin use is not indicated based on review of current medical condition/s. Risk of harm outweighs potential benefits.  .    Patient Active Problem List   Diagnosis   • Hypertension   • Obesity   • Hyperlipidemia   • Iron deficiency anemia due to chronic blood loss   • Upper GI bleeding   • ST elevation myocardial infarction (STEMI) (HCC)   • Coronary artery disease involving native heart without angina pectoris   • Morgagni hernia   • IFG (impaired fasting glucose)   • Primary insomnia   • PMB (postmenopausal bleeding)   • Endometrial cancer (HCC)   • Uncomplicated asthma   • LI (obstructive sleep apnea)   • Dyspepsia   • History of peptic ulcer disease     Advance Care Planning  Advance Directive is not on file.  ACP discussion was held with the patient during this visit. Patient does not have an advance directive, declines further assistance.          Objective    Vitals:    12/01/21 1615   BP: 128/74   Pulse: 68   Resp: 16   Temp: 97.8 °F (36.6 °C)   TempSrc: Oral   SpO2: 95%   Weight: 92.1 kg (203 lb)   Height: 157.5 cm (62\")     BMI Readings from Last 1 Encounters:   12/01/21 37.13 kg/m²   BMI is above normal parameters. Recommendations include: exercise counseling    Does the patient have evidence of cognitive impairment? No    Physical Exam            HEALTH RISK ASSESSMENT    Smoking Status:  Social History     Tobacco Use   Smoking Status Never Smoker   Smokeless Tobacco Never Used     Alcohol Consumption:  Social History     Substance and Sexual Activity   Alcohol Use No     Fall Risk Screen:    STEADI Fall Risk Assessment has not been completed.    Depression Screening:  PHQ-2/PHQ-9 Depression Screening 4/5/2021   Little interest or pleasure in doing things 0   Feeling down, " depressed, or hopeless 0   Total Score 0       Health Habits and Functional and Cognitive Screening:  No flowsheet data found.    Age-appropriate Screening Schedule:  Refer to the list below for future screening recommendations based on patient's age, sex and/or medical conditions. Orders for these recommended tests are listed in the plan section. The patient has been provided with a written plan.    Health Maintenance   Topic Date Due   • URINE MICROALBUMIN  Never done   • TDAP/TD VACCINES (1 - Tdap) Never done   • DIABETIC FOOT EXAM  Never done   • PAP SMEAR  Never done   • HEMOGLOBIN A1C  10/14/2021   • ZOSTER VACCINE (1 of 2) 12/10/2022 (Originally 4/6/2015)   • LIPID PANEL  04/14/2022   • DIABETIC EYE EXAM  08/05/2022   • MAMMOGRAM  01/06/2023   • INFLUENZA VACCINE  Completed              Assessment/Plan   CMS Preventative Services Quick Reference  Risk Factors Identified During Encounter  uterine cancer  The above risks/problems have been discussed with the patient.  Follow up actions/plans if indicated are seen below in the Assessment/Plan Section.  Pertinent information has been shared with the patient in the After Visit Summary.    Diagnoses and all orders for this visit:    1. Rectal bleeding (Primary)  -     CBC & Differential    2. Immunization due  -     FluLaval/Fluarix/Fluzone >6 Months ([id number])        Follow Up:   No follow-ups on file.     An After Visit Summary and PPPS were made available to the patient.        I spent 10 minutes caring for Flaca on this date of service. This time includes time spent by me in the following activities:preparing for the visit

## 2021-12-01 NOTE — PROGRESS NOTES
Injection   flu vaccine Injection performed in left deltoid by Misa Henao MA. Patient tolerated the procedure well without complications.  12/01/21   Misa Henao MA

## 2021-12-02 LAB
BASOPHILS # BLD AUTO: 0.1 X10E3/UL (ref 0–0.2)
BASOPHILS NFR BLD AUTO: 1 %
EOSINOPHIL # BLD AUTO: 0.2 X10E3/UL (ref 0–0.4)
EOSINOPHIL NFR BLD AUTO: 3 %
ERYTHROCYTE [DISTWIDTH] IN BLOOD BY AUTOMATED COUNT: 12.3 % (ref 11.7–15.4)
HCT VFR BLD AUTO: 39.4 % (ref 34–46.6)
HGB BLD-MCNC: 13.2 G/DL (ref 11.1–15.9)
IMM GRANULOCYTES # BLD AUTO: 0 X10E3/UL (ref 0–0.1)
IMM GRANULOCYTES NFR BLD AUTO: 0 %
LYMPHOCYTES # BLD AUTO: 1.7 X10E3/UL (ref 0.7–3.1)
LYMPHOCYTES NFR BLD AUTO: 24 %
MCH RBC QN AUTO: 30 PG (ref 26.6–33)
MCHC RBC AUTO-ENTMCNC: 33.5 G/DL (ref 31.5–35.7)
MCV RBC AUTO: 90 FL (ref 79–97)
MONOCYTES # BLD AUTO: 0.6 X10E3/UL (ref 0.1–0.9)
MONOCYTES NFR BLD AUTO: 8 %
NEUTROPHILS # BLD AUTO: 4.7 X10E3/UL (ref 1.4–7)
NEUTROPHILS NFR BLD AUTO: 64 %
PLATELET # BLD AUTO: 252 X10E3/UL (ref 150–450)
RBC # BLD AUTO: 4.4 X10E6/UL (ref 3.77–5.28)
WBC # BLD AUTO: 7.2 X10E3/UL (ref 3.4–10.8)

## 2021-12-07 ENCOUNTER — PREP FOR SURGERY (OUTPATIENT)
Dept: OTHER | Facility: HOSPITAL | Age: 56
End: 2021-12-07

## 2021-12-07 ENCOUNTER — TELEPHONE (OUTPATIENT)
Dept: GASTROENTEROLOGY | Facility: CLINIC | Age: 56
End: 2021-12-07

## 2021-12-07 DIAGNOSIS — K62.89 RECTAL PAIN: Primary | ICD-10-CM

## 2021-12-07 NOTE — TELEPHONE ENCOUNTER
"See note of 11/12.     Call to brother Sonya.  States does not need .  States Dr Hernandez advised pt that needs c/s 2nd \"burning in her rectum\".  See o/v note of 12/1.      Therefore, would like to proceed with c/s, rather than scheduled o/v on 1/19.  Advise per 11/12 note that DR Willson had wanted to try colace first and then assess.  If bleeding persists, would then recommend c/s.  Sonya states wants to proceed with c/s.     Message to Dr Willson.   "

## 2021-12-07 NOTE — TELEPHONE ENCOUNTER
----- Message from Bimal Cole sent at 12/7/2021  2:05 PM EST -----  Regarding: Questions  Good Afternoon,     Patient brother called about some question about upcoming appointment. I told the brother that patient isn't scheduled for a colonoscopy but regular visit. I sent Asya a message to screen patient. Patient brother still want someone from the nursing team to give him a call.

## 2021-12-09 ENCOUNTER — PRE-PROCEDURE SCREENING (OUTPATIENT)
Dept: GASTROENTEROLOGY | Facility: CLINIC | Age: 56
End: 2021-12-09

## 2021-12-09 NOTE — TELEPHONE ENCOUNTER
Last scope EDG 11/8/21 in [facility]     Colonoscopy screening-- No personal history of polyps--Family history of polyps--No family history of colon cancer--Plavis--Medications:        acetaminophen (TYLENOL) 500 MG tablet  amLODIPine (NORVASC) 10 MG tablet  bisoprolol-hydrochlorothiazide (ZIAC) 5-6.25 MG per tablet  budesonide-formoterol (SYMBICORT) 160-4.5 MCG/ACT inhaler  Cholecalciferol (VITAMIN D3) 2000 units tablet  clopidogrel (PLAVIX) 75 MG tablet  escitalopram (LEXAPRO) 10 MG tablet  fluticasone (FLONASE) 50 MCG/ACT nasal spray  Fluticasone Furoate-Vilanterol (Breo Ellipta) 200-25 MCG/INH inhaler  folic acid (FOLVITE) 1 MG tablet  furosemide (LASIX) 40 MG tablet  hydrocortisone (ANUSOL-HC) 25 MG suppository  medroxyPROGESTERone (Provera) 10 MG tablet  montelukast (SINGULAIR) 10 MG tablet  nitroglycerin (NITROSTAT) 0.4 MG SL tablet  O2 (OXYGEN)  pantoprazole (PROTONIX) 40 MG EC tablet  polyethylene glycol (MIRALAX) 17 g packet  potassium chloride (MICRO-K) 10 MEQ CR capsule  Proctozone-HC 2.5 % rectal cream  senna (Senokot) 8.6 MG tablet  simvastatin (ZOCOR) 20 MG tablet  traMADol (ULTRAM) 50 MG tablet  VENTOLIN  (90 Base) MCG/ACT inhaler         Pt has upcoming appt ( )

## 2021-12-13 PROBLEM — K62.89 RECTAL PAIN: Status: ACTIVE | Noted: 2021-12-13

## 2021-12-13 NOTE — TELEPHONE ENCOUNTER
LEONARDO patient via telephone for colonoscopy. Scheduled at [facility] 02/08/2022 arriving at 2:30pm. Prep packet mailed to patients verified address. Pt also advised that  his/her arrival time may vary based on [facility] guidelines. LEONARDO Ramon

## 2021-12-28 ENCOUNTER — APPOINTMENT (OUTPATIENT)
Dept: SLEEP MEDICINE | Facility: HOSPITAL | Age: 56
End: 2021-12-28

## 2022-01-10 ENCOUNTER — OFFICE VISIT (OUTPATIENT)
Dept: CARDIOLOGY | Facility: CLINIC | Age: 57
End: 2022-01-10

## 2022-01-10 VITALS
HEIGHT: 62 IN | BODY MASS INDEX: 37.54 KG/M2 | HEART RATE: 64 BPM | WEIGHT: 204 LBS | DIASTOLIC BLOOD PRESSURE: 88 MMHG | SYSTOLIC BLOOD PRESSURE: 159 MMHG

## 2022-01-10 DIAGNOSIS — I10 PRIMARY HYPERTENSION: ICD-10-CM

## 2022-01-10 DIAGNOSIS — I25.10 CORONARY ARTERY DISEASE INVOLVING NATIVE CORONARY ARTERY OF NATIVE HEART WITHOUT ANGINA PECTORIS: ICD-10-CM

## 2022-01-10 DIAGNOSIS — R07.89 CHEST PAIN, ATYPICAL: Primary | ICD-10-CM

## 2022-01-10 DIAGNOSIS — E78.5 HYPERLIPIDEMIA, UNSPECIFIED HYPERLIPIDEMIA TYPE: ICD-10-CM

## 2022-01-10 PROCEDURE — 99214 OFFICE O/P EST MOD 30 MIN: CPT | Performed by: INTERNAL MEDICINE

## 2022-01-10 NOTE — PROGRESS NOTES
6MO    Subjective:        Flaca Hassan is a 56 y.o. female who here for follow up    CC  HEART UNCOMFORTABLE  PICHING  SOB    HPI  36-year-old female uncomfortable in the chest also complained of a pinching sensation as well as shortness of breath with known history of microinfarction angioplasty and stent in the past     Problems Addressed this Visit        Cardiac and Vasculature    Hypertension    Hyperlipidemia    Coronary artery disease involving native heart without angina pectoris    Relevant Orders    Stress Test With Myocardial Perfusion One Day    Adult Transthoracic Echo Complete W/ Cont if Necessary Per Protocol      Other Visit Diagnoses     Chest pain, atypical    -  Primary    Relevant Orders    Stress Test With Myocardial Perfusion One Day    Adult Transthoracic Echo Complete W/ Cont if Necessary Per Protocol      Diagnoses       Codes Comments    Chest pain, atypical    -  Primary ICD-10-CM: R07.89  ICD-9-CM: 786.59     Coronary artery disease involving native coronary artery of native heart without angina pectoris     ICD-10-CM: I25.10  ICD-9-CM: 414.01     Hyperlipidemia, unspecified hyperlipidemia type     ICD-10-CM: E78.5  ICD-9-CM: 272.4     Primary hypertension     ICD-10-CM: I10  ICD-9-CM: 401.9         .    The following portions of the patient's history were reviewed and updated as appropriate: allergies, current medications, past family history, past medical history, past social history, past surgical history and problem list.    Past Medical History:   Diagnosis Date   • Anemia    • Asthma    • Breast cyst    • Coronary artery disease     stent   • Depression    • Diverticulosis    • H/O: GI bleed     recurrent   • Hematuria    • History of transfusion     no reaction   • Hyperlipidemia    • Hypertension    • Incontinence of urine     wears pads   • Irritable bowel syndrome    • Melena    • Obesity    • Oxygen dependent     uses oxygen 2 prn when walking if SOB   • UTI (urinary tract  "infection)      reports that she has never smoked. She has never used smokeless tobacco. She reports that she does not drink alcohol and does not use drugs.   Family History   Problem Relation Age of Onset   • Breast cancer Other    • Lung cancer Brother    • Hypertension Brother    • Hyperlipidemia Brother    • Heart disease Brother    • Throat cancer Paternal Uncle    • Tongue cancer Paternal Grandfather    • Hypertension Father    • Hyperlipidemia Father    • Heart disease Father    • Malig Hyperthermia Neg Hx        Review of Systems  Constitutional: No wt loss, fever, fatigue  Gastrointestinal: No nausea, abdominal pain  Behavioral/Psych: No insomnia or anxiety   Cardiovascular chest pains and tightness in the chest  Objective:       Physical Exam  /88   Pulse 64   Ht 157.5 cm (62\")   Wt 92.5 kg (204 lb)   LMP  (LMP Unknown)   BMI 37.31 kg/m²   General appearance: No acute changes   Neck: Trachea midline; NECK, supple, no thyromegaly or lymphadenopathy   Lungs: Normal size and shape, normal breath sounds, equal distribution of air, no rales and rhonchi   CV: S1-S2 regular, no murmurs, no rub, no gallop   Abdomen: Soft, nontender; no masses , no abnormal abdominal sounds   Extremities: No deformity , normal color , no peripheral edema   Skin: Normal temperature, turgor and texture; no rash, ulcers          Procedures      Echocardiogram:        Current Outpatient Medications:   •  acetaminophen (TYLENOL) 500 MG tablet, Take 1,000 mg by mouth Every 6 (Six) Hours As Needed for Mild Pain ., Disp: , Rfl:   •  amLODIPine (NORVASC) 10 MG tablet, TAKE 1 TABLET BY MOUTH DAILY, Disp: 30 tablet, Rfl: 1  •  bisoprolol-hydrochlorothiazide (ZIAC) 5-6.25 MG per tablet, Take 1 tablet by mouth Daily., Disp: 30 tablet, Rfl: 3  •  budesonide-formoterol (SYMBICORT) 160-4.5 MCG/ACT inhaler, Inhale 2 puffs 2 (two) times a day., Disp: , Rfl:   •  Cholecalciferol (VITAMIN D3) 2000 units tablet, Take 1 tablet by mouth " Daily., Disp: , Rfl: 1  •  clopidogrel (PLAVIX) 75 MG tablet, TAKE 1 TABLET BY MOUTH DAILY, Disp: 90 tablet, Rfl: 1  •  escitalopram (LEXAPRO) 10 MG tablet, Take 10 mg by mouth Every Morning., Disp: , Rfl: 0  •  fluticasone (FLONASE) 50 MCG/ACT nasal spray, 2 sprays into the nostril(s) as directed by provider Daily., Disp: , Rfl:   •  Fluticasone Furoate-Vilanterol (Breo Ellipta) 200-25 MCG/INH inhaler, Inhale 1 puff Daily., Disp: , Rfl:   •  folic acid (FOLVITE) 1 MG tablet, Take 1 mg by mouth Daily., Disp: , Rfl: 0  •  furosemide (LASIX) 40 MG tablet, Take 1 tablet by mouth Daily., Disp: 30 tablet, Rfl: 6  •  hydrocortisone (ANUSOL-HC) 25 MG suppository, Insert 1 suppository into the rectum Daily As Needed for Hemorrhoids (rectal discomfort)., Disp: 30 suppository, Rfl: 5  •  montelukast (SINGULAIR) 10 MG tablet, Take 10 mg by mouth Every Night., Disp: , Rfl:   •  nitroglycerin (NITROSTAT) 0.4 MG SL tablet, 1 under the tongue as needed for angina, may repeat q5mins for up three doses, Disp: 25 tablet, Rfl: 3  •  olmesartan-hydrochlorothiazide (BENICAR HCT) 40-12.5 MG per tablet, TAKE 1 TABLET BY MOUTH DAILY, Disp: 30 tablet, Rfl: 3  •  pantoprazole (PROTONIX) 40 MG EC tablet, Take 1 tablet by mouth Daily., Disp: 30 tablet, Rfl: 5  •  polyethylene glycol (MIRALAX) 17 g packet, Take 17 g by mouth Daily., Disp: 30 packet, Rfl: 5  •  potassium chloride (MICRO-K) 10 MEQ CR capsule, 1 DAILY, Disp: 30 capsule, Rfl: 6  •  Proctozone-HC 2.5 % rectal cream, ALVA AA ON RECTUM DAILY PRF HEMORRHOIDS, Disp: , Rfl:   •  senna (Senokot) 8.6 MG tablet, Take 1 tablet by mouth Daily., Disp: 30 tablet, Rfl: 5  •  simvastatin (ZOCOR) 20 MG tablet, TAKE 1 TABLET BY MOUTH EVERY EVENING, Disp: 30 tablet, Rfl: 6  •  traMADol (ULTRAM) 50 MG tablet, Take 1 tablet by mouth Every 6 (Six) Hours As Needed for Moderate Pain ., Disp: 5 tablet, Rfl: 0  •  medroxyPROGESTERone (Provera) 10 MG tablet, Take 1 tablet by mouth Daily., Disp: 30 tablet,  Rfl: 2  •  O2 (OXYGEN), Inhale 2 L/min As Needed., Disp: , Rfl:   •  VENTOLIN  (90 Base) MCG/ACT inhaler, Inhale 2 puffs Every 4 (Four) Hours As Needed for Wheezing or Shortness of Air., Disp: , Rfl: 6   Assessment:        Patient Active Problem List   Diagnosis   • Hypertension   • Obesity   • Hyperlipidemia   • Iron deficiency anemia due to chronic blood loss   • Upper GI bleeding   • ST elevation myocardial infarction (STEMI) ([facility])   • Coronary artery disease involving native heart without angina pectoris   • Morgagni hernia   • IFG (impaired fasting glucose)   • Primary insomnia   • PMB (postmenopausal bleeding)   • Endometrial cancer (HCC)   • Uncomplicated asthma   • LI (obstructive sleep apnea)   • Dyspepsia   • History of peptic ulcer disease   • Rectal pain               Plan:            ICD-10-CM ICD-9-CM   1. Chest pain, atypical  R07.89 786.59   2. Coronary artery disease involving native coronary artery of native heart without angina pectoris  I25.10 414.01   3. Hyperlipidemia, unspecified hyperlipidemia type  E78.5 272.4   4. Primary hypertension  I10 401.9     1. Coronary artery disease involving native coronary artery of native heart without angina pectoris  Considering the patient's symptoms as well as clinical situation and  EKG findings, along with cardiac risk factors, ischemic workup is necessary to rule out ischemic cardiomyopathy, stress induced arrhythmias, and functional capacity for diagnosis as well as prognostic consideration    - Stress Test With Myocardial Perfusion One Day  - Adult Transthoracic Echo Complete W/ Cont if Necessary Per Protocol    2. Chest pain, atypical  Considering patient's medical condition as well as the risk factors, patient will require echocardiogram for further evaluation for the LV function, four-chamber evaluation, including the pressures, valvular function and  pericardial disease and pericardial effusion    - Stress Test With Myocardial Perfusion One  Day  - Adult Transthoracic Echo Complete W/ Cont if Necessary Per Protocol    3. Hyperlipidemia, unspecified hyperlipidemia type  Continue current treatment    4. Primary hypertension  Continue current treatment       STRESS CARDIOLYTE, ECHO    MOST LIKELY WILL NEED CATH   COUNSELING:    Flaca Gudino was given to patient for the following topics: diagnostic results, risk factor reductions, impressions, risks and benefits of treatment options and importance of treatment compliance .       SMOKING COUNSELING:    [unfilled]    Dictated using Dragon dictation

## 2022-01-12 NOTE — TELEPHONE ENCOUNTER
Requested Prescriptions     Pending Prescriptions Disp Refills   • olmesartan (BENICAR) 40 MG tablet 30 tablet 3     Sig: Take 1 tablet by mouth Daily.   • hydroCHLOROthiazide (MICROZIDE) 12.5 MG capsule 30 capsule 3     Sig: Take 1 capsule by mouth Daily.

## 2022-01-18 RX ORDER — HYDROCHLOROTHIAZIDE 12.5 MG/1
12.5 CAPSULE, GELATIN COATED ORAL DAILY
Qty: 30 CAPSULE | Refills: 3 | Status: SHIPPED | OUTPATIENT
Start: 2022-01-18 | End: 2022-03-21 | Stop reason: SDUPTHER

## 2022-01-18 RX ORDER — OLMESARTAN MEDOXOMIL 40 MG/1
40 TABLET ORAL DAILY
Qty: 30 TABLET | Refills: 3 | Status: SHIPPED | OUTPATIENT
Start: 2022-01-18 | End: 2022-03-21 | Stop reason: SDUPTHER

## 2022-01-20 NOTE — TELEPHONE ENCOUNTER
Requested Prescriptions     Pending Prescriptions Disp Refills   • bisoprolol-hydrochlorothiazide (ZIAC) 5-6.25 MG per tablet [Pharmacy Med Name: BISOPROLOL/HCTZ 5MG/6.25MG TABS] 30 tablet 3     Sig: TAKE 1 TABLET BY MOUTH DAILY

## 2022-01-24 RX ORDER — BISOPROLOL FUMARATE AND HYDROCHLOROTHIAZIDE 5; 6.25 MG/1; MG/1
1 TABLET ORAL DAILY
Qty: 30 TABLET | Refills: 3 | Status: SHIPPED | OUTPATIENT
Start: 2022-01-24 | End: 2022-03-21 | Stop reason: SDUPTHER

## 2022-02-10 ENCOUNTER — APPOINTMENT (OUTPATIENT)
Dept: CARDIOLOGY | Facility: HOSPITAL | Age: 57
End: 2022-02-10

## 2022-02-10 ENCOUNTER — HOSPITAL ENCOUNTER (OUTPATIENT)
Dept: CARDIOLOGY | Facility: HOSPITAL | Age: 57
End: 2022-02-10

## 2022-02-21 ENCOUNTER — TELEPHONE (OUTPATIENT)
Dept: CARDIOLOGY | Facility: CLINIC | Age: 57
End: 2022-02-21

## 2022-02-21 NOTE — TELEPHONE ENCOUNTER
----- Message from Tari Melton sent at 2/21/2022 11:54 AM EST -----  Regarding: COLONOSCOPY ON 2/28 NEEDS TO STOP PLAVIX TODAY PLEASE ADVISE  Contact: [phone]     Per Sarah BANG From a cardiac stand point patient can hold Plavix for 5 days prior to her colonoscopy with a small risk.     Patient Informed

## 2022-02-28 ENCOUNTER — ANESTHESIA (OUTPATIENT)
Dept: GASTROENTEROLOGY | Facility: HOSPITAL | Age: 57
End: 2022-02-28

## 2022-02-28 ENCOUNTER — ANESTHESIA EVENT (OUTPATIENT)
Dept: GASTROENTEROLOGY | Facility: HOSPITAL | Age: 57
End: 2022-02-28

## 2022-02-28 ENCOUNTER — HOSPITAL ENCOUNTER (OUTPATIENT)
Facility: HOSPITAL | Age: 57
Setting detail: HOSPITAL OUTPATIENT SURGERY
Discharge: HOME | End: 2022-02-28
Attending: INTERNAL MEDICINE | Admitting: INTERNAL MEDICINE

## 2022-02-28 VITALS
RESPIRATION RATE: 18 BRPM | HEART RATE: 56 BPM | SYSTOLIC BLOOD PRESSURE: 106 MMHG | HEIGHT: 62 IN | DIASTOLIC BLOOD PRESSURE: 58 MMHG | BODY MASS INDEX: 37.73 KG/M2 | WEIGHT: 205 LBS | OXYGEN SATURATION: 99 %

## 2022-02-28 PROCEDURE — 45382 COLONOSCOPY W/CONTROL BLEED: CPT | Performed by: INTERNAL MEDICINE

## 2022-02-28 PROCEDURE — 25010000002 PROPOFOL 10 MG/ML EMULSION: Performed by: ANESTHESIOLOGY

## 2022-02-28 PROCEDURE — S0260 H&P FOR SURGERY: HCPCS | Performed by: INTERNAL MEDICINE

## 2022-02-28 RX ORDER — LIDOCAINE HYDROCHLORIDE 20 MG/ML
INJECTION, SOLUTION INFILTRATION; PERINEURAL AS NEEDED
Status: DISCONTINUED | OUTPATIENT
Start: 2022-02-28 | End: 2022-02-28 | Stop reason: SURG

## 2022-02-28 RX ORDER — GLYCOPYRROLATE 0.2 MG/ML
INJECTION INTRAMUSCULAR; INTRAVENOUS AS NEEDED
Status: DISCONTINUED | OUTPATIENT
Start: 2022-02-28 | End: 2022-02-28 | Stop reason: SURG

## 2022-02-28 RX ORDER — PROPOFOL 10 MG/ML
VIAL (ML) INTRAVENOUS CONTINUOUS PRN
Status: DISCONTINUED | OUTPATIENT
Start: 2022-02-28 | End: 2022-02-28 | Stop reason: SURG

## 2022-02-28 RX ORDER — PROPOFOL 10 MG/ML
VIAL (ML) INTRAVENOUS AS NEEDED
Status: DISCONTINUED | OUTPATIENT
Start: 2022-02-28 | End: 2022-02-28 | Stop reason: SURG

## 2022-02-28 RX ORDER — SODIUM CHLORIDE, SODIUM LACTATE, POTASSIUM CHLORIDE, CALCIUM CHLORIDE 600; 310; 30; 20 MG/100ML; MG/100ML; MG/100ML; MG/100ML
30 INJECTION, SOLUTION INTRAVENOUS CONTINUOUS PRN
Status: DISCONTINUED | OUTPATIENT
Start: 2022-02-28 | End: 2022-02-28 | Stop reason: HOSPADM

## 2022-02-28 RX ADMIN — GLYCOPYRROLATE 0.1 MG: 1 INJECTION INTRAMUSCULAR; INTRAVENOUS at 15:37

## 2022-02-28 RX ADMIN — LIDOCAINE HYDROCHLORIDE 60 MG: 20 INJECTION, SOLUTION INFILTRATION; PERINEURAL at 15:41

## 2022-02-28 RX ADMIN — PROPOFOL 80 MG: 10 INJECTION, EMULSION INTRAVENOUS at 15:41

## 2022-02-28 RX ADMIN — Medication 140 MCG/KG/MIN: at 15:41

## 2022-02-28 RX ADMIN — SODIUM CHLORIDE, POTASSIUM CHLORIDE, SODIUM LACTATE AND CALCIUM CHLORIDE 30 ML/HR: 600; 310; 30; 20 INJECTION, SOLUTION INTRAVENOUS at 15:31

## 2022-02-28 NOTE — ANESTHESIA PREPROCEDURE EVALUATION
Anesthesia Evaluation     Patient summary reviewed and Nursing notes reviewed   no history of anesthetic complications:  NPO Solid Status: > 8 hours  NPO Liquid Status: > 2 hours           Airway   Mallampati: III  TM distance: >3 FB  Neck ROM: full  Possible difficult intubation  Dental - normal exam     Pulmonary    (+) asthma,home oxygen, shortness of breath, sleep apnea on CPAP, decreased breath sounds,   (-) COPD, not a smoker, lung cancer    ROS comment: Moderate Asthma, uses bid MDI's w/ frequent albuterol as well.  Has LI and uses CPAP w/ 2L O2 qhs.  Was mildly SOB upon arrival to pre op this morning.  Improved w/ 2L O2 NC and use of home albuterol MDI.  Cardiovascular - normal exam  Exercise tolerance: good (4-7 METS)    ECG reviewed  Rhythm: regular  Rate: normal    (+) hypertension well controlled, past MI  >12 months, CAD, cardiac stents more than 12 months ago hyperlipidemia,   (-) valvular problems/murmurs, dysrhythmias, angina, CHF, orthopnea, PND, PINTO, CABG    ROS comment: Known CAD s/p MI 2017 w/ single stent placed per pt report.  She remains on Plavix and ASA, both stopped >5d ago.      TTE 01/2021:  ·Calculated left ventricular EF = 62.9% Estimated left ventricular EF was in agreement with the calculated left ventricular EF.  ·Left ventricular diastolic function was normal.  ·Calculated left ventricular EF = 62.9    Neuro/Psych  (+) psychiatric history Depression,    (-) seizures, TIA, CVA  GI/Hepatic/Renal/Endo    (+) obesity,  GI bleeding upper resolved,   (-) hiatal hernia, GERD, PUD, hepatitis, liver disease, no renal disease, diabetes, no thyroid disorder    Musculoskeletal (-) negative ROS    Abdominal   (+) obese,     Abdomen: soft.   Substance History - negative use     OB/GYN negative ob/gyn ROS         Other - negative ROS                         Anesthesia Plan    ASA 3     MAC       Anesthetic plan, all risks, benefits, and alternatives have been provided, discussed and informed  consent has been obtained with: patient.

## 2022-02-28 NOTE — ANESTHESIA POSTPROCEDURE EVALUATION
Patient: Flaca Hassan    Procedure Summary     Date: 02/28/22 [facility]    Anesthesia Start: 1534 Anesthesia Stop: 1607    Procedure: COLONOSCOPY TO CECUM and TI WITH APC CAUTERY TO RECTAL AVMs (N/A ) Diagnosis:       Rectal pain      (Rectal pain [K62.89])    Surgeons: Ruth Ann Willson MD Provider: Pan Palmer MD    Anesthesia Type: MAC ASA Status: 3          Anesthesia Type: MAC    Vitals  No vitals data found for the desired time range.          Post Anesthesia Care and Evaluation    Patient location during evaluation: PHASE II  Patient participation: waiting for patient participation  Level of consciousness: sleepy but conscious  Pain management: adequate  Airway patency: patent    Cardiovascular status: acceptable  Respiratory status: acceptable  Hydration status: acceptable

## 2022-03-14 ENCOUNTER — TELEPHONE (OUTPATIENT)
Dept: SLEEP MEDICINE | Facility: HOSPITAL | Age: 57
End: 2022-03-14

## 2022-03-14 ENCOUNTER — HOSPITAL ENCOUNTER (OUTPATIENT)
Dept: CARDIOLOGY | Facility: HOSPITAL | Age: 57
Discharge: HOME | End: 2022-03-14

## 2022-03-14 ENCOUNTER — HOSPITAL ENCOUNTER (OUTPATIENT)
Dept: CARDIOLOGY | Facility: HOSPITAL | Age: 57
Discharge: HOME | End: 2022-03-14
Admitting: INTERNAL MEDICINE

## 2022-03-14 VITALS
BODY MASS INDEX: 37.73 KG/M2 | DIASTOLIC BLOOD PRESSURE: 80 MMHG | WEIGHT: 205 LBS | HEIGHT: 62 IN | RESPIRATION RATE: 18 BRPM | SYSTOLIC BLOOD PRESSURE: 130 MMHG | OXYGEN SATURATION: 94 % | HEART RATE: 49 BPM

## 2022-03-14 LAB
AORTIC DIMENSIONLESS INDEX: 0.8 (DI)
ASCENDING AORTA: 3 CM
BH CV ECHO MEAS - AO MAX PG: 10.8 MMHG
BH CV ECHO MEAS - AO MEAN PG: 5.9 MMHG
BH CV ECHO MEAS - AO ROOT DIAM: 2.6 CM
BH CV ECHO MEAS - AO V2 MAX: 164.2 CM/SEC
BH CV ECHO MEAS - AO V2 VTI: 39.8 CM
BH CV ECHO MEAS - AVA(I,D): 1.81 CM2
BH CV ECHO MEAS - EDV(CUBED): 73.2 ML
BH CV ECHO MEAS - EDV(MOD-SP2): 56 ML
BH CV ECHO MEAS - EDV(MOD-SP4): 44 ML
BH CV ECHO MEAS - EF(MOD-BP): 59.4 %
BH CV ECHO MEAS - EF(MOD-SP2): 62.5 %
BH CV ECHO MEAS - EF(MOD-SP4): 56.8 %
BH CV ECHO MEAS - ESV(CUBED): 22.7 ML
BH CV ECHO MEAS - ESV(MOD-SP2): 21 ML
BH CV ECHO MEAS - ESV(MOD-SP4): 19 ML
BH CV ECHO MEAS - FS: 32.3 %
BH CV ECHO MEAS - IVS/LVPW: 1 CM
BH CV ECHO MEAS - IVSD: 0.97 CM
BH CV ECHO MEAS - LAT PEAK E' VEL: 12.4 CM/SEC
BH CV ECHO MEAS - LV MASS(C)D: 129.8 GRAMS
BH CV ECHO MEAS - LV MAX PG: 6.6 MMHG
BH CV ECHO MEAS - LV MEAN PG: 3.7 MMHG
BH CV ECHO MEAS - LV V1 MAX: 128.1 CM/SEC
BH CV ECHO MEAS - LV V1 VTI: 30.1 CM
BH CV ECHO MEAS - LVIDD: 4.2 CM
BH CV ECHO MEAS - LVIDS: 2.8 CM
BH CV ECHO MEAS - LVOT AREA: 2.39 CM2
BH CV ECHO MEAS - LVOT DIAM: 1.74 CM
BH CV ECHO MEAS - LVPWD: 0.97 CM
BH CV ECHO MEAS - MED PEAK E' VEL: 8.8 CM/SEC
BH CV ECHO MEAS - MV A DUR: 0.15 SEC
BH CV ECHO MEAS - MV A MAX VEL: 94.1 CM/SEC
BH CV ECHO MEAS - MV DEC SLOPE: 251.4 CM/SEC2
BH CV ECHO MEAS - MV DEC TIME: 0.27 MSEC
BH CV ECHO MEAS - MV E MAX VEL: 92.6 CM/SEC
BH CV ECHO MEAS - MV E/A: 0.98
BH CV ECHO MEAS - MV MAX PG: 3.8 MMHG
BH CV ECHO MEAS - MV MEAN PG: 1.38 MMHG
BH CV ECHO MEAS - MV V2 VTI: 37.4 CM
BH CV ECHO MEAS - MVA(VTI): 1.92 CM2
BH CV ECHO MEAS - PA ACC TIME: 0.06 SEC
BH CV ECHO MEAS - PA PR(ACCEL): 52.3 MMHG
BH CV ECHO MEAS - PA V2 MAX: 130.3 CM/SEC
BH CV ECHO MEAS - PULM A REVS DUR: 0.14 SEC
BH CV ECHO MEAS - PULM A REVS VEL: 32 CM/SEC
BH CV ECHO MEAS - PULM DIAS VEL: 28.4 CM/SEC
BH CV ECHO MEAS - PULM SYS VEL: 42.4 CM/SEC
BH CV ECHO MEAS - RV MAX PG: 5.9 MMHG
BH CV ECHO MEAS - RV V1 MAX: 121.3 CM/SEC
BH CV ECHO MEAS - RV V1 VTI: 24.2 CM
BH CV ECHO MEAS - RVOT DIAM: 2.21 CM
BH CV ECHO MEAS - SV(LVOT): 71.9 ML
BH CV ECHO MEAS - SV(MOD-SP2): 35 ML
BH CV ECHO MEAS - SV(MOD-SP4): 25 ML
BH CV ECHO MEAS - SV(RVOT): 93.1 ML
BH CV ECHO MEASUREMENTS AVERAGE E/E' RATIO: 8.74
BH CV REST NUCLEAR ISOTOPE DOSE: 10.9 MCI
BH CV STRESS BP STAGE 1: NORMAL
BH CV STRESS COMMENTS STAGE 1: NORMAL
BH CV STRESS DOSE REGADENOSON STAGE 1: 0.4
BH CV STRESS DURATION MIN STAGE 1: 1
BH CV STRESS DURATION SEC STAGE 1: 0
BH CV STRESS HR STAGE 1: 78
BH CV STRESS NUCLEAR ISOTOPE DOSE: 31.5 MCI
BH CV STRESS O2 STAGE 1: 95
BH CV STRESS PROTOCOL 1: NORMAL
BH CV STRESS RECOVERY BP: NORMAL MMHG
BH CV STRESS RECOVERY HR: 54 BPM
BH CV STRESS RECOVERY O2: 94 %
BH CV STRESS STAGE 1: 1
BH CV XLRA - TDI S': 14.3 CM/SEC
LV EF NUC BP: 72 %
MAXIMAL PREDICTED HEART RATE: 164 BPM
MAXIMAL PREDICTED HEART RATE: 164 BPM
PERCENT MAX PREDICTED HR: 47.56 %
SINUS: 2.8 CM
STJ: 2.7 CM
STRESS BASELINE BP: NORMAL MMHG
STRESS BASELINE HR: 50 BPM
STRESS O2 SAT REST: 94 %
STRESS PERCENT HR: 56 %
STRESS POST ESTIMATED WORKLOAD: 1 METS
STRESS POST EXERCISE DUR MIN: 1 MIN
STRESS POST EXERCISE DUR SEC: 0 SEC
STRESS POST O2 SAT PEAK: 95 %
STRESS POST PEAK BP: NORMAL MMHG
STRESS POST PEAK HR: 78 BPM
STRESS TARGET HR: 139 BPM
STRESS TARGET HR: 139 BPM

## 2022-03-14 PROCEDURE — 93306 TTE W/DOPPLER COMPLETE: CPT

## 2022-03-14 PROCEDURE — 78452 HT MUSCLE IMAGE SPECT MULT: CPT

## 2022-03-14 PROCEDURE — 93018 CV STRESS TEST I&R ONLY: CPT | Performed by: INTERNAL MEDICINE

## 2022-03-14 PROCEDURE — 78452 HT MUSCLE IMAGE SPECT MULT: CPT | Performed by: INTERNAL MEDICINE

## 2022-03-14 PROCEDURE — 25010000002 REGADENOSON 0.4 MG/5ML SOLUTION: Performed by: INTERNAL MEDICINE

## 2022-03-14 PROCEDURE — 25010000002 AMINOPHYLLINE PER 250 MG: Performed by: INTERNAL MEDICINE

## 2022-03-14 PROCEDURE — 25010000002 PERFLUTREN (DEFINITY) 8.476 MG IN SODIUM CHLORIDE (PF) 0.9 % 10 ML INJECTION: Performed by: INTERNAL MEDICINE

## 2022-03-14 PROCEDURE — 0 TECHNETIUM SESTAMIBI: Performed by: INTERNAL MEDICINE

## 2022-03-14 PROCEDURE — 93306 TTE W/DOPPLER COMPLETE: CPT | Performed by: INTERNAL MEDICINE

## 2022-03-14 PROCEDURE — A9500 TC99M SESTAMIBI: HCPCS | Performed by: INTERNAL MEDICINE

## 2022-03-14 PROCEDURE — 93017 CV STRESS TEST TRACING ONLY: CPT

## 2022-03-14 RX ORDER — RISPERIDONE 0.5 MG/1
0.5 TABLET ORAL DAILY
COMMUNITY
Start: 2022-01-09

## 2022-03-14 RX ORDER — AMINOPHYLLINE DIHYDRATE 25 MG/ML
125 INJECTION, SOLUTION INTRAVENOUS ONCE
Status: COMPLETED | OUTPATIENT
Start: 2022-03-14 | End: 2022-03-14

## 2022-03-14 RX ADMIN — SODIUM CHLORIDE 2 ML: 9 INJECTION INTRAMUSCULAR; INTRAVENOUS; SUBCUTANEOUS at 15:21

## 2022-03-14 RX ADMIN — TECHNETIUM TC 99M SESTAMIBI 1 DOSE: 1 INJECTION INTRAVENOUS at 11:58

## 2022-03-14 RX ADMIN — AMINOPHYLLINE 125 MG: 25 INJECTION, SOLUTION INTRAVENOUS at 11:58

## 2022-03-14 RX ADMIN — TECHNETIUM TC 99M SESTAMIBI 1 DOSE: 1 INJECTION INTRAVENOUS at 08:45

## 2022-03-14 RX ADMIN — REGADENOSON 0.4 MG: 0.08 INJECTION, SOLUTION INTRAVENOUS at 11:58

## 2022-03-14 NOTE — TELEPHONE ENCOUNTER
Pt called asking to schedule sleep study that she has cancelled many times due to Covid.  Apparently her machine broke last night and [employer] is attempting to help provide a loaner machine if possible.  She has not been seen at our office and sees Dr. Nichole at [facility] but has a consultation scheduled for [facility] next month.  I have reached out to the patient to have them contact [facility] to request a new order for a sleep study.

## 2022-03-15 ENCOUNTER — OFFICE VISIT (OUTPATIENT)
Dept: OBSTETRICS AND GYNECOLOGY | Facility: CLINIC | Age: 57
End: 2022-03-15

## 2022-03-15 ENCOUNTER — APPOINTMENT (OUTPATIENT)
Dept: WOMENS IMAGING | Facility: HOSPITAL | Age: 57
End: 2022-03-15

## 2022-03-15 ENCOUNTER — PROCEDURE VISIT (OUTPATIENT)
Dept: OBSTETRICS AND GYNECOLOGY | Facility: CLINIC | Age: 57
End: 2022-03-15

## 2022-03-15 VITALS
SYSTOLIC BLOOD PRESSURE: 125 MMHG | WEIGHT: 203 LBS | DIASTOLIC BLOOD PRESSURE: 71 MMHG | HEIGHT: 62 IN | BODY MASS INDEX: 37.36 KG/M2

## 2022-03-15 DIAGNOSIS — Z78.0 POSTMENOPAUSAL STATUS: ICD-10-CM

## 2022-03-15 DIAGNOSIS — Z12.31 VISIT FOR SCREENING MAMMOGRAM: Primary | ICD-10-CM

## 2022-03-15 DIAGNOSIS — Z01.419 WELL WOMAN EXAM WITH ROUTINE GYNECOLOGICAL EXAM: Primary | ICD-10-CM

## 2022-03-15 DIAGNOSIS — L30.4 INTERTRIGO: ICD-10-CM

## 2022-03-15 DIAGNOSIS — Z12.31 BREAST CANCER SCREENING BY MAMMOGRAM: ICD-10-CM

## 2022-03-15 DIAGNOSIS — Z85.42 HISTORY OF ENDOMETRIAL CANCER: ICD-10-CM

## 2022-03-15 PROCEDURE — 77067 SCR MAMMO BI INCL CAD: CPT | Performed by: RADIOLOGY

## 2022-03-15 PROCEDURE — G0101 CA SCREEN;PELVIC/BREAST EXAM: HCPCS | Performed by: STUDENT IN AN ORGANIZED HEALTH CARE EDUCATION/TRAINING PROGRAM

## 2022-03-15 PROCEDURE — 77063 BREAST TOMOSYNTHESIS BI: CPT | Performed by: STUDENT IN AN ORGANIZED HEALTH CARE EDUCATION/TRAINING PROGRAM

## 2022-03-15 PROCEDURE — 77067 SCR MAMMO BI INCL CAD: CPT | Performed by: STUDENT IN AN ORGANIZED HEALTH CARE EDUCATION/TRAINING PROGRAM

## 2022-03-15 PROCEDURE — 77063 BREAST TOMOSYNTHESIS BI: CPT | Performed by: RADIOLOGY

## 2022-03-15 RX ORDER — NYSTATIN AND TRIAMCINOLONE ACETONIDE 100000; 1 [USP'U]/G; MG/G
1 OINTMENT TOPICAL 2 TIMES DAILY
Qty: 60 G | Refills: 0 | Status: SHIPPED | OUTPATIENT
Start: 2022-03-15 | End: 2022-03-29

## 2022-03-15 RX ORDER — HYDROCORTISONE 2.5% / KETOCONAZOLE 2% 2.5; 2 G/100G; G/100G
1 CREAM TOPICAL DAILY
Qty: 30 G | Refills: 1 | Status: CANCELLED | OUTPATIENT
Start: 2022-03-15

## 2022-03-15 NOTE — PROGRESS NOTES
GYN Annual Exam     CC- Here for annual exam.     Flaca Hassan is a 56 y.o. female who presents for annual well woman exam. A Wireless [employer] telephone  was used today and the patient spoke mostly English. 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 She is being followed by GYN/ONC and radiation oncology at [facility]. She denies vaginal bleeding or vasomotor symptoms.       OB History    No obstetric history on file.         Current contraception: status post hysterectomy, postmenopausal status   History of abnormal Pap smear: no  Family history of uterine, colon or ovarian cancer: no  History of abnormal mammogram: no  Family history of breast cancer: yes- paternal cousin  Last Pap : unknown  Last mammogram: 1/6/21- BIRADS-2   Last colonoscopy: 3/28/22- scheduled   Last DEXA: n/a  Parental Hip Fracture: denies     Past Medical History:   Diagnosis Date   • Anemia    • Asthma    • Breast cyst    • Cancer (HCC)    • Coronary artery disease     stent   • Depression    • Diverticulosis    • H/O: GI bleed     recurrent   • Hematuria    • History of coronary angioplasty with insertion of stent    • History of transfusion     no reaction   • Hyperlipidemia    • Hypertension    • Incontinence of urine     wears pads   • Irritable bowel syndrome    • Malignant neoplasm of endometrium (HCC) 02/2021   • Melena    • Obesity    • Oxygen dependent     uses oxygen 2 prn when walking if SOB   • UTI (urinary tract infection)        Past Surgical History:   Procedure Laterality Date   • BREAST CYST EXCISION Left    • CARDIAC CATHETERIZATION N/A 11/13/2017    Procedure: Left Heart Cath;  Surgeon: Imer Montaño MD;  Location: [facility] CATH INVASIVE LOCATION;  Service:    • CARDIAC CATHETERIZATION N/A 11/13/2017    Procedure: Stent ROBERTO coronary;  Surgeon:  Imer Montaño MD;  Location: [facility] CATH INVASIVE LOCATION;  Service:    • COLONOSCOPY  02/11/2016    tics, NBIH,    • COLONOSCOPY N/A 2/28/2022    Procedure: COLONOSCOPY TO CECUM and TI WITH APC CAUTERY TO RECTAL AVMs;  Surgeon: Ruth Ann Willson MD;  Location: [facility] ENDOSCOPY;  Service: Gastroenterology;  Laterality: N/A;  FAMILY HX COLON CA  --HEMORRHOIDS, BLEEDING RECTAL AVMs    • D & C HYSTEROSCOPY ENDOMETRIAL ABLATION N/A 1/8/2021    Procedure: DILATATION AND CURETTAGE HYSTEROSCOPY ENDOMETRIAL  RESECTION;  Surgeon: Thu Blake MD;  Location: [facility] OR [facility];  Service: Obstetrics/Gynecology;  Laterality: N/A;   • ENDOSCOPY N/A 3/3/2017    erythematous mucosa in stomach, duodenal ulcer , mild to moderate chronic active gastritis, positive for h pylori   • ENDOSCOPY N/A 11/8/2021    Procedure: ESOPHAGOGASTRODUODENOSCOPY WITH BX'S;  Surgeon: Ruth Ann Willson MD;  Location: [facility] ENDOSCOPY;  Service: Gastroenterology;  Laterality: N/A;  pre: EPIGASTRIC PAIN, HX OF STOMACH ULCER  post: GASTRITIS   • UPPER GASTROINTESTINAL ENDOSCOPY  02/10/2016    sami-Ruth Ann Willson M.D.         Current Outpatient Medications:   •  acetaminophen (TYLENOL) 500 MG tablet, Take 1,000 mg by mouth Every 6 (Six) Hours As Needed for Mild Pain ., Disp: , Rfl:   •  amLODIPine (NORVASC) 10 MG tablet, TAKE 1 TABLET BY MOUTH DAILY, Disp: 30 tablet, Rfl: 1  •  bisoprolol-hydrochlorothiazide (ZIAC) 5-6.25 MG per tablet, TAKE 1 TABLET BY MOUTH DAILY, Disp: 30 tablet, Rfl: 3  •  budesonide-formoterol (SYMBICORT) 160-4.5 MCG/ACT inhaler, Inhale 2 puffs 2 (two) times a day., Disp: , Rfl:   •  Cholecalciferol (VITAMIN D3) 2000 units tablet, Take 1 tablet by mouth Daily., Disp: , Rfl: 1  •  clopidogrel (PLAVIX) 75 MG tablet, TAKE 1 TABLET BY MOUTH DAILY, Disp: 90 tablet, Rfl: 1  •  escitalopram (LEXAPRO) 10 MG tablet, Take 10 mg by mouth Every Morning., Disp: , Rfl: 0  •  fluticasone (FLONASE) 50 MCG/ACT nasal spray, 2 sprays into the  nostril(s) as directed by provider Daily., Disp: , Rfl:   •  Fluticasone Furoate-Vilanterol (Breo Ellipta) 200-25 MCG/INH inhaler, Inhale 1 puff Daily., Disp: , Rfl:   •  folic acid (FOLVITE) 1 MG tablet, Take 1 mg by mouth Daily., Disp: , Rfl: 0  •  furosemide (LASIX) 40 MG tablet, Take 1 tablet by mouth Daily., Disp: 30 tablet, Rfl: 6  •  hydroCHLOROthiazide (MICROZIDE) 12.5 MG capsule, Take 1 capsule by mouth Daily., Disp: 30 capsule, Rfl: 3  •  hydrocortisone (ANUSOL-HC) 25 MG suppository, Insert 1 suppository into the rectum Daily As Needed for Hemorrhoids (rectal discomfort)., Disp: 30 suppository, Rfl: 5  •  medroxyPROGESTERone (Provera) 10 MG tablet, Take 1 tablet by mouth Daily., Disp: 30 tablet, Rfl: 2  •  montelukast (SINGULAIR) 10 MG tablet, Take 10 mg by mouth Every Night., Disp: , Rfl:   •  nitroglycerin (NITROSTAT) 0.4 MG SL tablet, 1 under the tongue as needed for angina, may repeat q5mins for up three doses, Disp: 25 tablet, Rfl: 3  •  O2 (OXYGEN), Inhale 2 L/min As Needed., Disp: , Rfl:   •  olmesartan (BENICAR) 40 MG tablet, Take 1 tablet by mouth Daily., Disp: 30 tablet, Rfl: 3  •  pantoprazole (PROTONIX) 40 MG EC tablet, Take 1 tablet by mouth Daily., Disp: 30 tablet, Rfl: 5  •  polyethylene glycol (MIRALAX) 17 g packet, Take 17 g by mouth Daily., Disp: 30 packet, Rfl: 5  •  potassium chloride (MICRO-K) 10 MEQ CR capsule, 1 DAILY, Disp: 30 capsule, Rfl: 6  •  Proctozone-HC 2.5 % rectal cream, ALVA AA ON RECTUM DAILY PRF HEMORRHOIDS, Disp: , Rfl:   •  risperiDONE (risperDAL) 0.5 MG tablet, Take 0.5 mg by mouth., Disp: , Rfl:   •  senna (Senokot) 8.6 MG tablet, Take 1 tablet by mouth Daily., Disp: 30 tablet, Rfl: 5  •  simvastatin (ZOCOR) 20 MG tablet, TAKE 1 TABLET BY MOUTH EVERY EVENING, Disp: 30 tablet, Rfl: 6  •  traMADol (ULTRAM) 50 MG tablet, Take 1 tablet by mouth Every 6 (Six) Hours As Needed for Moderate Pain ., Disp: 5 tablet, Rfl: 0  •  VENTOLIN  (90 Base) MCG/ACT inhaler, Inhale  "2 puffs Every 4 (Four) Hours As Needed for Wheezing or Shortness of Air., Disp: , Rfl: 6  No current facility-administered medications for this visit.    No Known Allergies    Social History     Tobacco Use   • Smoking status: Never Smoker   • Smokeless tobacco: Never Used   Vaping Use   • Vaping Use: Never used   Substance Use Topics   • Alcohol use: No   • Drug use: Never       Family History   Problem Relation Age of Onset   • Breast cancer Other    • Lung cancer Brother    • Hypertension Brother    • Hyperlipidemia Brother    • Heart disease Brother    • Throat cancer Paternal Uncle    • Tongue cancer Paternal Grandfather    • Hypertension Father    • Hyperlipidemia Father    • Heart disease Father    • Malig Hyperthermia Neg Hx        Review of Systems   Gastrointestinal: Negative for abdominal pain.   Genitourinary: Negative for breast discharge, breast lump, breast pain, pelvic pain, vaginal bleeding and vaginal discharge.       /71   Ht 157.5 cm (62.01\")   Wt 92.1 kg (203 lb)   LMP  (LMP Unknown)   BMI 37.12 kg/m²     Physical Exam  Vitals reviewed. Exam conducted with a chaperone present.   Constitutional:       General: She is not in acute distress.     Appearance: She is obese.   HENT:      Head: Normocephalic and atraumatic.      Right Ear: External ear normal.      Left Ear: External ear normal.   Eyes:      Extraocular Movements: Extraocular movements intact.      Pupils: Pupils are equal, round, and reactive to light.   Cardiovascular:      Rate and Rhythm: Normal rate and regular rhythm.   Pulmonary:      Effort: Pulmonary effort is normal. No respiratory distress.   Chest:   Breasts: Breasts are symmetrical.      Right: Skin change present. No swelling, bleeding, inverted nipple, mass, nipple discharge, tenderness, axillary adenopathy or supraclavicular adenopathy.      Left: Skin change present. No swelling, bleeding, inverted nipple, mass, nipple discharge, tenderness, axillary " adenopathy or supraclavicular adenopathy.        Comments: Erythema and scaling skin along the underside of the breasts and upper abdomen.   Abdominal:      General: There is no distension.      Palpations: Abdomen is soft.      Tenderness: There is no abdominal tenderness. There is no guarding or rebound.   Genitourinary:     General: Normal vulva.      Exam position: Lithotomy position.      Labia:         Right: No rash, tenderness, lesion or injury.         Left: No rash, tenderness, lesion or injury.       Urethra: No prolapse or urethral swelling.      Comments: Uterus,cervix and adnexa surgically removed.   Internal exam difficult due to patient discomfort. Limited exam noted normal appearing vaginal mucosa. No palpable nodularity of the vagina. Unable to fully assess vaginal cuff.   Musculoskeletal:         General: No deformity. Normal range of motion.      Cervical back: Normal range of motion and neck supple.   Lymphadenopathy:      Upper Body:      Right upper body: No supraclavicular or axillary adenopathy.      Left upper body: No supraclavicular or axillary adenopathy.      Lower Body: No right inguinal adenopathy. No left inguinal adenopathy.   Skin:     General: Skin is warm and dry.   Neurological:      General: No focal deficit present.      Mental Status: She is alert and oriented to person, place, and time.   Psychiatric:         Mood and Affect: Mood normal.         Behavior: Behavior normal.            Assessment     1) GYN annual well woman exam.   2) Postmenopausal status   3) History of endometrioid adenocarcinoma of the endometrium   4) Breast cancer screening   5) Intertrigo inferior to breasts     Plan     1) Breast Health - Clinical breast exam & mammogram yearly, Self breast awareness monthly. Mammogram performed today.   2) Pap - Deferred today as difficulty to fully perform pelvic exam for patient discomfort. Patient currently followed by GYN/ONC for endometrial cancer.   3) Smoking  status- non-smoker.   4) Colon health - screening colonoscopy recommended if not up to date  5) Bone health - Weight bearing exercise, dietary calcium recommendations and vitamin D reviewed.   6) Activity recommends - Adult 150-300 min/week of multi-component physical activities that include balance training, aerobic and physical strengthening.    7) Intertrigo inferior to breasts- Advised keeping area dry and applying Mycolog cream twice a day for 14 days.   8) Follow up prn and one year      Thu Blake MD

## 2022-03-21 ENCOUNTER — OFFICE VISIT (OUTPATIENT)
Dept: CARDIOLOGY | Facility: CLINIC | Age: 57
End: 2022-03-21

## 2022-03-21 VITALS
SYSTOLIC BLOOD PRESSURE: 183 MMHG | HEIGHT: 62 IN | HEART RATE: 72 BPM | WEIGHT: 203 LBS | DIASTOLIC BLOOD PRESSURE: 85 MMHG | BODY MASS INDEX: 37.36 KG/M2

## 2022-03-21 DIAGNOSIS — I25.10 CORONARY ARTERY DISEASE INVOLVING NATIVE CORONARY ARTERY OF NATIVE HEART WITHOUT ANGINA PECTORIS: Primary | ICD-10-CM

## 2022-03-21 DIAGNOSIS — E78.5 HYPERLIPIDEMIA, UNSPECIFIED HYPERLIPIDEMIA TYPE: ICD-10-CM

## 2022-03-21 DIAGNOSIS — I10 PRIMARY HYPERTENSION: ICD-10-CM

## 2022-03-21 PROCEDURE — 99213 OFFICE O/P EST LOW 20 MIN: CPT | Performed by: INTERNAL MEDICINE

## 2022-03-21 RX ORDER — SIMVASTATIN 20 MG
20 TABLET ORAL EVERY EVENING
Qty: 30 TABLET | Refills: 3 | Status: SHIPPED | OUTPATIENT
Start: 2022-03-21 | End: 2022-04-12 | Stop reason: SDUPTHER

## 2022-03-21 RX ORDER — NITROGLYCERIN 0.4 MG/1
TABLET SUBLINGUAL
Qty: 25 TABLET | Refills: 3 | Status: SHIPPED | OUTPATIENT
Start: 2022-03-21

## 2022-03-21 RX ORDER — AMLODIPINE BESYLATE 10 MG/1
10 TABLET ORAL DAILY
Qty: 30 TABLET | Refills: 1 | Status: SHIPPED | OUTPATIENT
Start: 2022-03-21 | End: 2022-03-23

## 2022-03-21 RX ORDER — CLOPIDOGREL BISULFATE 75 MG/1
75 TABLET ORAL DAILY
Qty: 90 TABLET | Refills: 1 | Status: SHIPPED | OUTPATIENT
Start: 2022-03-21 | End: 2022-12-12

## 2022-03-21 RX ORDER — BISOPROLOL FUMARATE AND HYDROCHLOROTHIAZIDE 5; 6.25 MG/1; MG/1
1 TABLET ORAL DAILY
Qty: 30 TABLET | Refills: 3 | Status: SHIPPED | OUTPATIENT
Start: 2022-03-21 | End: 2022-04-12 | Stop reason: SDUPTHER

## 2022-03-21 RX ORDER — OLMESARTAN MEDOXOMIL 40 MG/1
40 TABLET ORAL DAILY
Qty: 30 TABLET | Refills: 3 | Status: SHIPPED | OUTPATIENT
Start: 2022-03-21 | End: 2022-09-23 | Stop reason: SDUPTHER

## 2022-03-21 RX ORDER — HYDROCHLOROTHIAZIDE 12.5 MG/1
12.5 CAPSULE, GELATIN COATED ORAL DAILY
Qty: 30 CAPSULE | Refills: 3 | Status: SHIPPED | OUTPATIENT
Start: 2022-03-21 | End: 2022-05-12

## 2022-03-23 RX ORDER — AMLODIPINE BESYLATE 10 MG/1
10 TABLET ORAL DAILY
Qty: 90 TABLET | Refills: 1 | Status: SHIPPED | OUTPATIENT
Start: 2022-03-23

## 2022-04-07 ENCOUNTER — TELEPHONE (OUTPATIENT)
Dept: OBSTETRICS AND GYNECOLOGY | Facility: CLINIC | Age: 57
End: 2022-04-07

## 2022-04-07 NOTE — TELEPHONE ENCOUNTER
Pt's nephew called saying that she has a hard spot on her stomach above her belly button. I advised her that this issue needs to go to her primary care provider. Do you agree that I should cancel her appointment on the 19th that was scheduled on 4/5 for this issue? Please advise.    Thank you,  Maik

## 2022-04-09 NOTE — PROGRESS NOTES
"Subjective   Flaca Hassan is a 57 y.o. female.     CC: Rash         Cough    History of Present Illness     Pt comes in today c/o a rash that's firm/tender/itches above the umbilicus for a week or so. Pt reports no new contacts. Pt also reports a cough and RN/congestion and mild sinus pressure. No f/c/dyspnea.    The following portions of the patient's history were reviewed and updated as appropriate: allergies, current medications, past family history, past medical history, past social history, past surgical history and problem list.    Review of Systems   Constitutional: Negative for activity change, chills and fever.   HENT: Positive for congestion, postnasal drip and sinus pressure.    Respiratory: Positive for cough.    Cardiovascular: Negative for chest pain.   Skin: Positive for rash.   Psychiatric/Behavioral: Negative for dysphoric mood.       /69   Pulse 82   Temp 97.5 °F (36.4 °C) (Oral)   Resp 16   Ht 157.5 cm (62\")   Wt 93.9 kg (207 lb)   LMP  (LMP Unknown)   SpO2 96%   BMI 37.86 kg/m²     Objective   Physical Exam  Constitutional:       General: She is not in acute distress.     Appearance: She is well-developed.   Cardiovascular:      Rate and Rhythm: Normal rate and regular rhythm.   Pulmonary:      Effort: Pulmonary effort is normal.      Breath sounds: Normal breath sounds.   Neurological:      Mental Status: She is alert and oriented to person, place, and time.   Psychiatric:         Behavior: Behavior normal.         Thought Content: Thought content normal.         Red area is firm/indurated.    Assessment/Plan   Diagnoses and all orders for this visit:    1. Cellulitis, unspecified cellulitis site (Primary)  -     amoxicillin-clavulanate (Augmentin) 875-125 MG per tablet; Take 1 tablet by mouth Every 12 (Twelve) Hours.  Dispense: 20 tablet; Refill: 1    2. IFG (impaired fasting glucose)  -     Hemoglobin A1c    3. Primary hypertension  -     Comprehensive metabolic panel  -     " Lipid panel  -     CBC and Differential  -     TSH    4. Acute URI  -     amoxicillin-clavulanate (Augmentin) 875-125 MG per tablet; Take 1 tablet by mouth Every 12 (Twelve) Hours.  Dispense: 20 tablet; Refill: 1  -     promethazine-dextromethorphan (PROMETHAZINE-DM) 6.25-15 MG/5ML syrup; Take 5 mL by mouth 4 (Four) Times a Day As Needed for Cough.  Dispense: 120 mL; Refill: 1    Ice to area and RTC if change.

## 2022-04-11 ENCOUNTER — OFFICE VISIT (OUTPATIENT)
Dept: FAMILY MEDICINE CLINIC | Facility: CLINIC | Age: 57
End: 2022-04-11

## 2022-04-11 VITALS
HEIGHT: 62 IN | RESPIRATION RATE: 16 BRPM | HEART RATE: 82 BPM | DIASTOLIC BLOOD PRESSURE: 69 MMHG | WEIGHT: 207 LBS | SYSTOLIC BLOOD PRESSURE: 119 MMHG | OXYGEN SATURATION: 96 % | BODY MASS INDEX: 38.09 KG/M2 | TEMPERATURE: 97.5 F

## 2022-04-11 DIAGNOSIS — R73.01 IFG (IMPAIRED FASTING GLUCOSE): ICD-10-CM

## 2022-04-11 DIAGNOSIS — L03.90 CELLULITIS, UNSPECIFIED CELLULITIS SITE: Primary | ICD-10-CM

## 2022-04-11 DIAGNOSIS — I10 PRIMARY HYPERTENSION: ICD-10-CM

## 2022-04-11 DIAGNOSIS — J06.9 ACUTE URI: ICD-10-CM

## 2022-04-11 PROCEDURE — 99214 OFFICE O/P EST MOD 30 MIN: CPT | Performed by: FAMILY MEDICINE

## 2022-04-11 RX ORDER — AMOXICILLIN AND CLAVULANATE POTASSIUM 875; 125 MG/1; MG/1
1 TABLET, FILM COATED ORAL EVERY 12 HOURS SCHEDULED
Qty: 20 TABLET | Refills: 1 | Status: SHIPPED | OUTPATIENT
Start: 2022-04-11 | End: 2022-05-12

## 2022-04-11 RX ORDER — DEXTROMETHORPHAN HYDROBROMIDE AND PROMETHAZINE HYDROCHLORIDE 15; 6.25 MG/5ML; MG/5ML
5 SYRUP ORAL 4 TIMES DAILY PRN
Qty: 120 ML | Refills: 1 | Status: SHIPPED | OUTPATIENT
Start: 2022-04-11 | End: 2022-08-04

## 2022-04-12 RX ORDER — BISOPROLOL FUMARATE AND HYDROCHLOROTHIAZIDE 5; 6.25 MG/1; MG/1
1 TABLET ORAL DAILY
Qty: 90 TABLET | Refills: 3 | Status: SHIPPED | OUTPATIENT
Start: 2022-04-12 | End: 2022-11-14 | Stop reason: SDUPTHER

## 2022-04-12 RX ORDER — SIMVASTATIN 20 MG
20 TABLET ORAL EVERY EVENING
Qty: 90 TABLET | Refills: 3 | Status: SHIPPED | OUTPATIENT
Start: 2022-04-12 | End: 2022-10-28 | Stop reason: SDUPTHER

## 2022-04-21 ENCOUNTER — TELEPHONE (OUTPATIENT)
Dept: OBSTETRICS AND GYNECOLOGY | Facility: CLINIC | Age: 57
End: 2022-04-21

## 2022-05-11 ENCOUNTER — HOSPITAL ENCOUNTER (OUTPATIENT)
Facility: HOSPITAL | Age: 57
Setting detail: OBSERVATION
Discharge: HOME | End: 2022-05-14
Attending: EMERGENCY MEDICINE | Admitting: INTERNAL MEDICINE

## 2022-05-11 DIAGNOSIS — L02.211 ABDOMINAL WALL ABSCESS: Primary | ICD-10-CM

## 2022-05-11 DIAGNOSIS — L03.311 ABDOMINAL WALL CELLULITIS: ICD-10-CM

## 2022-05-11 PROCEDURE — 36415 COLL VENOUS BLD VENIPUNCTURE: CPT

## 2022-05-11 PROCEDURE — 83605 ASSAY OF LACTIC ACID: CPT | Performed by: PHYSICIAN ASSISTANT

## 2022-05-11 PROCEDURE — 96375 TX/PRO/DX INJ NEW DRUG ADDON: CPT

## 2022-05-11 PROCEDURE — 87205 SMEAR GRAM STAIN: CPT | Performed by: PHYSICIAN ASSISTANT

## 2022-05-11 PROCEDURE — 87635 SARS-COV-2 COVID-19 AMP PRB: CPT | Performed by: PHYSICIAN ASSISTANT

## 2022-05-11 PROCEDURE — 99285 EMERGENCY DEPT VISIT HI MDM: CPT

## 2022-05-11 PROCEDURE — 25010000002 HYDROMORPHONE PER 4 MG: Performed by: EMERGENCY MEDICINE

## 2022-05-11 PROCEDURE — 87040 BLOOD CULTURE FOR BACTERIA: CPT | Performed by: PHYSICIAN ASSISTANT

## 2022-05-11 PROCEDURE — 87077 CULTURE AEROBIC IDENTIFY: CPT | Performed by: PHYSICIAN ASSISTANT

## 2022-05-11 PROCEDURE — 87070 CULTURE OTHR SPECIMN AEROBIC: CPT | Performed by: PHYSICIAN ASSISTANT

## 2022-05-11 PROCEDURE — 25010000002 ONDANSETRON PER 1 MG: Performed by: EMERGENCY MEDICINE

## 2022-05-11 PROCEDURE — 84145 PROCALCITONIN (PCT): CPT | Performed by: PHYSICIAN ASSISTANT

## 2022-05-11 PROCEDURE — C9803 HOPD COVID-19 SPEC COLLECT: HCPCS

## 2022-05-11 PROCEDURE — 85025 COMPLETE CBC W/AUTO DIFF WBC: CPT | Performed by: PHYSICIAN ASSISTANT

## 2022-05-11 PROCEDURE — 81001 URINALYSIS AUTO W/SCOPE: CPT | Performed by: PHYSICIAN ASSISTANT

## 2022-05-11 PROCEDURE — 87186 SC STD MICRODIL/AGAR DIL: CPT | Performed by: PHYSICIAN ASSISTANT

## 2022-05-11 PROCEDURE — 25010000002 PIPERACILLIN SOD-TAZOBACTAM PER 1 G: Performed by: PHYSICIAN ASSISTANT

## 2022-05-11 PROCEDURE — 80053 COMPREHEN METABOLIC PANEL: CPT | Performed by: PHYSICIAN ASSISTANT

## 2022-05-11 PROCEDURE — 96372 THER/PROPH/DIAG INJ SC/IM: CPT

## 2022-05-11 RX ORDER — ONDANSETRON 2 MG/ML
4 INJECTION INTRAMUSCULAR; INTRAVENOUS ONCE
Status: COMPLETED | OUTPATIENT
Start: 2022-05-11 | End: 2022-05-11

## 2022-05-11 RX ORDER — SODIUM CHLORIDE 0.9 % (FLUSH) 0.9 %
10 SYRINGE (ML) INJECTION AS NEEDED
Status: DISCONTINUED | OUTPATIENT
Start: 2022-05-11 | End: 2022-05-14 | Stop reason: HOSPADM

## 2022-05-11 RX ORDER — HYDROMORPHONE HYDROCHLORIDE 1 MG/ML
0.5 INJECTION, SOLUTION INTRAMUSCULAR; INTRAVENOUS; SUBCUTANEOUS ONCE
Status: COMPLETED | OUTPATIENT
Start: 2022-05-11 | End: 2022-05-11

## 2022-05-11 RX ADMIN — TAZOBACTAM SODIUM AND PIPERACILLIN SODIUM 3.38 G: 375; 3 INJECTION, SOLUTION INTRAVENOUS at 23:44

## 2022-05-11 RX ADMIN — ONDANSETRON 4 MG: 2 INJECTION INTRAMUSCULAR; INTRAVENOUS at 23:42

## 2022-05-11 RX ADMIN — HYDROMORPHONE HYDROCHLORIDE 0.5 MG: 1 INJECTION, SOLUTION INTRAMUSCULAR; INTRAVENOUS; SUBCUTANEOUS at 23:42

## 2022-05-11 RX ADMIN — SODIUM CHLORIDE 500 ML: 9 INJECTION, SOLUTION INTRAVENOUS at 23:45

## 2022-05-12 ENCOUNTER — APPOINTMENT (OUTPATIENT)
Dept: CT IMAGING | Facility: HOSPITAL | Age: 57
End: 2022-05-12

## 2022-05-12 PROBLEM — L03.311 ABDOMINAL WALL CELLULITIS: Status: ACTIVE | Noted: 2022-05-12

## 2022-05-12 PROBLEM — L02.211 ABDOMINAL WALL ABSCESS: Status: ACTIVE | Noted: 2022-05-12

## 2022-05-12 PROBLEM — R73.03 PREDIABETES: Status: ACTIVE | Noted: 2022-05-12

## 2022-05-12 PROBLEM — I25.2 HISTORY OF ST ELEVATION MYOCARDIAL INFARCTION (STEMI): Status: ACTIVE | Noted: 2022-05-12

## 2022-05-12 LAB
ALBUMIN SERPL-MCNC: 3.9 G/DL (ref 3.5–5.2)
ALBUMIN/GLOB SERPL: 0.9 G/DL
ALP SERPL-CCNC: 95 U/L (ref 39–117)
ALT SERPL W P-5'-P-CCNC: 16 U/L (ref 1–33)
ANION GAP SERPL CALCULATED.3IONS-SCNC: 10 MMOL/L (ref 5–15)
ANION GAP SERPL CALCULATED.3IONS-SCNC: 11.5 MMOL/L (ref 5–15)
AST SERPL-CCNC: 17 U/L (ref 1–32)
BACTERIA UR QL AUTO: ABNORMAL /HPF
BASOPHILS # BLD AUTO: 0.03 10*3/MM3 (ref 0–0.2)
BASOPHILS # BLD AUTO: 0.03 10*3/MM3 (ref 0–0.2)
BASOPHILS # BLD AUTO: 0.04 10*3/MM3 (ref 0–0.2)
BASOPHILS NFR BLD AUTO: 0.3 % (ref 0–1.5)
BASOPHILS NFR BLD AUTO: 0.4 % (ref 0–1.5)
BASOPHILS NFR BLD AUTO: 0.4 % (ref 0–1.5)
BILIRUB SERPL-MCNC: 0.5 MG/DL (ref 0–1.2)
BILIRUB UR QL STRIP: NEGATIVE
BUN SERPL-MCNC: 11 MG/DL (ref 6–20)
BUN SERPL-MCNC: 13 MG/DL (ref 6–20)
BUN/CREAT SERPL: 12.6 (ref 7–25)
BUN/CREAT SERPL: 14.9 (ref 7–25)
CALCIUM SPEC-SCNC: 8.8 MG/DL (ref 8.6–10.5)
CALCIUM SPEC-SCNC: 9.3 MG/DL (ref 8.6–10.5)
CHLORIDE SERPL-SCNC: 101 MMOL/L (ref 98–107)
CHLORIDE SERPL-SCNC: 104 MMOL/L (ref 98–107)
CLARITY UR: CLEAR
CO2 SERPL-SCNC: 21.5 MMOL/L (ref 22–29)
CO2 SERPL-SCNC: 22 MMOL/L (ref 22–29)
COLOR UR: YELLOW
CREAT SERPL-MCNC: 0.87 MG/DL (ref 0.57–1)
CREAT SERPL-MCNC: 0.87 MG/DL (ref 0.57–1)
D-LACTATE SERPL-SCNC: 1.2 MMOL/L (ref 0.5–2)
D-LACTATE SERPL-SCNC: 1.7 MMOL/L (ref 0.5–2)
DEPRECATED RDW RBC AUTO: 44.1 FL (ref 37–54)
DEPRECATED RDW RBC AUTO: 47.4 FL (ref 37–54)
DEPRECATED RDW RBC AUTO: 47.5 FL (ref 37–54)
EGFRCR SERPLBLD CKD-EPI 2021: 77.8 ML/MIN/1.73
EGFRCR SERPLBLD CKD-EPI 2021: 77.8 ML/MIN/1.73
EOSINOPHIL # BLD AUTO: 0.02 10*3/MM3 (ref 0–0.4)
EOSINOPHIL # BLD AUTO: 0.06 10*3/MM3 (ref 0–0.4)
EOSINOPHIL # BLD AUTO: 0.2 10*3/MM3 (ref 0–0.4)
EOSINOPHIL NFR BLD AUTO: 0.2 % (ref 0.3–6.2)
EOSINOPHIL NFR BLD AUTO: 0.5 % (ref 0.3–6.2)
EOSINOPHIL NFR BLD AUTO: 2.6 % (ref 0.3–6.2)
ERYTHROCYTE [DISTWIDTH] IN BLOOD BY AUTOMATED COUNT: 14.4 % (ref 12.3–15.4)
ERYTHROCYTE [DISTWIDTH] IN BLOOD BY AUTOMATED COUNT: 14.5 % (ref 12.3–15.4)
ERYTHROCYTE [DISTWIDTH] IN BLOOD BY AUTOMATED COUNT: 14.7 % (ref 12.3–15.4)
GLOBULIN UR ELPH-MCNC: 4.5 GM/DL
GLUCOSE SERPL-MCNC: 121 MG/DL (ref 65–99)
GLUCOSE SERPL-MCNC: 137 MG/DL (ref 65–99)
GLUCOSE UR STRIP-MCNC: NEGATIVE MG/DL
HCT VFR BLD AUTO: 35.8 % (ref 34–46.6)
HCT VFR BLD AUTO: 36.4 % (ref 34–46.6)
HCT VFR BLD AUTO: 37.5 % (ref 34–46.6)
HGB BLD-MCNC: 11.2 G/DL (ref 12–15.9)
HGB BLD-MCNC: 12.2 G/DL (ref 12–15.9)
HGB BLD-MCNC: 12.2 G/DL (ref 12–15.9)
HGB UR QL STRIP.AUTO: NEGATIVE
HYALINE CASTS UR QL AUTO: ABNORMAL /LPF
IMM GRANULOCYTES # BLD AUTO: 0.04 10*3/MM3 (ref 0–0.05)
IMM GRANULOCYTES # BLD AUTO: 0.06 10*3/MM3 (ref 0–0.05)
IMM GRANULOCYTES NFR BLD AUTO: 0.5 % (ref 0–0.5)
IMM GRANULOCYTES NFR BLD AUTO: 0.6 % (ref 0–0.5)
INR PPP: 1.15 (ref 0.9–1.1)
KETONES UR QL STRIP: NEGATIVE
LEUKOCYTE ESTERASE UR QL STRIP.AUTO: ABNORMAL
LYMPHOCYTES # BLD AUTO: 1.31 10*3/MM3 (ref 0.7–3.1)
LYMPHOCYTES # BLD AUTO: 1.45 10*3/MM3 (ref 0.7–3.1)
LYMPHOCYTES # BLD AUTO: 1.68 10*3/MM3 (ref 0.7–3.1)
LYMPHOCYTES NFR BLD AUTO: 12.1 % (ref 19.6–45.3)
LYMPHOCYTES NFR BLD AUTO: 13 % (ref 19.6–45.3)
LYMPHOCYTES NFR BLD AUTO: 21.8 % (ref 19.6–45.3)
MCH RBC QN AUTO: 27.9 PG (ref 26.6–33)
MCH RBC QN AUTO: 28.6 PG (ref 26.6–33)
MCH RBC QN AUTO: 28.6 PG (ref 26.6–33)
MCHC RBC AUTO-ENTMCNC: 31.3 G/DL (ref 31.5–35.7)
MCHC RBC AUTO-ENTMCNC: 32.5 G/DL (ref 31.5–35.7)
MCHC RBC AUTO-ENTMCNC: 33.5 G/DL (ref 31.5–35.7)
MCV RBC AUTO: 85.4 FL (ref 79–97)
MCV RBC AUTO: 88 FL (ref 79–97)
MCV RBC AUTO: 89.3 FL (ref 79–97)
MONOCYTES # BLD AUTO: 0.63 10*3/MM3 (ref 0.1–0.9)
MONOCYTES # BLD AUTO: 0.64 10*3/MM3 (ref 0.1–0.9)
MONOCYTES # BLD AUTO: 0.74 10*3/MM3 (ref 0.1–0.9)
MONOCYTES NFR BLD AUTO: 5.6 % (ref 5–12)
MONOCYTES NFR BLD AUTO: 6.8 % (ref 5–12)
MONOCYTES NFR BLD AUTO: 8.3 % (ref 5–12)
NEUTROPHILS NFR BLD AUTO: 5.1 10*3/MM3 (ref 1.7–7)
NEUTROPHILS NFR BLD AUTO: 66.4 % (ref 42.7–76)
NEUTROPHILS NFR BLD AUTO: 79.9 % (ref 42.7–76)
NEUTROPHILS NFR BLD AUTO: 8.64 10*3/MM3 (ref 1.7–7)
NEUTROPHILS NFR BLD AUTO: 8.96 10*3/MM3 (ref 1.7–7)
NEUTROPHILS NFR BLD AUTO: 80.2 % (ref 42.7–76)
NITRITE UR QL STRIP: NEGATIVE
NRBC BLD AUTO-RTO: 0 /100 WBC (ref 0–0.2)
NRBC BLD AUTO-RTO: 0 /100 WBC (ref 0–0.2)
PH UR STRIP.AUTO: 6.5 [PH] (ref 5–8)
PLAT MORPH BLD: NORMAL
PLATELET # BLD AUTO: 241 10*3/MM3 (ref 140–450)
PLATELET # BLD AUTO: 261 10*3/MM3 (ref 140–450)
PLATELET # BLD AUTO: 291 10*3/MM3 (ref 140–450)
PMV BLD AUTO: 10.9 FL (ref 6–12)
PMV BLD AUTO: 11.1 FL (ref 6–12)
PMV BLD AUTO: 11.1 FL (ref 6–12)
POTASSIUM SERPL-SCNC: 4 MMOL/L (ref 3.5–5.2)
POTASSIUM SERPL-SCNC: 4 MMOL/L (ref 3.5–5.2)
PROCALCITONIN SERPL-MCNC: 0.05 NG/ML (ref 0–0.25)
PROT SERPL-MCNC: 8.4 G/DL (ref 6–8.5)
PROT UR QL STRIP: NEGATIVE
PROTHROMBIN TIME: 14.7 SECONDS (ref 11.7–14.2)
RBC # BLD AUTO: 4.01 10*6/MM3 (ref 3.77–5.28)
RBC # BLD AUTO: 4.26 10*6/MM3 (ref 3.77–5.28)
RBC # BLD AUTO: 4.26 10*6/MM3 (ref 3.77–5.28)
RBC # UR STRIP: ABNORMAL /HPF
RBC MORPH BLD: NORMAL
REF LAB TEST METHOD: ABNORMAL
SARS-COV-2 RNA PNL SPEC NAA+PROBE: NOT DETECTED
SODIUM SERPL-SCNC: 133 MMOL/L (ref 136–145)
SODIUM SERPL-SCNC: 137 MMOL/L (ref 136–145)
SP GR UR STRIP: <=1.005 (ref 1–1.03)
SQUAMOUS #/AREA URNS HPF: ABNORMAL /HPF
UROBILINOGEN UR QL STRIP: ABNORMAL
WBC # UR STRIP: ABNORMAL /HPF
WBC MORPH BLD: NORMAL
WBC NRBC COR # BLD: 10.81 10*3/MM3 (ref 3.4–10.8)
WBC NRBC COR # BLD: 11.18 10*3/MM3 (ref 3.4–10.8)
WBC NRBC COR # BLD: 7.69 10*3/MM3 (ref 3.4–10.8)

## 2022-05-12 PROCEDURE — 25010000002 VANCOMYCIN PER 500 MG: Performed by: NURSE PRACTITIONER

## 2022-05-12 PROCEDURE — 25010000002 IOPAMIDOL 61 % SOLUTION: Performed by: EMERGENCY MEDICINE

## 2022-05-12 PROCEDURE — 25010000002 ONDANSETRON PER 1 MG: Performed by: EMERGENCY MEDICINE

## 2022-05-12 PROCEDURE — 80048 BASIC METABOLIC PNL TOTAL CA: CPT | Performed by: NURSE PRACTITIONER

## 2022-05-12 PROCEDURE — 99203 OFFICE O/P NEW LOW 30 MIN: CPT | Performed by: STUDENT IN AN ORGANIZED HEALTH CARE EDUCATION/TRAINING PROGRAM

## 2022-05-12 PROCEDURE — 85610 PROTHROMBIN TIME: CPT | Performed by: NURSE PRACTITIONER

## 2022-05-12 PROCEDURE — 85025 COMPLETE CBC W/AUTO DIFF WBC: CPT | Performed by: NURSE PRACTITIONER

## 2022-05-12 PROCEDURE — 25010000002 MORPHINE PER 10 MG: Performed by: NURSE PRACTITIONER

## 2022-05-12 PROCEDURE — G0378 HOSPITAL OBSERVATION PER HR: HCPCS

## 2022-05-12 PROCEDURE — 96367 TX/PROPH/DG ADDL SEQ IV INF: CPT

## 2022-05-12 PROCEDURE — 85007 BL SMEAR W/DIFF WBC COUNT: CPT | Performed by: NURSE PRACTITIONER

## 2022-05-12 PROCEDURE — 87186 SC STD MICRODIL/AGAR DIL: CPT | Performed by: STUDENT IN AN ORGANIZED HEALTH CARE EDUCATION/TRAINING PROGRAM

## 2022-05-12 PROCEDURE — 25010000002 VANCOMYCIN 10 G RECONSTITUTED SOLUTION: Performed by: PHYSICIAN ASSISTANT

## 2022-05-12 PROCEDURE — 36415 COLL VENOUS BLD VENIPUNCTURE: CPT | Performed by: NURSE PRACTITIONER

## 2022-05-12 PROCEDURE — 87077 CULTURE AEROBIC IDENTIFY: CPT | Performed by: STUDENT IN AN ORGANIZED HEALTH CARE EDUCATION/TRAINING PROGRAM

## 2022-05-12 PROCEDURE — 94799 UNLISTED PULMONARY SVC/PX: CPT

## 2022-05-12 PROCEDURE — 25010000002 DIPHENHYDRAMINE PER 50 MG: Performed by: PHYSICIAN ASSISTANT

## 2022-05-12 PROCEDURE — 96365 THER/PROPH/DIAG IV INF INIT: CPT

## 2022-05-12 PROCEDURE — 96375 TX/PRO/DX INJ NEW DRUG ADDON: CPT

## 2022-05-12 PROCEDURE — 96366 THER/PROPH/DIAG IV INF ADDON: CPT

## 2022-05-12 PROCEDURE — 96376 TX/PRO/DX INJ SAME DRUG ADON: CPT

## 2022-05-12 PROCEDURE — 94640 AIRWAY INHALATION TREATMENT: CPT

## 2022-05-12 PROCEDURE — 87070 CULTURE OTHR SPECIMN AEROBIC: CPT | Performed by: STUDENT IN AN ORGANIZED HEALTH CARE EDUCATION/TRAINING PROGRAM

## 2022-05-12 PROCEDURE — 74177 CT ABD & PELVIS W/CONTRAST: CPT

## 2022-05-12 PROCEDURE — 25010000002 HYDROMORPHONE PER 4 MG: Performed by: EMERGENCY MEDICINE

## 2022-05-12 PROCEDURE — 83605 ASSAY OF LACTIC ACID: CPT | Performed by: NURSE PRACTITIONER

## 2022-05-12 PROCEDURE — 94664 DEMO&/EVAL PT USE INHALER: CPT

## 2022-05-12 PROCEDURE — 87205 SMEAR GRAM STAIN: CPT | Performed by: STUDENT IN AN ORGANIZED HEALTH CARE EDUCATION/TRAINING PROGRAM

## 2022-05-12 PROCEDURE — 25010000002 PIPERACILLIN SOD-TAZOBACTAM PER 1 G: Performed by: NURSE PRACTITIONER

## 2022-05-12 RX ORDER — MONTELUKAST SODIUM 10 MG/1
10 TABLET ORAL NIGHTLY
Status: DISCONTINUED | OUTPATIENT
Start: 2022-05-12 | End: 2022-05-14 | Stop reason: HOSPADM

## 2022-05-12 RX ORDER — VANCOMYCIN HYDROCHLORIDE 1 G/200ML
1000 INJECTION, SOLUTION INTRAVENOUS EVERY 12 HOURS
Status: DISCONTINUED | OUTPATIENT
Start: 2022-05-12 | End: 2022-05-14 | Stop reason: HOSPADM

## 2022-05-12 RX ORDER — AMLODIPINE BESYLATE 10 MG/1
10 TABLET ORAL DAILY
Status: DISCONTINUED | OUTPATIENT
Start: 2022-05-12 | End: 2022-05-14 | Stop reason: HOSPADM

## 2022-05-12 RX ORDER — PANTOPRAZOLE SODIUM 40 MG/1
40 TABLET, DELAYED RELEASE ORAL EVERY MORNING
Status: DISCONTINUED | OUTPATIENT
Start: 2022-05-13 | End: 2022-05-14 | Stop reason: HOSPADM

## 2022-05-12 RX ORDER — LOSARTAN POTASSIUM 100 MG/1
100 TABLET ORAL
Refills: 3 | Status: DISCONTINUED | OUTPATIENT
Start: 2022-05-12 | End: 2022-05-14 | Stop reason: HOSPADM

## 2022-05-12 RX ORDER — HYDROMORPHONE HYDROCHLORIDE 1 MG/ML
0.5 INJECTION, SOLUTION INTRAMUSCULAR; INTRAVENOUS; SUBCUTANEOUS ONCE
Status: COMPLETED | OUTPATIENT
Start: 2022-05-12 | End: 2022-05-12

## 2022-05-12 RX ORDER — ATORVASTATIN CALCIUM 20 MG/1
10 TABLET, FILM COATED ORAL NIGHTLY
Refills: 3 | Status: DISCONTINUED | OUTPATIENT
Start: 2022-05-12 | End: 2022-05-13

## 2022-05-12 RX ORDER — ACETAMINOPHEN 160 MG/5ML
650 SOLUTION ORAL EVERY 4 HOURS PRN
Status: DISCONTINUED | OUTPATIENT
Start: 2022-05-12 | End: 2022-05-14 | Stop reason: HOSPADM

## 2022-05-12 RX ORDER — DIPHENHYDRAMINE HYDROCHLORIDE 50 MG/ML
25 INJECTION INTRAMUSCULAR; INTRAVENOUS ONCE
Status: COMPLETED | OUTPATIENT
Start: 2022-05-12 | End: 2022-05-12

## 2022-05-12 RX ORDER — SENNA PLUS 8.6 MG/1
1 TABLET ORAL DAILY
Status: DISCONTINUED | OUTPATIENT
Start: 2022-05-12 | End: 2022-05-14 | Stop reason: HOSPADM

## 2022-05-12 RX ORDER — MORPHINE SULFATE 2 MG/ML
1 INJECTION, SOLUTION INTRAMUSCULAR; INTRAVENOUS EVERY 4 HOURS PRN
Status: DISPENSED | OUTPATIENT
Start: 2022-05-12 | End: 2022-05-13

## 2022-05-12 RX ORDER — ACETAMINOPHEN 650 MG/1
650 SUPPOSITORY RECTAL EVERY 4 HOURS PRN
Status: DISCONTINUED | OUTPATIENT
Start: 2022-05-12 | End: 2022-05-14 | Stop reason: HOSPADM

## 2022-05-12 RX ORDER — BUDESONIDE AND FORMOTEROL FUMARATE DIHYDRATE 160; 4.5 UG/1; UG/1
2 AEROSOL RESPIRATORY (INHALATION)
Status: DISCONTINUED | OUTPATIENT
Start: 2022-05-12 | End: 2022-05-14 | Stop reason: HOSPADM

## 2022-05-12 RX ORDER — ONDANSETRON 2 MG/ML
4 INJECTION INTRAMUSCULAR; INTRAVENOUS ONCE
Status: COMPLETED | OUTPATIENT
Start: 2022-05-12 | End: 2022-05-12

## 2022-05-12 RX ORDER — ACETAMINOPHEN 325 MG/1
650 TABLET ORAL EVERY 4 HOURS PRN
Status: DISCONTINUED | OUTPATIENT
Start: 2022-05-12 | End: 2022-05-14 | Stop reason: HOSPADM

## 2022-05-12 RX ORDER — LIDOCAINE HYDROCHLORIDE AND EPINEPHRINE 10; 10 MG/ML; UG/ML
30 INJECTION, SOLUTION INFILTRATION; PERINEURAL ONCE
Status: COMPLETED | OUTPATIENT
Start: 2022-05-12 | End: 2022-05-12

## 2022-05-12 RX ORDER — RISPERIDONE 0.5 MG/1
0.5 TABLET ORAL DAILY
Status: DISCONTINUED | OUTPATIENT
Start: 2022-05-12 | End: 2022-05-14 | Stop reason: HOSPADM

## 2022-05-12 RX ORDER — NALOXONE HCL 0.4 MG/ML
0.4 VIAL (ML) INJECTION
Status: DISCONTINUED | OUTPATIENT
Start: 2022-05-12 | End: 2022-05-14 | Stop reason: HOSPADM

## 2022-05-12 RX ORDER — SODIUM CHLORIDE 0.9 % (FLUSH) 0.9 %
10 SYRINGE (ML) INJECTION AS NEEDED
Status: DISCONTINUED | OUTPATIENT
Start: 2022-05-12 | End: 2022-05-14 | Stop reason: HOSPADM

## 2022-05-12 RX ORDER — ALBUTEROL SULFATE 2.5 MG/3ML
2.5 SOLUTION RESPIRATORY (INHALATION) EVERY 4 HOURS PRN
Refills: 6 | Status: DISCONTINUED | OUTPATIENT
Start: 2022-05-12 | End: 2022-05-14 | Stop reason: HOSPADM

## 2022-05-12 RX ORDER — FLUTICASONE PROPIONATE 50 MCG
2 SPRAY, SUSPENSION (ML) NASAL DAILY
Status: DISCONTINUED | OUTPATIENT
Start: 2022-05-12 | End: 2022-05-14 | Stop reason: HOSPADM

## 2022-05-12 RX ORDER — SODIUM CHLORIDE 0.9 % (FLUSH) 0.9 %
10 SYRINGE (ML) INJECTION EVERY 12 HOURS SCHEDULED
Status: DISCONTINUED | OUTPATIENT
Start: 2022-05-12 | End: 2022-05-14 | Stop reason: HOSPADM

## 2022-05-12 RX ORDER — FOLIC ACID 1 MG/1
1 TABLET ORAL DAILY
Status: DISCONTINUED | OUTPATIENT
Start: 2022-05-12 | End: 2022-05-14 | Stop reason: HOSPADM

## 2022-05-12 RX ORDER — ONDANSETRON 2 MG/ML
4 INJECTION INTRAMUSCULAR; INTRAVENOUS EVERY 6 HOURS PRN
Status: DISCONTINUED | OUTPATIENT
Start: 2022-05-12 | End: 2022-05-14 | Stop reason: HOSPADM

## 2022-05-12 RX ORDER — MELATONIN
2000 DAILY
Refills: 1 | Status: DISCONTINUED | OUTPATIENT
Start: 2022-05-12 | End: 2022-05-14 | Stop reason: HOSPADM

## 2022-05-12 RX ORDER — ESCITALOPRAM OXALATE 10 MG/1
10 TABLET ORAL DAILY
Status: DISCONTINUED | OUTPATIENT
Start: 2022-05-12 | End: 2022-05-14 | Stop reason: HOSPADM

## 2022-05-12 RX ORDER — HYDROCODONE BITARTRATE AND ACETAMINOPHEN 5; 325 MG/1; MG/1
1 TABLET ORAL EVERY 6 HOURS PRN
Status: DISCONTINUED | OUTPATIENT
Start: 2022-05-12 | End: 2022-05-14 | Stop reason: HOSPADM

## 2022-05-12 RX ORDER — HYDROCORTISONE 25 MG/G
1 CREAM TOPICAL 2 TIMES DAILY PRN
Status: DISCONTINUED | OUTPATIENT
Start: 2022-05-12 | End: 2022-05-14 | Stop reason: HOSPADM

## 2022-05-12 RX ADMIN — BUDESONIDE AND FORMOTEROL FUMARATE DIHYDRATE 2 PUFF: 160; 4.5 AEROSOL RESPIRATORY (INHALATION) at 22:51

## 2022-05-12 RX ADMIN — IOPAMIDOL 85 ML: 612 INJECTION, SOLUTION INTRAVENOUS at 03:41

## 2022-05-12 RX ADMIN — Medication 2000 UNITS: at 15:10

## 2022-05-12 RX ADMIN — Medication 10 ML: at 11:51

## 2022-05-12 RX ADMIN — MORPHINE SULFATE 1 MG: 2 INJECTION, SOLUTION INTRAMUSCULAR; INTRAVENOUS at 17:16

## 2022-05-12 RX ADMIN — HYDROMORPHONE HYDROCHLORIDE 0.5 MG: 1 INJECTION, SOLUTION INTRAMUSCULAR; INTRAVENOUS; SUBCUTANEOUS at 01:23

## 2022-05-12 RX ADMIN — SENNOSIDES 1 TABLET: 8.6 TABLET, FILM COATED ORAL at 15:10

## 2022-05-12 RX ADMIN — VANCOMYCIN HYDROCHLORIDE 1000 MG: 1 INJECTION, SOLUTION INTRAVENOUS at 16:09

## 2022-05-12 RX ADMIN — ESCITALOPRAM 10 MG: 10 TABLET, FILM COATED ORAL at 15:10

## 2022-05-12 RX ADMIN — VANCOMYCIN HYDROCHLORIDE 1750 MG: 10 INJECTION, POWDER, LYOPHILIZED, FOR SOLUTION INTRAVENOUS at 00:41

## 2022-05-12 RX ADMIN — Medication 10 ML: at 21:21

## 2022-05-12 RX ADMIN — HYDROCODONE BITARTRATE AND ACETAMINOPHEN 1 TABLET: 5; 325 TABLET ORAL at 13:37

## 2022-05-12 RX ADMIN — RISPERIDONE 0.5 MG: 0.5 TABLET ORAL at 17:12

## 2022-05-12 RX ADMIN — HYDROCODONE BITARTRATE AND ACETAMINOPHEN 1 TABLET: 5; 325 TABLET ORAL at 19:29

## 2022-05-12 RX ADMIN — FLUTICASONE PROPIONATE 2 SPRAY: 50 SPRAY, METERED NASAL at 17:12

## 2022-05-12 RX ADMIN — MONTELUKAST SODIUM 10 MG: 10 TABLET, FILM COATED ORAL at 21:21

## 2022-05-12 RX ADMIN — ONDANSETRON 4 MG: 2 INJECTION INTRAMUSCULAR; INTRAVENOUS at 01:23

## 2022-05-12 RX ADMIN — TAZOBACTAM SODIUM AND PIPERACILLIN SODIUM 3.38 G: 375; 3 INJECTION, SOLUTION INTRAVENOUS at 05:37

## 2022-05-12 RX ADMIN — DIPHENHYDRAMINE HYDROCHLORIDE 25 MG: 50 INJECTION, SOLUTION INTRAMUSCULAR; INTRAVENOUS at 04:39

## 2022-05-12 RX ADMIN — TAZOBACTAM SODIUM AND PIPERACILLIN SODIUM 3.38 G: 375; 3 INJECTION, SOLUTION INTRAVENOUS at 23:00

## 2022-05-12 RX ADMIN — LOSARTAN POTASSIUM 100 MG: 100 TABLET, FILM COATED ORAL at 15:10

## 2022-05-12 RX ADMIN — HYDROMORPHONE HYDROCHLORIDE 0.5 MG: 1 INJECTION, SOLUTION INTRAMUSCULAR; INTRAVENOUS; SUBCUTANEOUS at 02:12

## 2022-05-12 RX ADMIN — FOLIC ACID 1 MG: 1 TABLET ORAL at 15:10

## 2022-05-12 RX ADMIN — AMLODIPINE BESYLATE 10 MG: 10 TABLET ORAL at 15:10

## 2022-05-12 RX ADMIN — LIDOCAINE HYDROCHLORIDE,EPINEPHRINE BITARTRATE 8 ML: 10; .01 INJECTION, SOLUTION INFILTRATION; PERINEURAL at 17:25

## 2022-05-12 RX ADMIN — Medication 10 ML: at 02:52

## 2022-05-12 RX ADMIN — TAZOBACTAM SODIUM AND PIPERACILLIN SODIUM 3.38 G: 375; 3 INJECTION, SOLUTION INTRAVENOUS at 15:08

## 2022-05-13 LAB
ANION GAP SERPL CALCULATED.3IONS-SCNC: 9 MMOL/L (ref 5–15)
BASOPHILS # BLD AUTO: 0.04 10*3/MM3 (ref 0–0.2)
BASOPHILS NFR BLD AUTO: 0.8 % (ref 0–1.5)
BUN SERPL-MCNC: 11 MG/DL (ref 6–20)
BUN/CREAT SERPL: 13.3 (ref 7–25)
CALCIUM SPEC-SCNC: 8.4 MG/DL (ref 8.6–10.5)
CHLORIDE SERPL-SCNC: 105 MMOL/L (ref 98–107)
CO2 SERPL-SCNC: 22 MMOL/L (ref 22–29)
CREAT SERPL-MCNC: 0.83 MG/DL (ref 0.57–1)
DEPRECATED RDW RBC AUTO: 47.5 FL (ref 37–54)
EGFRCR SERPLBLD CKD-EPI 2021: 82.3 ML/MIN/1.73
EOSINOPHIL # BLD AUTO: 0.35 10*3/MM3 (ref 0–0.4)
EOSINOPHIL NFR BLD AUTO: 6.6 % (ref 0.3–6.2)
ERYTHROCYTE [DISTWIDTH] IN BLOOD BY AUTOMATED COUNT: 14.6 % (ref 12.3–15.4)
GLUCOSE SERPL-MCNC: 121 MG/DL (ref 65–99)
HBA1C MFR BLD: 6 % (ref 4.8–5.6)
HCT VFR BLD AUTO: 35.3 % (ref 34–46.6)
HGB BLD-MCNC: 11.1 G/DL (ref 12–15.9)
IMM GRANULOCYTES # BLD AUTO: 0.02 10*3/MM3 (ref 0–0.05)
IMM GRANULOCYTES NFR BLD AUTO: 0.4 % (ref 0–0.5)
LYMPHOCYTES # BLD AUTO: 1.45 10*3/MM3 (ref 0.7–3.1)
LYMPHOCYTES NFR BLD AUTO: 27.3 % (ref 19.6–45.3)
MCH RBC QN AUTO: 28.5 PG (ref 26.6–33)
MCHC RBC AUTO-ENTMCNC: 31.4 G/DL (ref 31.5–35.7)
MCV RBC AUTO: 90.5 FL (ref 79–97)
MONOCYTES # BLD AUTO: 0.48 10*3/MM3 (ref 0.1–0.9)
MONOCYTES NFR BLD AUTO: 9 % (ref 5–12)
NEUTROPHILS NFR BLD AUTO: 2.97 10*3/MM3 (ref 1.7–7)
NEUTROPHILS NFR BLD AUTO: 55.9 % (ref 42.7–76)
NRBC BLD AUTO-RTO: 0 /100 WBC (ref 0–0.2)
PLATELET # BLD AUTO: 234 10*3/MM3 (ref 140–450)
PMV BLD AUTO: 11.3 FL (ref 6–12)
POTASSIUM SERPL-SCNC: 4.1 MMOL/L (ref 3.5–5.2)
RBC # BLD AUTO: 3.9 10*6/MM3 (ref 3.77–5.28)
SODIUM SERPL-SCNC: 136 MMOL/L (ref 136–145)
VANCOMYCIN TROUGH SERPL-MCNC: 17.4 MCG/ML (ref 5–20)
WBC NRBC COR # BLD: 5.31 10*3/MM3 (ref 3.4–10.8)

## 2022-05-13 PROCEDURE — 94664 DEMO&/EVAL PT USE INHALER: CPT

## 2022-05-13 PROCEDURE — 80048 BASIC METABOLIC PNL TOTAL CA: CPT | Performed by: NURSE PRACTITIONER

## 2022-05-13 PROCEDURE — 99212 OFFICE O/P EST SF 10 MIN: CPT | Performed by: STUDENT IN AN ORGANIZED HEALTH CARE EDUCATION/TRAINING PROGRAM

## 2022-05-13 PROCEDURE — 94799 UNLISTED PULMONARY SVC/PX: CPT

## 2022-05-13 PROCEDURE — 36415 COLL VENOUS BLD VENIPUNCTURE: CPT | Performed by: NURSE PRACTITIONER

## 2022-05-13 PROCEDURE — 96367 TX/PROPH/DG ADDL SEQ IV INF: CPT

## 2022-05-13 PROCEDURE — 83036 HEMOGLOBIN GLYCOSYLATED A1C: CPT | Performed by: NURSE PRACTITIONER

## 2022-05-13 PROCEDURE — 85025 COMPLETE CBC W/AUTO DIFF WBC: CPT | Performed by: NURSE PRACTITIONER

## 2022-05-13 PROCEDURE — 80202 ASSAY OF VANCOMYCIN: CPT | Performed by: HOSPITALIST

## 2022-05-13 PROCEDURE — G0378 HOSPITAL OBSERVATION PER HR: HCPCS

## 2022-05-13 PROCEDURE — 25010000002 VANCOMYCIN PER 500 MG: Performed by: NURSE PRACTITIONER

## 2022-05-13 PROCEDURE — 25010000002 PIPERACILLIN SOD-TAZOBACTAM PER 1 G: Performed by: NURSE PRACTITIONER

## 2022-05-13 PROCEDURE — 25010000002 CEFTRIAXONE PER 250 MG: Performed by: INTERNAL MEDICINE

## 2022-05-13 PROCEDURE — 96366 THER/PROPH/DIAG IV INF ADDON: CPT

## 2022-05-13 RX ORDER — ROSUVASTATIN CALCIUM 10 MG/1
10 TABLET, COATED ORAL NIGHTLY
Status: DISCONTINUED | OUTPATIENT
Start: 2022-05-13 | End: 2022-05-14 | Stop reason: HOSPADM

## 2022-05-13 RX ORDER — CLOPIDOGREL BISULFATE 75 MG/1
75 TABLET ORAL DAILY
Status: DISCONTINUED | OUTPATIENT
Start: 2022-05-13 | End: 2022-05-14 | Stop reason: HOSPADM

## 2022-05-13 RX ADMIN — HYDROCODONE BITARTRATE AND ACETAMINOPHEN 1 TABLET: 5; 325 TABLET ORAL at 14:01

## 2022-05-13 RX ADMIN — SENNOSIDES 1 TABLET: 8.6 TABLET, FILM COATED ORAL at 08:15

## 2022-05-13 RX ADMIN — FLUTICASONE PROPIONATE 2 SPRAY: 50 SPRAY, METERED NASAL at 08:15

## 2022-05-13 RX ADMIN — Medication 10 ML: at 08:16

## 2022-05-13 RX ADMIN — VANCOMYCIN HYDROCHLORIDE 1000 MG: 1 INJECTION, SOLUTION INTRAVENOUS at 18:01

## 2022-05-13 RX ADMIN — PANTOPRAZOLE SODIUM 40 MG: 40 TABLET, DELAYED RELEASE ORAL at 06:50

## 2022-05-13 RX ADMIN — MONTELUKAST SODIUM 10 MG: 10 TABLET, FILM COATED ORAL at 20:49

## 2022-05-13 RX ADMIN — FOLIC ACID 1 MG: 1 TABLET ORAL at 08:15

## 2022-05-13 RX ADMIN — CLOPIDOGREL 75 MG: 75 TABLET, FILM COATED ORAL at 18:02

## 2022-05-13 RX ADMIN — HYDROCODONE BITARTRATE AND ACETAMINOPHEN 1 TABLET: 5; 325 TABLET ORAL at 20:49

## 2022-05-13 RX ADMIN — HYDROCODONE BITARTRATE AND ACETAMINOPHEN 1 TABLET: 5; 325 TABLET ORAL at 08:13

## 2022-05-13 RX ADMIN — TAZOBACTAM SODIUM AND PIPERACILLIN SODIUM 3.38 G: 375; 3 INJECTION, SOLUTION INTRAVENOUS at 13:59

## 2022-05-13 RX ADMIN — Medication 10 ML: at 20:49

## 2022-05-13 RX ADMIN — BUDESONIDE AND FORMOTEROL FUMARATE DIHYDRATE 2 PUFF: 160; 4.5 AEROSOL RESPIRATORY (INHALATION) at 07:27

## 2022-05-13 RX ADMIN — CEFTRIAXONE 1 G: 1 INJECTION, POWDER, FOR SOLUTION INTRAMUSCULAR; INTRAVENOUS at 16:23

## 2022-05-13 RX ADMIN — ROSUVASTATIN CALCIUM 10 MG: 10 TABLET, FILM COATED ORAL at 20:49

## 2022-05-13 RX ADMIN — BUDESONIDE AND FORMOTEROL FUMARATE DIHYDRATE 2 PUFF: 160; 4.5 AEROSOL RESPIRATORY (INHALATION) at 21:29

## 2022-05-13 RX ADMIN — LOSARTAN POTASSIUM 100 MG: 100 TABLET, FILM COATED ORAL at 08:15

## 2022-05-13 RX ADMIN — RISPERIDONE 0.5 MG: 0.5 TABLET ORAL at 08:15

## 2022-05-13 RX ADMIN — ESCITALOPRAM 10 MG: 10 TABLET, FILM COATED ORAL at 08:15

## 2022-05-13 RX ADMIN — VANCOMYCIN HYDROCHLORIDE 1000 MG: 1 INJECTION, SOLUTION INTRAVENOUS at 04:37

## 2022-05-13 RX ADMIN — AMLODIPINE BESYLATE 10 MG: 10 TABLET ORAL at 08:15

## 2022-05-13 RX ADMIN — Medication 2000 UNITS: at 08:15

## 2022-05-13 RX ADMIN — TAZOBACTAM SODIUM AND PIPERACILLIN SODIUM 3.38 G: 375; 3 INJECTION, SOLUTION INTRAVENOUS at 08:18

## 2022-05-14 ENCOUNTER — READMISSION MANAGEMENT (OUTPATIENT)
Dept: CALL CENTER | Facility: HOSPITAL | Age: 57
End: 2022-05-14

## 2022-05-14 VITALS
DIASTOLIC BLOOD PRESSURE: 73 MMHG | WEIGHT: 199.96 LBS | SYSTOLIC BLOOD PRESSURE: 123 MMHG | HEART RATE: 67 BPM | BODY MASS INDEX: 36.8 KG/M2 | RESPIRATION RATE: 16 BRPM | TEMPERATURE: 96.8 F | OXYGEN SATURATION: 98 % | HEIGHT: 62 IN

## 2022-05-14 LAB
BACTERIA SPEC AEROBE CULT: ABNORMAL
GRAM STN SPEC: ABNORMAL

## 2022-05-14 PROCEDURE — 96366 THER/PROPH/DIAG IV INF ADDON: CPT

## 2022-05-14 PROCEDURE — 25010000002 VANCOMYCIN PER 500 MG: Performed by: NURSE PRACTITIONER

## 2022-05-14 PROCEDURE — G0378 HOSPITAL OBSERVATION PER HR: HCPCS

## 2022-05-14 PROCEDURE — 94799 UNLISTED PULMONARY SVC/PX: CPT

## 2022-05-14 RX ORDER — AMOXICILLIN AND CLAVULANATE POTASSIUM 875; 125 MG/1; MG/1
1 TABLET, FILM COATED ORAL 2 TIMES DAILY
Qty: 20 TABLET | Refills: 0 | Status: SHIPPED | OUTPATIENT
Start: 2022-05-14 | End: 2022-05-24

## 2022-05-14 RX ADMIN — Medication 10 ML: at 09:59

## 2022-05-14 RX ADMIN — AMLODIPINE BESYLATE 10 MG: 10 TABLET ORAL at 09:59

## 2022-05-14 RX ADMIN — LOSARTAN POTASSIUM 100 MG: 100 TABLET, FILM COATED ORAL at 09:59

## 2022-05-14 RX ADMIN — FLUTICASONE PROPIONATE 2 SPRAY: 50 SPRAY, METERED NASAL at 09:59

## 2022-05-14 RX ADMIN — CLOPIDOGREL 75 MG: 75 TABLET, FILM COATED ORAL at 09:59

## 2022-05-14 RX ADMIN — BISOPROLOL FUMARATE: 5 TABLET, FILM COATED ORAL at 09:59

## 2022-05-14 RX ADMIN — FOLIC ACID 1 MG: 1 TABLET ORAL at 09:59

## 2022-05-14 RX ADMIN — VANCOMYCIN HYDROCHLORIDE 1000 MG: 1 INJECTION, SOLUTION INTRAVENOUS at 04:10

## 2022-05-14 RX ADMIN — HYDROCODONE BITARTRATE AND ACETAMINOPHEN 1 TABLET: 5; 325 TABLET ORAL at 04:40

## 2022-05-14 RX ADMIN — RISPERIDONE 0.5 MG: 0.5 TABLET ORAL at 09:59

## 2022-05-14 RX ADMIN — SENNOSIDES 1 TABLET: 8.6 TABLET, FILM COATED ORAL at 09:59

## 2022-05-14 RX ADMIN — PANTOPRAZOLE SODIUM 40 MG: 40 TABLET, DELAYED RELEASE ORAL at 06:03

## 2022-05-14 RX ADMIN — BUDESONIDE AND FORMOTEROL FUMARATE DIHYDRATE 2 PUFF: 160; 4.5 AEROSOL RESPIRATORY (INHALATION) at 07:53

## 2022-05-14 RX ADMIN — ESCITALOPRAM 10 MG: 10 TABLET, FILM COATED ORAL at 09:59

## 2022-05-14 RX ADMIN — Medication 2000 UNITS: at 09:59

## 2022-05-14 NOTE — OUTREACH NOTE
Prep Survey    Flowsheet Row Responses   [facility] patient discharged from? [place]   Is LACE score < 7 ? No   Emergency Room discharge w/ pulse ox? No   Eligibility [facility]   Date of Admission 05/11/22   Date of Discharge 05/14/22   Discharge Disposition Home or Self Care   Discharge diagnosis Abdominal wall abscess,   Abdominal wall cellulitis,   bedside I&D   Does the patient have one of the following disease processes/diagnoses(primary or secondary)? Other   Does the patient have Home health ordered? No   Is there a DME ordered? No   Prep survey completed? Yes          DENISE RAMIREZ - Registered Nurse

## 2022-05-16 ENCOUNTER — TRANSITIONAL CARE MANAGEMENT TELEPHONE ENCOUNTER (OUTPATIENT)
Dept: CALL CENTER | Facility: HOSPITAL | Age: 57
End: 2022-05-16

## 2022-05-16 NOTE — OUTREACH NOTE
Call Center TCM Note    Flowsheet Row Responses   [facility] patient discharged from? [place]   Does the patient have one of the following disease processes/diagnoses(primary or secondary)? Other   TCM attempt successful? Yes   Call start time 1559   Call end time 1609   Discharge diagnosis Abdominal wall abscess,   Abdominal wall cellulitis,   bedside I&D   Is patient permission given to speak with other caregiver? Yes   List who call center can speak with Patient gave phone to brother, Taty Hassan   Person spoke with today (if not patient) and relationship Patient answered and gave phone to brother   Meds reviewed with patient/caregiver? Yes  [New augmentin]   Does the patient have all medications ordered at discharge? Yes   Medication comments Reports that patient could use some pain medication to be called in to the [facility].    Does the patient have a primary care provider?  Yes   Does the patient have an appointment with their PCP within 7 days of discharge? Greater than 7 days   Comments regarding PCP PCP Dr Hernadnez. AVS instructed for 2 week f/u, so hospital follow up scheduled for Wed 5/25/22   130pm   What is preventing the patient from scheduling follow up appointments within 7 days of discharge? --  [Wanted 2 week appt]   Has the patient kept scheduled appointments due by today? N/A   Comments Informed to call Dr Mccollum (surgery) again if feels that abscess needs to be drained again.    Has home health visited the patient within 72 hours of discharge? N/A   Psychosocial issues? No   Comments Reports patient managing dressing changes without difficulty.    Did the patient receive a copy of their discharge instructions? Yes   Nursing interventions Reviewed instructions with patient   What is the patient's perception of their health status since discharge? Improving   Is the patient/caregiver able to teach back signs and symptoms related to disease process for when to call PCP? Yes   Is the  patient/caregiver able to teach back the hierarchy of who to call/visit for symptoms/problems? PCP, Specialist, Home health nurse, Urgent Care, ED, 911 Yes   If the patient is a current smoker, are they able to teach back resources for cessation? Not a smoker   TCM call completed? Yes   Wrap up additional comments Denies further questions. Requesting pain medication order to Sean.           Tari Marti RN    5/16/2022, 16:09 EDT

## 2022-05-17 LAB
BACTERIA SPEC AEROBE CULT: NORMAL
BACTERIA SPEC AEROBE CULT: NORMAL

## 2022-05-24 ENCOUNTER — READMISSION MANAGEMENT (OUTPATIENT)
Dept: CALL CENTER | Facility: HOSPITAL | Age: 57
End: 2022-05-24

## 2022-05-24 NOTE — OUTREACH NOTE
Medical Week 2 Survey    Flowsheet Row Responses   [facility] patient discharged from? [place]   Does the patient have one of the following disease processes/diagnoses(primary or secondary)? Other   Week 2 attempt successful? Yes   Call start time 1146   Discharge diagnosis Abdominal wall abscess,   Abdominal wall cellulitis,   bedside I&D   Call end time 1149   Meds reviewed with patient/caregiver? Yes   Is the patient taking all medications as directed (includes completed medication regime)? Yes   Comments regarding appointments Pulmo tomorrow   Does the patient have a primary care provider?  Yes   Comments regarding PCP Will reschedule   Has the patient kept scheduled appointments due by today? Yes   Has home health visited the patient within 72 hours of discharge? N/A   Psychosocial issues? No   Did the patient receive a copy of their discharge instructions? Yes   Nursing interventions Reviewed instructions with patient   What is the patient's perception of their health status since discharge? Improving   Is the patient/caregiver able to teach back signs and symptoms related to disease process for when to call PCP? Yes   Is the patient/caregiver able to teach back signs and symptoms related to disease process for when to call 911? Yes   Is the patient/caregiver able to teach back the hierarchy of who to call/visit for symptoms/problems? PCP, Specialist, Home health nurse, Urgent Care, ED, 911 Yes   Week 2 Call Completed? Yes   Wrap up additional comments Brother reports she will not have to have area drained again and that Pt is doing better. Pt has Pulmo appt tomorrow and will reschedule PCP appt .           ROMINA DUENAS - Registered Nurse

## 2022-06-02 ENCOUNTER — READMISSION MANAGEMENT (OUTPATIENT)
Dept: CALL CENTER | Facility: HOSPITAL | Age: 57
End: 2022-06-02

## 2022-06-02 NOTE — OUTREACH NOTE
Medical Week 3 Survey    Flowsheet Row Responses   [facility] patient discharged from? [place]   Does the patient have one of the following disease processes/diagnoses(primary or secondary)? Other   Week 3 attempt successful? Yes   Call start time 1211   Call end time 1232   Discharge diagnosis Abdominal wall abscess,   Abdominal wall cellulitis,   bedside I&D   Is patient permission given to speak with other caregiver? Yes   Person spoke with today (if not patient) and relationship brother Sonya   Meds reviewed with patient/caregiver? Yes   Is the patient having any side effects they believe may be caused by any medication additions or changes? No   Does the patient have all medications ordered at discharge? Yes   Is the patient taking all medications as directed (includes completed medication regime)? Yes   Medication comments Patient has finished antibiotic - brother states abdomen is still not healed completely healed.    Does the patient have a primary care provider?  Yes   Does the patient have an appointment with their PCP within 7 days of discharge? Yes   Has the patient kept scheduled appointments due by today? Yes   Has home health visited the patient within 72 hours of discharge? N/A   Psychosocial issues? No   Did the patient receive a copy of their discharge instructions? Yes   Nursing interventions Reviewed instructions with patient   What is the patient's perception of their health status since discharge? Improving   Is the patient/caregiver able to teach back signs and symptoms related to disease process for when to call PCP? Yes   Is the patient/caregiver able to teach back signs and symptoms related to disease process for when to call 911? Yes   Is the patient/caregiver able to teach back the hierarchy of who to call/visit for symptoms/problems? PCP, Specialist, Home health nurse, Urgent Care, ED, 911 Yes   If the patient is a current smoker, are they able to teach back resources for  cessation? Not a smoker   Additional teach back comments Encouraged brother to have sister followup again with MD since abdomen is not completely healed. He verbalized understanding.    Week 3 Call Completed? Yes          GREG NGUYEN - Registered Nurse

## 2022-08-03 VITALS — BODY MASS INDEX: 36.62 KG/M2 | WEIGHT: 199 LBS | HEIGHT: 62 IN

## 2022-08-03 RX ORDER — MONTELUKAST SODIUM 10 MG/1
10 TABLET ORAL NIGHTLY
Qty: 14 TABLET | Refills: 0 | Status: CANCELLED | OUTPATIENT
Start: 2022-08-03 | End: 2022-08-17

## 2022-08-04 ENCOUNTER — TELEMEDICINE (OUTPATIENT)
Dept: FAMILY MEDICINE CLINIC | Facility: CLINIC | Age: 57
End: 2022-08-04

## 2022-08-04 DIAGNOSIS — J06.9 UPPER RESPIRATORY TRACT INFECTION DUE TO COVID-19 VIRUS: Primary | ICD-10-CM

## 2022-08-04 DIAGNOSIS — U07.1 UPPER RESPIRATORY TRACT INFECTION DUE TO COVID-19 VIRUS: Primary | ICD-10-CM

## 2022-08-04 PROCEDURE — 99213 OFFICE O/P EST LOW 20 MIN: CPT | Performed by: FAMILY MEDICINE

## 2022-08-04 RX ORDER — MONTELUKAST SODIUM 10 MG/1
10 TABLET ORAL NIGHTLY
Qty: 14 TABLET | Refills: 0 | Status: SHIPPED | OUTPATIENT
Start: 2022-08-04 | End: 2023-01-30

## 2022-08-04 RX ORDER — AZITHROMYCIN 250 MG/1
TABLET, FILM COATED ORAL
Qty: 6 TABLET | Refills: 0 | Status: SHIPPED | OUTPATIENT
Start: 2022-08-04 | End: 2023-01-30

## 2022-08-04 NOTE — PROGRESS NOTES
Subjective   Flaca Hassan is a 57 y.o. female.     CC: VV for COVID    History of Present Illness     Pt seen today on a vv after testing positive for COVID on 8/2/22 and c/o cough, congestion, myalgias,. No fever but some chills and RN.    Symptoms started last Friday.     [facility]     The following portions of the patient's history were reviewed and updated as appropriate: allergies, current medications, past family history, past medical history, past social history, past surgical history and problem list.    Review of Systems   Constitutional: Negative for activity change, chills and fever.   HENT: Positive for congestion.    Respiratory: Positive for cough.    Cardiovascular: Negative for chest pain.   Musculoskeletal: Positive for myalgias.   Psychiatric/Behavioral: Negative for dysphoric mood.       Objective   Physical Exam  Constitutional:       General: She is not in acute distress.     Appearance: She is well-developed.   Pulmonary:      Effort: Pulmonary effort is normal.   Neurological:      Mental Status: She is alert and oriented to person, place, and time.   Psychiatric:         Behavior: Behavior normal.         Thought Content: Thought content normal.         Assessment & Plan   Diagnoses and all orders for this visit:    1. Upper respiratory tract infection due to COVID-19 virus (Primary)  -     azithromycin (Zithromax Z-Juan Antonio) 250 MG tablet; Take 2 tablets the first day, then 1 tablet daily for 4 days.  Dispense: 6 tablet; Refill: 0  -     montelukast (SINGULAIR) 10 MG tablet; Take 1 tablet by mouth Every Night.  Dispense: 14 tablet; Refill: 0    Pt declined CC meds, Vitamins discussed.    Spent 14    minutes with chart and interview and consent for this encounter given by the patient.  You have chosen to receive care through a telehealth visit.  Do you consent to use a video/audio connection for your medical care today? Yes

## 2022-08-05 ENCOUNTER — TRANSCRIBE ORDERS (OUTPATIENT)
Dept: SLEEP MEDICINE | Facility: HOSPITAL | Age: 57
End: 2022-08-05

## 2022-08-05 DIAGNOSIS — E66.09 CLASS 1 OBESITY DUE TO EXCESS CALORIES WITHOUT SERIOUS COMORBIDITY WITH BODY MASS INDEX (BMI) OF 30.0 TO 30.9 IN ADULT: Primary | ICD-10-CM

## 2022-08-05 DIAGNOSIS — I25.10 CORONARY ARTERY DISEASE INVOLVING NATIVE CORONARY ARTERY OF NATIVE HEART WITHOUT ANGINA PECTORIS: ICD-10-CM

## 2022-08-05 DIAGNOSIS — G47.33 OSA (OBSTRUCTIVE SLEEP APNEA): ICD-10-CM

## 2022-08-09 ENCOUNTER — TRANSCRIBE ORDERS (OUTPATIENT)
Dept: ADMINISTRATIVE | Facility: HOSPITAL | Age: 57
End: 2022-08-09

## 2022-08-09 DIAGNOSIS — Z01.818 OTHER SPECIFIED PRE-OPERATIVE EXAMINATION: Primary | ICD-10-CM

## 2022-08-12 ENCOUNTER — LAB (OUTPATIENT)
Dept: LAB | Facility: HOSPITAL | Age: 57
End: 2022-08-12

## 2022-08-12 DIAGNOSIS — Z01.818 OTHER SPECIFIED PRE-OPERATIVE EXAMINATION: ICD-10-CM

## 2022-08-12 LAB — SARS-COV-2 ORF1AB RESP QL NAA+PROBE: DETECTED

## 2022-08-12 PROCEDURE — C9803 HOPD COVID-19 SPEC COLLECT: HCPCS

## 2022-08-12 PROCEDURE — U0004 COV-19 TEST NON-CDC HGH THRU: HCPCS

## 2022-08-15 ENCOUNTER — APPOINTMENT (OUTPATIENT)
Dept: SLEEP MEDICINE | Facility: HOSPITAL | Age: 57
End: 2022-08-15

## 2022-08-26 RX ORDER — PANTOPRAZOLE SODIUM 40 MG/1
40 TABLET, DELAYED RELEASE ORAL DAILY
Qty: 30 TABLET | Refills: 5 | Status: SHIPPED | OUTPATIENT
Start: 2022-08-26 | End: 2023-02-22

## 2022-09-23 RX ORDER — OLMESARTAN MEDOXOMIL 40 MG/1
40 TABLET ORAL DAILY
Qty: 30 TABLET | Refills: 5 | Status: SHIPPED | OUTPATIENT
Start: 2022-09-23 | End: 2023-03-13

## 2022-09-26 ENCOUNTER — TELEPHONE (OUTPATIENT)
Dept: GASTROENTEROLOGY | Facility: CLINIC | Age: 57
End: 2022-09-26

## 2022-09-26 NOTE — TELEPHONE ENCOUNTER
Caller: Sonya Posey    Relationship: Emergency Contact    Best call back number: [phone]    What is the best time to reach you: ANYTIME     Who are you requesting to speak with (clinical staff, provider,  specific staff member): CLINICAL STAFF         What was the call regarding: PT NEEDS HERNIA REMOVED AND WANTS TO KNOW IF  CAN DO THAT

## 2022-09-29 NOTE — TELEPHONE ENCOUNTER
Call to pt via [employer].  Phone was answered and then hung up.     Repeated efforts to contact pt without success. Call closed.

## 2022-10-03 RX ORDER — OLMESARTAN MEDOXOMIL AND HYDROCHLOROTHIAZIDE 40/12.5 40; 12.5 MG/1; MG/1
1 TABLET ORAL DAILY
Qty: 30 TABLET | Refills: 3 | OUTPATIENT
Start: 2022-10-03

## 2022-10-28 RX ORDER — SIMVASTATIN 20 MG
20 TABLET ORAL EVERY EVENING
Qty: 90 TABLET | Refills: 1 | Status: SHIPPED | OUTPATIENT
Start: 2022-10-28

## 2022-11-14 RX ORDER — BISOPROLOL FUMARATE AND HYDROCHLOROTHIAZIDE 5; 6.25 MG/1; MG/1
1 TABLET ORAL DAILY
Qty: 90 TABLET | Refills: 1 | Status: SHIPPED | OUTPATIENT
Start: 2022-11-14 | End: 2023-03-13

## 2022-11-21 ENCOUNTER — TRANSCRIBE ORDERS (OUTPATIENT)
Dept: SLEEP MEDICINE | Facility: HOSPITAL | Age: 57
End: 2022-11-21

## 2022-11-21 DIAGNOSIS — G47.33 OSA (OBSTRUCTIVE SLEEP APNEA): Primary | ICD-10-CM

## 2022-12-01 ENCOUNTER — HOSPITAL ENCOUNTER (OUTPATIENT)
Dept: SLEEP MEDICINE | Facility: HOSPITAL | Age: 57
Discharge: HOME | End: 2022-12-01
Admitting: INTERNAL MEDICINE

## 2022-12-01 DIAGNOSIS — G47.33 OSA (OBSTRUCTIVE SLEEP APNEA): ICD-10-CM

## 2022-12-01 PROCEDURE — 95811 POLYSOM 6/>YRS CPAP 4/> PARM: CPT

## 2022-12-12 RX ORDER — CLOPIDOGREL BISULFATE 75 MG/1
75 TABLET ORAL DAILY
Qty: 90 TABLET | Refills: 1 | Status: SHIPPED | OUTPATIENT
Start: 2022-12-12 | End: 2023-03-13

## 2022-12-21 ENCOUNTER — TELEPHONE (OUTPATIENT)
Dept: SLEEP MEDICINE | Facility: HOSPITAL | Age: 57
End: 2022-12-21

## 2022-12-21 NOTE — TELEPHONE ENCOUNTER
Faxed order to [facility] for set [facility]. Pt will cb and schedule a f/u appt once machine is received.

## 2023-02-13 DIAGNOSIS — I10 PRIMARY HYPERTENSION: ICD-10-CM

## 2023-02-13 DIAGNOSIS — R73.01 IFG (IMPAIRED FASTING GLUCOSE): Primary | ICD-10-CM

## 2023-02-14 LAB
ALBUMIN SERPL-MCNC: 4.5 G/DL (ref 3.5–5.2)
ALBUMIN/GLOB SERPL: 1.3 G/DL
ALP SERPL-CCNC: 132 U/L (ref 39–117)
ALT SERPL-CCNC: 17 U/L (ref 1–33)
AST SERPL-CCNC: 19 U/L (ref 1–32)
BASOPHILS # BLD AUTO: 0.04 10*3/MM3 (ref 0–0.2)
BASOPHILS NFR BLD AUTO: 0.5 % (ref 0–1.5)
BILIRUB SERPL-MCNC: 0.5 MG/DL (ref 0–1.2)
BUN SERPL-MCNC: 16 MG/DL (ref 6–20)
BUN/CREAT SERPL: 14.8 (ref 7–25)
CALCIUM SERPL-MCNC: 9.6 MG/DL (ref 8.6–10.5)
CHLORIDE SERPL-SCNC: 100 MMOL/L (ref 98–107)
CHOLEST SERPL-MCNC: 138 MG/DL (ref 0–200)
CO2 SERPL-SCNC: 25.4 MMOL/L (ref 22–29)
CREAT SERPL-MCNC: 1.08 MG/DL (ref 0.57–1)
EGFRCR SERPLBLD CKD-EPI 2021: 60 ML/MIN/1.73
EOSINOPHIL # BLD AUTO: 0.23 10*3/MM3 (ref 0–0.4)
EOSINOPHIL NFR BLD AUTO: 2.9 % (ref 0.3–6.2)
ERYTHROCYTE [DISTWIDTH] IN BLOOD BY AUTOMATED COUNT: 12.7 % (ref 12.3–15.4)
GLOBULIN SER CALC-MCNC: 3.4 GM/DL
GLUCOSE SERPL-MCNC: 108 MG/DL (ref 65–99)
HBA1C MFR BLD: 5.9 % (ref 4.8–5.6)
HCT VFR BLD AUTO: 40.6 % (ref 34–46.6)
HDLC SERPL-MCNC: 38 MG/DL (ref 40–60)
HGB BLD-MCNC: 13.2 G/DL (ref 12–15.9)
IMM GRANULOCYTES # BLD AUTO: 0.04 10*3/MM3 (ref 0–0.05)
IMM GRANULOCYTES NFR BLD AUTO: 0.5 % (ref 0–0.5)
LDLC SERPL CALC-MCNC: 78 MG/DL (ref 0–100)
LYMPHOCYTES # BLD AUTO: 2.29 10*3/MM3 (ref 0.7–3.1)
LYMPHOCYTES NFR BLD AUTO: 28.6 % (ref 19.6–45.3)
MCH RBC QN AUTO: 28.7 PG (ref 26.6–33)
MCHC RBC AUTO-ENTMCNC: 32.5 G/DL (ref 31.5–35.7)
MCV RBC AUTO: 88.3 FL (ref 79–97)
MONOCYTES # BLD AUTO: 0.71 10*3/MM3 (ref 0.1–0.9)
MONOCYTES NFR BLD AUTO: 8.9 % (ref 5–12)
NEUTROPHILS # BLD AUTO: 4.71 10*3/MM3 (ref 1.7–7)
NEUTROPHILS NFR BLD AUTO: 58.6 % (ref 42.7–76)
NRBC BLD AUTO-RTO: 0 /100 WBC (ref 0–0.2)
PLATELET # BLD AUTO: 246 10*3/MM3 (ref 140–450)
POTASSIUM SERPL-SCNC: 4.2 MMOL/L (ref 3.5–5.2)
PROT SERPL-MCNC: 7.9 G/DL (ref 6–8.5)
RBC # BLD AUTO: 4.6 10*6/MM3 (ref 3.77–5.28)
SODIUM SERPL-SCNC: 138 MMOL/L (ref 136–145)
TRIGL SERPL-MCNC: 122 MG/DL (ref 0–150)
TSH SERPL DL<=0.005 MIU/L-ACNC: 3.88 UIU/ML (ref 0.27–4.2)
VLDLC SERPL CALC-MCNC: 22 MG/DL (ref 5–40)
WBC # BLD AUTO: 8.02 10*3/MM3 (ref 3.4–10.8)

## 2023-02-20 NOTE — PROGRESS NOTES
The ABCs of the Annual Wellness Visit  Subsequent Medicare Wellness Visit    Subjective    {Wrapup  Review (Popup)  Advance Care Planning  Labs  CC  Problem List  Visit Diagnosis  Medications  Result Review  Imaging  [facility]  [facility]  SnapShot  Encounters  Notes  Media  Procedures :23}  Flaca Hassan is a 57 y.o. female who presents for a Subsequent Medicare Wellness Visit.    The following portions of the patient's history were reviewed and   updated as appropriate: allergies, current medications, past family history, past medical history, past social history, past surgical history and problem list.    Compared to one year ago, the patient feels her physical   health is the same.    Compared to one year ago, the patient feels her mental   health is the same.    Pt has been on Lasix and KCL from another physician that she is no longer seeing and is requesting me refill those today.    Recent Hospitalizations:  This patient has had a [facility] admission record on file within the last 365 days.    Current Medical Providers:  Patient Care Team:  Isidro Hernandez MD as PCP - General (Family Medicine)  Maxine Rose MD (Inactive) as Consulting Physician (Cardiology)  Ruth Ann Willson MD as Consulting Physician (Gastroenterology)  Imer Montaño MD as Consulting Physician (Cardiology)  Michael Malik MD as Consulting Physician (Psychiatry)  Thu Blake MD as Obstetrician (Obstetrics and Gynecology)    Outpatient Medications Prior to Visit   Medication Sig Dispense Refill   • acetaminophen (TYLENOL) 500 MG tablet Take 1,000 mg by mouth Every 6 (Six) Hours As Needed for Mild Pain .     • amLODIPine (NORVASC) 10 MG tablet TAKE 1 TABLET BY MOUTH DAILY 90 tablet 1   • bisoprolol-hydrochlorothiazide (ZIAC) 5-6.25 MG per tablet Take 1 tablet by mouth Daily. 90 tablet 1   • budesonide-formoterol (SYMBICORT) 160-4.5 MCG/ACT inhaler  Inhale 2 puffs 2 (two) times a day.     • Cholecalciferol (VITAMIN D3) 2000 units tablet Take 1 tablet by mouth Daily.  1   • clopidogrel (PLAVIX) 75 MG tablet TAKE 1 TABLET BY MOUTH DAILY 90 tablet 1   • escitalopram (LEXAPRO) 10 MG tablet Take 10 mg by mouth Every Morning.  0   • fluticasone (FLONASE) 50 MCG/ACT nasal spray 2 sprays into the nostril(s) as directed by provider Daily.     • Fluticasone Furoate-Vilanterol (Breo Ellipta) 200-25 MCG/INH inhaler Inhale 1 puff Daily.     • folic acid (FOLVITE) 1 MG tablet Take 1 mg by mouth Daily.  0   • furosemide (LASIX) 40 MG tablet Take 1 tablet by mouth Daily. 30 tablet 6   • nitroglycerin (NITROSTAT) 0.4 MG SL tablet 1 under the tongue as needed for angina, may repeat q5mins for up three doses (Patient taking differently: Place 0.4 mg under the tongue Every 5 (Five) Minutes As Needed. 1 under the tongue as needed for angina, may repeat q5mins for up three doses) 25 tablet 3   • olmesartan (BENICAR) 40 MG tablet Take 1 tablet by mouth Daily. 30 tablet 5   • pantoprazole (PROTONIX) 40 MG EC tablet TAKE 1 TABLET BY MOUTH DAILY 30 tablet 5   • potassium chloride (MICRO-K) 10 MEQ CR capsule 1 DAILY (Patient taking differently: Take 10 mEq by mouth Daily.) 30 capsule 6   • Proctozone-HC 2.5 % rectal cream Insert 1 application into the rectum As Needed.     • risperiDONE (risperDAL) 0.5 MG tablet Take 0.5 mg by mouth Daily.     • senna (Senokot) 8.6 MG tablet Take 1 tablet by mouth Daily. 30 tablet 5   • simvastatin (ZOCOR) 20 MG tablet Take 1 tablet by mouth Every Evening. 90 tablet 1   • VENTOLIN  (90 Base) MCG/ACT inhaler Inhale 2 puffs Every 4 (Four) Hours As Needed for Wheezing or Shortness of Air.  6   • montelukast (SINGULAIR) 10 MG tablet Take 1 tablet by mouth Daily.       No facility-administered medications prior to visit.       No opioid medication identified on active medication list. I have reviewed chart for other potential  high risk medication/s  "and harmful drug interactions in the elderly.          Aspirin is not on active medication list.  Aspirin use is not indicated based on review of current medical condition/s. Risk of harm outweighs potential benefits.  .    Patient Active Problem List   Diagnosis   • Hypertension   • Obesity   • Hyperlipidemia   • Iron deficiency anemia due to chronic blood loss   • Upper GI bleeding   • ST elevation myocardial infarction (STEMI) (HCC)   • Coronary artery disease involving native heart without angina pectoris   • Morgagni hernia   • IFG (impaired fasting glucose)   • Primary insomnia   • PMB (postmenopausal bleeding)   • Endometrial cancer (HCC)   • Uncomplicated asthma   • LI (obstructive sleep apnea)   • Dyspepsia   • History of peptic ulcer disease   • Rectal pain   • Abdominal wall abscess   • History of ST elevation myocardial infarction (STEMI)   • Abdominal wall cellulitis   • Prediabetes   • Localized edema     Advance Care Planning  Advance Directive is not on file.  ACP discussion was declined by the patient. Patient does not have an advance directive, declines further assistance.     Objective    Vitals:    02/21/23 1411   BP: 160/82   Pulse: 60   Resp: 22   Temp: 98.2 °F (36.8 °C)   TempSrc: Skin   SpO2: 98%   Weight: 92.1 kg (203 lb)   Height: 157.5 cm (62\")   PainSc: 0-No pain     Estimated body mass index is 37.13 kg/m² as calculated from the following:    Height as of this encounter: 157.5 cm (62\").    Weight as of this encounter: 92.1 kg (203 lb).    Class 2 Severe Obesity (BMI >=35 and <=39.9). Obesity-related health conditions include the following: hypertension, coronary heart disease and dyslipidemias. Obesity is unchanged. BMI is is above average; BMI management plan is completed. We discussed portion control and increasing exercise.      Does the patient have evidence of cognitive impairment?   No    Lab Results   Component Value Date    CHLPL 138 02/13/2023    TRIG 122 02/13/2023    HDL 38 " (L) 02/13/2023    LDL 78 02/13/2023    VLDL 22 02/13/2023    HGBA1C 5.90 (H) 02/13/2023          HEALTH RISK ASSESSMENT    Smoking Status:  Social History     Tobacco Use   Smoking Status Never   Smokeless Tobacco Never     Alcohol Consumption:  Social History     Substance and Sexual Activity   Alcohol Use No     Fall Risk Screen:    ANNALISEADI Fall Risk Assessment was completed, and patient is at MODERATE risk for falls. Assessment completed on:2/21/2023    Depression Screening:  PHQ-2/PHQ-9 Depression Screening 2/21/2023   Little Interest or Pleasure in Doing Things 0-->not at all   Feeling Down, Depressed or Hopeless 0-->not at all   PHQ-9: Brief Depression Severity Measure Score 0       Health Habits and Functional and Cognitive Screening:  Functional & Cognitive Status 2/21/2023   Do you have difficulty preparing food and eating? No   Do you have difficulty bathing yourself, getting dressed or grooming yourself? No   Do you have difficulty using the toilet? No   Do you have difficulty moving around from place to place? No   Do you have trouble with steps or getting out of a bed or a chair? No   Current Diet Well Balanced Diet   Dental Exam Not up to date   Eye Exam Up to date   Exercise (times per week) 7 times per week   Current Exercises Include Walking   Do you need help using the phone?  No   Are you deaf or do you have serious difficulty hearing?  No   Do you need help with transportation? Yes   Do you need help shopping? No   Do you need help preparing meals?  No   Do you need help with housework?  No   Do you need help with laundry? No   Do you need help taking your medications? No   Do you need help managing money? No   Do you ever drive or ride in a car without wearing a seat belt? No   Have you felt unusual stress, anger or loneliness in the last month? No   Who do you live with? Sibling   Do you have difficulty concentrating, remembering or making decisions? No       Age-appropriate Screening  Schedule:  Refer to the list below for future screening recommendations based on patient's age, sex and/or medical conditions. Orders for these recommended tests are listed in the plan section. The patient has been provided with a written plan.    Health Maintenance   Topic Date Due   • TDAP/TD VACCINES (1 - Tdap) Never done   • ZOSTER VACCINE (1 of 2) Never done   • PAP SMEAR  Never done   • INFLUENZA VACCINE  08/01/2022   • HEMOGLOBIN A1C  08/13/2023   • LIPID PANEL  02/13/2024   • MAMMOGRAM  03/15/2024   • DIABETIC FOOT EXAM  Discontinued   • DIABETIC EYE EXAM  Discontinued   • URINE MICROALBUMIN  Discontinued                CMS Preventative Services Quick Reference  Risk Factors Identified During Encounter:    None Identified    The above risks/problems have been discussed with the patient.  Pertinent information has been shared with the patient in the After Visit Summary.    Diagnoses and all orders for this visit:    1. Medicare annual wellness visit, subsequent (Primary)    2. IFG (impaired fasting glucose)    3. Localized edema    Diet/exercise/weight management to treat the glucose discussed.      Follow Up:   Next Medicare Wellness visit to be scheduled in 1 year.      An After Visit Summary and PPPS were made available to the patient.

## 2023-02-21 ENCOUNTER — OFFICE VISIT (OUTPATIENT)
Dept: FAMILY MEDICINE CLINIC | Facility: CLINIC | Age: 58
End: 2023-02-21
Payer: MEDICARE

## 2023-02-21 VITALS
DIASTOLIC BLOOD PRESSURE: 82 MMHG | SYSTOLIC BLOOD PRESSURE: 160 MMHG | HEIGHT: 62 IN | RESPIRATION RATE: 22 BRPM | HEART RATE: 60 BPM | TEMPERATURE: 98.2 F | OXYGEN SATURATION: 98 % | WEIGHT: 203 LBS | BODY MASS INDEX: 37.36 KG/M2

## 2023-02-21 DIAGNOSIS — Z00.00 MEDICARE ANNUAL WELLNESS VISIT, SUBSEQUENT: Primary | ICD-10-CM

## 2023-02-21 DIAGNOSIS — J45.40 MODERATE PERSISTENT ASTHMA WITHOUT COMPLICATION: ICD-10-CM

## 2023-02-21 DIAGNOSIS — R73.01 IFG (IMPAIRED FASTING GLUCOSE): Chronic | ICD-10-CM

## 2023-02-21 DIAGNOSIS — R60.0 LOCALIZED EDEMA: ICD-10-CM

## 2023-02-21 DIAGNOSIS — K59.09 CHRONIC CONSTIPATION: ICD-10-CM

## 2023-02-21 PROCEDURE — 99214 OFFICE O/P EST MOD 30 MIN: CPT | Performed by: FAMILY MEDICINE

## 2023-02-21 PROCEDURE — 1159F MED LIST DOCD IN RCRD: CPT | Performed by: FAMILY MEDICINE

## 2023-02-21 PROCEDURE — 3044F HG A1C LEVEL LT 7.0%: CPT | Performed by: FAMILY MEDICINE

## 2023-02-21 PROCEDURE — 1170F FXNL STATUS ASSESSED: CPT | Performed by: FAMILY MEDICINE

## 2023-02-21 PROCEDURE — G0439 PPPS, SUBSEQ VISIT: HCPCS | Performed by: FAMILY MEDICINE

## 2023-02-21 RX ORDER — POTASSIUM CHLORIDE 750 MG/1
10 CAPSULE, EXTENDED RELEASE ORAL DAILY
Qty: 90 CAPSULE | Refills: 3 | Status: SHIPPED | OUTPATIENT
Start: 2023-02-21

## 2023-02-21 RX ORDER — FUROSEMIDE 40 MG/1
40 TABLET ORAL DAILY
Qty: 90 TABLET | Refills: 3 | Status: SHIPPED | OUTPATIENT
Start: 2023-02-21

## 2023-02-21 RX ORDER — MONTELUKAST SODIUM 10 MG/1
1 TABLET ORAL DAILY
COMMUNITY
Start: 2023-02-15 | End: 2023-02-21 | Stop reason: SDUPTHER

## 2023-02-21 RX ORDER — DOCUSATE SODIUM 100 MG/1
100 CAPSULE, LIQUID FILLED ORAL 2 TIMES DAILY
Qty: 180 CAPSULE | Refills: 3 | Status: SHIPPED | OUTPATIENT
Start: 2023-02-21 | End: 2023-03-21

## 2023-02-21 RX ORDER — MONTELUKAST SODIUM 10 MG/1
10 TABLET ORAL DAILY
Qty: 90 TABLET | Refills: 3 | Status: SHIPPED | OUTPATIENT
Start: 2023-02-21

## 2023-02-21 NOTE — PROGRESS NOTES
The ABCs of the Annual Wellness Visit  Subsequent Medicare Wellness Visit    Subjective    Flaca Hassan is a 57 y.o. female who presents for a Subsequent Medicare Wellness Visit.    The following portions of the patient's history were reviewed and   updated as appropriate: allergies, current medications, past family history, past medical history, past social history, past surgical history and problem list.    Compared to one year ago, the patient feels her physical   health is the same.    Compared to one year ago, the patient feels her mental   health is the same.    Recent Hospitalizations:  This patient has had a [facility] [place] by [facility] admission record on file within the last 365 days.    Current Medical Providers:  Patient Care Team:  Isidro Hernandez MD as PCP - General (Family Medicine)  Maxine Rose MD (Inactive) as Consulting Physician (Cardiology)  Ruth Ann Willson MD as Consulting Physician (Gastroenterology)  Imer Montaño MD as Consulting Physician (Cardiology)  Michael Malik MD as Consulting Physician (Psychiatry)  Thu Blake MD as Obstetrician (Obstetrics and Gynecology)    Outpatient Medications Prior to Visit   Medication Sig Dispense Refill   • acetaminophen (TYLENOL) 500 MG tablet Take 1,000 mg by mouth Every 6 (Six) Hours As Needed for Mild Pain .     • amLODIPine (NORVASC) 10 MG tablet TAKE 1 TABLET BY MOUTH DAILY 90 tablet 1   • bisoprolol-hydrochlorothiazide (ZIAC) 5-6.25 MG per tablet Take 1 tablet by mouth Daily. 90 tablet 1   • budesonide-formoterol (SYMBICORT) 160-4.5 MCG/ACT inhaler Inhale 2 puffs 2 (two) times a day.     • Cholecalciferol (VITAMIN D3) 2000 units tablet Take 1 tablet by mouth Daily.  1   • clopidogrel (PLAVIX) 75 MG tablet TAKE 1 TABLET BY MOUTH DAILY 90 tablet 1   • escitalopram (LEXAPRO) 10 MG tablet Take 10 mg by mouth Every Morning.  0   • fluticasone (FLONASE) 50 MCG/ACT nasal spray 2 sprays into the nostril(s) as directed by  provider Daily.     • Fluticasone Furoate-Vilanterol (Breo Ellipta) 200-25 MCG/INH inhaler Inhale 1 puff Daily.     • folic acid (FOLVITE) 1 MG tablet Take 1 mg by mouth Daily.  0   • nitroglycerin (NITROSTAT) 0.4 MG SL tablet 1 under the tongue as needed for angina, may repeat q5mins for up three doses (Patient taking differently: Place 0.4 mg under the tongue Every 5 (Five) Minutes As Needed. 1 under the tongue as needed for angina, may repeat q5mins for up three doses) 25 tablet 3   • olmesartan (BENICAR) 40 MG tablet Take 1 tablet by mouth Daily. 30 tablet 5   • pantoprazole (PROTONIX) 40 MG EC tablet TAKE 1 TABLET BY MOUTH DAILY 30 tablet 5   • Proctozone-HC 2.5 % rectal cream Insert 1 application into the rectum As Needed.     • risperiDONE (risperDAL) 0.5 MG tablet Take 0.5 mg by mouth Daily.     • senna (Senokot) 8.6 MG tablet Take 1 tablet by mouth Daily. 30 tablet 5   • simvastatin (ZOCOR) 20 MG tablet Take 1 tablet by mouth Every Evening. 90 tablet 1   • VENTOLIN  (90 Base) MCG/ACT inhaler Inhale 2 puffs Every 4 (Four) Hours As Needed for Wheezing or Shortness of Air.  6   • furosemide (LASIX) 40 MG tablet Take 1 tablet by mouth Daily. 30 tablet 6   • potassium chloride (MICRO-K) 10 MEQ CR capsule 1 DAILY (Patient taking differently: Take 10 mEq by mouth Daily.) 30 capsule 6   • montelukast (SINGULAIR) 10 MG tablet Take 1 tablet by mouth Daily.       No facility-administered medications prior to visit.       No opioid medication identified on active medication list. I have reviewed chart for other potential  high risk medication/s and harmful drug interactions in the elderly.          Aspirin is not on active medication list.  Aspirin use is not indicated based on review of current medical condition/s. Risk of harm outweighs potential benefits.  .    Patient Active Problem List   Diagnosis   • Hypertension   • Obesity   • Hyperlipidemia   • Iron deficiency anemia due to chronic blood loss   • Upper  "GI bleeding   • ST elevation myocardial infarction (STEMI) (HCC)   • Coronary artery disease involving native heart without angina pectoris   • Morgagni hernia   • IFG (impaired fasting glucose)   • Primary insomnia   • PMB (postmenopausal bleeding)   • Endometrial cancer (HCC)   • Uncomplicated asthma   • LI (obstructive sleep apnea)   • Dyspepsia   • History of peptic ulcer disease   • Rectal pain   • Abdominal wall abscess   • History of ST elevation myocardial infarction (STEMI)   • Abdominal wall cellulitis   • Prediabetes   • Localized edema     Advance Care Planning  Advance Directive is not on file.  ACP discussion was held with the patient during this visit. Patient does not have an advance directive, declines further assistance.     Objective    Vitals:    02/21/23 1411   BP: 160/82   Pulse: 60   Resp: 22   Temp: 98.2 °F (36.8 °C)   TempSrc: Skin   SpO2: 98%   Weight: 92.1 kg (203 lb)   Height: 157.5 cm (62\")   PainSc: 0-No pain     Estimated body mass index is 37.13 kg/m² as calculated from the following:    Height as of this encounter: 157.5 cm (62\").    Weight as of this encounter: 92.1 kg (203 lb).    Class 2 Severe Obesity (BMI >=35 and <=39.9). Obesity-related health conditions include the following: hypertension, coronary heart disease and dyslipidemias. Obesity is unchanged. BMI is is above average; BMI management plan is completed. We discussed portion control and increasing exercise.      Does the patient have evidence of cognitive impairment? No    Lab Results   Component Value Date    CHLPL 138 02/13/2023    TRIG 122 02/13/2023    HDL 38 (L) 02/13/2023    LDL 78 02/13/2023    VLDL 22 02/13/2023    HGBA1C 5.90 (H) 02/13/2023        HEALTH RISK ASSESSMENT    Smoking Status:  Social History     Tobacco Use   Smoking Status Never   Smokeless Tobacco Never     Alcohol Consumption:  Social History     Substance and Sexual Activity   Alcohol Use No     Fall Risk Screen:    CATIE Fall Risk Assessment " was completed, and patient is at MODERATE risk for falls. Assessment completed on:2/21/2023    Depression Screening:  PHQ-2/PHQ-9 Depression Screening 2/21/2023   Little Interest or Pleasure in Doing Things 0-->not at all   Feeling Down, Depressed or Hopeless 0-->not at all   PHQ-9: Brief Depression Severity Measure Score 0       Health Habits and Functional and Cognitive Screening:  Functional & Cognitive Status 2/21/2023   Do you have difficulty preparing food and eating? No   Do you have difficulty bathing yourself, getting dressed or grooming yourself? No   Do you have difficulty using the toilet? No   Do you have difficulty moving around from place to place? No   Do you have trouble with steps or getting out of a bed or a chair? No   Current Diet Well Balanced Diet   Dental Exam Not up to date   Eye Exam Up to date   Exercise (times per week) 7 times per week   Current Exercises Include Walking   Do you need help using the phone?  No   Are you deaf or do you have serious difficulty hearing?  No   Do you need help with transportation? Yes   Do you need help shopping? No   Do you need help preparing meals?  No   Do you need help with housework?  No   Do you need help with laundry? No   Do you need help taking your medications? No   Do you need help managing money? No   Do you ever drive or ride in a car without wearing a seat belt? No   Have you felt unusual stress, anger or loneliness in the last month? No   Who do you live with? Sibling   Do you have difficulty concentrating, remembering or making decisions? No       Age-appropriate Screening Schedule:  Refer to the list below for future screening recommendations based on patient's age, sex and/or medical conditions. Orders for these recommended tests are listed in the plan section. The patient has been provided with a written plan.    Health Maintenance   Topic Date Due   • TDAP/TD VACCINES (1 - Tdap) Never done   • ZOSTER VACCINE (1 of 2) Never done   • PAP  "SMEAR  Never done   • INFLUENZA VACCINE  08/01/2022   • HEMOGLOBIN A1C  08/13/2023   • LIPID PANEL  02/13/2024   • MAMMOGRAM  03/15/2024   • DIABETIC FOOT EXAM  Discontinued   • DIABETIC EYE EXAM  Discontinued   • URINE MICROALBUMIN  Discontinued                CMS Preventative Services Quick Reference  Risk Factors Identified During Encounter  None Identified  The above risks/problems have been discussed with the patient.  Pertinent information has been shared with the patient in the After Visit Summary.  An After Visit Summary and PPPS were made available to the patient.    Follow Up:   Next Medicare Wellness visit to be scheduled in 1 year.       Additional E&M Note during same encounter follows:  Patient has multiple medical problems which are significant and separately identifiable that require additional work above and beyond the Medicare Wellness Visit.      Chief Complaint  Medicare Wellness-subsequent, Hypertension, Insomnia, and Asthma    Subjective        HPI  Flaca Hassan is also being seen today for Medication Management.    Pt doing well on the medication(s) w/o SEs, and is due refill today.    Pt has been on Lasix and KCL from another physician that she is no longer seeing and is requesting me refill those today.      Review of Systems   Constitutional: Negative for chills and fever.   HENT: Negative for congestion and sinus pressure.    Eyes: Negative for visual disturbance.   Respiratory: Negative for cough and shortness of breath.    Cardiovascular: Negative for chest pain.   Gastrointestinal: Negative for abdominal pain.   Genitourinary: Negative for dysuria.   Skin: Negative for rash.   Hematological: Negative for adenopathy.   Psychiatric/Behavioral: Negative for dysphoric mood.       Objective   Vital Signs:  /82   Pulse 60   Temp 98.2 °F (36.8 °C) (Skin)   Resp 22   Ht 157.5 cm (62\")   Wt 92.1 kg (203 lb)   SpO2 98%   BMI 37.13 kg/m²     Physical Exam  Constitutional:       " General: She is not in acute distress.     Appearance: She is well-developed.   Cardiovascular:      Rate and Rhythm: Normal rate and regular rhythm.   Pulmonary:      Effort: Pulmonary effort is normal.      Breath sounds: Normal breath sounds.   Neurological:      Mental Status: She is alert and oriented to person, place, and time.   Psychiatric:         Behavior: Behavior normal.         Thought Content: Thought content normal.      Labs reviewed with pt today during visit. All questions answered.      The following data was reviewed by: Isidro Hernandez MD on 02/21/2023:  Common labs    Common Labs 5/12/22 5/12/22 5/12/22 5/13/22 5/13/22 5/13/22 2/13/23 2/13/23 2/13/23 2/13/23    0530 0530 1218 0702 0702 0703 1348 1348 1348 1348   Glucose  121 (A)   121 (A)  108 (A)      BUN  11   11  16      Creatinine  0.87   0.83  1.08 (A)      Sodium  137   136  138      Potassium  4.0   4.1  4.2      Chloride  104   105  100      Calcium  8.8   8.4 (A)  9.6      Total Protein       7.9      Albumin       4.5      Total Bilirubin       0.5      Alkaline Phosphatase       132 (A)      AST (SGOT)       19      ALT (SGPT)       17      WBC 11.18 (A)  7.69 5.31     8.02    Hemoglobin 12.2  11.2 (A) 11.1 (A)     13.2    Hematocrit 37.5  35.8 35.3     40.6    Platelets 261  241 234     246    Total Cholesterol        138     Triglycerides        122     HDL Cholesterol        38 (A)     LDL Cholesterol         78     Hemoglobin A1C      6.00 (A)    5.90 (A)   (A) Abnormal value       Comments are available for some flowsheets but are not being displayed.                      Assessment and Plan   Diagnoses and all orders for this visit:    1. Medicare annual wellness visit, subsequent (Primary)    2. IFG (impaired fasting glucose)  Comments:  Diet/exercise/weight management to treat the glucose discussed.      3. Localized edema  -     furosemide (LASIX) 40 MG tablet; Take 1 tablet by mouth Daily.  Dispense: 90 tablet; Refill: 3  -      potassium chloride (MICRO-K) 10 MEQ CR capsule; Take 1 capsule by mouth Daily.  Dispense: 90 capsule; Refill: 3    4. Chronic constipation  -     docusate sodium (Colace) 100 MG capsule; Take 1 capsule by mouth 2 (Two) Times a Day.  Dispense: 180 capsule; Refill: 3    5. Moderate persistent asthma without complication  -     montelukast (SINGULAIR) 10 MG tablet; Take 1 tablet by mouth Daily.  Dispense: 90 tablet; Refill: 3           I spent 15 minutes caring for Flaca on this date of service. This time includes time spent by me in the following activities:preparing for the visit, reviewing tests, performing a medically appropriate examination and/or evaluation , ordering medications, tests, or procedures and documenting information in the medical record  Follow Up   No follow-ups on file.  Patient was given instructions and counseling regarding her condition or for health maintenance advice. Please see specific information pulled into the AVS if appropriate.

## 2023-02-21 NOTE — PATIENT INSTRUCTIONS
Medicare Wellness  Personal Prevention Plan of Service     Date of Office Visit:    Encounter Provider:  Isidro Hernandez MD  Place of Service:  [facility]  Patient Name: Flaca Hassan  :  1965    As part of the Medicare Wellness portion of your visit today, we are providing you with this personalized preventive plan of services (PPPS). This plan is based upon recommendations of the [place] Preventive Services Task Force (USPSTF) and the Advisory Committee on Immunization Practices (ACIP).    This lists the preventive care services that should be considered, and provides dates of when you are due. Items listed as completed are up-to-date and do not require any further intervention.    Health Maintenance   Topic Date Due    Pneumococcal Vaccine 0-64 (1 - PCV) Never done    TDAP/TD VACCINES (1 - Tdap) Never done    ZOSTER VACCINE (1 of 2) Never done    PAP SMEAR  Never done    INFLUENZA VACCINE  2022    HEMOGLOBIN A1C  2023    LIPID PANEL  2024    ANNUAL WELLNESS VISIT  2024    MAMMOGRAM  03/15/2024    COLORECTAL CANCER SCREENING  2032    HEPATITIS C SCREENING  Completed    COVID-19 Vaccine  Completed    DIABETIC FOOT EXAM  Discontinued    DIABETIC EYE EXAM  Discontinued    URINE MICROALBUMIN  Discontinued       No orders of the defined types were placed in this encounter.      Return in about 1 year (around 2024) for Annual physical, Recheck.

## 2023-02-22 RX ORDER — PANTOPRAZOLE SODIUM 40 MG/1
40 TABLET, DELAYED RELEASE ORAL DAILY
Qty: 30 TABLET | Refills: 5 | Status: SHIPPED | OUTPATIENT
Start: 2023-02-22 | End: 2023-03-21 | Stop reason: SDUPTHER

## 2023-03-10 NOTE — TELEPHONE ENCOUNTER
Pt scheduled to see Dr Willson 6/12/18 at 4PM.     Called pt and advised that his H pylori breath test was negative and he should follow up with Dr Willson as scheduled on 6/12 at 4 PM. Pt verb understanding.   2-4 times/mo

## 2023-03-13 RX ORDER — BISOPROLOL FUMARATE AND HYDROCHLOROTHIAZIDE 5; 6.25 MG/1; MG/1
1 TABLET ORAL DAILY
Qty: 30 TABLET | Refills: 0 | Status: SHIPPED | OUTPATIENT
Start: 2023-03-13

## 2023-03-13 RX ORDER — CLOPIDOGREL BISULFATE 75 MG/1
75 TABLET ORAL DAILY
Qty: 90 TABLET | Refills: 1 | Status: SHIPPED | OUTPATIENT
Start: 2023-03-13

## 2023-03-13 RX ORDER — OLMESARTAN MEDOXOMIL 40 MG/1
40 TABLET ORAL DAILY
Qty: 30 TABLET | Refills: 5 | Status: SHIPPED | OUTPATIENT
Start: 2023-03-13

## 2023-03-21 ENCOUNTER — OFFICE VISIT (OUTPATIENT)
Dept: GASTROENTEROLOGY | Facility: CLINIC | Age: 58
End: 2023-03-21
Payer: COMMERCIAL

## 2023-03-21 VITALS
HEIGHT: 62 IN | DIASTOLIC BLOOD PRESSURE: 86 MMHG | HEART RATE: 57 BPM | OXYGEN SATURATION: 98 % | WEIGHT: 204.3 LBS | SYSTOLIC BLOOD PRESSURE: 159 MMHG | BODY MASS INDEX: 37.6 KG/M2 | TEMPERATURE: 97.8 F

## 2023-03-21 DIAGNOSIS — R19.4 ALTERED BOWEL HABITS: ICD-10-CM

## 2023-03-21 DIAGNOSIS — I99.8 ANGIECTASIA: ICD-10-CM

## 2023-03-21 DIAGNOSIS — R12 HEARTBURN: Primary | ICD-10-CM

## 2023-03-21 PROCEDURE — 1160F RVW MEDS BY RX/DR IN RCRD: CPT | Performed by: INTERNAL MEDICINE

## 2023-03-21 PROCEDURE — 1159F MED LIST DOCD IN RCRD: CPT | Performed by: INTERNAL MEDICINE

## 2023-03-21 PROCEDURE — 3077F SYST BP >= 140 MM HG: CPT | Performed by: INTERNAL MEDICINE

## 2023-03-21 PROCEDURE — 99214 OFFICE O/P EST MOD 30 MIN: CPT | Performed by: INTERNAL MEDICINE

## 2023-03-21 PROCEDURE — 3079F DIAST BP 80-89 MM HG: CPT | Performed by: INTERNAL MEDICINE

## 2023-03-21 RX ORDER — HYDROCHLOROTHIAZIDE 12.5 MG/1
CAPSULE, GELATIN COATED ORAL
COMMUNITY
Start: 2023-03-15

## 2023-03-21 RX ORDER — PANTOPRAZOLE SODIUM 40 MG/1
40 TABLET, DELAYED RELEASE ORAL DAILY
Qty: 90 TABLET | Refills: 3 | Status: SHIPPED | OUTPATIENT
Start: 2023-03-21

## 2023-03-21 NOTE — PROGRESS NOTES
Subjective   Chief Complaint   Patient presents with   • change in bowel habits       Flaca Hassan is a  57 y.o. female here for a follow up visit for heartburn, change in bowel habits.  She was last seen in the office in October 2021.    She reports that she has not had any bleeding since her colonoscopy.  She reports that after she eats, she sometimes has to go to the bathroom immediately.  She has a difficult time finishing and has to go back multiple times.  She sometimes has some irritation when she wipes from hemorrhoids.  She is requesting hemorrhoid cream.  She takes 2 spoonfuls of Metamucil once a day and the stool softener twice daily.  She feels like her bowel movements are sometimes loose.  She sometimes gets bloated after eating.    She has a history of endometrial carcinoma and has undergone a hysterectomy.  Her postoperative course was complicated by an abscess.  Review of her imaging from last August showed that she had a fluid collection and a resolving abscess.  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    Colonoscopy 2/28/2022-a few colonic angioectasias seen in the rectum which were treated with APC.  Grade 1 nonbleeding internal hemorrhoids were also noted.  5-year follow-up recommended.  HPI  Past Medical History:   Diagnosis Date   • Anemia    • Asthma    • Breast cyst    • Cancer (HCC)    • Coronary artery disease     stent   • Depression    • Diverticulosis    • H/O: GI bleed     recurrent   • Hematuria    • History of coronary angioplasty with insertion of stent    • History of transfusion     no reaction   • Hyperlipidemia    • Hypertension    • Incontinence of urine     wears pads   • Irritable bowel syndrome    • Malignant neoplasm of endometrium (HCC) 02/2021   • Melena    • Obesity    • Oxygen dependent     uses oxygen 2 prn when walking if SOB   • UTI (urinary tract infection)      Past Surgical History:   Procedure Laterality Date   • BREAST CYST  EXCISION Left    • CARDIAC CATHETERIZATION N/A 11/13/2017    Procedure: Left Heart Cath;  Surgeon: Imer Montaño MD;  Location: [facility] CATH INVASIVE LOCATION;  Service:    • CARDIAC CATHETERIZATION N/A 11/13/2017    Procedure: Stent ROBERTO coronary;  Surgeon: Imer Montaño MD;  Location: [facility] CATH INVASIVE LOCATION;  Service:    • COLONOSCOPY  02/11/2016    tics, NBIH,    • COLONOSCOPY N/A 02/28/2022    Procedure: COLONOSCOPY TO CECUM and TI WITH APC CAUTERY TO RECTAL AVMs;  Surgeon: Ruth Ann Willson MD;  Location: [facility] ENDOSCOPY;  Service: Gastroenterology;  Laterality: N/A;  FAMILY HX COLON CA  --HEMORRHOIDS, BLEEDING RECTAL AVMs    • D & C HYSTEROSCOPY ENDOMETRIAL ABLATION N/A 01/08/2021    Procedure: DILATATION AND CURETTAGE HYSTEROSCOPY ENDOMETRIAL  RESECTION;  Surgeon: Thu Blake MD;  [place] [facility] [place] [facility] [place]  Service: Obstetrics/Gynecology;  Laterality: N/A;   • ENDOSCOPY N/A 03/03/2017    erythematous mucosa in stomach, duodenal ulcer , mild to moderate chronic active gastritis, positive for h pylori   • ENDOSCOPY N/A 11/08/2021    Procedure: ESOPHAGOGASTRODUODENOSCOPY WITH BX'S;  Surgeon: Ruth Ann Willson MD;  Location: [facility] ENDOSCOPY;  Service: Gastroenterology;  Laterality: N/A;  pre: EPIGASTRIC PAIN, HX OF STOMACH ULCER  post: GASTRITIS   • HYSTERECTOMY     • UPPER GASTROINTESTINAL ENDOSCOPY  02/10/2016    sami-Ruth Ann Willson M.D.       Current Outpatient Medications:   •  acetaminophen (TYLENOL) 500 MG tablet, Take 2 tablets by mouth Every 6 (Six) Hours As Needed for Mild Pain., Disp: , Rfl:   •  amLODIPine (NORVASC) 10 MG tablet, TAKE 1 TABLET BY MOUTH DAILY, Disp: 90 tablet, Rfl: 1  •  bisoprolol-hydrochlorothiazide (ZIAC) 5-6.25 MG per tablet, TAKE 1 TABLET BY MOUTH DAILY, Disp: 30 tablet, Rfl: 0  •  budesonide-formoterol (SYMBICORT) 160-4.5 MCG/ACT inhaler, Inhale 2 puffs 2 (Two) Times a Day., Disp: , Rfl:   •  Cholecalciferol (VITAMIN D3) 2000 units tablet, Take 1  tablet by mouth Daily., Disp: , Rfl: 1  •  clopidogrel (PLAVIX) 75 MG tablet, TAKE 1 TABLET BY MOUTH DAILY, Disp: 90 tablet, Rfl: 1  •  escitalopram (LEXAPRO) 10 MG tablet, Take 1 tablet by mouth Every Morning., Disp: , Rfl: 0  •  fluticasone (FLONASE) 50 MCG/ACT nasal spray, 2 sprays into the nostril(s) as directed by provider Daily., Disp: , Rfl:   •  Fluticasone Furoate-Vilanterol (Breo Ellipta) 200-25 MCG/INH inhaler, Inhale 1 puff Daily., Disp: , Rfl:   •  folic acid (FOLVITE) 1 MG tablet, Take 1 tablet by mouth Daily., Disp: , Rfl: 0  •  furosemide (LASIX) 40 MG tablet, Take 1 tablet by mouth Daily., Disp: 90 tablet, Rfl: 3  •  hydroCHLOROthiazide (MICROZIDE) 12.5 MG capsule, , Disp: , Rfl:   •  montelukast (SINGULAIR) 10 MG tablet, Take 1 tablet by mouth Daily., Disp: 90 tablet, Rfl: 3  •  nitroglycerin (NITROSTAT) 0.4 MG SL tablet, 1 under the tongue as needed for angina, may repeat q5mins for up three doses (Patient taking differently: Place 1 tablet under the tongue Every 5 (Five) Minutes As Needed. 1 under the tongue as needed for angina, may repeat q5mins for up three doses), Disp: 25 tablet, Rfl: 3  •  olmesartan (BENICAR) 40 MG tablet, TAKE 1 TABLET BY MOUTH DAILY, Disp: 30 tablet, Rfl: 5  •  pantoprazole (PROTONIX) 40 MG EC tablet, Take 1 tablet by mouth Daily., Disp: 90 tablet, Rfl: 3  •  potassium chloride (MICRO-K) 10 MEQ CR capsule, Take 1 capsule by mouth Daily., Disp: 90 capsule, Rfl: 3  •  Proctozone-HC 2.5 % rectal cream, Insert 1 application into the rectum As Needed (28)., Disp: 28 g, Rfl: 3  •  risperiDONE (risperDAL) 0.5 MG tablet, Take 1 tablet by mouth Daily., Disp: , Rfl:   •  simvastatin (ZOCOR) 20 MG tablet, Take 1 tablet by mouth Every Evening., Disp: 90 tablet, Rfl: 1  •  VENTOLIN  (90 Base) MCG/ACT inhaler, Inhale 2 puffs Every 4 (Four) Hours As Needed for Wheezing or Shortness of Air., Disp: , Rfl: 6  PRN Meds:.  No Known Allergies  Social History     Socioeconomic History    • Marital status:    • Years of education: High School   Tobacco Use   • Smoking status: Never   • Smokeless tobacco: Never   Vaping Use   • Vaping Use: Never used   Substance and Sexual Activity   • Alcohol use: No   • Drug use: Never   • Sexual activity: Defer     Family History   Problem Relation Age of Onset   • Breast cancer Other    • Lung cancer Brother    • Hypertension Brother    • Hyperlipidemia Brother    • Heart disease Brother    • Throat cancer Paternal Uncle    • Tongue cancer Paternal Grandfather    • Hypertension Father    • Hyperlipidemia Father    • Heart disease Father    • Malig Hyperthermia Neg Hx      Review of Systems   Constitutional: Negative for appetite change and unexpected weight change.   Gastrointestinal: Positive for abdominal distention and diarrhea.     Vitals:    03/21/23 1503   BP: 159/86   Pulse: 57   Temp: 97.8 °F (36.6 °C)   SpO2: 98%         03/21/23  1503   Weight: 92.7 kg (204 lb 4.8 oz)       Objective   Physical Exam  Constitutional:       Appearance: Normal appearance. She is well-developed.   HENT:      Head: Normocephalic and atraumatic.   Eyes:      General: No scleral icterus.     Conjunctiva/sclera: Conjunctivae normal.   Pulmonary:      Effort: Pulmonary effort is normal.      Breath sounds: Normal breath sounds.   Abdominal:      General: There is no distension.      Palpations: Abdomen is soft.      Tenderness: There is no abdominal tenderness.      Comments: Palpable solid area above her umbilical scar   Musculoskeletal:      Cervical back: Normal range of motion and neck supple.   Skin:     General: Skin is warm and dry.   Neurological:      Mental Status: She is alert.   Psychiatric:         Mood and Affect: Mood normal.         Behavior: Behavior normal.       No radiology results for the last 7 days    Assessment & Plan   Diagnoses and all orders for this visit:    Heartburn    Altered bowel habits    Angiectasia    Other orders  -      hydroCHLOROthiazide (MICROZIDE) 12.5 MG capsule  -     pantoprazole (PROTONIX) 40 MG EC tablet; Take 1 tablet by mouth Daily.  -     Proctozone-HC 2.5 % rectal cream; Insert 1 application into the rectum As Needed (28).      Plan:  · She has a palpable abnormality in the mid abdomen.  She has had a hysterectomy in the last year with postoperative abscess and fluid collection.  She is due to follow-up with her gynecologic surgeon and due for repeat imaging.  I encouraged her to schedule that as soon as possible.  We will hold off on imaging as I expect that she will need imaging to follow-up on her cancer but I encouraged her and her son to reach out to her surgeon  · Increase fiber to twice daily  · Discontinue docusate  · Proctosol  sent to pharmacy for hemorrhoids  · IB sissy as needed for abdominal upset.  I think some of the bloating she has is the result of her hernia

## 2023-04-13 RX ORDER — SIMVASTATIN 20 MG
TABLET ORAL
Qty: 90 TABLET | Refills: 1 | Status: SHIPPED | OUTPATIENT
Start: 2023-04-13

## 2023-05-22 NOTE — TELEPHONE ENCOUNTER
Rx Refill Note  Requested Prescriptions     Pending Prescriptions Disp Refills   • bisoprolol-hydrochlorothiazide (ZIAC) 5-6.25 MG per tablet [Pharmacy Med Name: BISOPRL/HCTZ TAB 5-6.25MG] 90 tablet 1     Sig: TAKE 1 TABLET DAILY      Last office visit with prescribing clinician: 3/21/2022   Last telemedicine visit with prescribing clinician: 4/13/2023   Next office visit with prescribing clinician: 8/14/2023                         Would you like a call back once the refill request has been completed: [] Yes [] No    If the office needs to give you a call back, can they leave a voicemail: [] Yes [] No    Darlin Luo MA  05/22/23, 08:27 EDT

## 2023-06-08 RX ORDER — BISOPROLOL FUMARATE AND HYDROCHLOROTHIAZIDE 5; 6.25 MG/1; MG/1
TABLET ORAL
Qty: 90 TABLET | Refills: 1 | Status: SHIPPED | OUTPATIENT
Start: 2023-06-08

## 2023-07-20 ENCOUNTER — TELEPHONE (OUTPATIENT)
Dept: FAMILY MEDICINE CLINIC | Facility: CLINIC | Age: 58
End: 2023-07-20

## 2023-07-20 NOTE — TELEPHONE ENCOUNTER
Caller: Sonya Posey    Relationship: Emergency Contact    Best call back number: [phone]    Caller requesting test results: BROTHER    What test was performed: LABS    When was the test performed: 7/17/23    Where was the test performed: OFFICE

## 2023-07-20 NOTE — TELEPHONE ENCOUNTER
LEFT MESSAGE FOR PT TO RETURN CALL - OK FOR THE HUB TO RELAY MESSAGE ( BROTHER NEEDS TO BE ON RELEASE OF INFORMATION BEFORE RESULTS CAN BE GIVEN TO HIM.  Very happy to report that your kidney function is back to normal!  I was slightly back down on the amount of water that you are currently consuming, as the sodium level is dropping due to the dilutional effect.  Overall though excellent job!

## 2023-08-14 ENCOUNTER — OFFICE VISIT (OUTPATIENT)
Dept: CARDIOLOGY | Facility: CLINIC | Age: 58
End: 2023-08-14
Payer: MEDICARE

## 2023-08-14 VITALS
SYSTOLIC BLOOD PRESSURE: 162 MMHG | DIASTOLIC BLOOD PRESSURE: 88 MMHG | HEIGHT: 60 IN | BODY MASS INDEX: 40.44 KG/M2 | HEART RATE: 78 BPM | WEIGHT: 206 LBS

## 2023-08-14 DIAGNOSIS — I25.10 CORONARY ARTERY DISEASE INVOLVING NATIVE CORONARY ARTERY OF NATIVE HEART WITHOUT ANGINA PECTORIS: Primary | ICD-10-CM

## 2023-08-14 DIAGNOSIS — I10 PRIMARY HYPERTENSION: ICD-10-CM

## 2023-08-14 DIAGNOSIS — R07.89 CHEST PAIN, ATYPICAL: ICD-10-CM

## 2023-08-14 DIAGNOSIS — E78.5 HYPERLIPIDEMIA, UNSPECIFIED HYPERLIPIDEMIA TYPE: ICD-10-CM

## 2023-08-14 PROCEDURE — 99214 OFFICE O/P EST MOD 30 MIN: CPT | Performed by: INTERNAL MEDICINE

## 2023-08-14 PROCEDURE — 3077F SYST BP >= 140 MM HG: CPT | Performed by: INTERNAL MEDICINE

## 2023-08-14 PROCEDURE — 93000 ELECTROCARDIOGRAM COMPLETE: CPT | Performed by: INTERNAL MEDICINE

## 2023-08-14 PROCEDURE — 3079F DIAST BP 80-89 MM HG: CPT | Performed by: INTERNAL MEDICINE

## 2023-08-14 RX ORDER — FOLIC ACID 20 MG
1 CAPSULE ORAL
COMMUNITY

## 2023-08-14 NOTE — PROGRESS NOTES
1YR    Subjective:        Flaca Hassan is a 58 y.o. female who here for follow up    CC  1 year follow-up  HPI  58-year-old female with coronary artery disease hyperlipidemia hypertension here for the follow-up with no complaints of chest pains tightness heaviness or the pressure sensation     Problems Addressed this Visit          Cardiac and Vasculature    Hypertension    Hyperlipidemia    Coronary artery disease involving native heart without angina pectoris - Primary     Other Visit Diagnoses       Chest pain, atypical              Diagnoses         Codes Comments    Coronary artery disease involving native coronary artery of native heart without angina pectoris    -  Primary ICD-10-CM: I25.10  ICD-9-CM: 414.01     Hyperlipidemia, unspecified hyperlipidemia type     ICD-10-CM: E78.5  ICD-9-CM: 272.4     Primary hypertension     ICD-10-CM: I10  ICD-9-CM: 401.9     Chest pain, atypical     ICD-10-CM: R07.89  ICD-9-CM: 786.59           .    The following portions of the patient's history were reviewed and updated as appropriate: allergies, current medications, past family history, past medical history, past social history, past surgical history and problem list.    Past Medical History:   Diagnosis Date    Anemia     Asthma     Breast cyst     Cancer     Coronary artery disease     stent    Depression     Diverticulosis     H/O: GI bleed     recurrent    Hematuria     History of coronary angioplasty with insertion of stent     History of transfusion     no reaction    Hyperlipidemia     Hypertension     Incontinence of urine     wears pads    Irritable bowel syndrome     Malignant neoplasm of endometrium 02/2021    Melena     Obesity     Oxygen dependent     uses oxygen 2 prn when walking if SOB    Uterine cancer     UTI (urinary tract infection)      reports that she has never smoked. She has never used smokeless tobacco. She reports that she does not drink alcohol and does not use drugs.   Family History  "  Problem Relation Age of Onset    Breast cancer Other     Lung cancer Brother     Hypertension Brother     Hyperlipidemia Brother     Heart disease Brother     Throat cancer Paternal Uncle     Tongue cancer Paternal Grandfather     Hypertension Father     Hyperlipidemia Father     Heart disease Father     Malig Hyperthermia Neg Hx        Review of Systems  Constitutional: No wt loss, fever, fatigue  Gastrointestinal: No nausea, abdominal pain  Behavioral/Psych: No insomnia or anxiety   Cardiovascular no chest pains or tightness in the chest  Objective:       Physical Exam           Physical Exam  /88   Pulse 78   Ht 152.4 cm (60\")   Wt 93.4 kg (206 lb)   LMP  (LMP Unknown)   BMI 40.23 kg/mý     General appearance: No acute changes   Eyes: Sclerae conjunctivae normal, pupils reactive   HENT: Atraumatic; oropharynx clear with moist mucous membranes and no mucosal ulcerations;  Neck: Trachea midline; NECK, supple, no thyromegaly or lymphadenopathy   Lungs: Normal size and shape, normal breath sounds, equal distribution of air, no rales and rhonchi   CV: S1-S2 regular, no murmurs, no rub, no gallop   Abdomen: Soft, nontender; no masses , no abnormal abdominal sounds   Extremities: No deformity , normal color , no peripheral edema   Skin: Normal temperature, turgor and texture; no rash, ulcers  Psych: Appropriate affect, alert and oriented to person, place and time             ECG 12 Lead    Date/Time: 8/14/2023 1:28 PM  Performed by: Imer Montaño MD  Authorized by: Imer Montaño MD   Comparison: compared with previous ECG   Similar to previous ECG  Rhythm: sinus rhythm  ST Flattening: all    Clinical impression: non-specific ECG          Echocardiogram:        Current Outpatient Medications:     acetaminophen (TYLENOL) 500 MG tablet, Take 2 tablets by mouth Every 6 (Six) Hours As Needed for Mild Pain., Disp: , Rfl:     amLODIPine (NORVASC) 10 MG tablet, TAKE 1 TABLET BY MOUTH DAILY, Disp: " 90 tablet, Rfl: 1    bisoprolol-hydrochlorothiazide (ZIAC) 5-6.25 MG per tablet, Take 1 tablet by mouth Daily., Disp: 90 tablet, Rfl: 0    budesonide-formoterol (SYMBICORT) 160-4.5 MCG/ACT inhaler, Inhale 2 puffs 2 (Two) Times a Day., Disp: , Rfl:     Cholecalciferol (VITAMIN D3) 2000 units tablet, Take 1 tablet by mouth Daily., Disp: , Rfl: 1    clopidogrel (PLAVIX) 75 MG tablet, TAKE 1 TABLET BY MOUTH DAILY, Disp: 90 tablet, Rfl: 1    escitalopram (LEXAPRO) 20 MG tablet, Take 1 tablet by mouth Daily., Disp: , Rfl:     fluticasone (FLONASE) 50 MCG/ACT nasal spray, 2 sprays into the nostril(s) as directed by provider Daily., Disp: , Rfl:     Fluticasone Furoate-Vilanterol (Breo Ellipta) 200-25 MCG/INH inhaler, Inhale 1 puff Daily., Disp: , Rfl:     Folic Acid 20 MG capsule, Take 1 capsule by mouth., Disp: , Rfl:     furosemide (LASIX) 40 MG tablet, Take 1 tablet by mouth Daily. (Patient taking differently: Take 1 tablet by mouth As Needed.), Disp: 90 tablet, Rfl: 3    hydroCHLOROthiazide (MICROZIDE) 12.5 MG capsule, , Disp: , Rfl:     montelukast (SINGULAIR) 10 MG tablet, Take 1 tablet by mouth Daily., Disp: 90 tablet, Rfl: 3    nitroglycerin (NITROSTAT) 0.4 MG SL tablet, 1 under the tongue as needed for angina, may repeat q5mins for up three doses (Patient taking differently: Place 1 tablet under the tongue Every 5 (Five) Minutes As Needed. 1 under the tongue as needed for angina, may repeat q5mins for up three doses), Disp: 25 tablet, Rfl: 3    olmesartan (BENICAR) 40 MG tablet, TAKE 1 TABLET BY MOUTH DAILY, Disp: 30 tablet, Rfl: 5    pantoprazole (PROTONIX) 20 MG EC tablet, Take 1 tablet by mouth Daily., Disp: 30 tablet, Rfl: 3    potassium chloride (MICRO-K) 10 MEQ CR capsule, Take 1 capsule by mouth Daily., Disp: 90 capsule, Rfl: 3    POTASSIUM PO, Take 1 tablet by mouth., Disp: , Rfl:     Proctozone-HC 2.5 % rectal cream, Insert 1 application into the rectum As Needed (28)., Disp: 28 g, Rfl: 3    risperiDONE  (risperDAL) 0.5 MG tablet, Take 1 tablet by mouth Daily., Disp: , Rfl:     simvastatin (ZOCOR) 20 MG tablet, TAKE 1 TABLET EVERY EVENING, Disp: 90 tablet, Rfl: 1    VENTOLIN  (90 Base) MCG/ACT inhaler, Inhale 2 puffs Every 4 (Four) Hours As Needed for Wheezing or Shortness of Air., Disp: , Rfl: 6   Assessment:        Patient Active Problem List   Diagnosis    Hypertension    Obesity    Hyperlipidemia    Iron deficiency anemia due to chronic blood loss    Upper GI bleeding    ST elevation myocardial infarction (STEMI)    Coronary artery disease involving native heart without angina pectoris    Morgagni hernia    IFG (impaired fasting glucose)    Primary insomnia    PMB (postmenopausal bleeding)    Endometrial cancer    Uncomplicated asthma    LI (obstructive sleep apnea)    Dyspepsia    History of peptic ulcer disease    Rectal pain    Abdominal wall abscess    History of ST elevation myocardial infarction (STEMI)    Abdominal wall cellulitis    Prediabetes    Localized edema               Plan:            ICD-10-CM ICD-9-CM   1. Coronary artery disease involving native coronary artery of native heart without angina pectoris  I25.10 414.01   2. Hyperlipidemia, unspecified hyperlipidemia type  E78.5 272.4   3. Primary hypertension  I10 401.9   4. Chest pain, atypical  R07.89 786.59     1. Coronary artery disease involving native coronary artery of native heart without angina pectoris  No angina pectoris    2. Hyperlipidemia, unspecified hyperlipidemia type  Continue current treatment    3. Primary hypertension  Continue current treatment    4. Chest pain, atypical  Atypical      Patient understands importance of blood pressure check at home which patient does regularly and the blood pressures are well under control to the level of less than 140/90     1 yr    COUNSELING:    Flaca Gudino was given to patient for the following topics: diagnostic results, risk factor reductions, impressions, risks  and benefits of treatment options and importance of treatment compliance .       SMOKING COUNSELING:        Dictated using Dragon dictation

## 2023-09-11 ENCOUNTER — TELEPHONE (OUTPATIENT)
Dept: CARDIOLOGY | Facility: CLINIC | Age: 58
End: 2023-09-11
Payer: MEDICARE

## 2023-09-11 RX ORDER — CLOPIDOGREL BISULFATE 75 MG/1
75 TABLET ORAL DAILY
Qty: 90 TABLET | Refills: 3 | Status: SHIPPED | OUTPATIENT
Start: 2023-09-11 | End: 2023-09-11 | Stop reason: SDUPTHER

## 2023-09-11 RX ORDER — CLOPIDOGREL BISULFATE 75 MG/1
75 TABLET ORAL DAILY
Qty: 90 TABLET | Refills: 1 | Status: SHIPPED | OUTPATIENT
Start: 2023-09-11

## 2023-09-11 NOTE — TELEPHONE ENCOUNTER
Caller: Yoly Poseyed    Relationship: Emergency Contact    Best call back number: [phone]     Requested Prescriptions:   Requested Prescriptions     Pending Prescriptions Disp Refills    clopidogrel (PLAVIX) 75 MG tablet 90 tablet 3     Sig: Take 1 tablet by mouth Daily.        Pharm[id number] [address]  AT [facility] [phone] [facility] [phone] FX     Last office visit with prescribing clinician: 8/14/2023   Last telemedicine visit with prescribing clinician: Visit date not found   Next office visit with prescribing clinician: 8/22/2024     Additional details provided by patient: PT BROTHER CALLED IN REFILL AND STATES PT IS OUT OF MEDICINE AND NEEDING 90 DAY REFILLS     Does the patient have less than a 3 day supply:  [x] Yes  [] No    Would you like a call back once the refill request has been completed: [] Yes [] No    If the office needs to give you a call back, can they leave a voicemail: [] Yes [] No    Johnathan Kendall Rep   09/11/23 14:22 EDT

## 2023-09-15 NOTE — TELEPHONE ENCOUNTER
Rx Refill Note  Requested Prescriptions     Pending Prescriptions Disp Refills    bisoprolol-hydrochlorothiazide (ZIAC) 5-6.25 MG per tablet [Pharmacy Med Name: BISOPROLOL/HCTZ 5MG/6.25MG TABS] 90 tablet 0     Sig: TAKE 1 TABLET BY MOUTH DAILY      Last office visit with prescribing clinician: 8/14/2023   Last telemedicine visit with prescribing clinician: Visit date not found   Next office visit with prescribing clinician: 8/22/2024                         Would you like a call back once the refill request has been completed: [] Yes [] No    If the office needs to give you a call back, can they leave a voicemail: [] Yes [] No    Sarah Samuels, [facility]  09/15/23, 08:37 EDT

## 2023-09-21 RX ORDER — OLMESARTAN MEDOXOMIL 40 MG/1
40 TABLET ORAL DAILY
Qty: 30 TABLET | Refills: 5 | Status: SHIPPED | OUTPATIENT
Start: 2023-09-21

## 2023-09-21 RX ORDER — BISOPROLOL FUMARATE AND HYDROCHLOROTHIAZIDE 5; 6.25 MG/1; MG/1
1 TABLET ORAL DAILY
Qty: 90 TABLET | Refills: 1 | Status: SHIPPED | OUTPATIENT
Start: 2023-09-21

## 2023-09-21 RX ORDER — SIMVASTATIN 20 MG
20 TABLET ORAL EVERY EVENING
Qty: 90 TABLET | Refills: 1 | Status: SHIPPED | OUTPATIENT
Start: 2023-09-21

## 2023-09-21 NOTE — TELEPHONE ENCOUNTER
Caller: RICK    Relationship: SELF    Best call back number: [phone]    Requested Prescriptions:   Requested Prescriptions     Pending Prescriptions Disp Refills    simvastatin (ZOCOR) 20 MG tablet 90 tablet 1     Sig: Take 1 tablet by mouth Every Evening.    olmesartan (BENICAR) 40 MG tablet 30 tablet 5     Sig: Take 1 tablet by mouth Daily.        Pharmacy where request should be sent: [facility] #[id number] [facility] [address] [facility] [address] AT [facility] [phone] [facility] [phone] FX     Last office visit with prescribing clinician: 8/14/2023   Last telemedicine visit with prescribing clinician: Visit date not found   Next office visit with prescribing clinician: 8/22/2024     Additional details provided by patient: KLAUDIALEY OUT    Does the patient have less than a 3 day supply:  [x] Yes  [] No    Would you like a call back once the refill request has been completed: [] Yes [] No    If the office needs to give you a call back, can they leave a voicemail: [] Yes [] No    Johnathan Hampton Rep   09/21/23 11:51 EDT

## 2023-10-10 RX ORDER — SIMVASTATIN 20 MG
20 TABLET ORAL EVERY EVENING
Qty: 90 TABLET | Refills: 1 | OUTPATIENT
Start: 2023-10-10

## 2023-12-13 ENCOUNTER — OFFICE VISIT (OUTPATIENT)
Dept: GASTROENTEROLOGY | Facility: CLINIC | Age: 58
End: 2023-12-13
Payer: MEDICARE

## 2023-12-13 VITALS
HEART RATE: 71 BPM | WEIGHT: 202.8 LBS | HEIGHT: 62 IN | BODY MASS INDEX: 37.32 KG/M2 | SYSTOLIC BLOOD PRESSURE: 169 MMHG | OXYGEN SATURATION: 91 % | DIASTOLIC BLOOD PRESSURE: 80 MMHG | TEMPERATURE: 97.8 F

## 2023-12-13 DIAGNOSIS — Z87.11 HISTORY OF PEPTIC ULCER DISEASE: ICD-10-CM

## 2023-12-13 DIAGNOSIS — K58.2 IRRITABLE BOWEL SYNDROME WITH BOTH CONSTIPATION AND DIARRHEA: Primary | ICD-10-CM

## 2023-12-13 DIAGNOSIS — Z79.899 CURRENT USE OF PROTON PUMP INHIBITOR: ICD-10-CM

## 2023-12-13 PROCEDURE — 1160F RVW MEDS BY RX/DR IN RCRD: CPT | Performed by: INTERNAL MEDICINE

## 2023-12-13 PROCEDURE — 3079F DIAST BP 80-89 MM HG: CPT | Performed by: INTERNAL MEDICINE

## 2023-12-13 PROCEDURE — 3077F SYST BP >= 140 MM HG: CPT | Performed by: INTERNAL MEDICINE

## 2023-12-13 PROCEDURE — 1159F MED LIST DOCD IN RCRD: CPT | Performed by: INTERNAL MEDICINE

## 2023-12-13 PROCEDURE — 99214 OFFICE O/P EST MOD 30 MIN: CPT | Performed by: INTERNAL MEDICINE

## 2023-12-13 RX ORDER — PANTOPRAZOLE SODIUM 20 MG/1
20 TABLET, DELAYED RELEASE ORAL DAILY
Qty: 90 TABLET | Refills: 3 | Status: SHIPPED | OUTPATIENT
Start: 2023-12-13

## 2023-12-13 NOTE — PROGRESS NOTES
Subjective   Chief Complaint   Patient presents with    Heartburn    dyspepsia       Flaca Hassan is a  58 y.o. female here for a follow up visit for heartburn and dyspepsia, IBS.     She was last seen in June of this year.  She has been doing well.  No significant change in her symptoms.  She reports that her stools are still sometimes tight despite taking Metamucil 2 spoonfuls daily.  She will have loose stool if she eats something even with light spice and by light spice they mean even a small amount of garlic.  Her family prepares her food separately from the rest of the family.  She walks the halls of her house for 30 minutes after meals.  She has not seen any blood in her stool but she sometimes has burning in her rectum from her hemorrhoids if she ate something with any spice-she uses over-the-counter hemorrhoid cream for this.    Reports daily bowel movements.  No abdominal pain regularly.  Infrequent heartburn.    She has a history of endometrial carcinoma and has undergone a hysterectomy.  Her postoperative course was complicated by an abscess.  Review of her imaging from last August showed that she had a fluid collection and a resolving abscess.  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    Colonoscopy 2/28/2022-a few colonic angioectasias seen in the rectum which were treated with APC.  Grade 1 nonbleeding internal hemorrhoids were also noted.  5-year follow-up recommended.    She had a GI bleed secondary to an ulcer in [id number] EGD showed resolution of the ulcer.  She has been maintained on low-dose pantoprazole.  HPI  Past Medical History:   Diagnosis Date    Anemia     Asthma     Breast cyst     Cancer     Coronary artery disease     stent    Depression     Diverticulosis     H/O: GI bleed     recurrent    Hematuria     History of coronary angioplasty with insertion of stent     History of transfusion     no reaction    Hyperlipidemia     Hypertension      Incontinence of urine     wears pads    Irritable bowel syndrome     Malignant neoplasm of endometrium 02/2021    Melena     Obesity     Oxygen dependent     uses oxygen 2 prn when walking if SOB    Uterine cancer     UTI (urinary tract infection)      Past Surgical History:   Procedure Laterality Date    BREAST CYST EXCISION Left     CARDIAC CATHETERIZATION N/A 11/13/2017    Procedure: Left Heart Cath;  Surgeon: Imer Montaño MD;  Location: [facility] CATH INVASIVE LOCATION;  Service:     CARDIAC CATHETERIZATION N/A 11/13/2017    Procedure: Stent ROBERTO coronary;  Surgeon: Imer Montaño MD;  Location: [facility] CATH INVASIVE LOCATION;  Service:     COLONOSCOPY  02/11/2016    tics, NBIH,     COLONOSCOPY N/A 02/28/2022    Procedure: COLONOSCOPY TO CECUM and TI WITH APC CAUTERY TO RECTAL AVMs;  Surgeon: Ruth Ann Willson MD;  Location: [facility] ENDOSCOPY;  Service: Gastroenterology;  Laterality: N/A;  FAMILY HX COLON CA  --HEMORRHOIDS, BLEEDING RECTAL AVMs     D & C HYSTEROSCOPY ENDOMETRIAL ABLATION N/A 01/08/2021    Procedure: DILATATION AND CURETTAGE HYSTEROSCOPY ENDOMETRIAL  RESECTION;  Surgeon: Thu Blake MD;  Location:  [facility];  Service: Obstetrics/Gynecology;  Laterality: N/A;    ENDOSCOPY N/A 03/03/2017    erythematous mucosa in stomach, duodenal ulcer , mild to moderate chronic active gastritis, positive for h pylori    ENDOSCOPY N/A 11/08/2021    Procedure: ESOPHAGOGASTRODUODENOSCOPY WITH BX'S;  Surgeon: Ruth Ann Willson MD;  [facility];  Service: Gastroenterology;  Laterality: N/A;  pre: EPIGASTRIC PAIN, HX OF STOMACH ULCER  post: GASTRITIS    HYSTERECTOMY      UPPER GASTROINTESTINAL ENDOSCOPY  02/10/2016    asmi-Ruth Ann Willson M.D.       Current Outpatient Medications:     acetaminophen (TYLENOL) 500 MG tablet, Take 2 tablets by mouth Every 6 (Six) Hours As Needed for Mild Pain., Disp: , Rfl:     amLODIPine (NORVASC) 10 MG tablet, TAKE 1 TABLET BY MOUTH DAILY, Disp: 90  tablet, Rfl: 1    bisoprolol-hydrochlorothiazide (ZIAC) 5-6.25 MG per tablet, TAKE 1 TABLET BY MOUTH DAILY, Disp: 90 tablet, Rfl: 1    budesonide-formoterol (SYMBICORT) 160-4.5 MCG/ACT inhaler, Inhale 2 puffs 2 (Two) Times a Day., Disp: , Rfl:     Cholecalciferol (VITAMIN D3) 2000 units tablet, Take 1 tablet by mouth Daily., Disp: , Rfl: 1    clopidogrel (PLAVIX) 75 MG tablet, Take 1 tablet by mouth Daily., Disp: 90 tablet, Rfl: 1    escitalopram (LEXAPRO) 20 MG tablet, Take 1 tablet by mouth Daily., Disp: , Rfl:     fluticasone (FLONASE) 50 MCG/ACT nasal spray, 2 sprays into the nostril(s) as directed by provider Daily., Disp: , Rfl:     Fluticasone Furoate-Vilanterol (Breo Ellipta) 200-25 MCG/INH inhaler, Inhale 1 puff Daily., Disp: , Rfl:     Folic Acid 20 MG capsule, Take 1 capsule by mouth., Disp: , Rfl:     furosemide (LASIX) 40 MG tablet, Take 1 tablet by mouth Daily. (Patient taking differently: Take 1 tablet by mouth As Needed.), Disp: 90 tablet, Rfl: 3    hydroCHLOROthiazide (MICROZIDE) 12.5 MG capsule, , Disp: , Rfl:     montelukast (SINGULAIR) 10 MG tablet, Take 1 tablet by mouth Daily., Disp: 90 tablet, Rfl: 3    nitroglycerin (NITROSTAT) 0.4 MG SL tablet, 1 under the tongue as needed for angina, may repeat q5mins for up three doses (Patient taking differently: Place 1 tablet under the tongue Every 5 (Five) Minutes As Needed. 1 under the tongue as needed for angina, may repeat q5mins for up three doses), Disp: 25 tablet, Rfl: 3    olmesartan (BENICAR) 40 MG tablet, Take 1 tablet by mouth Daily., Disp: 30 tablet, Rfl: 5    pantoprazole (PROTONIX) 20 MG EC tablet, Take 1 tablet by mouth Daily., Disp: 90 tablet, Rfl: 3    potassium chloride (MICRO-K) 10 MEQ CR capsule, Take 1 capsule by mouth Daily., Disp: 90 capsule, Rfl: 3    POTASSIUM PO, Take 1 tablet by mouth., Disp: , Rfl:     Proctozone-HC 2.5 % rectal cream, Insert 1 application into the rectum As Needed (28)., Disp: 28 g, Rfl: 3    risperiDONE  (risperDAL) 0.5 MG tablet, Take 1 tablet by mouth Daily., Disp: , Rfl:     simvastatin (ZOCOR) 20 MG tablet, Take 1 tablet by mouth Every Evening., Disp: 90 tablet, Rfl: 1    VENTOLIN  (90 Base) MCG/ACT inhaler, Inhale 2 puffs Every 4 (Four) Hours As Needed for Wheezing or Shortness of Air., Disp: , Rfl: 6  PRN Meds:.  Allergies   Allergen Reactions    Peanut (Diagnostic) Other (See Comments)     Social History     Socioeconomic History    Marital status:     Years of education: High School   Tobacco Use    Smoking status: Never    Smokeless tobacco: Never   Vaping Use    Vaping Use: Never used   Substance and Sexual Activity    Alcohol use: No    Drug use: Never    Sexual activity: Defer     Family History   Problem Relation Age of Onset    Breast cancer Other     Lung cancer Brother     Hypertension Brother     Hyperlipidemia Brother     Heart disease Brother     Throat cancer Paternal Uncle     Tongue cancer Paternal Grandfather     Hypertension Father     Hyperlipidemia Father     Heart disease Father     Malig Hyperthermia Neg Hx      Review of Systems   Constitutional:  Negative for appetite change and unexpected weight change.   Gastrointestinal:  Positive for diarrhea. Negative for blood in stool.     Vitals:    12/13/23 1438   BP: 169/80   Pulse: 71   Temp: 97.8 °F (36.6 °C)   SpO2: 91%         12/13/23  1438   Weight: 92 kg (202 lb 12.8 oz)       Objective   Physical Exam  Constitutional:       Appearance: Normal appearance. She is well-developed.   HENT:      Head: Normocephalic and atraumatic.   Eyes:      General: No scleral icterus.     Conjunctiva/sclera: Conjunctivae normal.   Pulmonary:      Effort: Pulmonary effort is normal.   Abdominal:      General: There is no distension.      Palpations: Abdomen is soft.   Musculoskeletal:      Cervical back: Normal range of motion and neck supple.   Skin:     General: Skin is warm and dry.   Neurological:      Mental Status: She is alert.    Psychiatric:         Mood and Affect: Mood normal.         Behavior: Behavior normal.     No radiology results for the last 7 days    Assessment & Plan   Diagnoses and all orders for this visit:    Irritable bowel syndrome with both constipation and diarrhea    Current use of proton pump inhibitor    History of peptic ulcer disease    Other orders  -     pantoprazole (PROTONIX) 20 MG EC tablet; Take 1 tablet by mouth Daily.      Plan:  Continue daily fiber supplement-can increase this to twice a day.  She eats a fairly healthy diet-all of her food is cooked at home.  She is walking after eating.  We discussed monitoring portion sizes this may help her to lose weight and decrease her GI symptoms.  Continue pantoprazole 20 mg once daily-history of peptic ulcer disease and requiring long-term Plavix use.  Discussed risks and benefits.  Reviewed labs from the summer-creatinine had normalized at 1.

## 2024-01-09 RX ORDER — AMLODIPINE BESYLATE 10 MG/1
10 TABLET ORAL DAILY
Qty: 90 TABLET | Refills: 2 | Status: SHIPPED | OUTPATIENT
Start: 2024-01-09

## 2024-01-10 ENCOUNTER — TELEPHONE (OUTPATIENT)
Dept: FAMILY MEDICINE CLINIC | Facility: CLINIC | Age: 59
End: 2024-01-10
Payer: MEDICARE

## 2024-01-10 NOTE — TELEPHONE ENCOUNTER
Patients brother called in and would like for her to be seen today for coughing productive cough, sinus congestion symptoms for 1 week.   No fever.  Has tried Nyquil for sleeping with no help.    Would like to be seen today if possible

## 2024-01-10 NOTE — TELEPHONE ENCOUNTER
Pt was told to go to urgent care if needed . Appointment offered with [facility]ka via  1/11/20234pt declines. . Pt also scheduled  01/15/2024 with dr rai . Pt wanted to keep this appointment

## 2024-01-15 ENCOUNTER — OFFICE VISIT (OUTPATIENT)
Dept: FAMILY MEDICINE CLINIC | Facility: CLINIC | Age: 59
End: 2024-01-15
Payer: MEDICARE

## 2024-01-15 VITALS
DIASTOLIC BLOOD PRESSURE: 76 MMHG | WEIGHT: 200 LBS | BODY MASS INDEX: 36.8 KG/M2 | RESPIRATION RATE: 16 BRPM | SYSTOLIC BLOOD PRESSURE: 175 MMHG | OXYGEN SATURATION: 92 % | TEMPERATURE: 97.9 F | HEIGHT: 62 IN | HEART RATE: 80 BPM

## 2024-01-15 DIAGNOSIS — J01.00 ACUTE NON-RECURRENT MAXILLARY SINUSITIS: Primary | ICD-10-CM

## 2024-01-15 PROCEDURE — 1159F MED LIST DOCD IN RCRD: CPT | Performed by: FAMILY MEDICINE

## 2024-01-15 PROCEDURE — 1160F RVW MEDS BY RX/DR IN RCRD: CPT | Performed by: FAMILY MEDICINE

## 2024-01-15 PROCEDURE — 99213 OFFICE O/P EST LOW 20 MIN: CPT | Performed by: FAMILY MEDICINE

## 2024-01-15 PROCEDURE — 3077F SYST BP >= 140 MM HG: CPT | Performed by: FAMILY MEDICINE

## 2024-01-15 PROCEDURE — 3078F DIAST BP <80 MM HG: CPT | Performed by: FAMILY MEDICINE

## 2024-01-15 RX ORDER — AMOXICILLIN AND CLAVULANATE POTASSIUM 875; 125 MG/1; MG/1
1 TABLET, FILM COATED ORAL EVERY 12 HOURS SCHEDULED
Qty: 20 TABLET | Refills: 0 | Status: SHIPPED | OUTPATIENT
Start: 2024-01-15

## 2024-01-31 ENCOUNTER — OFFICE VISIT (OUTPATIENT)
Dept: FAMILY MEDICINE CLINIC | Facility: CLINIC | Age: 59
End: 2024-01-31
Payer: MEDICARE

## 2024-01-31 VITALS
TEMPERATURE: 97.9 F | SYSTOLIC BLOOD PRESSURE: 149 MMHG | OXYGEN SATURATION: 94 % | DIASTOLIC BLOOD PRESSURE: 77 MMHG | HEART RATE: 64 BPM | HEIGHT: 62 IN | BODY MASS INDEX: 36.8 KG/M2 | RESPIRATION RATE: 16 BRPM | WEIGHT: 200 LBS

## 2024-01-31 DIAGNOSIS — J40 BRONCHITIS: Primary | ICD-10-CM

## 2024-01-31 PROCEDURE — 1160F RVW MEDS BY RX/DR IN RCRD: CPT | Performed by: FAMILY MEDICINE

## 2024-01-31 PROCEDURE — 3077F SYST BP >= 140 MM HG: CPT | Performed by: FAMILY MEDICINE

## 2024-01-31 PROCEDURE — 3078F DIAST BP <80 MM HG: CPT | Performed by: FAMILY MEDICINE

## 2024-01-31 PROCEDURE — 99213 OFFICE O/P EST LOW 20 MIN: CPT | Performed by: FAMILY MEDICINE

## 2024-01-31 PROCEDURE — 1159F MED LIST DOCD IN RCRD: CPT | Performed by: FAMILY MEDICINE

## 2024-01-31 RX ORDER — DOXYCYCLINE HYCLATE 100 MG
100 TABLET ORAL 2 TIMES DAILY
Qty: 20 TABLET | Refills: 0 | Status: SHIPPED | OUTPATIENT
Start: 2024-01-31

## 2024-01-31 NOTE — PROGRESS NOTES
"Subjective   Flaca Hassan is a 58 y.o. female.     CC: Management of Sinusitis    Pt returns today after seeing me on 1/15/23 with this note:    Pt comes in today reporting an approximate 2-week history of cough/congestion/sinus pressure, and recently some eye drainage, although no change in vision.No f/c. Using Dayquil/Nyquil.      Covid test was negative at home.     1. Acute non-recurrent maxillary sinusitis (Primary)  -     amoxicillin-clavulanate (AUGMENTIN) 875-125 MG per tablet; Take 1 tablet by mouth Every 12 (Twelve) Hours.  Dispense: 20 tablet; Refill: 0    Today, pt reports continued cough with some thick white sputum.  Patient does have a history of asthma.    Pt has had 5 Covid shots.    The following portions of the patient's history were reviewed and updated as appropriate: allergies, current medications, past family history, past medical history, past social history, past surgical history, and problem list.    Review of Systems   Constitutional:  Negative for activity change, chills and fever.   HENT:  Positive for congestion.    Respiratory:  Positive for cough.    Cardiovascular:  Negative for chest pain.   Psychiatric/Behavioral:  Negative for dysphoric mood.        /77   Pulse 64   Temp 97.9 °F (36.6 °C) (Oral)   Resp 16   Ht 157.5 cm (62\")   Wt 90.7 kg (200 lb)   LMP  (LMP Unknown)   SpO2 94%   BMI 36.58 kg/m²     Objective   Physical Exam  Constitutional:       General: She is not in acute distress.     Appearance: She is well-developed.   HENT:      Right Ear: Tympanic membrane and ear canal normal.      Left Ear: Tympanic membrane and ear canal normal.   Cardiovascular:      Rate and Rhythm: Normal rate and regular rhythm.   Pulmonary:      Effort: Pulmonary effort is normal.      Breath sounds: Normal breath sounds.   Neurological:      Mental Status: She is alert and oriented to person, place, and time.   Psychiatric:         Behavior: Behavior normal.         Thought " Content: Thought content normal.         Assessment & Plan   Diagnoses and all orders for this visit:    1. Bronchitis (Primary)  -     doxycycline (VIBRAMYICN) 100 MG tablet; Take 1 tablet by mouth 2 (Two) Times a Day.  Dispense: 20 tablet; Refill: 0    Recommend patient start Flonase daily, along with continuing her Singulair, and adding a daily antihistamine.  Also discussed Mucinex.  Patient has an appointment in 2 weeks with ENT, and is encouraged to keep that.

## 2024-03-18 RX ORDER — BISOPROLOL FUMARATE AND HYDROCHLOROTHIAZIDE 5; 6.25 MG/1; MG/1
1 TABLET ORAL DAILY
Qty: 90 TABLET | Refills: 1 | Status: SHIPPED | OUTPATIENT
Start: 2024-03-18

## 2024-04-18 DIAGNOSIS — J45.40 MODERATE PERSISTENT ASTHMA WITHOUT COMPLICATION: ICD-10-CM

## 2024-04-18 RX ORDER — MONTELUKAST SODIUM 10 MG/1
10 TABLET ORAL DAILY
Qty: 90 TABLET | Refills: 0 | Status: SHIPPED | OUTPATIENT
Start: 2024-04-18

## 2024-04-18 NOTE — TELEPHONE ENCOUNTER
Last seen 02/21/2023 dr rai  Return in about 1 year (around 2/21/2024) for Annual physical, Recheck.

## 2024-05-15 NOTE — TELEPHONE ENCOUNTER
Caller: Sonya Posey    Relationship: Emergency Contact    Best call back number:[phone]    Requested Prescriptions:   Requested Prescriptions     Pending Prescriptions Disp Refills    olmesartan (BENICAR) 40 MG tablet 30 tablet 5     Sig: Take 1 tablet by mouth Daily.    clopidogrel (PLAVIX) 75 MG tablet 90 tablet 1     Sig: Take 1 tablet by mouth Daily.    simvastatin (ZOCOR) 20 MG tablet 90 tablet 1     Sig: Take 1 tablet by mouth Every Evening.        Pharmacy where request should be sent: [id number] [address]  AT [facility] [phone] [facility] [phone] FX     Last office visit with prescribing clinician: 8/14/2023   Last telemedicine visit with prescribing clinician: Visit date not found   Next office visit with prescribing clinician: 8/22/2024     Additional details provided by patient:     Does the patient have less than a 3 day supply:  [] Yes  [x] No    Would you like a call back once the refill request has been completed: [] Yes [] No    If the office needs to give you a call back, can they leave a voicemail: [] Yes [] No    Johnathan Kang Rep   05/15/24 15:46 EDT

## 2024-05-16 RX ORDER — CLOPIDOGREL BISULFATE 75 MG/1
75 TABLET ORAL DAILY
Qty: 90 TABLET | Refills: 0 | Status: SHIPPED | OUTPATIENT
Start: 2024-05-16

## 2024-05-16 RX ORDER — OLMESARTAN MEDOXOMIL 40 MG/1
40 TABLET ORAL DAILY
Qty: 90 TABLET | Refills: 0 | Status: SHIPPED | OUTPATIENT
Start: 2024-05-16

## 2024-05-16 RX ORDER — SIMVASTATIN 20 MG
20 TABLET ORAL EVERY EVENING
Qty: 90 TABLET | Refills: 0 | Status: SHIPPED | OUTPATIENT
Start: 2024-05-16

## 2024-05-21 ENCOUNTER — TELEPHONE (OUTPATIENT)
Dept: FAMILY MEDICINE CLINIC | Facility: CLINIC | Age: 59
End: 2024-05-21
Payer: MEDICARE

## 2024-05-21 DIAGNOSIS — I10 PRIMARY HYPERTENSION: Primary | ICD-10-CM

## 2024-05-21 DIAGNOSIS — R73.01 IFG (IMPAIRED FASTING GLUCOSE): ICD-10-CM

## 2024-05-21 NOTE — TELEPHONE ENCOUNTER
aller: Dorothy Posey     Relationship: Self     Best call back number:     [phone]          What was the call regarding:   PLEASE PLACE LAB ORDERS AND CALL PATIENT TO SCHEDULE LABS

## 2024-05-22 NOTE — TELEPHONE ENCOUNTER
Left patient a voicemail and mychart message informing her that her lab orders have been place by her provider and to contact our office for scheduling.    HUB TO RELAY

## 2024-06-03 RX ORDER — PANTOPRAZOLE SODIUM 40 MG/1
40 TABLET, DELAYED RELEASE ORAL DAILY
Qty: 90 TABLET | Refills: 1 | Status: SHIPPED | OUTPATIENT
Start: 2024-06-03

## 2024-07-17 ENCOUNTER — TRANSCRIBE ORDERS (OUTPATIENT)
Dept: ADMINISTRATIVE | Facility: HOSPITAL | Age: 59
End: 2024-07-17
Payer: MEDICARE

## 2024-07-21 DIAGNOSIS — J45.40 MODERATE PERSISTENT ASTHMA WITHOUT COMPLICATION: ICD-10-CM

## 2024-07-22 ENCOUNTER — TRANSCRIBE ORDERS (OUTPATIENT)
Dept: ADMINISTRATIVE | Facility: HOSPITAL | Age: 59
End: 2024-07-22
Payer: MEDICARE

## 2024-07-22 DIAGNOSIS — R06.00 DYSPNEA, UNSPECIFIED TYPE: Primary | ICD-10-CM

## 2024-07-22 RX ORDER — MONTELUKAST SODIUM 10 MG/1
10 TABLET ORAL DAILY
Qty: 90 TABLET | Refills: 1 | Status: SHIPPED | OUTPATIENT
Start: 2024-07-22

## 2024-08-01 RX ORDER — SIMVASTATIN 20 MG
20 TABLET ORAL EVERY EVENING
Qty: 30 TABLET | Refills: 0 | Status: SHIPPED | OUTPATIENT
Start: 2024-08-01

## 2024-08-22 ENCOUNTER — OFFICE VISIT (OUTPATIENT)
Dept: CARDIOLOGY | Facility: CLINIC | Age: 59
End: 2024-08-22
Payer: MEDICARE

## 2024-08-22 VITALS
OXYGEN SATURATION: 95 % | HEART RATE: 56 BPM | HEIGHT: 62 IN | SYSTOLIC BLOOD PRESSURE: 172 MMHG | RESPIRATION RATE: 16 BRPM | WEIGHT: 198.8 LBS | DIASTOLIC BLOOD PRESSURE: 85 MMHG | BODY MASS INDEX: 36.58 KG/M2

## 2024-08-22 DIAGNOSIS — I10 PRIMARY HYPERTENSION: Primary | ICD-10-CM

## 2024-08-22 DIAGNOSIS — I25.10 CORONARY ARTERY DISEASE INVOLVING NATIVE CORONARY ARTERY OF NATIVE HEART WITHOUT ANGINA PECTORIS: ICD-10-CM

## 2024-08-22 DIAGNOSIS — E78.5 HYPERLIPIDEMIA, UNSPECIFIED HYPERLIPIDEMIA TYPE: ICD-10-CM

## 2024-08-22 PROCEDURE — 93000 ELECTROCARDIOGRAM COMPLETE: CPT | Performed by: INTERNAL MEDICINE

## 2024-08-22 PROCEDURE — 99213 OFFICE O/P EST LOW 20 MIN: CPT | Performed by: INTERNAL MEDICINE

## 2024-08-22 PROCEDURE — 3079F DIAST BP 80-89 MM HG: CPT | Performed by: INTERNAL MEDICINE

## 2024-08-22 PROCEDURE — 3077F SYST BP >= 140 MM HG: CPT | Performed by: INTERNAL MEDICINE

## 2024-08-22 RX ORDER — CLOPIDOGREL BISULFATE 75 MG/1
75 TABLET ORAL DAILY
Qty: 90 TABLET | Refills: 0 | Status: SHIPPED | OUTPATIENT
Start: 2024-08-22

## 2024-08-22 RX ORDER — BISOPROLOL FUMARATE AND HYDROCHLOROTHIAZIDE 5; 6.25 MG/1; MG/1
1 TABLET ORAL DAILY
Qty: 90 TABLET | Refills: 1 | Status: SHIPPED | OUTPATIENT
Start: 2024-08-22

## 2024-08-22 RX ORDER — OLMESARTAN MEDOXOMIL 40 MG/1
40 TABLET ORAL DAILY
Qty: 90 TABLET | Refills: 0 | Status: SHIPPED | OUTPATIENT
Start: 2024-08-22

## 2024-08-22 RX ORDER — SIMVASTATIN 20 MG
20 TABLET ORAL EVERY EVENING
Qty: 30 TABLET | Refills: 0 | Status: SHIPPED | OUTPATIENT
Start: 2024-08-22 | End: 2024-08-23

## 2024-08-22 RX ORDER — AMLODIPINE BESYLATE 10 MG/1
10 TABLET ORAL DAILY
Qty: 90 TABLET | Refills: 2 | Status: SHIPPED | OUTPATIENT
Start: 2024-08-22

## 2024-08-22 RX ORDER — FLUTICASONE FUROATE, UMECLIDINIUM BROMIDE AND VILANTEROL TRIFENATATE 200; 62.5; 25 UG/1; UG/1; UG/1
1 POWDER RESPIRATORY (INHALATION) DAILY
COMMUNITY
Start: 2024-07-21

## 2024-08-22 NOTE — PROGRESS NOTES
Yearly check up   Subjective:        Flaca Hassan is a 59 y.o. female who here for follow up    CC  Follow-up coronary artery disease hypertension hyperlipidemia  HPI  59-year-old female with coronary artery disease hypertension hyperlipidemia here for the follow-up no chest pains or tightness in the chest     Problems Addressed this Visit          Cardiac and Vasculature    Hypertension - Primary    Hyperlipidemia    Coronary artery disease involving native heart without angina pectoris     Diagnoses         Codes Comments    Primary hypertension    -  Primary ICD-10-CM: I10  ICD-9-CM: 401.9     Hyperlipidemia, unspecified hyperlipidemia type     ICD-10-CM: E78.5  ICD-9-CM: 272.4     Coronary artery disease involving native coronary artery of native heart without angina pectoris     ICD-10-CM: I25.10  ICD-9-CM: 414.01           .    The following portions of the patient's history were reviewed and updated as appropriate: allergies, current medications, past family history, past medical history, past social history, past surgical history and problem list.    Past Medical History:   Diagnosis Date    Anemia     Asthma     Breast cyst     Cancer     Coronary artery disease     stent    Depression     Diverticulosis     H/O: GI bleed     recurrent    Hematuria     History of coronary angioplasty with insertion of stent     History of transfusion     no reaction    Hyperlipidemia     Hypertension     Incontinence of urine     wears pads    Irritable bowel syndrome     Malignant neoplasm of endometrium 02/2021    Melena     Obesity     Oxygen dependent     uses oxygen 2 prn when walking if SOB    Uterine cancer     UTI (urinary tract infection)      reports that she has never smoked. She has never used smokeless tobacco. She reports that she does not drink alcohol and does not use drugs.   Family History   Problem Relation Age of Onset    Breast cancer Other     Lung cancer Brother     Hypertension Brother      "Hyperlipidemia Brother     Heart disease Brother     Throat cancer Paternal Uncle     Tongue cancer Paternal Grandfather     Hypertension Father     Hyperlipidemia Father     Heart disease Father     Malig Hyperthermia Neg Hx        Review of Systems  Constitutional: No wt loss, fever, fatigue  Gastrointestinal: No nausea, abdominal pain  Behavioral/Psych: No insomnia or anxiety   Cardiovascular no chest pains or tightness in the chest  Objective:       Physical Exam           Physical Exam  /85 (BP Location: Left arm, Patient Position: Sitting, Cuff Size: Adult)   Pulse 56   Resp 16   Ht 157.5 cm (62.01\")   Wt 90.2 kg (198 lb 12.8 oz)   LMP  (LMP Unknown)   SpO2 95%   BMI 36.35 kg/m²     General appearance: No acute changes   Eyes: Sclerae conjunctivae normal, pupils reactive   HENT: Atraumatic; oropharynx clear with moist mucous membranes and no mucosal ulcerations;  Neck: Trachea midline; NECK, supple, no thyromegaly or lymphadenopathy   Lungs: Normal size and shape, normal breath sounds, equal distribution of air, no rales and rhonchi   CV: S1-S2 regular, no murmurs, no rub, no gallop   Abdomen: Soft, nontender; no masses , no abnormal abdominal sounds   Extremities: No deformity , normal color , no peripheral edema   Skin: Normal temperature, turgor and texture; no rash, ulcers  Psych: Appropriate affect, alert and oriented to person, place and time             ECG 12 Lead    Date/Time: 8/22/2024 2:32 PM  Performed by: Imer Montaño MD    Authorized by: Imer Montaño MD  Comparison: compared with previous ECG   Similar to previous ECG  Rhythm: sinus rhythm    Clinical impression: non-specific ECG            Echocardiogram:    Results for orders placed in visit on 01/10/22    Adult Transthoracic Echo Complete W/ Cont if Necessary Per Protocol    Interpretation Summary  · Calculated left ventricular EF = 59.4% Estimated left ventricular EF was in agreement with the calculated left " ventricular EF.  · Left ventricular diastolic function was normal.  · There is no evidence of pericardial effusion. .          Current Outpatient Medications:     acetaminophen (TYLENOL) 500 MG tablet, Take 2 tablets by mouth Every 6 (Six) Hours As Needed for Mild Pain., Disp: , Rfl:     budesonide-formoterol (SYMBICORT) 160-4.5 MCG/ACT inhaler, Inhale 2 puffs 2 (Two) Times a Day., Disp: , Rfl:     Cholecalciferol (VITAMIN D3) 2000 units tablet, Take 1 tablet by mouth Daily., Disp: , Rfl: 1    escitalopram (LEXAPRO) 20 MG tablet, Take 1 tablet by mouth Daily., Disp: , Rfl:     fluticasone (FLONASE) 50 MCG/ACT nasal spray, 2 sprays into the nostril(s) as directed by provider Daily., Disp: , Rfl:     Fluticasone Furoate-Vilanterol (Breo Ellipta) 200-25 MCG/INH inhaler, Inhale 1 puff Daily., Disp: , Rfl:     Folic Acid 20 MG capsule, Take 1 capsule by mouth., Disp: , Rfl:     furosemide (LASIX) 40 MG tablet, Take 1 tablet by mouth Daily. (Patient taking differently: Take 1 tablet by mouth As Needed.), Disp: 90 tablet, Rfl: 3    hydroCHLOROthiazide (MICROZIDE) 12.5 MG capsule, , Disp: , Rfl:     montelukast (SINGULAIR) 10 MG tablet, TAKE 1 TABLET BY MOUTH DAILY, Disp: 90 tablet, Rfl: 1    nitroglycerin (NITROSTAT) 0.4 MG SL tablet, 1 under the tongue as needed for angina, may repeat q5mins for up three doses (Patient taking differently: Place 1 tablet under the tongue Every 5 (Five) Minutes As Needed. 1 under the tongue as needed for angina, may repeat q5mins for up three doses), Disp: 25 tablet, Rfl: 3    pantoprazole (PROTONIX) 40 MG EC tablet, TAKE 1 TABLET BY MOUTH DAILY, Disp: 90 tablet, Rfl: 1    potassium chloride (MICRO-K) 10 MEQ CR capsule, Take 1 capsule by mouth Daily., Disp: 90 capsule, Rfl: 3    POTASSIUM PO, Take 1 tablet by mouth., Disp: , Rfl:     Proctozone-HC 2.5 % rectal cream, Insert 1 application into the rectum As Needed (28)., Disp: 28 g, Rfl: 3    risperiDONE (risperDAL) 0.5 MG tablet, Take 1  tablet by mouth Daily., Disp: , Rfl:     Trelegy Ellipta 200-62.5-25 MCG/ACT inhaler, Inhale 1 puff Daily., Disp: , Rfl:     VENTOLIN  (90 Base) MCG/ACT inhaler, Inhale 2 puffs Every 4 (Four) Hours As Needed for Wheezing or Shortness of Air., Disp: , Rfl: 6    amLODIPine (NORVASC) 10 MG tablet, Take 1 tablet by mouth Daily., Disp: 90 tablet, Rfl: 2    bisoprolol-hydrochlorothiazide (ZIAC) 5-6.25 MG per tablet, Take 1 tablet by mouth Daily., Disp: 90 tablet, Rfl: 1    clopidogrel (PLAVIX) 75 MG tablet, Take 1 tablet by mouth Daily., Disp: 90 tablet, Rfl: 0    olmesartan (BENICAR) 40 MG tablet, Take 1 tablet by mouth Daily., Disp: 90 tablet, Rfl: 0    simvastatin (ZOCOR) 20 MG tablet, TAKE 1 TABLET BY MOUTH EVERY EVENING, Disp: 90 tablet, Rfl: 4   Assessment:                Plan:          ICD-10-CM ICD-9-CM   1. Primary hypertension  I10 401.9   2. Hyperlipidemia, unspecified hyperlipidemia type  E78.5 272.4   3. Coronary artery disease involving native coronary artery of native heart without angina pectoris  I25.10 414.01     1. Primary hypertension  Patient understands importance of blood pressure check at home which patient does regularly and the blood pressures are well under control to the level of less than 140/90      2. Hyperlipidemia, unspecified hyperlipidemia type  Continue current treatment    3. Coronary artery disease involving native coronary artery of native heart without angina pectoris  No angina pectoris    COUNSELING:    Flaca Gudino was given to patient for the following topics: diagnostic results, risk factor reductions, impressions, risks and benefits of treatment options and importance of treatment compliance .       SMOKING COUNSELING:        Dictated using Dragon dictation

## 2024-08-23 RX ORDER — SIMVASTATIN 20 MG
20 TABLET ORAL EVERY EVENING
Qty: 90 TABLET | Refills: 4 | Status: SHIPPED | OUTPATIENT
Start: 2024-08-23

## 2024-09-03 NOTE — TELEPHONE ENCOUNTER
Caller: GeoInes    Relationship: Emergency Contact    Best call back number:     Requested Prescriptions:   Requested Prescriptions     Pending Prescriptions Disp Refills    amLODIPine (NORVASC) 10 MG tablet 90 tablet 2     Sig: Take 1 tablet by mouth Daily.    bisoprolol-hydrochlorothiazide (ZIAC) 5-6.25 MG per tablet 90 tablet 1     Sig: Take 1 tablet by mouth Daily.    clopidogrel (PLAVIX) 75 MG tablet 90 tablet 0     Sig: Take 1 tablet by mouth Daily.    simvastatin (ZOCOR) 20 MG tablet 90 tablet 4     Sig: Take 1 tablet by mouth Every Evening.    olmesartan (BENICAR) 40 MG tablet 90 tablet 0     Sig: Take 1 tablet by mouth Daily.        Pharmacy where request should be sent: [facility] [id number] [address] - [phone]  - [phone] FX     Last office visit with prescribing clinician: 8/22/2024   Last telemedicine visit with prescribing clinician: Visit date not found   Next office visit with prescribing clinician: 8/28/2025     Additional details provided by patient: TRANSFER PHARMACY  OUT OF [facility]    Does the patient have less than a 3 day supply:  [x] Yes  [] No    Would you like a call back once the refill request has been completed: [] Yes [x] No    If the office needs to give you a call back, can they leave a voicemail: [x] Yes [] No    Johnathan Kaufman Rep   09/03/24 16:32 EDT            Filler: Cristian Pacheco Mid Face Filler  Volume In Cc: 0 Nasolabial Folds Filler Volume In Cc: 0.6 Lot #: [id number] Lot #: [id number] Map Statment: See [address] for Complete Details Include Cannula Information In Note?: No Post-Care Instructions: Patient instructed to apply ice to reduce swelling. Additional Area 1 Location: lower face Expiration Date (Month Year): 2018-03-31 Expiration Date (Month Year): 2019-02-28 Additional Area 1 Location: corner of the mouth Additional Area 3 Location: lips Expiration Date (Month Year): 2019/08/31 Detail Level: Detailed Filler: Restylane Defyne Additional Anesthesia Volume In Cc: 6 Additional Area 2 Location: glabella Consent: Written consent obtained. Risks include but not limited to bruising, beading, irregular texture, ulceration, infection, allergic reaction, scar formation, incomplete augmentation, temporary nature, procedural pain. [place] Lines Filler  Volume In Cc: 1.4 Cheeks Filler Volume In Cc: 2 Lot #: [id number] [facility] [address] Price (Use Numbers Only, No Special Characters Or $): 0.0 Anesthesia Volume In Cc: 0.5

## 2024-09-04 DIAGNOSIS — R60.0 LOCALIZED EDEMA: ICD-10-CM

## 2024-09-04 RX ORDER — SIMVASTATIN 20 MG
20 TABLET ORAL EVERY EVENING
Qty: 90 TABLET | Refills: 4 | Status: SHIPPED | OUTPATIENT
Start: 2024-09-04

## 2024-09-04 RX ORDER — AMLODIPINE BESYLATE 10 MG/1
10 TABLET ORAL DAILY
Qty: 90 TABLET | Refills: 4 | Status: SHIPPED | OUTPATIENT
Start: 2024-09-04

## 2024-09-04 RX ORDER — OLMESARTAN MEDOXOMIL 40 MG/1
40 TABLET ORAL DAILY
Qty: 90 TABLET | Refills: 4 | Status: SHIPPED | OUTPATIENT
Start: 2024-09-04

## 2024-09-04 RX ORDER — CLOPIDOGREL BISULFATE 75 MG/1
75 TABLET ORAL DAILY
Qty: 90 TABLET | Refills: 4 | Status: SHIPPED | OUTPATIENT
Start: 2024-09-04

## 2024-09-04 RX ORDER — BISOPROLOL FUMARATE AND HYDROCHLOROTHIAZIDE 5; 6.25 MG/1; MG/1
1 TABLET ORAL DAILY
Qty: 90 TABLET | Refills: 4 | Status: SHIPPED | OUTPATIENT
Start: 2024-09-04

## 2024-09-05 RX ORDER — POTASSIUM CHLORIDE 750 MG/1
10 CAPSULE, EXTENDED RELEASE ORAL DAILY
Qty: 90 CAPSULE | Refills: 1 | Status: SHIPPED | OUTPATIENT
Start: 2024-09-05

## 2024-09-05 RX ORDER — FUROSEMIDE 40 MG
40 TABLET ORAL DAILY
Qty: 90 TABLET | Refills: 1 | Status: SHIPPED | OUTPATIENT
Start: 2024-09-05

## 2024-09-11 RX ORDER — OLMESARTAN MEDOXOMIL 40 MG/1
40 TABLET ORAL DAILY
Qty: 90 TABLET | Refills: 4 | OUTPATIENT
Start: 2024-09-11

## 2024-09-11 RX ORDER — OLMESARTAN MEDOXOMIL 40 MG/1
40 TABLET ORAL DAILY
Qty: 90 TABLET | Refills: 4 | Status: SHIPPED | OUTPATIENT
Start: 2024-09-11

## 2024-11-11 NOTE — PROGRESS NOTES
Subjective   The ABCs of the Annual Wellness Visit  Medicare Wellness Visit      Flaca Hassan is a 59 y.o. patient who presents for a Medicare Wellness Visit.    The following portions of the patient's history were reviewed and   updated as appropriate: allergies, current medications, past family history, past medical history, past social history, past surgical history, and problem list.    Compared to one year ago, the patient's physical   health is the same.  Compared to one year ago, the patient's mental   health is the same.    Recent Hospitalizations:  She was not admitted to the hospital during the last year.     Current Medical Providers:  Patient Care Team:  Isidro Hernandez MD as PCP - General (Family Medicine)  Maxine Rose MD (Inactive) as Consulting Physician (Cardiology)  Ruth Ann Willson MD as Consulting Physician (Gastroenterology)  Imer Montaño MD as Consulting Physician (Cardiology)  Michael Malik MD as Consulting Physician (Psychiatry)  Thu Blake MD as Obstetrician (Obstetrics and Gynecology)  Camron Holloway MD as Consulting Physician (Nephrology)  Liliya Barlow MD as Surgeon (General Surgery)  Jewels Melendez MD as Consulting Physician (Gynecologic Oncology)    Outpatient Medications Prior to Visit   Medication Sig Dispense Refill    acetaminophen (TYLENOL) 500 MG tablet Take 2 tablets by mouth Every 6 (Six) Hours As Needed for Mild Pain.      amLODIPine (NORVASC) 10 MG tablet Take 1 tablet by mouth Daily. 90 tablet 4    bisoprolol-hydrochlorothiazide (ZIAC) 5-6.25 MG per tablet Take 1 tablet by mouth Daily. 90 tablet 4    budesonide-formoterol (SYMBICORT) 160-4.5 MCG/ACT inhaler Inhale 2 puffs 2 (Two) Times a Day.      Cholecalciferol (VITAMIN D3) 2000 units tablet Take 1 tablet by mouth Daily.  1    clopidogrel (PLAVIX) 75 MG tablet Take 1 tablet by mouth Daily. 90 tablet 4    escitalopram (LEXAPRO) 20 MG tablet Take 1 tablet by mouth  Daily.      fluticasone (FLONASE) 50 MCG/ACT nasal spray 2 sprays into the nostril(s) as directed by provider Daily.      Fluticasone Furoate-Vilanterol (Breo Ellipta) 200-25 MCG/INH inhaler Inhale 1 puff Daily.      Folic Acid 20 MG capsule Take 1 capsule by mouth.      hydroCHLOROthiazide (MICROZIDE) 12.5 MG capsule       nitroglycerin (NITROSTAT) 0.4 MG SL tablet 1 under the tongue as needed for angina, may repeat q5mins for up three doses (Patient taking differently: Place 1 tablet under the tongue Every 5 (Five) Minutes As Needed for Chest Pain. 1 under the tongue as needed for angina, may repeat q5mins for up three doses) 25 tablet 3    olmesartan (BENICAR) 40 MG tablet Take 1 tablet by mouth Daily. 90 tablet 4    pantoprazole (PROTONIX) 40 MG EC tablet TAKE 1 TABLET BY MOUTH DAILY 90 tablet 1    potassium chloride (MICRO-K) 10 MEQ CR capsule TAKE 1 CAPSULE BY MOUTH DAILY 90 capsule 1    Proctozone-HC 2.5 % rectal cream Insert 1 application into the rectum As Needed (28). 28 g 3    risperiDONE (risperDAL) 0.5 MG tablet Take 1 tablet by mouth Daily.      simvastatin (ZOCOR) 20 MG tablet Take 1 tablet by mouth Every Evening. 90 tablet 4    Trelegy Ellipta 200-62.5-25 MCG/ACT inhaler Inhale 1 puff Daily.      VENTOLIN  (90 Base) MCG/ACT inhaler Inhale 2 puffs Every 4 (Four) Hours As Needed for Wheezing or Shortness of Air.  6    POTASSIUM PO Take 1 tablet by mouth.      furosemide (LASIX) 40 MG tablet TAKE 1 TABLET BY MOUTH DAILY 90 tablet 1    montelukast (SINGULAIR) 10 MG tablet TAKE 1 TABLET BY MOUTH DAILY 90 tablet 1     No facility-administered medications prior to visit.     No opioid medication identified on active medication list. I have reviewed chart for other potential  high risk medication/s and harmful drug interactions in the elderly.      Aspirin is not on active medication list.  Aspirin use is not indicated based on review of current medical condition/s. Risk of harm outweighs potential  "benefits.  .    Patient Active Problem List   Diagnosis    Hypertension    Obesity    Hyperlipidemia    Iron deficiency anemia due to chronic blood loss    Upper GI bleeding    ST elevation myocardial infarction (STEMI)    Coronary artery disease involving native heart without angina pectoris    Morgagni hernia    IFG (impaired fasting glucose)    Primary insomnia    PMB (postmenopausal bleeding)    Endometrial cancer    Uncomplicated asthma    LI (obstructive sleep apnea)    Dyspepsia    History of peptic ulcer disease    Rectal pain    Abdominal wall abscess    History of ST elevation myocardial infarction (STEMI)    Abdominal wall cellulitis    Prediabetes    Localized edema     Advance Care Planning Advance Directive is not on file.  ACP discussion was held with the patient during this visit. Patient does not have an advance directive, declines further assistance.            Objective   Vitals:    11/12/24 1506   BP: 152/70   Pulse: 65   Temp: 98.3 °F (36.8 °C)   TempSrc: Oral   SpO2: 91%   Weight: 90.6 kg (199 lb 12.8 oz)   Height: 157.5 cm (62.01\")   PainSc: 0-No pain       Estimated body mass index is 36.53 kg/m² as calculated from the following:    Height as of this encounter: 157.5 cm (62.01\").    Weight as of this encounter: 90.6 kg (199 lb 12.8 oz).    Class 2 Severe Obesity (BMI >=35 and <=39.9). Obesity-related health conditions include the following: obstructive sleep apnea, hypertension, coronary heart disease, impaired fasting glucose, and dyslipidemias. Obesity is unchanged. BMI is is above average; BMI management plan is completed. We discussed portion control and increasing exercise.       Does the patient have evidence of cognitive impairment? No                                                                                                Health  Risk Assessment    Smoking Status:  Social History     Tobacco Use   Smoking Status Never   Smokeless Tobacco Never     Alcohol Consumption:  Social " History     Substance and Sexual Activity   Alcohol Use No       Fall Risk Screen  ANNALISEADI Fall Risk Assessment was completed, and patient is at LOW risk for falls.Assessment completed on:2024    Depression Screenin/12/2024     1:01 PM   PHQ-2/PHQ-9 Depression Screening   Little interest or pleasure in doing things Not at all   Feeling down, depressed, or hopeless Not at all   How difficult have these problems made it for you to do your work, take care of things at home, or get along with other people? Not difficult at all     Health Habits and Functional and Cognitive Screenin/12/2024     1:01 PM   Functional & Cognitive Status   Do you have difficulty preparing food and eating? No   Do you have difficulty bathing yourself, getting dressed or grooming yourself? No   Do you have difficulty using the toilet? No   Do you have difficulty moving around from place to place? No   Do you have trouble with steps or getting out of a bed or a chair? No   Current Diet Well Balanced Diet   Dental Exam Up to date   Eye Exam Up to date   Do you need help using the phone?  No   Are you deaf or do you have serious difficulty hearing?  No   Do you need help to go to places out of walking distance? Yes   Do you need help shopping? No   Do you need help preparing meals?  No   Do you need help with housework?  No   Do you need help with laundry? No   Do you need help taking your medications? No   Do you need help managing money? No   Do you ever drive or ride in a car without wearing a seat belt? No   Have you felt unusual stress, anger or loneliness in the last month? No   Who do you live with? Sibling   If you need help, do you have trouble finding someone available to you? No   Have you been bothered in the last four weeks by sexual problems? No   Do you have difficulty concentrating, remembering or making decisions? No           Age-appropriate Screening Schedule:  Refer to the list below for future  screening recommendations based on patient's age, sex and/or medical conditions. Orders for these recommended tests are listed in the plan section. The patient has been provided with a written plan.    Health Maintenance List  Health Maintenance   Topic Date Due    Pneumococcal Vaccine 0-64 (1 of 2 - PCV) Never done    TDAP/TD VACCINES (1 - Tdap) Never done    PAP SMEAR  Never done    LIPID PANEL  02/13/2024    MAMMOGRAM  03/15/2024    INFLUENZA VACCINE  08/01/2024    COVID-19 Vaccine (6 - 2024-25 season) 09/01/2024    ZOSTER VACCINE (1 of 2) 01/31/2025 (Originally 4/6/2015)    ANNUAL WELLNESS VISIT  11/12/2025    BMI FOLLOWUP  11/12/2025    COLORECTAL CANCER SCREENING  02/28/2032    HEPATITIS C SCREENING  Completed                                                                                                                                                CMS Preventative Services Quick Reference  Risk Factors Identified During Encounter  None Identified    The above risks/problems have been discussed with the patient.  Pertinent information has been shared with the patient in the After Visit Summary.  An After Visit Summary and PPPS were made available to the patient.    Follow Up:   Next Medicare Wellness visit to be scheduled in 1 year.         Additional E&M Note during same encounter follows:  Patient has additional, significant, and separately identifiable condition(s)/problem(s) that require work above and beyond the Medicare Wellness Visit     Chief Complaint  Medicare Wellness-subsequent    Subjective   HPI  Dereckalib is also being seen today for additional medical problem/s. Reports 4 day h/o increased wheezing and cough. No f/c. UTD with pulmonary and using inhalers.    Review of Systems   Constitutional:  Negative for chills and fever.   HENT:  Negative for congestion and sinus pressure.    Eyes:  Negative for visual disturbance.   Respiratory:  Positive for cough. Negative for shortness of breath.   "  Cardiovascular:  Negative for chest pain.   Gastrointestinal:  Negative for abdominal pain.   Genitourinary:  Negative for dysuria.   Skin:  Negative for rash.   Hematological:  Negative for adenopathy.   Psychiatric/Behavioral:  Negative for dysphoric mood.               Objective   Vital Signs:  /70   Pulse 65   Temp 98.3 °F (36.8 °C) (Oral)   Ht 157.5 cm (62.01\")   Wt 90.6 kg (199 lb 12.8 oz)   SpO2 91%   BMI 36.53 kg/m²   Physical Exam  Vitals and nursing note reviewed.   Constitutional:       General: She is not in acute distress.     Appearance: She is well-developed.   Cardiovascular:      Rate and Rhythm: Normal rate and regular rhythm.   Pulmonary:      Effort: Pulmonary effort is normal.      Breath sounds: Normal breath sounds.   Neurological:      Mental Status: She is alert and oriented to person, place, and time.   Psychiatric:         Behavior: Behavior normal.         Thought Content: Thought content normal.     Labs ordered and pt to complete.    The following data was reviewed by: Isidro Hernandez MD on 11/12/2024:        Assessment and Plan Additional age appropriate preventative wellness advice topics were discussed during today's preventative wellness exam(some topics already addressed during AWV portion of the note above):    Physical Activity: Advised cardiovascular activity 150 minutes per week as tolerated. (example brisk walk for 30 minutes, 5 days a week).     Nutrition: Discussed nutrition plan with patient. Information shared in after visit summary. Goal is for a well balanced diet to enhance overall health.              Encounter for subsequent annual wellness visit (AWV) in Medicare patient    Localized edema    Moderate persistent asthma without complication  Asthma is stable.  The patient is experiencing no daytime asthma symptoms and she is experiencing no nighttime asthma symptoms.    Plan: Continue same medication/s without change.    Discussed medication dosage, use, " side effects, and goals of treatment in detail.    Discussed distinction between quick-relief and maintenance control medications.  Discussed monitoring symptoms and use of quick-relief medications and contacting provider early in the course of exacerbations.  Warning signs of respiratory distress were reviewed with the patient.     Patient Treatment Goals: symptom prevention, minimizing limitation in activity, prevention of exacerbations and use of ER/inpatient care, maintenance of optimal pulmonary function, and minimization of adverse effects of treatment.    Followup in 1 year.          Bronchitis    Primary hypertension    No orders of the defined types were placed in this encounter.    New Medications Ordered This Visit   Medications    furosemide (LASIX) 40 MG tablet     Sig: Take 1 tablet by mouth Daily.     Dispense:  90 tablet     Refill:  3    montelukast (SINGULAIR) 10 MG tablet     Sig: Take 1 tablet by mouth Daily.     Dispense:  90 tablet     Refill:  3    azithromycin (Zithromax Z-Juan Antonio) 250 MG tablet     Sig: Take 2 tablets the first day, then 1 tablet daily for 4 days.     Dispense:  6 tablet     Refill:  0        I spent 15 minutes caring for Flaca on this date of service. This time includes time spent by me in the following activities:preparing for the visit, performing a medically appropriate examination and/or evaluation , ordering medications, tests, or procedures, and documenting information in the medical record  Follow Up   No follow-ups on file.  Patient was given instructions and counseling regarding her condition or for health maintenance advice. Please see specific information pulled into the AVS if appropriate.

## 2024-11-12 ENCOUNTER — OFFICE VISIT (OUTPATIENT)
Dept: FAMILY MEDICINE CLINIC | Facility: CLINIC | Age: 59
End: 2024-11-12
Payer: MEDICARE

## 2024-11-12 VITALS
BODY MASS INDEX: 36.77 KG/M2 | HEIGHT: 62 IN | WEIGHT: 199.8 LBS | OXYGEN SATURATION: 91 % | HEART RATE: 65 BPM | DIASTOLIC BLOOD PRESSURE: 70 MMHG | TEMPERATURE: 98.3 F | SYSTOLIC BLOOD PRESSURE: 152 MMHG

## 2024-11-12 DIAGNOSIS — J40 BRONCHITIS: ICD-10-CM

## 2024-11-12 DIAGNOSIS — I10 PRIMARY HYPERTENSION: ICD-10-CM

## 2024-11-12 DIAGNOSIS — Z00.00 ENCOUNTER FOR SUBSEQUENT ANNUAL WELLNESS VISIT (AWV) IN MEDICARE PATIENT: Primary | ICD-10-CM

## 2024-11-12 DIAGNOSIS — J45.40 MODERATE PERSISTENT ASTHMA WITHOUT COMPLICATION: ICD-10-CM

## 2024-11-12 DIAGNOSIS — R60.0 LOCALIZED EDEMA: ICD-10-CM

## 2024-11-12 PROCEDURE — 1170F FXNL STATUS ASSESSED: CPT | Performed by: FAMILY MEDICINE

## 2024-11-12 PROCEDURE — 3077F SYST BP >= 140 MM HG: CPT | Performed by: FAMILY MEDICINE

## 2024-11-12 PROCEDURE — 1159F MED LIST DOCD IN RCRD: CPT | Performed by: FAMILY MEDICINE

## 2024-11-12 PROCEDURE — 1160F RVW MEDS BY RX/DR IN RCRD: CPT | Performed by: FAMILY MEDICINE

## 2024-11-12 PROCEDURE — G0439 PPPS, SUBSEQ VISIT: HCPCS | Performed by: FAMILY MEDICINE

## 2024-11-12 PROCEDURE — 1126F AMNT PAIN NOTED NONE PRSNT: CPT | Performed by: FAMILY MEDICINE

## 2024-11-12 PROCEDURE — 3078F DIAST BP <80 MM HG: CPT | Performed by: FAMILY MEDICINE

## 2024-11-12 PROCEDURE — 99214 OFFICE O/P EST MOD 30 MIN: CPT | Performed by: FAMILY MEDICINE

## 2024-11-12 RX ORDER — FUROSEMIDE 40 MG/1
40 TABLET ORAL DAILY
Qty: 90 TABLET | Refills: 3 | Status: SHIPPED | OUTPATIENT
Start: 2024-11-12

## 2024-11-12 RX ORDER — MONTELUKAST SODIUM 10 MG/1
10 TABLET ORAL DAILY
Qty: 90 TABLET | Refills: 3 | Status: SHIPPED | OUTPATIENT
Start: 2024-11-12

## 2024-11-12 RX ORDER — AZITHROMYCIN 250 MG/1
TABLET, FILM COATED ORAL
Qty: 6 TABLET | Refills: 0 | Status: SHIPPED | OUTPATIENT
Start: 2024-11-12

## 2024-11-12 NOTE — PATIENT INSTRUCTIONS
Medicare Wellness  Personal Prevention Plan of Service     Date of Office Visit:    Encounter Provider:  Isidro Hernandez MD  Place of Service:  [facility]  Patient Name: Flaca Hassan  :  1965    As part of the Medicare Wellness portion of your visit today, we are providing you with this personalized preventive plan of services (PPPS). This plan is based upon recommendations of the [place] Preventive Services Task Force (USPSTF) and the Advisory Committee on Immunization Practices (ACIP).    This lists the preventive care services that should be considered, and provides dates of when you are due. Items listed as completed are up-to-date and do not require any further intervention.    Health Maintenance   Topic Date Due    Pneumococcal Vaccine 0-64 (1 of 2 - PCV) Never done    TDAP/TD VACCINES (1 - Tdap) Never done    PAP SMEAR  Never done    LIPID PANEL  2024    MAMMOGRAM  03/15/2024    INFLUENZA VACCINE  2024    COVID-19 Vaccine ( season) 2024    ZOSTER VACCINE (1 of 2) 2025 (Originally 2015)    ANNUAL WELLNESS VISIT  2025    BMI FOLLOWUP  2025    COLORECTAL CANCER SCREENING  2032    HEPATITIS C SCREENING  Completed       No orders of the defined types were placed in this encounter.      Return in about 1 year (around 2025) for Annual physical, Recheck.

## 2024-11-15 LAB
ALBUMIN SERPL-MCNC: 3.9 G/DL (ref 3.5–5.2)
ALBUMIN/GLOB SERPL: 1.1 G/DL
ALP SERPL-CCNC: 116 U/L (ref 39–117)
ALT SERPL-CCNC: 15 U/L (ref 1–33)
AST SERPL-CCNC: 19 U/L (ref 1–32)
BASOPHILS # BLD AUTO: 0.07 10*3/MM3 (ref 0–0.2)
BASOPHILS NFR BLD AUTO: 0.7 % (ref 0–1.5)
BILIRUB SERPL-MCNC: 0.7 MG/DL (ref 0–1.2)
BUN SERPL-MCNC: 20 MG/DL (ref 6–20)
BUN/CREAT SERPL: 16.1 (ref 7–25)
CALCIUM SERPL-MCNC: 8.9 MG/DL (ref 8.6–10.5)
CHLORIDE SERPL-SCNC: 99 MMOL/L (ref 98–107)
CHOLEST SERPL-MCNC: 109 MG/DL (ref 0–200)
CO2 SERPL-SCNC: 22.1 MMOL/L (ref 22–29)
CREAT SERPL-MCNC: 1.24 MG/DL (ref 0.57–1)
EGFRCR SERPLBLD CKD-EPI 2021: 50.2 ML/MIN/1.73
EOSINOPHIL # BLD AUTO: 0.31 10*3/MM3 (ref 0–0.4)
EOSINOPHIL NFR BLD AUTO: 3 % (ref 0.3–6.2)
ERYTHROCYTE [DISTWIDTH] IN BLOOD BY AUTOMATED COUNT: 12.7 % (ref 12.3–15.4)
GLOBULIN SER CALC-MCNC: 3.7 GM/DL
GLUCOSE SERPL-MCNC: 105 MG/DL (ref 65–99)
HBA1C MFR BLD: 5.8 % (ref 4.8–5.6)
HCT VFR BLD AUTO: 41.3 % (ref 34–46.6)
HDLC SERPL-MCNC: 31 MG/DL (ref 40–60)
HGB BLD-MCNC: 13.1 G/DL (ref 12–15.9)
IMM GRANULOCYTES # BLD AUTO: 0.04 10*3/MM3 (ref 0–0.05)
IMM GRANULOCYTES NFR BLD AUTO: 0.4 % (ref 0–0.5)
LDLC SERPL CALC-MCNC: 57 MG/DL (ref 0–100)
LYMPHOCYTES # BLD AUTO: 2.29 10*3/MM3 (ref 0.7–3.1)
LYMPHOCYTES NFR BLD AUTO: 22.4 % (ref 19.6–45.3)
MCH RBC QN AUTO: 27.8 PG (ref 26.6–33)
MCHC RBC AUTO-ENTMCNC: 31.7 G/DL (ref 31.5–35.7)
MCV RBC AUTO: 87.7 FL (ref 79–97)
MONOCYTES # BLD AUTO: 0.97 10*3/MM3 (ref 0.1–0.9)
MONOCYTES NFR BLD AUTO: 9.5 % (ref 5–12)
NEUTROPHILS # BLD AUTO: 6.56 10*3/MM3 (ref 1.7–7)
NEUTROPHILS NFR BLD AUTO: 64 % (ref 42.7–76)
NRBC BLD AUTO-RTO: 0 /100 WBC (ref 0–0.2)
PLATELET # BLD AUTO: 266 10*3/MM3 (ref 140–450)
POTASSIUM SERPL-SCNC: 4.6 MMOL/L (ref 3.5–5.2)
PROT SERPL-MCNC: 7.6 G/DL (ref 6–8.5)
RBC # BLD AUTO: 4.71 10*6/MM3 (ref 3.77–5.28)
SODIUM SERPL-SCNC: 135 MMOL/L (ref 136–145)
TRIGL SERPL-MCNC: 112 MG/DL (ref 0–150)
TSH SERPL DL<=0.005 MIU/L-ACNC: 5.23 UIU/ML (ref 0.27–4.2)
VLDLC SERPL CALC-MCNC: 21 MG/DL (ref 5–40)
WBC # BLD AUTO: 10.24 10*3/MM3 (ref 3.4–10.8)

## 2024-11-21 ENCOUNTER — TELEPHONE (OUTPATIENT)
Dept: FAMILY MEDICINE CLINIC | Facility: CLINIC | Age: 59
End: 2024-11-21
Payer: MEDICARE

## 2024-11-21 NOTE — TELEPHONE ENCOUNTER
Caller: Sonya Posey    Relationship: Emergency Contact    Best call back number: [phone]     Caller requesting test results:      What test was performed:      When was the test performed:      Where was the test performed:      Additional notes: PATIENT BROTHER SONYA CALLED AND NEED THE LAB RESULTS OF THE PATIENT LABS HE HAD DONE LAST WEEK.  PLEASE CALL HIM BACK TO GO OVER THE RESULTS.

## 2024-11-22 NOTE — TELEPHONE ENCOUNTER
HUB TO RELAY    Labs overall consistent over time, and she does see nephrology regarding her renal function.  Good water intake advisable.  Thyroid goes up and down over time but seems to correct itself, thus would continue to monitor at this time.  A1c is slowly improving, and would recommend a low-carb diet and some type of daily exercise.

## 2024-12-02 RX ORDER — CLOPIDOGREL BISULFATE 75 MG/1
75 TABLET ORAL DAILY
Qty: 90 TABLET | Refills: 3 | Status: SHIPPED | OUTPATIENT
Start: 2024-12-02

## 2024-12-30 ENCOUNTER — TELEPHONE (OUTPATIENT)
Dept: FAMILY MEDICINE CLINIC | Facility: CLINIC | Age: 59
End: 2024-12-30
Payer: MEDICARE

## 2024-12-30 NOTE — TELEPHONE ENCOUNTER
Brother called in with patient with coughing, yellow mucus, symptoms x 3 weeks. She has been on an antibiotic prescribed by you, as well a Nyquil. No help.   They want to know what more  can be done for her, and if she can be worked in.   Please call brother,  [phone] Christ.

## 2025-01-20 ENCOUNTER — TELEPHONE (OUTPATIENT)
Dept: CASE MANAGEMENT | Facility: OTHER | Age: 60
End: 2025-01-20
Payer: MEDICARE

## 2025-01-20 ENCOUNTER — PATIENT OUTREACH (OUTPATIENT)
Dept: CASE MANAGEMENT | Facility: OTHER | Age: 60
End: 2025-01-20
Payer: MEDICARE

## 2025-01-20 DIAGNOSIS — I10 PRIMARY HYPERTENSION: Primary | ICD-10-CM

## 2025-01-20 DIAGNOSIS — E66.09 CLASS 1 OBESITY DUE TO EXCESS CALORIES WITHOUT SERIOUS COMORBIDITY WITH BODY MASS INDEX (BMI) OF 30.0 TO 30.9 IN ADULT: ICD-10-CM

## 2025-01-20 DIAGNOSIS — E66.811 CLASS 1 OBESITY DUE TO EXCESS CALORIES WITHOUT SERIOUS COMORBIDITY WITH BODY MASS INDEX (BMI) OF 30.0 TO 30.9 IN ADULT: ICD-10-CM

## 2025-01-20 DIAGNOSIS — E78.5 HYPERLIPIDEMIA, UNSPECIFIED HYPERLIPIDEMIA TYPE: ICD-10-CM

## 2025-01-20 NOTE — TELEPHONE ENCOUNTER
Patient requesting call forwarded by scheduling.  Patient with c/o Shortness of breath, advised to go directly to ED.  Unable to leave a message. MyChart message left.

## 2025-01-20 NOTE — OUTREACH NOTE
AMBULATORY CASE MANAGEMENT NOTE    Names and Relationships of Patient/Support Persons: Contact: TatyEssie wallaceSonya; Relationship: Emergency Contact -     [facility] Interim Update    Received return call from patient brother today, verified by verbal release.    States that patient has asthma and has been having on and off shortness of breath.  ACM made recommendation to go directly to UC or ED for further evaluation.  Denies fever or inability to recover with activity. Declining ED or UC per recommendation.    Brother and patient were agreeable to scheduling an appointment tomorrow.    Care Coordination    ACM was able to provide active listening during telephonic interview/ assessment.  Reconciliation of medications completed. Asthma education provided to prevent worsening symptoms.    Advising patient to increase her fluid intake to help move secretions.  ACM advising movement, or forward lean to enhance deep breathing and cough.  Further encouraging use of inhalers routinely.    Next outreach has been scheduled.      Education Documentation  No documentation found.        Aminah DUENAS  Ambulatory Case Management    1/20/2025, 14:30 EST

## 2025-01-21 ENCOUNTER — APPOINTMENT (OUTPATIENT)
Dept: GENERAL RADIOLOGY | Facility: HOSPITAL | Age: 60
End: 2025-01-21
Payer: MEDICARE

## 2025-01-21 ENCOUNTER — TELEPHONE (OUTPATIENT)
Dept: FAMILY MEDICINE CLINIC | Facility: CLINIC | Age: 60
End: 2025-01-21

## 2025-01-21 ENCOUNTER — HOSPITAL ENCOUNTER (INPATIENT)
Facility: HOSPITAL | Age: 60
LOS: 8 days | Discharge: HOME HEALTH | End: 2025-01-30
Attending: EMERGENCY MEDICINE | Admitting: HOSPITALIST
Payer: MEDICARE

## 2025-01-21 DIAGNOSIS — J96.01 ACUTE HYPOXIC RESPIRATORY FAILURE: ICD-10-CM

## 2025-01-21 DIAGNOSIS — J45.901 EXACERBATION OF ASTHMA, UNSPECIFIED ASTHMA SEVERITY, UNSPECIFIED WHETHER PERSISTENT: ICD-10-CM

## 2025-01-21 DIAGNOSIS — J20.5 RSV BRONCHITIS: Primary | ICD-10-CM

## 2025-01-21 DIAGNOSIS — J96.01 ACUTE RESPIRATORY FAILURE WITH HYPOXIA: ICD-10-CM

## 2025-01-21 PROBLEM — N18.31 STAGE 3A CHRONIC KIDNEY DISEASE: Status: ACTIVE | Noted: 2025-01-21

## 2025-01-21 PROBLEM — J45.41 MODERATE PERSISTENT ASTHMA WITH EXACERBATION: Status: ACTIVE | Noted: 2025-01-21

## 2025-01-21 PROBLEM — R09.02 HYPOXIA: Status: ACTIVE | Noted: 2025-01-21

## 2025-01-21 LAB
ALBUMIN SERPL-MCNC: 4.3 G/DL (ref 3.5–5.2)
ALBUMIN/GLOB SERPL: 1 G/DL
ALP SERPL-CCNC: 115 U/L (ref 39–117)
ALT SERPL W P-5'-P-CCNC: 19 U/L (ref 1–33)
ANION GAP SERPL CALCULATED.3IONS-SCNC: 13 MMOL/L (ref 5–15)
ARTERIAL PATENCY WRIST A: POSITIVE
AST SERPL-CCNC: 26 U/L (ref 1–32)
ATMOSPHERIC PRESS: 763 MMHG
B PARAPERT DNA SPEC QL NAA+PROBE: NOT DETECTED
B PERT DNA SPEC QL NAA+PROBE: NOT DETECTED
BASE EXCESS BLDA CALC-SCNC: -0.1 MMOL/L (ref 0–2)
BASOPHILS # BLD AUTO: 0.02 10*3/MM3 (ref 0–0.2)
BASOPHILS NFR BLD AUTO: 0.3 % (ref 0–1.5)
BDY SITE: ABNORMAL
BILIRUB SERPL-MCNC: 0.4 MG/DL (ref 0–1.2)
BUN SERPL-MCNC: 19 MG/DL (ref 6–20)
BUN/CREAT SERPL: 15.2 (ref 7–25)
C PNEUM DNA NPH QL NAA+NON-PROBE: NOT DETECTED
CALCIUM SPEC-SCNC: 9.1 MG/DL (ref 8.6–10.5)
CHLORIDE SERPL-SCNC: 96 MMOL/L (ref 98–107)
CO2 BLDA-SCNC: 26.7 MMOL/L (ref 23–27)
CO2 SERPL-SCNC: 25 MMOL/L (ref 22–29)
CREAT SERPL-MCNC: 1.25 MG/DL (ref 0.57–1)
D-LACTATE SERPL-SCNC: 1.3 MMOL/L (ref 0.5–2)
DEPRECATED RDW RBC AUTO: 45.7 FL (ref 37–54)
EGFRCR SERPLBLD CKD-EPI 2021: 49.8 ML/MIN/1.73
EOSINOPHIL # BLD AUTO: 0.03 10*3/MM3 (ref 0–0.4)
EOSINOPHIL NFR BLD AUTO: 0.4 % (ref 0.3–6.2)
ERYTHROCYTE [DISTWIDTH] IN BLOOD BY AUTOMATED COUNT: 14.8 % (ref 12.3–15.4)
FLUAV SUBTYP SPEC NAA+PROBE: NOT DETECTED
FLUBV RNA ISLT QL NAA+PROBE: NOT DETECTED
GAS FLOW AIRWAY: 5 LPM
GEN 5 1HR TROPONIN T REFLEX: 9 NG/L
GLOBULIN UR ELPH-MCNC: 4.3 GM/DL
GLUCOSE SERPL-MCNC: 116 MG/DL (ref 65–99)
HADV DNA SPEC NAA+PROBE: NOT DETECTED
HCO3 BLDA-SCNC: 25.3 MMOL/L (ref 22–28)
HCOV 229E RNA SPEC QL NAA+PROBE: NOT DETECTED
HCOV HKU1 RNA SPEC QL NAA+PROBE: NOT DETECTED
HCOV NL63 RNA SPEC QL NAA+PROBE: NOT DETECTED
HCOV OC43 RNA SPEC QL NAA+PROBE: NOT DETECTED
HCT VFR BLD AUTO: 44.4 % (ref 34–46.6)
HEMODILUTION: NO
HGB BLD-MCNC: 14.3 G/DL (ref 12–15.9)
HMPV RNA NPH QL NAA+NON-PROBE: NOT DETECTED
HPIV1 RNA ISLT QL NAA+PROBE: NOT DETECTED
HPIV2 RNA SPEC QL NAA+PROBE: NOT DETECTED
HPIV3 RNA NPH QL NAA+PROBE: NOT DETECTED
HPIV4 P GENE NPH QL NAA+PROBE: NOT DETECTED
IMM GRANULOCYTES # BLD AUTO: 0.02 10*3/MM3 (ref 0–0.05)
IMM GRANULOCYTES NFR BLD AUTO: 0.3 % (ref 0–0.5)
LYMPHOCYTES # BLD AUTO: 1.68 10*3/MM3 (ref 0.7–3.1)
LYMPHOCYTES NFR BLD AUTO: 24.1 % (ref 19.6–45.3)
Lab: ABNORMAL
M PNEUMO IGG SER IA-ACNC: NOT DETECTED
MAGNESIUM SERPL-MCNC: 1.7 MG/DL (ref 1.6–2.6)
MCH RBC QN AUTO: 27.2 PG (ref 26.6–33)
MCHC RBC AUTO-ENTMCNC: 32.2 G/DL (ref 31.5–35.7)
MCV RBC AUTO: 84.6 FL (ref 79–97)
MODALITY: ABNORMAL
MONOCYTES # BLD AUTO: 0.52 10*3/MM3 (ref 0.1–0.9)
MONOCYTES NFR BLD AUTO: 7.4 % (ref 5–12)
NEUTROPHILS NFR BLD AUTO: 4.71 10*3/MM3 (ref 1.7–7)
NEUTROPHILS NFR BLD AUTO: 67.5 % (ref 42.7–76)
NOTIFIED WHO: ABNORMAL
NRBC BLD AUTO-RTO: 0 /100 WBC (ref 0–0.2)
NT-PROBNP SERPL-MCNC: 124 PG/ML (ref 0–900)
PCO2 BLDA: 43.1 MM HG (ref 35–45)
PH BLDA: 7.38 PH UNITS (ref 7.35–7.45)
PLATELET # BLD AUTO: 247 10*3/MM3 (ref 140–450)
PMV BLD AUTO: 10.5 FL (ref 6–12)
PO2 BLDA: 43.6 MM HG (ref 80–100)
POTASSIUM SERPL-SCNC: 3.6 MMOL/L (ref 3.5–5.2)
PROCALCITONIN SERPL-MCNC: <0.02 NG/ML (ref 0–0.25)
PROT SERPL-MCNC: 8.6 G/DL (ref 6–8.5)
RBC # BLD AUTO: 5.25 10*6/MM3 (ref 3.77–5.28)
READ BACK: YES
RHINOVIRUS RNA SPEC NAA+PROBE: NOT DETECTED
RSV RNA NPH QL NAA+NON-PROBE: DETECTED
SAO2 % BLDCOA: 78.1 % (ref 92–98.5)
SARS-COV-2 RNA NPH QL NAA+NON-PROBE: NOT DETECTED
SODIUM SERPL-SCNC: 134 MMOL/L (ref 136–145)
TROPONIN T NUMERIC DELTA: -1 NG/L
TROPONIN T SERPL HS-MCNC: 10 NG/L
WBC NRBC COR # BLD AUTO: 6.98 10*3/MM3 (ref 3.4–10.8)

## 2025-01-21 PROCEDURE — 85025 COMPLETE CBC W/AUTO DIFF WBC: CPT | Performed by: EMERGENCY MEDICINE

## 2025-01-21 PROCEDURE — 84484 ASSAY OF TROPONIN QUANT: CPT | Performed by: EMERGENCY MEDICINE

## 2025-01-21 PROCEDURE — 25010000002 METHYLPREDNISOLONE PER 125 MG: Performed by: EMERGENCY MEDICINE

## 2025-01-21 PROCEDURE — 99285 EMERGENCY DEPT VISIT HI MDM: CPT

## 2025-01-21 PROCEDURE — 36600 WITHDRAWAL OF ARTERIAL BLOOD: CPT

## 2025-01-21 PROCEDURE — 25010000002 METHYLPREDNISOLONE PER 125 MG: Performed by: HOSPITALIST

## 2025-01-21 PROCEDURE — G0378 HOSPITAL OBSERVATION PER HR: HCPCS

## 2025-01-21 PROCEDURE — 94761 N-INVAS EAR/PLS OXIMETRY MLT: CPT

## 2025-01-21 PROCEDURE — 94664 DEMO&/EVAL PT USE INHALER: CPT

## 2025-01-21 PROCEDURE — 83605 ASSAY OF LACTIC ACID: CPT | Performed by: EMERGENCY MEDICINE

## 2025-01-21 PROCEDURE — 93010 ELECTROCARDIOGRAM REPORT: CPT | Performed by: INTERNAL MEDICINE

## 2025-01-21 PROCEDURE — 36415 COLL VENOUS BLD VENIPUNCTURE: CPT

## 2025-01-21 PROCEDURE — 93005 ELECTROCARDIOGRAM TRACING: CPT | Performed by: EMERGENCY MEDICINE

## 2025-01-21 PROCEDURE — 80053 COMPREHEN METABOLIC PANEL: CPT | Performed by: EMERGENCY MEDICINE

## 2025-01-21 PROCEDURE — 94799 UNLISTED PULMONARY SVC/PX: CPT

## 2025-01-21 PROCEDURE — 94640 AIRWAY INHALATION TREATMENT: CPT

## 2025-01-21 PROCEDURE — 71045 X-RAY EXAM CHEST 1 VIEW: CPT

## 2025-01-21 PROCEDURE — 83735 ASSAY OF MAGNESIUM: CPT | Performed by: EMERGENCY MEDICINE

## 2025-01-21 PROCEDURE — 83880 ASSAY OF NATRIURETIC PEPTIDE: CPT | Performed by: EMERGENCY MEDICINE

## 2025-01-21 PROCEDURE — 0202U NFCT DS 22 TRGT SARS-COV-2: CPT | Performed by: EMERGENCY MEDICINE

## 2025-01-21 PROCEDURE — 82803 BLOOD GASES ANY COMBINATION: CPT

## 2025-01-21 PROCEDURE — 84145 PROCALCITONIN (PCT): CPT | Performed by: EMERGENCY MEDICINE

## 2025-01-21 RX ORDER — AMLODIPINE BESYLATE 10 MG/1
10 TABLET ORAL DAILY
Status: DISCONTINUED | OUTPATIENT
Start: 2025-01-22 | End: 2025-01-30 | Stop reason: HOSPADM

## 2025-01-21 RX ORDER — BISOPROLOL FUMARATE 5 MG/1
5 TABLET, FILM COATED ORAL DAILY
Status: DISCONTINUED | OUTPATIENT
Start: 2025-01-22 | End: 2025-01-30 | Stop reason: HOSPADM

## 2025-01-21 RX ORDER — CLOPIDOGREL BISULFATE 75 MG/1
75 TABLET ORAL DAILY
Status: DISCONTINUED | OUTPATIENT
Start: 2025-01-22 | End: 2025-01-30 | Stop reason: HOSPADM

## 2025-01-21 RX ORDER — RISPERIDONE 0.5 MG/1
0.5 TABLET ORAL DAILY
Status: DISCONTINUED | OUTPATIENT
Start: 2025-01-22 | End: 2025-01-26

## 2025-01-21 RX ORDER — ONDANSETRON 2 MG/ML
4 INJECTION INTRAMUSCULAR; INTRAVENOUS EVERY 6 HOURS PRN
Status: DISCONTINUED | OUTPATIENT
Start: 2025-01-21 | End: 2025-01-30 | Stop reason: HOSPADM

## 2025-01-21 RX ORDER — SODIUM CHLORIDE 9 MG/ML
40 INJECTION, SOLUTION INTRAVENOUS AS NEEDED
Status: DISCONTINUED | OUTPATIENT
Start: 2025-01-21 | End: 2025-01-30 | Stop reason: HOSPADM

## 2025-01-21 RX ORDER — FLUTICASONE PROPIONATE 50 MCG
2 SPRAY, SUSPENSION (ML) NASAL DAILY
Status: DISCONTINUED | OUTPATIENT
Start: 2025-01-22 | End: 2025-01-30 | Stop reason: HOSPADM

## 2025-01-21 RX ORDER — ACETAMINOPHEN 325 MG/1
650 TABLET ORAL EVERY 4 HOURS PRN
Status: DISCONTINUED | OUTPATIENT
Start: 2025-01-21 | End: 2025-01-30 | Stop reason: HOSPADM

## 2025-01-21 RX ORDER — AMOXICILLIN 250 MG
2 CAPSULE ORAL 2 TIMES DAILY
Status: DISCONTINUED | OUTPATIENT
Start: 2025-01-21 | End: 2025-01-30 | Stop reason: HOSPADM

## 2025-01-21 RX ORDER — POLYETHYLENE GLYCOL 3350 17 G/17G
17 POWDER, FOR SOLUTION ORAL DAILY PRN
Status: DISCONTINUED | OUTPATIENT
Start: 2025-01-21 | End: 2025-01-30 | Stop reason: HOSPADM

## 2025-01-21 RX ORDER — METHYLPREDNISOLONE SODIUM SUCCINATE 125 MG/2ML
80 INJECTION, POWDER, LYOPHILIZED, FOR SOLUTION INTRAMUSCULAR; INTRAVENOUS EVERY 12 HOURS
Status: DISCONTINUED | OUTPATIENT
Start: 2025-01-21 | End: 2025-01-22

## 2025-01-21 RX ORDER — SODIUM CHLORIDE 0.9 % (FLUSH) 0.9 %
10 SYRINGE (ML) INJECTION EVERY 12 HOURS SCHEDULED
Status: DISCONTINUED | OUTPATIENT
Start: 2025-01-21 | End: 2025-01-30 | Stop reason: HOSPADM

## 2025-01-21 RX ORDER — PANTOPRAZOLE SODIUM 40 MG/1
40 TABLET, DELAYED RELEASE ORAL
Status: DISCONTINUED | OUTPATIENT
Start: 2025-01-22 | End: 2025-01-26

## 2025-01-21 RX ORDER — NITROGLYCERIN 0.4 MG/1
0.4 TABLET SUBLINGUAL
Status: DISCONTINUED | OUTPATIENT
Start: 2025-01-21 | End: 2025-01-30 | Stop reason: HOSPADM

## 2025-01-21 RX ORDER — ATORVASTATIN CALCIUM 10 MG/1
10 TABLET, FILM COATED ORAL DAILY
Status: DISCONTINUED | OUTPATIENT
Start: 2025-01-22 | End: 2025-01-30 | Stop reason: HOSPADM

## 2025-01-21 RX ORDER — ACETAMINOPHEN 650 MG/1
650 SUPPOSITORY RECTAL EVERY 4 HOURS PRN
Status: DISCONTINUED | OUTPATIENT
Start: 2025-01-21 | End: 2025-01-30 | Stop reason: HOSPADM

## 2025-01-21 RX ORDER — HYDROCHLOROTHIAZIDE 12.5 MG/1
6.25 TABLET ORAL DAILY
Status: DISCONTINUED | OUTPATIENT
Start: 2025-01-22 | End: 2025-01-22

## 2025-01-21 RX ORDER — MONTELUKAST SODIUM 10 MG/1
10 TABLET ORAL DAILY
Status: DISCONTINUED | OUTPATIENT
Start: 2025-01-22 | End: 2025-01-30 | Stop reason: HOSPADM

## 2025-01-21 RX ORDER — LOSARTAN POTASSIUM 100 MG/1
100 TABLET ORAL
Status: DISCONTINUED | OUTPATIENT
Start: 2025-01-22 | End: 2025-01-26

## 2025-01-21 RX ORDER — ESCITALOPRAM OXALATE 10 MG/1
20 TABLET ORAL DAILY
Status: DISCONTINUED | OUTPATIENT
Start: 2025-01-22 | End: 2025-01-26

## 2025-01-21 RX ORDER — IPRATROPIUM BROMIDE AND ALBUTEROL SULFATE 2.5; .5 MG/3ML; MG/3ML
3 SOLUTION RESPIRATORY (INHALATION) ONCE
Status: COMPLETED | OUTPATIENT
Start: 2025-01-21 | End: 2025-01-21

## 2025-01-21 RX ORDER — BISACODYL 5 MG/1
5 TABLET, DELAYED RELEASE ORAL DAILY PRN
Status: DISCONTINUED | OUTPATIENT
Start: 2025-01-21 | End: 2025-01-30 | Stop reason: HOSPADM

## 2025-01-21 RX ORDER — BISACODYL 10 MG
10 SUPPOSITORY, RECTAL RECTAL DAILY PRN
Status: DISCONTINUED | OUTPATIENT
Start: 2025-01-21 | End: 2025-01-30 | Stop reason: HOSPADM

## 2025-01-21 RX ORDER — IPRATROPIUM BROMIDE AND ALBUTEROL SULFATE 2.5; .5 MG/3ML; MG/3ML
3 SOLUTION RESPIRATORY (INHALATION)
Status: DISCONTINUED | OUTPATIENT
Start: 2025-01-21 | End: 2025-01-30 | Stop reason: HOSPADM

## 2025-01-21 RX ORDER — ACETAMINOPHEN 160 MG/5ML
650 SOLUTION ORAL EVERY 4 HOURS PRN
Status: DISCONTINUED | OUTPATIENT
Start: 2025-01-21 | End: 2025-01-30 | Stop reason: HOSPADM

## 2025-01-21 RX ORDER — SODIUM CHLORIDE 0.9 % (FLUSH) 0.9 %
10 SYRINGE (ML) INJECTION AS NEEDED
Status: DISCONTINUED | OUTPATIENT
Start: 2025-01-21 | End: 2025-01-30 | Stop reason: HOSPADM

## 2025-01-21 RX ORDER — POTASSIUM CHLORIDE 750 MG/1
10 TABLET, FILM COATED, EXTENDED RELEASE ORAL DAILY
Status: DISCONTINUED | OUTPATIENT
Start: 2025-01-22 | End: 2025-01-30 | Stop reason: HOSPADM

## 2025-01-21 RX ORDER — METHYLPREDNISOLONE SODIUM SUCCINATE 125 MG/2ML
125 INJECTION, POWDER, LYOPHILIZED, FOR SOLUTION INTRAMUSCULAR; INTRAVENOUS ONCE
Status: COMPLETED | OUTPATIENT
Start: 2025-01-21 | End: 2025-01-21

## 2025-01-21 RX ORDER — IPRATROPIUM BROMIDE AND ALBUTEROL SULFATE 2.5; .5 MG/3ML; MG/3ML
3 SOLUTION RESPIRATORY (INHALATION) EVERY 4 HOURS PRN
Status: DISCONTINUED | OUTPATIENT
Start: 2025-01-21 | End: 2025-01-30 | Stop reason: HOSPADM

## 2025-01-21 RX ADMIN — METHYLPREDNISOLONE SODIUM SUCCINATE 80 MG: 125 INJECTION, POWDER, FOR SOLUTION INTRAMUSCULAR; INTRAVENOUS at 21:55

## 2025-01-21 RX ADMIN — IPRATROPIUM BROMIDE AND ALBUTEROL SULFATE 3 ML: 2.5; .5 SOLUTION RESPIRATORY (INHALATION) at 21:44

## 2025-01-21 RX ADMIN — METHYLPREDNISOLONE SODIUM SUCCINATE 125 MG: 125 INJECTION INTRAMUSCULAR; INTRAVENOUS at 19:59

## 2025-01-21 RX ADMIN — IPRATROPIUM BROMIDE AND ALBUTEROL SULFATE 3 ML: 2.5; .5 SOLUTION RESPIRATORY (INHALATION) at 18:50

## 2025-01-21 NOTE — ED PROVIDER NOTES
EMERGENCY DEPARTMENT ENCOUNTER    Room Number:  13/13  PCP: Isidro Hernandez MD  Historian: Patient, nephew      HPI:  Chief Complaint: Shortness of breath  A complete HPI/ROS/PMH/PSH/SH/FH are unobtainable due to: None    Context: Flaca Hassan is a 59 y.o. female who presents to the ED via private vehicle from urgent care c/o acute increased shortness of breath and cough over the last 4 days.  History of asthma, on nighttime oxygen.  Does wear BiPAP at home as well.  Was 78% on room air urgent care.  No fevers.  No history of heart failure.  Does have a history of coronary disease.      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    IFG (impaired fasting glucose) 09/17/2020    Primary insomnia 09/17/2020    PMB (postmenopausal bleeding) 12/29/2020    Endometrial cancer 01/13/2021    Uncomplicated asthma 04/05/2021    LI (obstructive sleep apnea) 04/05/2021    Dyspepsia 10/27/2021    History of peptic ulcer disease 10/27/2021    Rectal pain 12/13/2021    Abdominal wall abscess 05/12/2022    History of ST elevation myocardial infarction (STEMI) 05/12/2022    Abdominal wall cellulitis 05/12/2022    Prediabetes 05/12/2022    Localized edema 02/21/2023     Resolved Ambulatory Problems     Diagnosis Date Noted    No Resolved Ambulatory Problems     Past Medical History:   Diagnosis Date    Anemia     Asthma     Breast cyst     Cancer     Coronary artery disease     Depression      Diverticulosis     H/O: GI bleed     Hematuria     History of coronary angioplasty with insertion of stent     History of transfusion     Incontinence of urine     Irritable bowel syndrome     Malignant neoplasm of endometrium 02/2021    Melena     Oxygen dependent     Uterine cancer     UTI (urinary tract infection)          PAST SURGICAL HISTORY  Past Surgical History:   Procedure Laterality Date    BREAST CYST EXCISION Left     CARDIAC CATHETERIZATION N/A 11/13/2017    Procedure: Left Heart Cath;  Surgeon: Imer Montaño MD;  Location: [facility] CATH INVASIVE LOCATION;  Service:     CARDIAC CATHETERIZATION N/A 11/13/2017    Procedure: Stent ROBERTO coronary;  Surgeon: Imer Montaño MD;  Location: [facility] CATH INVASIVE LOCATION;  Service:     COLONOSCOPY  02/11/2016    tics, NBIH,     COLONOSCOPY N/A 02/28/2022    Procedure: COLONOSCOPY TO CECUM and TI WITH APC CAUTERY TO RECTAL AVMs;  Surgeon: Ruth Ann Willson MD;  Location: [facility] ENDOSCOPY;  Service: Gastroenterology;  Laterality: N/A;  FAMILY HX COLON CA  --HEMORRHOIDS, BLEEDING RECTAL AVMs     D & C HYSTEROSCOPY ENDOMETRIAL ABLATION N/A 01/08/2021    Procedure: DILATATION AND CURETTAGE HYSTEROSCOPY ENDOMETRIAL  RESECTION;  Surgeon: Thu Blake MD;  [place] [facility] [place] [facility] [place]  Service: Obstetrics/Gynecology;  Laterality: N/A;    ENDOSCOPY N/A 03/03/2017    erythematous mucosa in stomach, duodenal ulcer , mild to moderate chronic active gastritis, positive for h pylori    ENDOSCOPY N/A 11/08/2021    Procedure: ESOPHAGOGASTRODUODENOSCOPY WITH BX'S;  Surgeon: Ruth Ann Willson MD;  Location: [facility] ENDOSCOPY;  Service: Gastroenterology;  Laterality: N/A;  pre: EPIGASTRIC PAIN, HX OF STOMACH ULCER  post: GASTRITIS    HYSTERECTOMY      UPPER GASTROINTESTINAL ENDOSCOPY  02/10/2016    sami-Ruth Ann Willson M.D.         FAMILY HISTORY  Family History   Problem Relation Age of Onset    Breast cancer Other     Lung cancer Brother     Hypertension Brother      Hyperlipidemia Brother     Heart disease Brother     Throat cancer Paternal Uncle     Tongue cancer Paternal Grandfather     Hypertension Father     Hyperlipidemia Father     Heart disease Father     Malig Hyperthermia Neg Hx          SOCIAL HISTORY  Social History     Socioeconomic History    Marital status:     Years of education: High School   Tobacco Use    Smoking status: Never     Passive exposure: Never    Smokeless tobacco: Never   Vaping Use    Vaping status: Never Used   Substance and Sexual Activity    Alcohol use: No    Drug use: Never    Sexual activity: Defer         ALLERGIES  Peanut (diagnostic)        REVIEW OF SYSTEMS  Review of Systems     All systems reviewed and negative except for those discussed in HPI.       PHYSICAL EXAM    I have reviewed the triage vital signs and nursing notes.    ED Triage Vitals [01/21/25 1820]   Temp Heart Rate Resp BP SpO2   -- 80 (!) 30 -- (!) 84 %      Temp src Heart Rate Source Patient Position BP Location FiO2 (%)   -- Monitor -- -- --       Physical Exam  General: Mildly ill-appearing, nondiaphoretic  HEENT: Mucous membranes moist, atraumatic, EOMI  Neck: Full ROM  Pulm: Symmetric chest rise, mild tachypnea, diminished lung sounds bilaterally with scattered wheezes  Cardiovascular: Regular rate and rhythm, intact distal pulses, no significant peripheral edema  GI: Soft, nontender, nondistended, no rebound, no guarding, bowel sounds present  MSK: Full ROM, no deformity  Skin: Warm, dry  Neuro: Awake, alert, oriented x 4, GCS 15, moving all extremities, no focal deficits  Psych: Calm, cooperative      I wore an N95 mask, eye protection, and gloves used during this encounter.       LAB RESULTS  Recent Results (from the past 24 hours)   Respiratory Panel PCR w/COVID-19(SARS-CoV-2) TE/TONJA/ALVINA/[facility] In-House, NP Swab in UTM/VTM, 2 HR TAT - Swab, Nasopharynx    Collection Time: 01/21/25  6:35 PM    Specimen: Nasopharynx; Swab   Result Value Ref Range     ADENOVIRUS, PCR Not Detected Not Detected    Coronavirus 229E Not Detected Not Detected    Coronavirus HKU1 Not Detected Not Detected    Coronavirus NL63 Not Detected Not Detected    Coronavirus OC43 Not Detected Not Detected    COVID19 Not Detected Not Detected - Ref. Range    Human Metapneumovirus Not Detected Not Detected    Human Rhinovirus/Enterovirus Not Detected Not Detected    Influenza A PCR Not Detected Not Detected    Influenza B PCR Not Detected Not Detected    Parainfluenza Virus 1 Not Detected Not Detected    Parainfluenza Virus 2 Not Detected Not Detected    Parainfluenza Virus 3 Not Detected Not Detected    Parainfluenza Virus 4 Not Detected Not Detected    RSV, PCR Detected (A) Not Detected    Bordetella pertussis pcr Not Detected Not Detected    Bordetella parapertussis PCR Not Detected Not Detected    Chlamydophila pneumoniae PCR Not Detected Not Detected    Mycoplasma pneumo by PCR Not Detected Not Detected   Comprehensive Metabolic Panel    Collection Time: 01/21/25  6:35 PM    Specimen: Blood   Result Value Ref Range    Glucose 116 (H) 65 - 99 mg/dL    BUN 19 6 - 20 mg/dL    Creatinine 1.25 (H) 0.57 - 1.00 mg/dL    Sodium 134 (L) 136 - 145 mmol/L    Potassium 3.6 3.5 - 5.2 mmol/L    Chloride 96 (L) 98 - 107 mmol/L    CO2 25.0 22.0 - 29.0 mmol/L    Calcium 9.1 8.6 - 10.5 mg/dL    Total Protein 8.6 (H) 6.0 - 8.5 g/dL    Albumin 4.3 3.5 - 5.2 g/dL    ALT (SGPT) 19 1 - 33 U/L    AST (SGOT) 26 1 - 32 U/L    Alkaline Phosphatase 115 39 - 117 U/L    Total Bilirubin 0.4 0.0 - 1.2 mg/dL    Globulin 4.3 gm/dL    A/G Ratio 1.0 g/dL    BUN/Creatinine Ratio 15.2 7.0 - 25.0    Anion Gap 13.0 5.0 - 15.0 mmol/L    eGFR 49.8 (L) >60.0 mL/min/1.73   BNP    Collection Time: 01/21/25  6:35 PM    Specimen: Blood   Result Value Ref Range    proBNP 124.0 0.0 - 900.0 pg/mL   High Sensitivity Troponin T    Collection Time: 01/21/25  6:35 PM    Specimen: Blood   Result Value Ref Range    HS Troponin T 10 <14 ng/L    Lactic Acid, Plasma    Collection Time: 01/21/25  6:35 PM    Specimen: Blood   Result Value Ref Range    Lactate 1.3 0.5 - 2.0 mmol/L   Procalcitonin    Collection Time: 01/21/25  6:35 PM    Specimen: Blood   Result Value Ref Range    Procalcitonin <0.02 0.00 - 0.25 ng/mL   Magnesium    Collection Time: 01/21/25  6:35 PM    Specimen: Blood   Result Value Ref Range    Magnesium 1.7 1.6 - 2.6 mg/dL   CBC Auto Differential    Collection Time: 01/21/25  6:35 PM    Specimen: Blood   Result Value Ref Range    WBC 6.98 3.40 - 10.80 10*3/mm3    RBC 5.25 3.77 - 5.28 10*6/mm3    Hemoglobin 14.3 12.0 - 15.9 g/dL    Hematocrit 44.4 34.0 - 46.6 %    MCV 84.6 79.0 - 97.0 fL    MCH 27.2 26.6 - 33.0 pg    MCHC 32.2 31.5 - 35.7 g/dL    RDW 14.8 12.3 - 15.4 %    RDW-SD 45.7 37.0 - 54.0 fl    MPV 10.5 6.0 - 12.0 fL    Platelets 247 140 - 450 10*3/mm3    Neutrophil % 67.5 42.7 - 76.0 %    Lymphocyte % 24.1 19.6 - 45.3 %    Monocyte % 7.4 5.0 - 12.0 %    Eosinophil % 0.4 0.3 - 6.2 %    Basophil % 0.3 0.0 - 1.5 %    Immature Grans % 0.3 0.0 - 0.5 %    Neutrophils, Absolute 4.71 1.70 - 7.00 10*3/mm3    Lymphocytes, Absolute 1.68 0.70 - 3.10 10*3/mm3    Monocytes, Absolute 0.52 0.10 - 0.90 10*3/mm3    Eosinophils, Absolute 0.03 0.00 - 0.40 10*3/mm3    Basophils, Absolute 0.02 0.00 - 0.20 10*3/mm3    Immature Grans, Absolute 0.02 0.00 - 0.05 10*3/mm3    nRBC 0.0 0.0 - 0.2 /100 WBC   ECG 12 Lead Dyspnea    Collection Time: 01/21/25  6:42 PM   Result Value Ref Range    QT Interval 543 ms    QTC Interval 547 ms   Blood Gas, Arterial -    Collection Time: 01/21/25  6:49 PM    Specimen: Arterial Blood   Result Value Ref Range    Site Right Radial     Blayne's Test Positive     pH, Arterial 7.378 7.350 - 7.450 pH units    pCO2, Arterial 43.1 35.0 - 45.0 mm Hg    pO2, Arterial 43.6 (C) 80.0 - 100.0 mm Hg    HCO3, Arterial 25.3 22.0 - 28.0 mmol/L    Base Excess, Arterial -0.1 (L) 0.0 - 2.0 mmol/L    O2 Saturation, Arterial 78.1 (L) 92.0 - 98.5 %     CO2 Content 26.7 23 - 27 mmol/L    Barometric Pressure for Blood Gas 763.0000 mmHg    Modality Cannula     Flow Rate 5.0000 lpm    Notified Who Ellie Beck     Read Back Yes     Notified Time      Hemodilution No    High Sensitivity Troponin T 1Hr    Collection Time: 01/21/25  7:39 PM    Specimen: Arm, Right; Blood   Result Value Ref Range    HS Troponin T 9 <14 ng/L    Troponin T Numeric Delta -1 Abnormal if >/=3 ng/L       Ordered the above labs and independently interpreted results. My findings will be discussed in the medical decision making section below        RADIOLOGY  XR Chest 1 View    Addendum Date: 1/21/2025    XR CHEST 1 VW-  HISTORY: Male who is 65 years-old, short of breath  TECHNIQUE: Frontal views of the chest  COMPARISON: 1/2/2025  FINDINGS: The heart size appears borderline. Pulmonary vasculature appears mildly congested mild atelectasis or infiltrate at the bases, follow-up suggested. There may be minimal pleural effusions. No pneumothorax. Right hemidiaphragm is elevated. No acute osseous process.  IMPRESSION: As described.   This report was finalized on 1/21/2025 7:30 PM by Dr. Pan Alexandre M.D on Workstation: [id number]      Result Date: 1/21/2025  XR CHEST 1 VW-  HISTORY: Male who is 65 years-old, short of breath  TECHNIQUE: Frontal views of the chest  COMPARISON: 10/30/2018  FINDINGS: The heart size appears borderline. Pulmonary vasculature appears mildly congested mild atelectasis or infiltrate at the bases, follow-up suggested. There may be minimal pleural effusions. No pneumothorax. Right hemidiaphragm is elevated. No acute osseous process.      As described.  This report was finalized on 1/21/2025 7:09 PM by Dr. Pan Alexandre M.D on Workstation: [id number]       Ordered the above noted radiological studies.  Independently interpreted by me and my independent review of findings can be found in the ED Course.  See dictation for official radiology  interpretation.      PROCEDURES    Procedures      EKG - Per my independent interpretation at 1848:    EKG Time: 1842  Rhythm/Rate: Sinus rhythm with rate of 61  Normal axis  Prolonged QTc of 547  Borderline nonspecific T wave abnormalities, no acute ischemic changes  No STEMI       No emergent changes as compared to January 8, 2021      MEDICATIONS GIVEN IN ER    Medications   ipratropium-albuterol (DUO-NEB) nebulizer solution 3 mL (3 mL Nebulization Given 1/21/25 1850)   methylPREDNISolone sodium succinate (SOLU-Medrol) injection 125 mg (125 mg Intravenous Given 1/21/25 1959)         PROGRESS, DATA ANALYSIS, CONSULTS, AND MEDICAL DECISION MAKING    Please note that this section constitutes my independent interpretation of clinical data including lab results, radiology, EKG's.  This constitutes my independent professional opinion regarding differential diagnosis and management of this patient.  It may include any factors such as history from outside sources, review of external records, social determinants of health, management of medications, response to those treatments, and discussions with other providers.    Differential Diagnosis and Plan: Initial concern for viral process, pneumonia, asthma exacerbation, heart failure, renal failure, electrolyte abnormalities, anemia, arrhythmia, among others.  Plan for labs, supportive care with oxygen and possibly BiPAP, breathing treatment, potentially steroids or antibiotics, and reevaluate.    Additional sources:  - Discussed/ obtained information from independent historians:       - (Social Determinants of Health): None     - Shared decision making:  Patient and nephew at bedside fully updated on and in agreement with the course and plan moving forward    ED Course as of 01/21/25 2014 Tue Jan 21, 2025 1902 WBC: 6.98 [DC]   1902 Hemoglobin: 14.3 [DC]   1920 proBNP: 124.0 [DC]   1920 Procalcitonin: <0.02 [DC]   1920 HS Troponin T: 10 [DC]   1920 Magnesium: 1.7 [DC]    1920 Lactate: 1.3 [DC]   1920 Glucose(!): 116 [DC]   1920 BUN: 19 [DC]   1920 Creatinine(!): 1.25  1.24 two months ago [DC]   1920 Sodium(!): 134 [DC]   1920 Potassium: 3.6 [DC]   1928 XR Chest 1 View  Per my independent interpretation of the chest x-ray, no overtly evident pneumothorax, either right elevated hemidiaphragm or right-sided effusion [DC]   1945 XR Chest 1 View  Radiology report reviewed, maybe minimal pleural effusions, congestion with mild atelectasis or infiltrates [DC]   1957 RSV, PCR(!): Detected [DC]   1957 IV steroids have been ordered, improving oxygenation, RSV is likely source and trigger for illness today with underlying asthma exacerbation patient and family have been fully updated the findings and need for hospital stay for further monitoring. [DC]   2013 Discussed with Dr. Patrick [facility], discussed patient's clinical course and findings today, treatment modalities, and need for hospitalization. [DC]      ED Course User Index  [DC] Kiran Argueta MD       Hospitalization Considered?:  yes    Orders Placed During This Visit:  Orders Placed This Encounter   Procedures    Respiratory Panel PCR w/COVID-19(SARS-CoV-2) TE/TONJA/[facility] In-House, NP Swab in UTM/VTM, 2 HR TAT - Swab, Nasopharynx    XR Chest 1 View    Comprehensive Metabolic Panel    BNP    High Sensitivity Troponin T    Blood Gas, Arterial -    Lactic Acid, Plasma    Procalcitonin    Magnesium    CBC Auto Differential    Blood Gas, Arterial -    High Sensitivity Troponin T 1Hr    LHA (on-call MD unless specified) Details    ECG 12 Lead Dyspnea    Initiate Observation Status    CBC & Differential       Additional orders considered but not placed:      Independent interpretation of labs, radiology studies, and discussions with consultants: See ED Course        AS OF 20:14 EST VITALS:    BP - 133/64  HR - 64  TEMP - 99.3 °F (37.4 °C)  02 SATS - 97%          DIAGNOSIS  Final diagnoses:   RSV bronchitis   Exacerbation of asthma,  unspecified asthma severity, unspecified whether persistent   Acute hypoxic respiratory failure         DISPOSITION  ED Disposition       ED Disposition   Decision to Admit    Condition   --    Comment   Level of Care: Telemetry [5]   Diagnosis: Hypoxia [109162]   Admitting Physician: KALPANA CORONADO [[id number]]   Attending Physician: KALPANA CORONADO [[id number]]   Is patient appropriate for Inpatient Observation Unit?: No [0]                  Please note that portions of this document were completed with a voice recognition program.    Note Disclaimer: At [facility], we believe that sharing information builds trust and better relationships. You are receiving this note because you recently visited [facility]. It is possible you will see health information before a provider has talked with you about it. This kind of information can be easy to misunderstand. To help you fully understand what it means for your health, we urge you to discuss this note with your provider.                       Kiran Argueta MD  01/21/25 2033

## 2025-01-21 NOTE — TELEPHONE ENCOUNTER
Patient showed up greater than 15 minutes late for her appointment.  She was told that she could be be seen immediately at the urgent care center or can be rescheduled or potentially see one of our colleagues, yet patient's family member insisted on speaking to the  which was completed.  Patient has a history of showing up late for appointments or no showing appointments previously.

## 2025-01-21 NOTE — ED NOTES
Pt states that she has had a cough and SOA which she has been treated for. Pt is on O2 at night usually. Pt was 78% on RA. Placed on 5L NC and is still only 84%.

## 2025-01-22 ENCOUNTER — APPOINTMENT (OUTPATIENT)
Dept: CT IMAGING | Facility: HOSPITAL | Age: 60
End: 2025-01-22
Payer: MEDICARE

## 2025-01-22 LAB
ALBUMIN SERPL-MCNC: 3.4 G/DL (ref 3.5–5.2)
ALBUMIN/GLOB SERPL: 0.7 G/DL
ALP SERPL-CCNC: 97 U/L (ref 39–117)
ALT SERPL W P-5'-P-CCNC: 17 U/L (ref 1–33)
ANION GAP SERPL CALCULATED.3IONS-SCNC: 12.1 MMOL/L (ref 5–15)
AST SERPL-CCNC: 20 U/L (ref 1–32)
BASOPHILS # BLD MANUAL: 0 10*3/MM3 (ref 0–0.2)
BASOPHILS NFR BLD MANUAL: 0 % (ref 0–1.5)
BILIRUB SERPL-MCNC: 0.3 MG/DL (ref 0–1.2)
BUN SERPL-MCNC: 18 MG/DL (ref 6–20)
BUN/CREAT SERPL: 14.4 (ref 7–25)
CALCIUM SPEC-SCNC: 8.8 MG/DL (ref 8.6–10.5)
CHLORIDE SERPL-SCNC: 101 MMOL/L (ref 98–107)
CO2 SERPL-SCNC: 20.9 MMOL/L (ref 22–29)
CREAT SERPL-MCNC: 1.25 MG/DL (ref 0.57–1)
DEPRECATED RDW RBC AUTO: 47.3 FL (ref 37–54)
EGFRCR SERPLBLD CKD-EPI 2021: 49.8 ML/MIN/1.73
EOSINOPHIL # BLD MANUAL: 0 10*3/MM3 (ref 0–0.4)
EOSINOPHIL NFR BLD MANUAL: 0 % (ref 0.3–6.2)
ERYTHROCYTE [DISTWIDTH] IN BLOOD BY AUTOMATED COUNT: 14.7 % (ref 12.3–15.4)
GIANT PLATELETS: ABNORMAL
GLOBULIN UR ELPH-MCNC: 4.7 GM/DL
GLUCOSE SERPL-MCNC: 245 MG/DL (ref 65–99)
HBA1C MFR BLD: 6.4 % (ref 4.8–5.6)
HCT VFR BLD AUTO: 42.5 % (ref 34–46.6)
HGB BLD-MCNC: 13.2 G/DL (ref 12–15.9)
LYMPHOCYTES # BLD MANUAL: 0.49 10*3/MM3 (ref 0.7–3.1)
LYMPHOCYTES NFR BLD MANUAL: 0 % (ref 5–12)
MAGNESIUM SERPL-MCNC: 1.8 MG/DL (ref 1.6–2.6)
MCH RBC QN AUTO: 27.2 PG (ref 26.6–33)
MCHC RBC AUTO-ENTMCNC: 31.1 G/DL (ref 31.5–35.7)
MCV RBC AUTO: 87.6 FL (ref 79–97)
MONOCYTES # BLD: 0 10*3/MM3 (ref 0.1–0.9)
NEUTROPHILS # BLD AUTO: 2.99 10*3/MM3 (ref 1.7–7)
NEUTROPHILS NFR BLD MANUAL: 85.9 % (ref 42.7–76)
NRBC BLD AUTO-RTO: 0 /100 WBC (ref 0–0.2)
PHOSPHATE SERPL-MCNC: 2.7 MG/DL (ref 2.5–4.5)
PLATELET # BLD AUTO: 227 10*3/MM3 (ref 140–450)
PMV BLD AUTO: 11.3 FL (ref 6–12)
POTASSIUM SERPL-SCNC: 3.7 MMOL/L (ref 3.5–5.2)
PROT SERPL-MCNC: 8.1 G/DL (ref 6–8.5)
QT INTERVAL: 543 MS
QTC INTERVAL: 547 MS
RBC # BLD AUTO: 4.85 10*6/MM3 (ref 3.77–5.28)
RBC MORPH BLD: NORMAL
SODIUM SERPL-SCNC: 134 MMOL/L (ref 136–145)
VARIANT LYMPHS NFR BLD MANUAL: 14.1 % (ref 19.6–45.3)
WBC MORPH BLD: NORMAL
WBC NRBC COR # BLD AUTO: 3.48 10*3/MM3 (ref 3.4–10.8)

## 2025-01-22 PROCEDURE — 80053 COMPREHEN METABOLIC PANEL: CPT | Performed by: HOSPITALIST

## 2025-01-22 PROCEDURE — 94799 UNLISTED PULMONARY SVC/PX: CPT

## 2025-01-22 PROCEDURE — 84100 ASSAY OF PHOSPHORUS: CPT | Performed by: HOSPITALIST

## 2025-01-22 PROCEDURE — 83735 ASSAY OF MAGNESIUM: CPT | Performed by: HOSPITALIST

## 2025-01-22 PROCEDURE — 85025 COMPLETE CBC W/AUTO DIFF WBC: CPT | Performed by: HOSPITALIST

## 2025-01-22 PROCEDURE — 94760 N-INVAS EAR/PLS OXIMETRY 1: CPT

## 2025-01-22 PROCEDURE — 83036 HEMOGLOBIN GLYCOSYLATED A1C: CPT | Performed by: HOSPITALIST

## 2025-01-22 PROCEDURE — 25010000002 METHYLPREDNISOLONE PER 40 MG: Performed by: INTERNAL MEDICINE

## 2025-01-22 PROCEDURE — 85007 BL SMEAR W/DIFF WBC COUNT: CPT | Performed by: HOSPITALIST

## 2025-01-22 PROCEDURE — 94761 N-INVAS EAR/PLS OXIMETRY MLT: CPT

## 2025-01-22 PROCEDURE — 94664 DEMO&/EVAL PT USE INHALER: CPT

## 2025-01-22 PROCEDURE — 71250 CT THORAX DX C-: CPT

## 2025-01-22 RX ORDER — METHYLPREDNISOLONE SODIUM SUCCINATE 40 MG/ML
40 INJECTION, POWDER, LYOPHILIZED, FOR SOLUTION INTRAMUSCULAR; INTRAVENOUS EVERY 12 HOURS
Status: DISCONTINUED | OUTPATIENT
Start: 2025-01-22 | End: 2025-01-23

## 2025-01-22 RX ORDER — GUAIFENESIN 600 MG/1
600 TABLET, EXTENDED RELEASE ORAL EVERY 12 HOURS SCHEDULED
Status: DISCONTINUED | OUTPATIENT
Start: 2025-01-22 | End: 2025-01-30 | Stop reason: HOSPADM

## 2025-01-22 RX ORDER — BENZONATATE 100 MG/1
200 CAPSULE ORAL 3 TIMES DAILY PRN
Status: DISCONTINUED | OUTPATIENT
Start: 2025-01-22 | End: 2025-01-30 | Stop reason: HOSPADM

## 2025-01-22 RX ADMIN — GUAIFENESIN 600 MG: 600 TABLET, EXTENDED RELEASE ORAL at 09:13

## 2025-01-22 RX ADMIN — BENZONATATE 200 MG: 100 CAPSULE ORAL at 00:52

## 2025-01-22 RX ADMIN — HYDROCHLOROTHIAZIDE 6.25 MG: 12.5 TABLET ORAL at 09:02

## 2025-01-22 RX ADMIN — BENZONATATE 200 MG: 100 CAPSULE ORAL at 09:13

## 2025-01-22 RX ADMIN — Medication 10 ML: at 09:17

## 2025-01-22 RX ADMIN — METHYLPREDNISOLONE SODIUM SUCCINATE 40 MG: 40 INJECTION, POWDER, FOR SOLUTION INTRAMUSCULAR; INTRAVENOUS at 22:42

## 2025-01-22 RX ADMIN — FLUTICASONE PROPIONATE 2 SPRAY: 50 SPRAY, METERED NASAL at 09:05

## 2025-01-22 RX ADMIN — IPRATROPIUM BROMIDE AND ALBUTEROL SULFATE 3 ML: 2.5; .5 SOLUTION RESPIRATORY (INHALATION) at 10:32

## 2025-01-22 RX ADMIN — RISPERIDONE 0.5 MG: 0.5 TABLET, FILM COATED ORAL at 09:04

## 2025-01-22 RX ADMIN — PANTOPRAZOLE SODIUM 40 MG: 40 TABLET, DELAYED RELEASE ORAL at 06:24

## 2025-01-22 RX ADMIN — IPRATROPIUM BROMIDE AND ALBUTEROL SULFATE 3 ML: 2.5; .5 SOLUTION RESPIRATORY (INHALATION) at 07:26

## 2025-01-22 RX ADMIN — ATORVASTATIN CALCIUM 10 MG: 10 TABLET, FILM COATED ORAL at 09:06

## 2025-01-22 RX ADMIN — ESCITALOPRAM 20 MG: 10 TABLET, FILM COATED ORAL at 22:42

## 2025-01-22 RX ADMIN — IPRATROPIUM BROMIDE AND ALBUTEROL SULFATE 3 ML: 2.5; .5 SOLUTION RESPIRATORY (INHALATION) at 19:46

## 2025-01-22 RX ADMIN — LOSARTAN POTASSIUM 100 MG: 100 TABLET, FILM COATED ORAL at 09:04

## 2025-01-22 RX ADMIN — IPRATROPIUM BROMIDE AND ALBUTEROL SULFATE 3 ML: 2.5; .5 SOLUTION RESPIRATORY (INHALATION) at 13:56

## 2025-01-22 RX ADMIN — CLOPIDOGREL BISULFATE 75 MG: 75 TABLET ORAL at 09:02

## 2025-01-22 RX ADMIN — Medication 10 ML: at 00:52

## 2025-01-22 RX ADMIN — Medication 10 ML: at 22:43

## 2025-01-22 RX ADMIN — METHYLPREDNISOLONE SODIUM SUCCINATE 40 MG: 40 INJECTION, POWDER, FOR SOLUTION INTRAMUSCULAR; INTRAVENOUS at 09:14

## 2025-01-22 RX ADMIN — AMLODIPINE BESYLATE 10 MG: 10 TABLET ORAL at 09:03

## 2025-01-22 RX ADMIN — BISOPROLOL FUMARATE 5 MG: 5 TABLET ORAL at 09:03

## 2025-01-22 RX ADMIN — MONTELUKAST 10 MG: 10 TABLET, FILM COATED ORAL at 09:03

## 2025-01-22 RX ADMIN — GUAIFENESIN 600 MG: 600 TABLET, EXTENDED RELEASE ORAL at 22:42

## 2025-01-22 RX ADMIN — POTASSIUM CHLORIDE 10 MEQ: 750 TABLET, EXTENDED RELEASE ORAL at 09:02

## 2025-01-22 NOTE — H&P
[facility]   HISTORY AND PHYSICAL    Patient Name: Flaca Hassan  : 1965  MRN: [id number]  Primary Care Physician:  Isidro Hernandez MD  Date of admission: 2025    Subjective   Subjective     Chief Complaint: SOA    History of Present Illness  Very pleasant 58yo woman sent from urgent care center with 4d h/o SOA and cough. RA sats 78% at urgent care. Required 5L/min to maintain sats >90% here. No ill contacts. No CP or palp. She has h/o asthma and LI with use of nocturnal O2 and BiPAP and is followed by Dr. Nichole. No F/C/NS. Tolerating diet. No swallowing issues. Voiding well. Compliant with home meds.       Review of Systems   Constitutional:  Positive for fatigue. Negative for appetite change, chills, diaphoresis and fever.   HENT:  Negative for congestion, nosebleeds, rhinorrhea, sore throat, trouble swallowing and voice change.    Eyes:  Negative for pain and visual disturbance.   Respiratory:  Positive for cough, shortness of breath and wheezing. Negative for choking, chest tightness and stridor.    Cardiovascular:  Negative for chest pain, palpitations and leg swelling.   Gastrointestinal:  Negative for abdominal pain, diarrhea, nausea and vomiting.   Endocrine: Negative for cold intolerance and heat intolerance.   Genitourinary:  Negative for decreased urine volume, difficulty urinating, dysuria and hematuria.   Musculoskeletal:  Negative for back pain and neck pain.   Skin:  Negative for color change and rash.   Allergic/Immunologic: Positive for food allergies (Peanut). Negative for environmental allergies.   Neurological:  Negative for seizures, syncope, speech difficulty and headaches.   Hematological:  Negative for adenopathy. Does not bruise/bleed easily.   Psychiatric/Behavioral:  Negative for behavioral problems, confusion and hallucinations.         Personal History     Past Medical History:   Diagnosis Date    Anemia     Asthma     Breast cyst     Cancer     Coronary artery  disease     stent    Depression     Diverticulosis     H/O: GI bleed     recurrent    Hematuria     History of coronary angioplasty with insertion of stent     History of transfusion     no reaction    Hyperlipidemia     Hypertension     Incontinence of urine     wears pads    Irritable bowel syndrome     Malignant neoplasm of endometrium 02/2021    Melena     Obesity     Oxygen dependent     uses oxygen 2 prn when walking if SOB    Uterine cancer     UTI (urinary tract infection)        Past Surgical History:   Procedure Laterality Date    BREAST CYST EXCISION Left     CARDIAC CATHETERIZATION N/A 11/13/2017    Procedure: Left Heart Cath;  Surgeon: Imer Montaño MD;  Location: [facility] CATH INVASIVE LOCATION;  Service:     CARDIAC CATHETERIZATION N/A 11/13/2017    Procedure: Stent ROBERTO coronary;  Surgeon: Imer Montaño MD;  Location: [facility] CATH INVASIVE LOCATION;  Service:     COLONOSCOPY  02/11/2016    tics, NBIH,     COLONOSCOPY N/A 02/28/2022    Procedure: COLONOSCOPY TO CECUM and TI WITH APC CAUTERY TO RECTAL AVMs;  Surgeon: Ruth Ann Willson MD;  Location: [facility] ENDOSCOPY;  Service: Gastroenterology;  Laterality: N/A;  FAMILY HX COLON CA  --HEMORRHOIDS, BLEEDING RECTAL AVMs     D & C HYSTEROSCOPY ENDOMETRIAL ABLATION N/A 01/08/2021    Procedure: DILATATION AND CURETTAGE HYSTEROSCOPY ENDOMETRIAL  RESECTION;  Surgeon: Thu Blake MD;  [place] [facility] [place] [facility] [place]  Service: Obstetrics/Gynecology;  Laterality: N/A;    ENDOSCOPY N/A 03/03/2017    erythematous mucosa in stomach, duodenal ulcer , mild to moderate chronic active gastritis, positive for h pylori    ENDOSCOPY N/A 11/08/2021    Procedure: ESOPHAGOGASTRODUODENOSCOPY WITH BX'S;  Surgeon: Ruth Ann Willson MD;  Location: [facility] ENDOSCOPY;  Service: Gastroenterology;  Laterality: N/A;  pre: EPIGASTRIC PAIN, HX OF STOMACH ULCER  post: GASTRITIS    HYSTERECTOMY      UPPER GASTROINTESTINAL ENDOSCOPY  02/10/2016    normal-Ruth Ann Willson M.D.        Family History: family history includes Breast cancer in an other family member; Heart disease in her brother and father; Hyperlipidemia in her brother and father; Hypertension in her brother and father; Lung cancer in her brother; Throat cancer in her paternal uncle; Tongue cancer in her paternal grandfather. Otherwise pertinent FHx was reviewed and not pertinent to current issue.    Social History:  reports that she has never smoked. She has never been exposed to tobacco smoke. She has never used smokeless tobacco. She reports that she does not drink alcohol and does not use drugs.    Home Medications:  Fluticasone Furoate-Vilanterol, Fluticasone-Umeclidin-Vilant, Folic Acid, Hydrocortisone (Perianal), Vitamin D3, acetaminophen, albuterol sulfate HFA, amLODIPine, bisoprolol-hydrochlorothiazide, budesonide-formoterol, clopidogrel, escitalopram, fluticasone, furosemide, hydroCHLOROthiazide, montelukast, nitroglycerin, olmesartan, pantoprazole, potassium chloride, risperiDONE, and simvastatin    Allergies:  Allergies   Allergen Reactions    Peanut (Diagnostic) Itching       Objective    Objective     Vitals:   Temp:  [97.7 °F (36.5 °C)-99.3 °F (37.4 °C)] 99.3 °F (37.4 °C)  Heart Rate:  [55-80] 59  Resp:  [18-30] 18  BP: (121-153)/(64-92) 121/64  Flow (L/min) (Oxygen Therapy):  [5] 5    Physical Exam  Vitals and nursing note reviewed. Exam conducted with a chaperone present (Family x 1).   Constitutional:       General: She is not in acute distress.     Appearance: She is ill-appearing. She is not toxic-appearing or diaphoretic.   HENT:      Head: Normocephalic.      Nose: Nose normal.      Mouth/Throat:      Mouth: Mucous membranes are moist.      Pharynx: Oropharynx is clear.   Eyes:      General: No scleral icterus.        Right eye: No discharge.         Left eye: No discharge.      Extraocular Movements: Extraocular movements intact.      Conjunctiva/sclera: Conjunctivae normal.      Pupils: Pupils are  equal, round, and reactive to light.   Cardiovascular:      Rate and Rhythm: Normal rate and regular rhythm.      Pulses: Normal pulses.   Pulmonary:      Effort: Pulmonary effort is normal. No respiratory distress.      Breath sounds: No stridor. Wheezing (global) present. No rhonchi or rales.   Abdominal:      General: Bowel sounds are normal. There is no distension.      Palpations: Abdomen is soft.      Tenderness: There is no abdominal tenderness.   Musculoskeletal:         General: No swelling.      Cervical back: Neck supple.   Skin:     General: Skin is warm and dry.      Capillary Refill: Capillary refill takes less than 2 seconds.      Coloration: Skin is not jaundiced.   Neurological:      General: No focal deficit present.      Mental Status: She is alert. Mental status is at baseline.   Psychiatric:         Mood and Affect: Mood normal.         Behavior: Behavior normal.         Thought Content: Thought content normal.         Result Review    Result Review:  I have personally reviewed the results from the time of this admission to 1/21/2025 21:18 EST and agree with these findings:  [x]  Laboratory list / accordion  [x]  Microbiology  [x]  Radiology  [x]  EKG/Telemetry   []  Cardiology/Vascular   []  Pathology  [x]  Old records  []  Other:  Most notable findings include: CXR shows mild congestion and mild atelectasis or infiltrate at bases  RVP positive for RSV  ABG showed pO2 43.6  Trop and BNP wnl, EKG non-acute  CBC wnl, PCT wnl, LA wnl  Na+ 134, Cr 1.25      Assessment & Plan   Assessment / Plan     Brief Patient Summary:  Flaca Hassan is a 59 y.o. female who presents from urgent care center with acute hypoxia due to RSV bronchitis with asthma exacerbation.    Active Hospital Problems:  Active Hospital Problems    Diagnosis     **RSV bronchitis     Moderate persistent asthma with exacerbation     Stage 3a chronic kidney disease     Acute respiratory failure with hypoxia     LI (obstructive  sleep apnea)     Endometrial cancer     IFG (impaired fasting glucose)     Coronary artery disease involving native heart without angina pectoris     Hypertension     Hyperlipidemia      Plan:   Continue IV Solumedrol  Continue DuoNebs--scheduled and PRN  Continue supplemental O2  Consult her pulmonologist  Continue home meds  Telemetry monitored bed  Further orders to follow as suggested by evolving hospital course     D/w pt, family x 1, and Dr. Argueta (ER MD)    VTE Prophylaxis:  Mechanical VTE prophylaxis orders are signed & held.      CODE STATUS:    Code Status (Patient has no pulse and is not breathing): CPR (Attempt to Resuscitate)  Medical Interventions (Patient has pulse or is breathing): Full Support    Admission Status:  I believe this patient meets observation status.    Germán Patrick MD

## 2025-01-22 NOTE — PAYOR COMM NOTE
"Flaca Hassan (59 y.o. Female)        PLEASE SEE ATTACHED FOR INPT AUTH.  PT CAME IN ON  1/21 AS OBS AND CONVERTED TO INPT ON   1/22/25    REF#[id number]    PLEASE CALL  OR  [phone] WITH INPT AUTH.     THANK YOU    DEBRA CASANOVA LPN CCP   Date of Birth   1965    Social Security Number       Address   [address] [place] [address]    Home Phone   [phone]    MRN   [id number]       Restorationism   Amish    Marital Status                               Admission Date   1/21/25    Admission Type   Emergency    Admitting Provider   Germán Patrick MD    Attending Provider   Marco Antonio Esquivel MD    Department, Room/Bed   [address], N[address]/1       Discharge Date       Discharge Disposition       Discharge Destination                                 Attending Provider: Marco Antonio Esquivel MD    Allergies: Peanut (Diagnostic)    Isolation: Contact   Infection: RSV (01/21/25)   Code Status: CPR    Ht: 157.5 cm (62.01\")   Wt: 90.6 kg (199 lb 11.8 oz)    Admission Cmt: None   Principal Problem: RSV bronchitis [J20.5]                   Active Insurance as of 1/21/2025       Primary Coverage       Payor Plan Insurance Group Employer/Plan Group    [facility] [employer] [facility] [place] [facility] [place]        Payor Plan Address Payor Plan Phone Number Payor Plan Fax Number Effective Dates    [address]   6/1/2015 - None Entered    [facility] [phone]         Subscriber Name Subscriber Birth Date Member ID       FLACA HASSAN 1965 [id number]                     Emergency Contacts        (Rel.) Home Phone Work Phone Mobile Phone    Sonya Posey (Brother) [phone] -- --    Ines Guardado (Relative) [phone] -- --    Eleazar Cortez (Relative) [phone] -- [phone]              [place]: [facility] [id number]  Tax ID [id number]     History & Physical        Germán Patrick MD at 01/21/25 [address] [place] [address] "   HISTORY AND PHYSICAL    Patient Name: Flaca Hassan  : 1965  MRN: [id number]  Primary Care Physician:  Isidro Hernandez MD  Date of admission: 2025    Subjective  Subjective     Chief Complaint: SOA    History of Present Illness  Very pleasant 58yo woman sent from urgent care center with 4d h/o SOA and cough. RA sats 78% at urgent care. Required 5L/min to maintain sats >90% here. No ill contacts. No CP or palp. She has h/o asthma and LI with use of nocturnal O2 and BiPAP and is followed by Dr. Nichole. No F/C/NS. Tolerating diet. No swallowing issues. Voiding well. Compliant with home meds.       Review of Systems   Constitutional:  Positive for fatigue. Negative for appetite change, chills, diaphoresis and fever.   HENT:  Negative for congestion, nosebleeds, rhinorrhea, sore throat, trouble swallowing and voice change.    Eyes:  Negative for pain and visual disturbance.   Respiratory:  Positive for cough, shortness of breath and wheezing. Negative for choking, chest tightness and stridor.    Cardiovascular:  Negative for chest pain, palpitations and leg swelling.   Gastrointestinal:  Negative for abdominal pain, diarrhea, nausea and vomiting.   Endocrine: Negative for cold intolerance and heat intolerance.   Genitourinary:  Negative for decreased urine volume, difficulty urinating, dysuria and hematuria.   Musculoskeletal:  Negative for back pain and neck pain.   Skin:  Negative for color change and rash.   Allergic/Immunologic: Positive for food allergies (Peanut). Negative for environmental allergies.   Neurological:  Negative for seizures, syncope, speech difficulty and headaches.   Hematological:  Negative for adenopathy. Does not bruise/bleed easily.   Psychiatric/Behavioral:  Negative for behavioral problems, confusion and hallucinations.         Personal History     Past Medical History:   Diagnosis Date    Anemia     Asthma     Breast cyst     Cancer     Coronary artery disease     stent     Depression     Diverticulosis     H/O: GI bleed     recurrent    Hematuria     History of coronary angioplasty with insertion of stent     History of transfusion     no reaction    Hyperlipidemia     Hypertension     Incontinence of urine     wears pads    Irritable bowel syndrome     Malignant neoplasm of endometrium 02/2021    Melena     Obesity     Oxygen dependent     uses oxygen 2 prn when walking if SOB    Uterine cancer     UTI (urinary tract infection)        Past Surgical History:   Procedure Laterality Date    BREAST CYST EXCISION Left     CARDIAC CATHETERIZATION N/A 11/13/2017    Procedure: Left Heart Cath;  Surgeon: Imer Montaño MD;  Location: [facility] CATH INVASIVE LOCATION;  Service:     CARDIAC CATHETERIZATION N/A 11/13/2017    Procedure: Stent ROBERTO coronary;  Surgeon: Imer Montaño MD;  Location: [facility] CATH INVASIVE LOCATION;  Service:     COLONOSCOPY  02/11/2016    tics, NBIH,     COLONOSCOPY N/A 02/28/2022    Procedure: COLONOSCOPY TO CECUM and TI WITH APC CAUTERY TO RECTAL AVMs;  Surgeon: Ruth Ann Willson MD;  Location: [facility] ENDOSCOPY;  Service: Gastroenterology;  Laterality: N/A;  FAMILY HX COLON CA  --HEMORRHOIDS, BLEEDING RECTAL AVMs     D & C HYSTEROSCOPY ENDOMETRIAL ABLATION N/A 01/08/2021    Procedure: DILATATION AND CURETTAGE HYSTEROSCOPY ENDOMETRIAL  RESECTION;  Surgeon: Thu Blake MD;  [place] [facility] [place] [facility] [place]  Service: Obstetrics/Gynecology;  Laterality: N/A;    ENDOSCOPY N/A 03/03/2017    erythematous mucosa in stomach, duodenal ulcer , mild to moderate chronic active gastritis, positive for h pylori    ENDOSCOPY N/A 11/08/2021    Procedure: ESOPHAGOGASTRODUODENOSCOPY WITH BX'S;  Surgeon: Ruth Ann Willson MD;  Location: [facility] ENDOSCOPY;  Service: Gastroenterology;  Laterality: N/A;  pre: EPIGASTRIC PAIN, HX OF STOMACH ULCER  post: GASTRITIS    HYSTERECTOMY      UPPER GASTROINTESTINAL ENDOSCOPY  02/10/2016    normal-Ruth Ann Willson M.D.       Family History:  family history includes Breast cancer in an other family member; Heart disease in her brother and father; Hyperlipidemia in her brother and father; Hypertension in her brother and father; Lung cancer in her brother; Throat cancer in her paternal uncle; Tongue cancer in her paternal grandfather. Otherwise pertinent FHx was reviewed and not pertinent to current issue.    Social History:  reports that she has never smoked. She has never been exposed to tobacco smoke. She has never used smokeless tobacco. She reports that she does not drink alcohol and does not use drugs.    Home Medications:  Fluticasone Furoate-Vilanterol, Fluticasone-Umeclidin-Vilant, Folic Acid, Hydrocortisone (Perianal), Vitamin D3, acetaminophen, albuterol sulfate HFA, amLODIPine, bisoprolol-hydrochlorothiazide, budesonide-formoterol, clopidogrel, escitalopram, fluticasone, furosemide, hydroCHLOROthiazide, montelukast, nitroglycerin, olmesartan, pantoprazole, potassium chloride, risperiDONE, and simvastatin    Allergies:  Allergies   Allergen Reactions    Peanut (Diagnostic) Itching       Objective   Objective     Vitals:   Temp:  [97.7 °F (36.5 °C)-99.3 °F (37.4 °C)] 99.3 °F (37.4 °C)  Heart Rate:  [55-80] 59  Resp:  [18-30] 18  BP: (121-153)/(64-92) 121/64  Flow (L/min) (Oxygen Therapy):  [5] 5    Physical Exam  Vitals and nursing note reviewed. Exam conducted with a chaperone present (Family x 1).   Constitutional:       General: She is not in acute distress.     Appearance: She is ill-appearing. She is not toxic-appearing or diaphoretic.   HENT:      Head: Normocephalic.      Nose: Nose normal.      Mouth/Throat:      Mouth: Mucous membranes are moist.      Pharynx: Oropharynx is clear.   Eyes:      General: No scleral icterus.        Right eye: No discharge.         Left eye: No discharge.      Extraocular Movements: Extraocular movements intact.      Conjunctiva/sclera: Conjunctivae normal.      Pupils: Pupils are equal, round, and reactive  to light.   Cardiovascular:      Rate and Rhythm: Normal rate and regular rhythm.      Pulses: Normal pulses.   Pulmonary:      Effort: Pulmonary effort is normal. No respiratory distress.      Breath sounds: No stridor. Wheezing (global) present. No rhonchi or rales.   Abdominal:      General: Bowel sounds are normal. There is no distension.      Palpations: Abdomen is soft.      Tenderness: There is no abdominal tenderness.   Musculoskeletal:         General: No swelling.      Cervical back: Neck supple.   Skin:     General: Skin is warm and dry.      Capillary Refill: Capillary refill takes less than 2 seconds.      Coloration: Skin is not jaundiced.   Neurological:      General: No focal deficit present.      Mental Status: She is alert. Mental status is at baseline.   Psychiatric:         Mood and Affect: Mood normal.         Behavior: Behavior normal.         Thought Content: Thought content normal.         Result Review   Result Review:  I have personally reviewed the results from the time of this admission to 1/21/2025 21:18 EST and agree with these findings:  [x]  Laboratory list / accordion  [x]  Microbiology  [x]  Radiology  [x]  EKG/Telemetry   []  Cardiology/Vascular   []  Pathology  [x]  Old records  []  Other:  Most notable findings include: CXR shows mild congestion and mild atelectasis or infiltrate at bases  RVP positive for RSV  ABG showed pO2 43.6  Trop and BNP wnl, EKG non-acute  CBC wnl, PCT wnl, LA wnl  Na+ 134, Cr 1.25      Assessment & Plan  Assessment / Plan     Brief Patient Summary:  Flaca Hassan is a 59 y.o. female who presents from urgent care center with acute hypoxia due to RSV bronchitis with asthma exacerbation.    Active Hospital Problems:  Active Hospital Problems    Diagnosis     **RSV bronchitis     Moderate persistent asthma with exacerbation     Stage 3a chronic kidney disease     Acute respiratory failure with hypoxia     LI (obstructive sleep apnea)     Endometrial  cancer     IFG (impaired fasting glucose)     Coronary artery disease involving native heart without angina pectoris     Hypertension     Hyperlipidemia      Plan:   Continue IV Solumedrol  Continue DuoNebs--scheduled and PRN  Continue supplemental O2  Consult her pulmonologist  Continue home meds  Telemetry monitored bed  Further orders to follow as suggested by evolving hospital course     D/w pt, family x 1, and Dr. Argueta (ER MD)    VTE Prophylaxis:  Mechanical VTE prophylaxis orders are signed & held.      CODE STATUS:    Code Status (Patient has no pulse and is not breathing): CPR (Attempt to Resuscitate)  Medical Interventions (Patient has pulse or is breathing): Full Support    Admission Status:  I believe this patient meets observation status.    Germán Patrick MD    Electronically signed by Gremán Patrick MD at 01/21/25 2124          Emergency Department Notes          Kiran Argueta MD at 01/21/25 1827           EMERGENCY DEPARTMENT ENCOUNTER    Room Number:  13/13  PCP: Isidro Hernandez MD  Historian: Patient, nephew      HPI:  Chief Complaint: Shortness of breath  A complete HPI/ROS/PMH/PSH/SH/FH are unobtainable due to: None    Context: Flaca Hassan is a 59 y.o. female who presents to the ED via private vehicle from urgent care c/o acute increased shortness of breath and cough over the last 4 days.  History of asthma, on nighttime oxygen.  Does wear BiPAP at home as well.  Was 78% on room air urgent care.  No fevers.  No history of heart failure.  Does have a history of coronary disease.      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    IFG (impaired fasting glucose) 09/17/2020    Primary insomnia 09/17/2020    PMB (postmenopausal bleeding) 12/29/2020    Endometrial cancer 01/13/2021    Uncomplicated asthma 04/05/2021    LI (obstructive sleep apnea) 04/05/2021    Dyspepsia 10/27/2021    History of peptic ulcer disease 10/27/2021    Rectal pain 12/13/2021    Abdominal wall abscess 05/12/2022    History of ST elevation myocardial infarction (STEMI) 05/12/2022    Abdominal wall cellulitis 05/12/2022    Prediabetes 05/12/2022    Localized edema 02/21/2023     Resolved Ambulatory Problems     Diagnosis Date Noted    No Resolved Ambulatory Problems     Past Medical History:   Diagnosis Date    Anemia     Asthma     Breast cyst     Cancer     Coronary artery disease     Depression     Diverticulosis     H/O: GI bleed     Hematuria     History of coronary angioplasty with insertion of stent     History of transfusion     Incontinence of urine     Irritable bowel syndrome     Malignant neoplasm of endometrium 02/2021    Melena     Oxygen dependent     Uterine cancer     UTI (urinary tract infection)          PAST SURGICAL HISTORY  Past Surgical History:   Procedure Laterality Date    BREAST CYST EXCISION Left     CARDIAC CATHETERIZATION N/A 11/13/2017    Procedure: Left Heart Cath;  Surgeon: Imer Montaño MD;  Location: [facility] CATH INVASIVE LOCATION;  Service:     CARDIAC CATHETERIZATION N/A 11/13/2017    Procedure: Stent ROBERTO coronary;  Surgeon: Imer Montaño MD;  Location: [facility] CATH INVASIVE LOCATION;  Service:     COLONOSCOPY  02/11/2016    tics, NBIH,     COLONOSCOPY N/A 02/28/2022    Procedure: COLONOSCOPY TO CECUM and TI WITH APC CAUTERY TO RECTAL AVMs;  Surgeon: Ruth Ann Willson MD;  [facility];  Service: Gastroenterology;  Laterality: N/A;  FAMILY  HX COLON CA  --HEMORRHOIDS, BLEEDING RECTAL AVMs     D & C HYSTEROSCOPY ENDOMETRIAL ABLATION N/A 01/08/2021    Procedure: DILATATION AND CURETTAGE HYSTEROSCOPY ENDOMETRIAL  RESECTION;  Surgeon: Thu Blake MD;  Location: [facility] [place];  Service: Obstetrics/Gynecology;  Laterality: N/A;    ENDOSCOPY N/A 03/03/2017    erythematous mucosa in stomach, duodenal ulcer , mild to moderate chronic active gastritis, positive for h pylori    ENDOSCOPY N/A 11/08/2021    Procedure: ESOPHAGOGASTRODUODENOSCOPY WITH BX'S;  Surgeon: Ruth Ann Willson MD;  Location: [facility] ENDOSCOPY;  Service: Gastroenterology;  Laterality: N/A;  pre: EPIGASTRIC PAIN, HX OF STOMACH ULCER  post: GASTRITIS    HYSTERECTOMY      UPPER GASTROINTESTINAL ENDOSCOPY  02/10/2016    normal-Ruth Ann Willson M.D.         FAMILY HISTORY  Family History   Problem Relation Age of Onset    Breast cancer Other     Lung cancer Brother     Hypertension Brother     Hyperlipidemia Brother     Heart disease Brother     Throat cancer Paternal Uncle     Tongue cancer Paternal Grandfather     Hypertension Father     Hyperlipidemia Father     Heart disease Father     Malig Hyperthermia Neg Hx          SOCIAL HISTORY  Social History     Socioeconomic History    Marital status:     Years of education: High School   Tobacco Use    Smoking status: Never     Passive exposure: Never    Smokeless tobacco: Never   Vaping Use    Vaping status: Never Used   Substance and Sexual Activity    Alcohol use: No    Drug use: Never    Sexual activity: Defer         ALLERGIES  Peanut (diagnostic)        REVIEW OF SYSTEMS  Review of Systems     All systems reviewed and negative except for those discussed in HPI.       PHYSICAL EXAM    I have reviewed the triage vital signs and nursing notes.    ED Triage Vitals [01/21/25 1820]   Temp Heart Rate Resp BP SpO2   -- 80 (!) 30 -- (!) 84 %      Temp src Heart Rate Source Patient Position BP Location FiO2 (%)   -- Monitor -- -- --        Physical Exam  General: Mildly ill-appearing, nondiaphoretic  HEENT: Mucous membranes moist, atraumatic, EOMI  Neck: Full ROM  Pulm: Symmetric chest rise, mild tachypnea, diminished lung sounds bilaterally with scattered wheezes  Cardiovascular: Regular rate and rhythm, intact distal pulses, no significant peripheral edema  GI: Soft, nontender, nondistended, no rebound, no guarding, bowel sounds present  MSK: Full ROM, no deformity  Skin: Warm, dry  Neuro: Awake, alert, oriented x 4, GCS 15, moving all extremities, no focal deficits  Psych: Calm, cooperative      I wore an N95 mask, eye protection, and gloves used during this encounter.       LAB RESULTS  Recent Results (from the past 24 hours)   Respiratory Panel PCR w/COVID-19(SARS-CoV-2) TE/TONJA/ALVINA/PAD/COR/PEEWEE In-House, NP Swab in UTM/VTM, 2 HR TAT - Swab, Nasopharynx    Collection Time: 01/21/25  6:35 PM    Specimen: Nasopharynx; Swab   Result Value Ref Range    ADENOVIRUS, PCR Not Detected Not Detected    Coronavirus 229E Not Detected Not Detected    Coronavirus HKU1 Not Detected Not Detected    Coronavirus NL63 Not Detected Not Detected    Coronavirus OC43 Not Detected Not Detected    COVID19 Not Detected Not Detected - Ref. Range    Human Metapneumovirus Not Detected Not Detected    Human Rhinovirus/Enterovirus Not Detected Not Detected    Influenza A PCR Not Detected Not Detected    Influenza B PCR Not Detected Not Detected    Parainfluenza Virus 1 Not Detected Not Detected    Parainfluenza Virus 2 Not Detected Not Detected    Parainfluenza Virus 3 Not Detected Not Detected    Parainfluenza Virus 4 Not Detected Not Detected    RSV, PCR Detected (A) Not Detected    Bordetella pertussis pcr Not Detected Not Detected    Bordetella parapertussis PCR Not Detected Not Detected    Chlamydophila pneumoniae PCR Not Detected Not Detected    Mycoplasma pneumo by PCR Not Detected Not Detected   Comprehensive Metabolic Panel    Collection Time: 01/21/25  6:35 PM     Specimen: Blood   Result Value Ref Range    Glucose 116 (H) 65 - 99 mg/dL    BUN 19 6 - 20 mg/dL    Creatinine 1.25 (H) 0.57 - 1.00 mg/dL    Sodium 134 (L) 136 - 145 mmol/L    Potassium 3.6 3.5 - 5.2 mmol/L    Chloride 96 (L) 98 - 107 mmol/L    CO2 25.0 22.0 - 29.0 mmol/L    Calcium 9.1 8.6 - 10.5 mg/dL    Total Protein 8.6 (H) 6.0 - 8.5 g/dL    Albumin 4.3 3.5 - 5.2 g/dL    ALT (SGPT) 19 1 - 33 U/L    AST (SGOT) 26 1 - 32 U/L    Alkaline Phosphatase 115 39 - 117 U/L    Total Bilirubin 0.4 0.0 - 1.2 mg/dL    Globulin 4.3 gm/dL    A/G Ratio 1.0 g/dL    BUN/Creatinine Ratio 15.2 7.0 - 25.0    Anion Gap 13.0 5.0 - 15.0 mmol/L    eGFR 49.8 (L) >60.0 mL/min/1.73   BNP    Collection Time: 01/21/25  6:35 PM    Specimen: Blood   Result Value Ref Range    proBNP 124.0 0.0 - 900.0 pg/mL   High Sensitivity Troponin T    Collection Time: 01/21/25  6:35 PM    Specimen: Blood   Result Value Ref Range    HS Troponin T 10 <14 ng/L   Lactic Acid, Plasma    Collection Time: 01/21/25  6:35 PM    Specimen: Blood   Result Value Ref Range    Lactate 1.3 0.5 - 2.0 mmol/L   Procalcitonin    Collection Time: 01/21/25  6:35 PM    Specimen: Blood   Result Value Ref Range    Procalcitonin <0.02 0.00 - 0.25 ng/mL   Magnesium    Collection Time: 01/21/25  6:35 PM    Specimen: Blood   Result Value Ref Range    Magnesium 1.7 1.6 - 2.6 mg/dL   CBC Auto Differential    Collection Time: 01/21/25  6:35 PM    Specimen: Blood   Result Value Ref Range    WBC 6.98 3.40 - 10.80 10*3/mm3    RBC 5.25 3.77 - 5.28 10*6/mm3    Hemoglobin 14.3 12.0 - 15.9 g/dL    Hematocrit 44.4 34.0 - 46.6 %    MCV 84.6 79.0 - 97.0 fL    MCH 27.2 26.6 - 33.0 pg    MCHC 32.2 31.5 - 35.7 g/dL    RDW 14.8 12.3 - 15.4 %    RDW-SD 45.7 37.0 - 54.0 fl    MPV 10.5 6.0 - 12.0 fL    Platelets 247 140 - 450 10*3/mm3    Neutrophil % 67.5 42.7 - 76.0 %    Lymphocyte % 24.1 19.6 - 45.3 %    Monocyte % 7.4 5.0 - 12.0 %    Eosinophil % 0.4 0.3 - 6.2 %    Basophil % 0.3 0.0 - 1.5 %     Immature Grans % 0.3 0.0 - 0.5 %    Neutrophils, Absolute 4.71 1.70 - 7.00 10*3/mm3    Lymphocytes, Absolute 1.68 0.70 - 3.10 10*3/mm3    Monocytes, Absolute 0.52 0.10 - 0.90 10*3/mm3    Eosinophils, Absolute 0.03 0.00 - 0.40 10*3/mm3    Basophils, Absolute 0.02 0.00 - 0.20 10*3/mm3    Immature Grans, Absolute 0.02 0.00 - 0.05 10*3/mm3    nRBC 0.0 0.0 - 0.2 /100 WBC   ECG 12 Lead Dyspnea    Collection Time: 01/21/25  6:42 PM   Result Value Ref Range    QT Interval 543 ms    QTC Interval 547 ms   Blood Gas, Arterial -    Collection Time: 01/21/25  6:49 PM    Specimen: Arterial Blood   Result Value Ref Range    Site Right Radial     Blayne's Test Positive     pH, Arterial 7.378 7.350 - 7.450 pH units    pCO2, Arterial 43.1 35.0 - 45.0 mm Hg    pO2, Arterial 43.6 (C) 80.0 - 100.0 mm Hg    HCO3, Arterial 25.3 22.0 - 28.0 mmol/L    Base Excess, Arterial -0.1 (L) 0.0 - 2.0 mmol/L    O2 Saturation, Arterial 78.1 (L) 92.0 - 98.5 %    CO2 Content 26.7 23 - 27 mmol/L    Barometric Pressure for Blood Gas 763.0000 mmHg    Modality Cannula     Flow Rate 5.0000 lpm    Notified Who Ellie Beck     Read Back Yes     Notified Time      Hemodilution No    High Sensitivity Troponin T 1Hr    Collection Time: 01/21/25  7:39 PM    Specimen: Arm, Right; Blood   Result Value Ref Range    HS Troponin T 9 <14 ng/L    Troponin T Numeric Delta -1 Abnormal if >/=3 ng/L       Ordered the above labs and independently interpreted results. My findings will be discussed in the medical decision making section below        RADIOLOGY  XR Chest 1 View    Addendum Date: 1/21/2025    XR CHEST 1 VW-  HISTORY: Male who is 65 years-old, short of breath  TECHNIQUE: Frontal views of the chest  COMPARISON: 1/2/2025  FINDINGS: The heart size appears borderline. Pulmonary vasculature appears mildly congested mild atelectasis or infiltrate at the bases, follow-up suggested. There may be minimal pleural effusions. No pneumothorax. Right hemidiaphragm is elevated. No  acute osseous process.  IMPRESSION: As described.   This report was finalized on 1/21/2025 7:30 PM by Dr. Pan Alexandre M.D on Workstation: [id number]      Result Date: 1/21/2025  XR CHEST 1 VW-  HISTORY: Male who is 65 years-old, short of breath  TECHNIQUE: Frontal views of the chest  COMPARISON: 10/30/2018  FINDINGS: The heart size appears borderline. Pulmonary vasculature appears mildly congested mild atelectasis or infiltrate at the bases, follow-up suggested. There may be minimal pleural effusions. No pneumothorax. Right hemidiaphragm is elevated. No acute osseous process.      As described.  This report was finalized on 1/21/2025 7:09 PM by Dr. Pan Alexandre M.D on Workstation: [id number]       Ordered the above noted radiological studies.  Independently interpreted by me and my independent review of findings can be found in the ED Course.  See dictation for official radiology interpretation.      PROCEDURES    Procedures      EKG - Per my independent interpretation at 1848:    EKG Time: 1842  Rhythm/Rate: Sinus rhythm with rate of 61  Normal axis  Prolonged QTc of 547  Borderline nonspecific T wave abnormalities, no acute ischemic changes  No STEMI       No emergent changes as compared to January 8, 2021      MEDICATIONS GIVEN IN ER    Medications   ipratropium-albuterol (DUO-NEB) nebulizer solution 3 mL (3 mL Nebulization Given 1/21/25 1850)   methylPREDNISolone sodium succinate (SOLU-Medrol) injection 125 mg (125 mg Intravenous Given 1/21/25 1959)         PROGRESS, DATA ANALYSIS, CONSULTS, AND MEDICAL DECISION MAKING    Please note that this section constitutes my independent interpretation of clinical data including lab results, radiology, EKG's.  This constitutes my independent professional opinion regarding differential diagnosis and management of this patient.  It may include any factors such as history from outside sources, review of external records, social determinants of health, management of  medications, response to those treatments, and discussions with other providers.    Differential Diagnosis and Plan: Initial concern for viral process, pneumonia, asthma exacerbation, heart failure, renal failure, electrolyte abnormalities, anemia, arrhythmia, among others.  Plan for labs, supportive care with oxygen and possibly BiPAP, breathing treatment, potentially steroids or antibiotics, and reevaluate.    Additional sources:  - Discussed/ obtained information from independent historians:       - (Social Determinants of Health): None     - Shared decision making:  Patient and nephew at bedside fully updated on and in agreement with the course and plan moving forward    ED Course as of 01/21/25 2014 Tue Jan 21, 2025 1902 WBC: 6.98 [DC]   1902 Hemoglobin: 14.3 [DC]   1920 proBNP: 124.0 [DC]   1920 Procalcitonin: <0.02 [DC]   1920 HS Troponin T: 10 [DC]   1920 Magnesium: 1.7 [DC]   1920 Lactate: 1.3 [DC]   1920 Glucose(!): 116 [DC]   1920 BUN: 19 [DC]   1920 Creatinine(!): 1.25  1.24 two months ago [DC]   1920 Sodium(!): 134 [DC]   1920 Potassium: 3.6 [DC]   1928 XR Chest 1 View  Per my independent interpretation of the chest x-ray, no overtly evident pneumothorax, either right elevated hemidiaphragm or right-sided effusion [DC]   1945 XR Chest 1 View  Radiology report reviewed, maybe minimal pleural effusions, congestion with mild atelectasis or infiltrates [DC]   1957 RSV, PCR(!): Detected [DC]   1957 IV steroids have been ordered, improving oxygenation, RSV is likely source and trigger for illness today with underlying asthma exacerbation patient and family have been fully updated the findings and need for hospital stay for further monitoring. [DC]   2013 Discussed with Dr. Patrick [facility], discussed patient's clinical course and findings today, treatment modalities, and need for hospitalization. [DC]      ED Course User Index  [DC] Kiran Argueta MD       Hospitalization Considered?:  yes    Orders Placed  During This Visit:  Orders Placed This Encounter   Procedures    Respiratory Panel PCR w/COVID-19(SARS-CoV-2) TE/TONJA/ALVINA/PAD/COR/PEEWEE In-House, NP Swab in UTM/VTM, 2 HR TAT - Swab, Nasopharynx    XR Chest 1 View    Comprehensive Metabolic Panel    BNP    High Sensitivity Troponin T    Blood Gas, Arterial -    Lactic Acid, Plasma    Procalcitonin    Magnesium    CBC Auto Differential    Blood Gas, Arterial -    High Sensitivity Troponin T 1Hr    [facility] (on-call MD unless specified) Details    ECG 12 Lead Dyspnea    Initiate Observation Status    CBC & Differential       Additional orders considered but not placed:      Independent interpretation of labs, radiology studies, and discussions with consultants: See ED Course        AS OF 20:14 EST VITALS:    BP - 133/64  HR - 64  TEMP - 99.3 °F (37.4 °C)  02 SATS - 97%          DIAGNOSIS  Final diagnoses:   RSV bronchitis   Exacerbation of asthma, unspecified asthma severity, unspecified whether persistent   Acute hypoxic respiratory failure         DISPOSITION  ED Disposition       ED Disposition   Decision to Admit    Condition   --    Comment   Level of Care: Telemetry [5]   Diagnosis: Hypoxia [776504]   Admitting Physician: KALPANA CORONADO [[id number]]   Attending Physician: KALPANA CORONADO [[id number]]   Is patient appropriate for Inpatient Observation Unit?: No [0]                  Please note that portions of this document were completed with a voice recognition program.    Note Disclaimer: At [facility], we believe that sharing information builds trust and better relationships. You are receiving this note because you recently visited [facility]. It is possible you will see health information before a provider has talked with you about it. This kind of information can be easy to misunderstand. To help you fully understand what it means for your health, we urge you to discuss this note with your provider.                       Kiran Argueta MD  01/21/25  2033      Electronically signed by Kiran Argueta MD at 01/21/25 2033       Shantel Mojica, RN at 01/21/25 1819          Pt states that she has had a cough and SOA which she has been treated for. Pt is on O2 at night usually. Pt was 78% on RA. Placed on 5L NC and is still only 84%.     Electronically signed by Shantel Mojica, RN at 01/21/25 1820       Oxygen Therapy (since admission)       Date/Time SpO2 Device (Oxygen Therapy) Flow (L/min) (Oxygen Therapy) Oxygen Concentration (%) ETCO2 (mmHg)    01/22/25 1402 93 nasal cannula;humidified 4 -- --    01/22/25 1356 98 nasal cannula;humidified 5 -- --    01/22/25 1333 -- -- 5 -- --    01/22/25 1327 -- -- 7 -- --    01/22/25 1316 -- nasal cannula 9 -- --    01/22/25 1230 -- nasal cannula 5 -- --    01/22/25 1032 94 nasal cannula 3 -- --    01/22/25 0918 -- nasal cannula 3 -- --    01/22/25 0750 93 nasal cannula 3 -- --    01/22/25 0732 100 -- -- -- --    01/22/25 0726 94 nasal cannula 3 -- --    01/22/25 0353 89 -- -- -- --    01/22/25 0316 -- nasal cannula 3 -- --    01/22/25 0126 93 nasal cannula 3 -- --    01/22/25 0120 -- nasal cannula 3 -- --    01/22/25 0119 93 -- 3 -- --    01/22/25 0053 94 -- 4 -- --    01/22/25 0045 93 -- 4 -- --    01/22/25 0043 94 -- 5 -- --    01/22/25 0007 94 -- -- -- --    01/21/25 2302 97 nasal cannula 5 -- --    01/21/25 21:55:55 98 nasal cannula 5 -- --    01/21/25 2144 93 nasal cannula;humidified 5 40 --    01/21/25 2141 93 -- -- -- --    01/21/25 2042 95 -- -- -- --    01/21/25 20:00:04 97 nasal cannula 5 -- --    01/21/25 19:34:29 97 nasal cannula 5 -- --    01/21/25 1910 100 -- -- -- --    01/21/25 1857 100 -- -- -- --    01/21/25 1850 97 nasal cannula 5 -- --    01/21/25 1842 93 -- -- -- --    01/21/25 1838 -- nasal cannula 5 -- --    01/21/25 1837 94 -- -- -- --    01/21/25 1820 84 nasal cannula 5 -- --                Intake & Output (last 2 days)         01/20 0701 01/21 0700 01/21 0701 01/22 0700 01/22 0701 01/23  0700    P.O.  30     Total Intake  30     Net  +30            Urine Unmeasured Occurrence  1 x 2 x    Stool Unmeasured Occurrence   1 x          Lines, Drains & Airways       Active LDAs       Name Placement date Placement time Site Days    Peripheral IV 01/21/25 Left Antecubital 01/21/25  --  Antecubital  1                  Medication Administration Report for Flaca Hassan as of 1/21/25 through 1/22/25     Legend:    Given Hold Not Given Due Canceled Entry Other Actions    Time Time (Time) Time Time-Action         Discontinued     Completed     Future     MAR Hold     Linked             Medications 01/21/25 01/22/25      acetaminophen (TYLENOL) tablet 650 mg  Dose: 650 mg  Freq: Every 4 Hours PRN Route: PO  PRN Reason: Mild Pain  Start: 01/21/25 2242     Admin Instructions:   If given for fever, use fever parameter: fever greater than 100.4 °F  Based on patient request - if ordered for moderate or severe pain, provider allows for administration of a medication prescribed for a lower pain scale.    Do not exceed 4 grams of acetaminophen in a 24 hr period. Max dose of 2gm for AST/ALT greater than 120 units/L.    If given for pain, use the following pain scale:   Mild Pain = Pain Score of 1-3, CPOT 1-2  Moderate Pain = Pain Score of 4-6, CPOT 3-4  Severe Pain = Pain Score of 7-10, CPOT 5-8          Or   acetaminophen (TYLENOL) 160 MG/5ML oral solution 650 mg  Dose: 650 mg  Freq: Every 4 Hours PRN Route: PO  PRN Reason: Mild Pain  Start: 01/21/25 2242     Admin Instructions:   If given for fever, use fever parameter: fever greater than 100.4 °F  Based on patient request - if ordered for moderate or severe pain, provider allows for administration of a medication prescribed for a lower pain scale.    Do not exceed 4 grams of acetaminophen in a 24 hr period. Max dose of 2gm for AST/ALT greater than 120 units/L.    If given for pain, use the following pain scale:   Mild Pain = Pain Score of 1-3, CPOT 1-2  Moderate  Pain = Pain Score of 4-6, CPOT 3-4  Severe Pain = Pain Score of 7-10, CPOT 5-8          Or   acetaminophen (TYLENOL) suppository 650 mg  Dose: 650 mg  Freq: Every 4 Hours PRN Route: RE  PRN Reason: Mild Pain  Start: 01/21/25 2242     Admin Instructions:   If given for fever, use fever parameter: fever greater than 100.4 °F  Based on patient request - if ordered for moderate or severe pain, provider allows for administration of a medication prescribed for a lower pain scale.    Do not exceed 4 grams of acetaminophen in a 24 hr period. Max dose of 2gm for AST/ALT greater than 120 units/L.    If given for pain, use the following pain scale:   Mild Pain = Pain Score of 1-3, CPOT 1-2  Moderate Pain = Pain Score of 4-6, CPOT 3-4  Severe Pain = Pain Score of 7-10, CPOT 5-8          benzonatate (TESSALON) capsule 200 mg  Dose: 200 mg  Freq: 3 Times Daily PRN Route: PO  PRN Reason: Cough  Start: 01/22/25 0047     Admin Instructions:   Do not crush or chew the capsules or tablets. The drug may not work as designed if the capsule or tablet is crushed or chewed. Swallow whole.  Swallow whole.  Do not crush, chew, or open capsule.       0052-Given     0913-Given              sennosides-docusate (PERICOLACE) 8.6-50 MG per tablet 2 tablet  Dose: 2 tablet  Freq: 2 Times Daily Route: PO  Start: 01/21/25 2330     Admin Instructions:   HOLD MEDICATION IF PATIENT HAS HAD BOWEL MOVEMENT. Start bowel management regimen if patient has not had a bowel movement after 12 hours.       (0038)-Not Given     (0917)-Not Given     2100            And   polyethylene glycol (MIRALAX) packet 17 g  Dose: 17 g  Freq: Daily PRN Route: PO  PRN Reason: Constipation  PRN Comment: Use if senna-docusate is ineffective  Start: 01/21/25 2242     Admin Instructions:   Use if no bowel movement after 12 hours. Mix in 6-8 ounces of water.  Use 4-8 ounces of water, tea, or juice for each 17 gram dose.          And   bisacodyl (DULCOLAX) EC tablet 5 mg  Dose: 5  mg  Freq: Daily PRN Route: PO  PRN Reason: Constipation  PRN Comment: Use if polyethylene glycol is ineffective  Start: 01/21/25 2242     Admin Instructions:   Use if no bowel movement after 12 hours.  Swallow whole. Do not crush, split, or chew tablet.          And   bisacodyl (DULCOLAX) suppository 10 mg  Dose: 10 mg  Freq: Daily PRN Route: RE  PRN Reason: Constipation  PRN Comment: Use if bisacodyl oral is ineffective  Start: 01/21/25 2242     Admin Instructions:   Use if no bowel movement after 12 hours.  Hold for diarrhea          ipratropium-albuterol (DUO-NEB) nebulizer solution 3 mL  Dose: 3 mL  Freq: Every 4 Hours PRN Route: NEBULIZATION  PRN Reasons: Shortness of Air,Wheezing  Start: 01/21/25 2122     Admin Instructions:   Include Respiratory Treatment Education          nitroglycerin (NITROSTAT) SL tablet 0.4 mg  Dose: 0.4 mg  Freq: Every 5 Minutes PRN Route: SL  PRN Reason: Chest Pain  PRN Comment: Only if SBP Greater Than 100  Start: 01/21/25 2242     Admin Instructions:   If Pain Unrelieved After 3 Doses Notify MD  May administer up to 3 doses per episode.          ondansetron (ZOFRAN) injection 4 mg  Dose: 4 mg  Freq: Every 6 Hours PRN Route: IV  PRN Reasons: Nausea,Vomiting  Start: 01/21/25 2242     Admin Instructions:   If BOTH ondansetron (ZOFRAN) and promethazine (PHENERGAN) are ordered use ondansetron first and THEN promethazine IF ondansetron is ineffective.          sodium chloride 0.9 % flush 10 mL  Dose: 10 mL  Freq: As Needed Route: IV  PRN Reason: Line Care  Start: 01/21/25 2242          sodium chloride 0.9 % infusion 40 mL  Dose: 40 mL  Freq: As Needed Route: IV  PRN Reason: Line Care  Start: 01/21/25 2242     Admin Instructions:   Following administration of an IV intermittent medication, flush line with 40mL NS at 100mL/hr.                           Consult Notes (all)        Giorgio Deng MD at 01/22/25 0805        Consult Orders    1. Inpatient Pulmonology Consult [[id number]]  ordered by Germán Patrick MD at 01/21/25 2111                       Patient Identification:  Flaca Hassan  59 y.o.  female  1965  [id number]          LOS 0    Requesting physician: Dr. Esquivel    Reason for Consultation: Shortness of breath with wheezing    History of Present Illness:     Consultation for shortness of breath and asthma exacerbation.  Positive for RSV.  Complains of cough with chest tightness and wheezing.  Cough is nonproductive.  Symptoms have been going on for about 4 days.  Noted her saturation in urgent care 78% prior to admission.  She also has history of sleep apnea and uses BiPAP with her oxygen.    Past Medical History:  Past Medical History:   Diagnosis Date    Anemia     Asthma     Breast cyst     Cancer     Coronary artery disease     stent    Depression     Diverticulosis     H/O: GI bleed     recurrent    Hematuria     History of coronary angioplasty with insertion of stent     History of transfusion     no reaction    Hyperlipidemia     Hypertension     Incontinence of urine     wears pads    Irritable bowel syndrome     Malignant neoplasm of endometrium 02/2021    Melena     Obesity     Oxygen dependent     uses oxygen 2 prn when walking if SOB    Uterine cancer     UTI (urinary tract infection)        Past Surgical History:  Past Surgical History:   Procedure Laterality Date    BREAST CYST EXCISION Left     CARDIAC CATHETERIZATION N/A 11/13/2017    Procedure: Left Heart Cath;  Surgeon: Imer Montaño MD;  Location: [facility] CATH INVASIVE LOCATION;  Service:     CARDIAC CATHETERIZATION N/A 11/13/2017    Procedure: Stent ROBERTO coronary;  Surgeon: Imer Montaño MD;  Location: [facility] CATH INVASIVE LOCATION;  Service:     COLONOSCOPY  02/11/2016    mary, VICTOR MANUEL,     COLONOSCOPY N/A 02/28/2022    Procedure: COLONOSCOPY TO CECUM and TI WITH APC CAUTERY TO RECTAL AVMs;  Surgeon: Ruth Ann Willson MD;  Location: [facility] ENDOSCOPY;  Service: Gastroenterology;  Laterality:  N/A;  FAMILY HX COLON CA  --HEMORRHOIDS, BLEEDING RECTAL AVMs     D & C HYSTEROSCOPY ENDOMETRIAL ABLATION N/A 01/08/2021    Procedure: DILATATION AND CURETTAGE HYSTEROSCOPY ENDOMETRIAL  RESECTION;  Surgeon: Thu Blake MD;  [place] [facility] [place] [facility]  Service: Obstetrics/Gynecology;  Laterality: N/A;    ENDOSCOPY N/A 03/03/2017    erythematous mucosa in stomach, duodenal ulcer , mild to moderate chronic active gastritis, positive for h pylori    ENDOSCOPY N/A 11/08/2021    Procedure: ESOPHAGOGASTRODUODENOSCOPY WITH BX'S;  Surgeon: Ruth Ann Willson MD;  Location: [facility] ENDOSCOPY;  Service: Gastroenterology;  Laterality: N/A;  pre: EPIGASTRIC PAIN, HX OF STOMACH ULCER  post: GASTRITIS    HYSTERECTOMY      UPPER GASTROINTESTINAL ENDOSCOPY  02/10/2016    normal-Ruth Ann Willson M.D.        Home Meds:  Medications Prior to Admission   Medication Sig Dispense Refill Last Dose/Taking    amLODIPine (NORVASC) 10 MG tablet Take 1 tablet by mouth Daily. 90 tablet 4 1/21/2025    bisoprolol-hydrochlorothiazide (ZIAC) 5-6.25 MG per tablet Take 1 tablet by mouth Daily. 90 tablet 4 1/21/2025    clopidogrel (PLAVIX) 75 MG tablet TAKE 1 TABLET BY MOUTH DAILY 90 tablet 3 1/21/2025    escitalopram (LEXAPRO) 20 MG tablet Take 1 tablet by mouth Every Night.   1/21/2025    fluticasone (FLONASE) 50 MCG/ACT nasal spray Administer 2 sprays into the nostril(s) as directed by provider Daily.   1/21/2025    furosemide (LASIX) 40 MG tablet Take 1 tablet by mouth Daily. 90 tablet 3 1/21/2025    montelukast (SINGULAIR) 10 MG tablet Take 1 tablet by mouth Daily. 90 tablet 3 1/21/2025    olmesartan (BENICAR) 40 MG tablet Take 1 tablet by mouth Daily. 90 tablet 4 1/21/2025    pantoprazole (PROTONIX) 40 MG EC tablet TAKE 1 TABLET BY MOUTH DAILY 90 tablet 1 1/21/2025    potassium chloride (MICRO-K) 10 MEQ CR capsule TAKE 1 CAPSULE BY MOUTH DAILY 90 capsule 1 1/21/2025    Proctozone-HC 2.5 % rectal cream Insert 1 application into the rectum As Needed  (28). 28 g 3 1/21/2025    risperiDONE (risperDAL) 0.5 MG tablet Take 1 tablet by mouth Daily.   1/21/2025    simvastatin (ZOCOR) 20 MG tablet Take 1 tablet by mouth Every Evening. 90 tablet 4 1/21/2025    Trelegy Ellipta 200-62.5-25 MCG/ACT inhaler Inhale 1 puff Daily.   1/21/2025    VENTOLIN  (90 Base) MCG/ACT inhaler Inhale 2 puffs Every 4 (Four) Hours As Needed for Wheezing or Shortness of Air.  6 1/21/2025    acetaminophen (TYLENOL) 500 MG tablet Take 2 tablets by mouth Every 6 (Six) Hours As Needed for Mild Pain.       Cholecalciferol (VITAMIN D3) 2000 units tablet Take 1 tablet by mouth Daily.  1     Folic Acid 20 MG capsule Take 1 capsule by mouth.       nitroglycerin (NITROSTAT) 0.4 MG SL tablet 1 under the tongue as needed for angina, may repeat q5mins for up three doses (Patient taking differently: Place 1 tablet under the tongue Every 5 (Five) Minutes As Needed for Chest Pain. 1 under the tongue as needed for angina, may repeat q5mins for up three doses) 25 tablet 3          Allergies:  Allergies   Allergen Reactions    Peanut (Diagnostic) Itching       Social History:   Social History     Socioeconomic History    Marital status:     Years of education: High School   Tobacco Use    Smoking status: Never     Passive exposure: Never    Smokeless tobacco: Never   Vaping Use    Vaping status: Never Used   Substance and Sexual Activity    Alcohol use: No    Drug use: Never    Sexual activity: Defer       Family History:  Family History   Problem Relation Age of Onset    Breast cancer Other     Lung cancer Brother     Hypertension Brother     Hyperlipidemia Brother     Heart disease Brother     Throat cancer Paternal Uncle     Tongue cancer Paternal Grandfather     Hypertension Father     Hyperlipidemia Father     Heart disease Father     Malig Hyperthermia Neg Hx        Review of Systems:  Denies fevers or chills  Denies nausea or vomiting  No new vision or hearing changes  No chest  pain  Nonproductive cough and shortness of breath with chest tightness  No diarrhea, hematemesis or hematochezia, no dysuria or frequency  No musculoskeletal complaints  No heat or cold intolerance  No skin rashes  No dizziness or confusion.  No seizure activity  No new anxiety or depression  12 system review of systems performed and all else negative    Objective:    PHYSICAL EXAM:    /57 (BP Location: Right arm, Patient Position: Sitting)   Pulse 63   Temp 97.7 °F (36.5 °C) (Oral)   Resp 18   LMP  (LMP Unknown)   SpO2 100%  There is no height or weight on file to calculate BMI. 100%      GENERAL APPEARANCE:   Well developed  Well nourished  Acutely ill-appearing  EYES:    PERRL                                                                           Conjunctivae normal  Sclerae nonicteric.  HENT:   Atraumatic, normocephalic  External ears and nose normal  Moist mucous membranes and no ulcers  NECK:  Thyroid not enlarged  Trachea midline   RESPIRATORY:    Nonlabored breathing   Normal breath sounds  No rales. +wheezing  No dullness  CARDIOVASCULAR:    RRR  Normal S1, S2  No murmur  Lower extremity edema: none    GI:   Bowel sounds normal  Abdomen soft , nondistended, nontender  No abdominal masses  MUSCULOSKELETAL:  Normal movement of extremities  No tenderness, no deformities  No clubbing or cyanosis   Skin:    No visible rashes  No palpable nodules  Cap refill normal.  No mottling.   PSYCHIATRIC:  Speech and behavior appropriate  Normal mood and affect  Oriented to person, place and time  NEUROLOGIC:  Cranial nerves II through XII grossly intact.  Sensation intact.      Lab Review:   Results from last 7 days   Lab Units 01/22/25  0505 01/21/25  1835   WBC 10*3/mm3 3.48 6.98   HEMOGLOBIN g/dL 13.2 14.3   HEMATOCRIT % 42.5 44.4   PLATELETS 10*3/mm3 227 247     Results from last 7 days   Lab Units 01/22/25  0505 01/21/25  1835   SODIUM mmol/L 134* 134*   POTASSIUM mmol/L 3.7 3.6   CHLORIDE mmol/L 101 96*    CO2 mmol/L 20.9* 25.0   BUN mg/dL 18 19   CREATININE mg/dL 1.25* 1.25*   CALCIUM mg/dL 8.8 9.1   BILIRUBIN mg/dL 0.3 0.4   ALK PHOS U/L 97 115   ALT (SGPT) U/L 17 19   AST (SGOT) U/L 20 26   GLUCOSE mg/dL 245* 116*     Results from last 7 days   Lab Units 01/21/25  1849   PH, ARTERIAL pH units 7.378   PO2 ART mm Hg 43.6*   PCO2, ARTERIAL mm Hg 43.1   HCO3 ART mmol/L 25.3        Microbiology reviewed:  RVP positive for RSV           Imaging reviewed  chest X-ray 1/21 reviewed: Mildly congested pulmonary vasculature.  Small pleural effusions.       Assessment:  Acute RSV bronchitis  Acute exacerbation of moderate persistent asthma  Acute hypoxemic respiratory failure  LI  Endometrial cancer  Stage IIIa CKD  CAD  Hypertension/hyperlipidemia        Recommendations:  Scheduled and as needed bronchodilators and steroid for asthma exacerbation.  Treatment for viral process is supportive.  Wean oxygen as able.  Positive airway pressure for sleep apnea at night and bleed and oxygen as needed.      Thank you for allowing me to participate in the care of this patient.  I will continue to follow along with you.      Giorgio Deng MD  [facility], [facility]  Pulmonary and Critical Care Medicine    1/22/2025  08:23 EST    Electronically signed by Giorgio Deng MD at 01/22/25 6075

## 2025-01-22 NOTE — ED NOTES
Nursing report ED to floor  Flaca Hassan  59 y.o.  female    HPI :  HPI  Stated Reason for Visit: SOA    Chief Complaint  Chief Complaint   Patient presents with    Shortness of Breath       Admitting doctor:   Germán Patrick MD    Admitting diagnosis:   The primary encounter diagnosis was RSV bronchitis. Diagnoses of Exacerbation of asthma, unspecified asthma severity, unspecified whether persistent and Acute hypoxic respiratory failure were also pertinent to this visit.    Code status:   Current Code Status       Date Active Code Status Order ID Comments User Context       Prior            Allergies:   Peanut (diagnostic)    Isolation:   Enhanced Droplet/Contact     Intake and Output  No intake or output data in the 24 hours ending 01/21/25 2017    Weight:   There were no vitals filed for this visit.    Most recent vitals:   Vitals:    01/21/25 1857 01/21/25 1910 01/21/25 1934 01/21/25 2000   BP:  142/76 142/76 133/64   BP Location:   Left arm Left arm   Patient Position:   Lying Sitting   Pulse: 61 61 60 64   Resp:  26 20 18   Temp:       SpO2: 100% 100% 97% 97%       Active LDAs/IV Access:   Lines, Drains & Airways       Active LDAs       Name Placement date Placement time Site Days    Peripheral IV 01/21/25 Left Antecubital 01/21/25  --  Antecubital  less than 1                    Labs (abnormal labs have a star):   Labs Reviewed   RESPIRATORY PANEL PCR W/ COVID-19 (SARS-COV-2), NP SWAB IN UTM/VTP, 2 HR TAT - Abnormal; Notable for the following components:       Result Value    RSV, PCR Detected (*)     All other components within normal limits    Narrative:     In the setting of a positive respiratory panel with a viral infection PLUS a negative procalcitonin without other underlying concern for bacterial infection, consider observing off antibiotics or discontinuation of antibiotics and continue supportive care. If the respiratory panel is positive for atypical bacterial infection (Bordetella pertussis,  Chlamydophila pneumoniae, or Mycoplasma pneumoniae), consider antibiotic de-escalation to target atypical bacterial infection.   COMPREHENSIVE METABOLIC PANEL - Abnormal; Notable for the following components:    Glucose 116 (*)     Creatinine 1.25 (*)     Sodium 134 (*)     Chloride 96 (*)     Total Protein 8.6 (*)     eGFR 49.8 (*)     All other components within normal limits    Narrative:     GFR Categories in Chronic Kidney Disease (CKD)      GFR Category          GFR (mL/min/1.73)    Interpretation  G1                     90 or greater         Normal or high (1)  G2                      60-89                Mild decrease (1)  G3a                   45-59                Mild to moderate decrease  G3b                   30-44                Moderate to severe decrease  G4                    15-29                Severe decrease  G5                    14 or less           Kidney failure          (1)In the absence of evidence of kidney disease, neither GFR category G1 or G2 fulfill the criteria for CKD.    eGFR calculation 2021 CKD-EPI creatinine equation, which does not include race as a factor   BLOOD GAS, ARTERIAL - Abnormal; Notable for the following components:    pO2, Arterial 43.6 (*)     Base Excess, Arterial -0.1 (*)     O2 Saturation, Arterial 78.1 (*)     All other components within normal limits   BNP (IN-HOUSE) - Normal    Narrative:     This assay is used as an aid in the diagnosis of individuals suspected of having heart failure. It can be used as an aid in the diagnosis of acute decompensated heart failure (ADHF) in patients presenting with signs and symptoms of ADHF to the emergency department (ED). In addition, NT-proBNP of <300 pg/mL indicates ADHF is not likely.    Age Range Result Interpretation  NT-proBNP Concentration (pg/mL:      <50             Positive            >450                   Gray                 300-450                    Negative             <300    50-75           Positive         "    >900                  Gray                300-900                  Negative            <300      >75             Positive            >1800                  Gray                300-1800                  Negative            <300   TROPONIN - Normal    Narrative:     High Sensitive Troponin T Reference Range:  <14.0 ng/L- Negative Female for AMI  <22.0 ng/L- Negative Male for AMI  >=14 - Abnormal Female indicating possible myocardial injury.  >=22 - Abnormal Male indicating possible myocardial injury.   Clinicians would have to utilize clinical acumen, EKG, Troponin, and serial changes to determine if it is an Acute Myocardial Infarction or myocardial injury due to an underlying chronic condition.        LACTIC ACID, PLASMA - Normal   PROCALCITONIN - Normal    Narrative:     As a Marker for Sepsis (Non-Neonates):    1. <0.5 ng/mL represents a low risk of severe sepsis and/or septic shock.  2. >2 ng/mL represents a high risk of severe sepsis and/or septic shock.    As a Marker for Lower Respiratory Tract Infections that require antibiotic therapy:    PCT on Admission    Antibiotic Therapy       6-12 Hrs later    >0.5                Strongly Recommended  >0.25 - <0.5        Recommended   0.1 - 0.25          Discouraged              Remeasure/reassess PCT  <0.1                Strongly Discouraged     Remeasure/reassess PCT    As 28 day mortality risk marker: \"Change in Procalcitonin Result\" (>80% or <=80%) if Day 0 (or Day 1) and Day 4 values are available. Refer to http://www.[facility]-pct-calculator.com    Change in PCT <=80%  A decrease of PCT levels below or equal to 80% defines a positive change in PCT test result representing a higher risk for 28-day all-cause mortality of patients diagnosed with severe sepsis for septic shock.    Change in PCT >80%  A decrease of PCT levels of more than 80% defines a negative change in PCT result representing a lower risk for 28-day all-cause mortality of patients diagnosed with " severe sepsis or septic shock.      MAGNESIUM - Normal   CBC WITH AUTO DIFFERENTIAL - Normal   HIGH SENSITIVITIY TROPONIN T 1HR - Normal    Narrative:     High Sensitive Troponin T Reference Range:  <14.0 ng/L- Negative Female for AMI  <22.0 ng/L- Negative Male for AMI  >=14 - Abnormal Female indicating possible myocardial injury.  >=22 - Abnormal Male indicating possible myocardial injury.   Clinicians would have to utilize clinical acumen, EKG, Troponin, and serial changes to determine if it is an Acute Myocardial Infarction or myocardial injury due to an underlying chronic condition.        BLOOD GAS, ARTERIAL   CBC AND DIFFERENTIAL    Narrative:     The following orders were created for panel order CBC & Differential.  Procedure                               Abnormality         Status                     ---------                               -----------         ------                     CBC Auto Differential[[id number]]        Normal              Final result                 Please view results for these tests on the individual orders.       EKG:   ECG 12 Lead Dyspnea   Preliminary Result   HEART RATE=61  bpm   RR Ebghlfky=728  ms   AK Zyhlzhbe=994  ms   P Horizontal Axis=-15  deg   P Front Axis=54  deg   QRSD Interval=86  ms   QT Qohusihw=866  ms   YEpC=874  ms   QRS Axis=34  deg   T Wave Axis=114  deg   - ABNORMAL ECG -   Sinus rhythm   Borderline T wave abnormalities   Prolonged QT interval   Date and Time of Study:2025-01-21 18:42:09          Meds given in ED:   Medications   ipratropium-albuterol (DUO-NEB) nebulizer solution 3 mL (3 mL Nebulization Given 1/21/25 1850)   methylPREDNISolone sodium succinate (SOLU-Medrol) injection 125 mg (125 mg Intravenous Given 1/21/25 1959)       Imaging results:  XR Chest 1 View    Addendum Date: 1/21/2025    XR CHEST 1 VW-  HISTORY: Male who is 65 years-old, short of breath  TECHNIQUE: Frontal views of the chest  COMPARISON: 1/2/2025  FINDINGS: The heart size appears  borderline. Pulmonary vasculature appears mildly congested mild atelectasis or infiltrate at the bases, follow-up suggested. There may be minimal pleural effusions. No pneumothorax. Right hemidiaphragm is elevated. No acute osseous process.  IMPRESSION: As described.   This report was finalized on 1/21/2025 7:30 PM by Dr. Pan Alexandre M.D on Workstation: [id number]      Result Date: 1/21/2025  As described.  This report was finalized on 1/21/2025 7:09 PM by Dr. Pan Alexandre M.D on Workstation: [id number]       Ambulatory status:   - ad jon    Social issues:   Social History     Socioeconomic History    Marital status:     Years of education: High School   Tobacco Use    Smoking status: Never     Passive exposure: Never    Smokeless tobacco: Never   Vaping Use    Vaping status: Never Used   Substance and Sexual Activity    Alcohol use: No    Drug use: Never    Sexual activity: Defer       Peripheral Neurovascular  Peripheral Neurovascular (Adult)  Peripheral Neurovascular WDL: WDL    Neuro Cognitive  Neuro Cognitive (Adult)  Cognitive/Neuro/Behavioral WDL: WDL    Learning  Learning Assessment  Learning Readiness and Ability: no barriers identified  Education Provided  Person Taught: patient    Respiratory  Respiratory WDL  Respiratory WDL: .WDL except, rhythm/pattern, expansion/retractions, cough  Rhythm/Pattern, Respiratory: shortness of breath, tachypneic, labored  Expansion/Accessory Muscles/Retractions: abdominal muscle use  Cough Frequency: frequent  Cough Type: congested  Breath Sounds  All Lung Fields Breath Sounds: All Fields  All Lung Fields Breath Sounds: Anterior:, Lateral:, wheezes, expiratory, wheezes, inspiratory, rhonchi  Breath Sounds Post-Respiratory Treatment  Breath Sounds Posttreatment All Fields: All Fields  Breath Sounds Posttreatment All Fields: Anterior:, Lateral:, aeration increased    Abdominal Pain       Pain Assessments  Pain (Adult)  (0-10) Pain Rating: Rest: 0    NIH Stroke  Scale       Janeth Peña RN  01/21/25 20:17 EST

## 2025-01-22 NOTE — PLAN OF CARE
Goal Outcome Evaluation:  Plan of Care Reviewed With: patient        Progress: improving  Outcome Evaluation: pt is axox4. was on 5 L in the ED, now down to 3 L nc. SOA at times, especially with exertion. up to BSC x 1 and pt was dyspneic. tessalon pearls ordered for congestive cough. denies pain. pulm consulted.

## 2025-01-22 NOTE — PROGRESS NOTES
[facility]    [phone]   LOS: 0 days     Name: Flaca Hassan  Age/Sex: 59 y.o. female  :  1965        PCP: Isidro Hernandez MD  Chief Complaint   Patient presents with    Shortness of Breath      Subjective   It hurts when she breathes deep and cough.  Mainly in her chest and back.  She feels sluggish and tired today.  GI: No Nausea, Vomiting, or Diarrhea and Other: No bleeding    amLODIPine, 10 mg, Oral, Daily  atorvastatin, 10 mg, Oral, Daily  bisoprolol, 5 mg, Oral, Daily   And  hydroCHLOROthiazide, 6.25 mg, Oral, Daily  clopidogrel, 75 mg, Oral, Daily  escitalopram, 20 mg, Oral, Daily  fluticasone, 2 spray, Nasal, Daily  guaiFENesin, 600 mg, Oral, Q12H  ipratropium-albuterol, 3 mL, Nebulization, 4x Daily - RT  losartan, 100 mg, Oral, Q24H  methylPREDNISolone sodium succinate, 40 mg, Intravenous, Q12H  montelukast, 10 mg, Oral, Daily  pantoprazole, 40 mg, Oral, Q AM  potassium chloride, 10 mEq, Oral, Daily  risperiDONE, 0.5 mg, Oral, Daily  senna-docusate sodium, 2 tablet, Oral, BID  sodium chloride, 10 mL, Intravenous, Q12H           Objective   Vital Signs  Temp:  [97.3 °F (36.3 °C)-99.3 °F (37.4 °C)] 97.9 °F (36.6 °C)  Heart Rate:  [59-80] 66  Resp:  [16-30] 16  BP: (103-142)/(57-76) 103/58  There is no height or weight on file to calculate BMI.    Intake/Output Summary (Last 24 hours) at 2025 1520  Last data filed at 2025 0000  Gross per 24 hour   Intake 30 ml   Output --   Net 30 ml       Physical Exam  Vitals reviewed.   Constitutional:       Appearance: Normal appearance.   Cardiovascular:      Rate and Rhythm: Normal rate and regular rhythm.   Pulmonary:      Effort: Tachypnea and accessory muscle usage present.      Breath sounds: Decreased air movement present. Examination of the right-lower field reveals rales. Examination of the left-lower field reveals rales. Rales present.   Neurological:      General: No focal deficit present.      Mental Status: She is  alert. Mental status is at baseline.           Results Review:       I reviewed the patient's new clinical results.  Results from last 7 days   Lab Units 01/22/25  0505 01/21/25  1835   WBC 10*3/mm3 3.48 6.98   HEMOGLOBIN g/dL 13.2 14.3   PLATELETS 10*3/mm3 227 247     Results from last 7 days   Lab Units 01/22/25  0505 01/21/25  1835   SODIUM mmol/L 134* 134*   POTASSIUM mmol/L 3.7 3.6   CHLORIDE mmol/L 101 96*   CO2 mmol/L 20.9* 25.0   BUN mg/dL 18 19   CREATININE mg/dL 1.25* 1.25*   CALCIUM mg/dL 8.8 9.1   MAGNESIUM mg/dL 1.8 1.7   PHOSPHORUS mg/dL 2.7  --    Estimated Creatinine Clearance: 50.7 mL/min (A) (by C-G formula based on SCr of 1.25 mg/dL (H)).      Assessment & Plan   Active Hospital Problems    Diagnosis  POA    **RSV bronchitis [J20.5]  Yes    Moderate persistent asthma with exacerbation [J45.41]  Yes    Stage 3a chronic kidney disease [N18.31]  Yes    Acute respiratory failure with hypoxia [J96.01]  Yes    LI (obstructive sleep apnea) [G47.33]  Yes    Endometrial cancer [C54.1]  Yes    IFG (impaired fasting glucose) [R73.01]  Yes    Coronary artery disease involving native heart without angina pectoris [I25.10]  Yes    Hypertension [I10]  Yes    Hyperlipidemia [E78.5]  Yes      Resolved Hospital Problems   No resolved problems to display.       PLAN  This is a 59-year-old lady with history of asthma sleep apnea obesity endometrial cancer coronary artery disease hypertension and hyperlipidemia that presents to the hospital with shortness of breath and cough and is found to have RSV bronchitis with acute respiratory failure with hypoxia  -Unfortunately the care for RSV is only supportive.  Plan to continue steroids and breathing treatments for now.  -She remains in respiratory distress today.  Will get a CT scan of her chest but based on the crackles in her bases I suspect she likely has a pneumonia probably viral at this time with no evidence of superimposed bacterial infection with normal white  count and procalcitonin.  -Pulmonary is following and assisting with care.  Will add Mucinex and continue scheduled breathing treatments.  -Discussed with the nursing staff today will watch her O2 saturations closely.  She was pretty tachypneic on my exam breathing about 30 times a minute.  She is also using accessory muscles which is a little bit concerning.  There is no stridor present but she does have decreased air movement.  -Renal function electrolytes stable noted mild hyponatremia, discontinue the HCTZ component of her medications.  -Mechanical DVT prophylaxis  -Full code      Disposition  Expected discharge date/ time has not been documented.       Marco Antonio Esquivel MD  [facility]  01/22/25  15:20 EST

## 2025-01-22 NOTE — CONSULTS
Patient Identification:  Flaca Hassan  59 y.o.  female  1965  [id number]          LOS 0    Requesting physician: Dr. Esquivel    Reason for Consultation: Shortness of breath with wheezing    History of Present Illness:     Consultation for shortness of breath and asthma exacerbation.  Positive for RSV.  Complains of cough with chest tightness and wheezing.  Cough is nonproductive.  Symptoms have been going on for about 4 days.  Noted her saturation in urgent care 78% prior to admission.  She also has history of sleep apnea and uses BiPAP with her oxygen.    Past Medical History:  Past Medical History:   Diagnosis Date    Anemia     Asthma     Breast cyst     Cancer     Coronary artery disease     stent    Depression     Diverticulosis     H/O: GI bleed     recurrent    Hematuria     History of coronary angioplasty with insertion of stent     History of transfusion     no reaction    Hyperlipidemia     Hypertension     Incontinence of urine     wears pads    Irritable bowel syndrome     Malignant neoplasm of endometrium 02/2021    Melena     Obesity     Oxygen dependent     uses oxygen 2 prn when walking if SOB    Uterine cancer     UTI (urinary tract infection)        Past Surgical History:  Past Surgical History:   Procedure Laterality Date    BREAST CYST EXCISION Left     CARDIAC CATHETERIZATION N/A 11/13/2017    Procedure: Left Heart Cath;  Surgeon: Imer Montaño MD;  Location: [facility] CATH INVASIVE LOCATION;  Service:     CARDIAC CATHETERIZATION N/A 11/13/2017    Procedure: Stent ROBERTO coronary;  Surgeon: Imer Montaño MD;  Location: [facility] CATH INVASIVE LOCATION;  Service:     COLONOSCOPY  02/11/2016    VICTOR MANUEL hernandez,     COLONOSCOPY N/A 02/28/2022    Procedure: COLONOSCOPY TO CECUM and TI WITH APC CAUTERY TO RECTAL AVMs;  Surgeon: Ruth Ann Willson MD;  Location: [facility] ENDOSCOPY;  Service: Gastroenterology;  Laterality: N/A;  FAMILY HX COLON CA  --HEMORRHOIDS, BLEEDING RECTAL AVMs      D & C HYSTEROSCOPY ENDOMETRIAL ABLATION N/A 01/08/2021    Procedure: DILATATION AND CURETTAGE HYSTEROSCOPY ENDOMETRIAL  RESECTION;  Surgeon: Thu Blake MD;  [facility];  Service: Obstetrics/Gynecology;  Laterality: N/A;    ENDOSCOPY N/A 03/03/2017    erythematous mucosa in stomach, duodenal ulcer , mild to moderate chronic active gastritis, positive for h pylori    ENDOSCOPY N/A 11/08/2021    Procedure: ESOPHAGOGASTRODUODENOSCOPY WITH BX'S;  Surgeon: Ruth Ann Willson MD;  Location: [facility] ENDOSCOPY;  Service: Gastroenterology;  Laterality: N/A;  pre: EPIGASTRIC PAIN, HX OF STOMACH ULCER  post: GASTRITIS    HYSTERECTOMY      UPPER GASTROINTESTINAL ENDOSCOPY  02/10/2016    sami-Ruth Ann Willson M.D.        Home Meds:  Medications Prior to Admission   Medication Sig Dispense Refill Last Dose/Taking    amLODIPine (NORVASC) 10 MG tablet Take 1 tablet by mouth Daily. 90 tablet 4 1/21/2025    bisoprolol-hydrochlorothiazide (ZIAC) 5-6.25 MG per tablet Take 1 tablet by mouth Daily. 90 tablet 4 1/21/2025    clopidogrel (PLAVIX) 75 MG tablet TAKE 1 TABLET BY MOUTH DAILY 90 tablet 3 1/21/2025    escitalopram (LEXAPRO) 20 MG tablet Take 1 tablet by mouth Every Night.   1/21/2025    fluticasone (FLONASE) 50 MCG/ACT nasal spray Administer 2 sprays into the nostril(s) as directed by provider Daily.   1/21/2025    furosemide (LASIX) 40 MG tablet Take 1 tablet by mouth Daily. 90 tablet 3 1/21/2025    montelukast (SINGULAIR) 10 MG tablet Take 1 tablet by mouth Daily. 90 tablet 3 1/21/2025    olmesartan (BENICAR) 40 MG tablet Take 1 tablet by mouth Daily. 90 tablet 4 1/21/2025    pantoprazole (PROTONIX) 40 MG EC tablet TAKE 1 TABLET BY MOUTH DAILY 90 tablet 1 1/21/2025    potassium chloride (MICRO-K) 10 MEQ CR capsule TAKE 1 CAPSULE BY MOUTH DAILY 90 capsule 1 1/21/2025    Proctozone-HC 2.5 % rectal cream Insert 1 application into the rectum As Needed (28). 28 g 3 1/21/2025    risperiDONE (risperDAL) 0.5 MG  tablet Take 1 tablet by mouth Daily.   1/21/2025    simvastatin (ZOCOR) 20 MG tablet Take 1 tablet by mouth Every Evening. 90 tablet 4 1/21/2025    Trelegy Ellipta 200-62.5-25 MCG/ACT inhaler Inhale 1 puff Daily.   1/21/2025    VENTOLIN  (90 Base) MCG/ACT inhaler Inhale 2 puffs Every 4 (Four) Hours As Needed for Wheezing or Shortness of Air.  6 1/21/2025    acetaminophen (TYLENOL) 500 MG tablet Take 2 tablets by mouth Every 6 (Six) Hours As Needed for Mild Pain.       Cholecalciferol (VITAMIN D3) 2000 units tablet Take 1 tablet by mouth Daily.  1     Folic Acid 20 MG capsule Take 1 capsule by mouth.       nitroglycerin (NITROSTAT) 0.4 MG SL tablet 1 under the tongue as needed for angina, may repeat q5mins for up three doses (Patient taking differently: Place 1 tablet under the tongue Every 5 (Five) Minutes As Needed for Chest Pain. 1 under the tongue as needed for angina, may repeat q5mins for up three doses) 25 tablet 3          Allergies:  Allergies   Allergen Reactions    Peanut (Diagnostic) Itching       Social History:   Social History     Socioeconomic History    Marital status:     Years of education: High School   Tobacco Use    Smoking status: Never     Passive exposure: Never    Smokeless tobacco: Never   Vaping Use    Vaping status: Never Used   Substance and Sexual Activity    Alcohol use: No    Drug use: Never    Sexual activity: Defer       Family History:  Family History   Problem Relation Age of Onset    Breast cancer Other     Lung cancer Brother     Hypertension Brother     Hyperlipidemia Brother     Heart disease Brother     Throat cancer Paternal Uncle     Tongue cancer Paternal Grandfather     Hypertension Father     Hyperlipidemia Father     Heart disease Father     Malig Hyperthermia Neg Hx        Review of Systems:  Denies fevers or chills  Denies nausea or vomiting  No new vision or hearing changes  No chest pain  Nonproductive cough and shortness of breath with chest  tightness  No diarrhea, hematemesis or hematochezia, no dysuria or frequency  No musculoskeletal complaints  No heat or cold intolerance  No skin rashes  No dizziness or confusion.  No seizure activity  No new anxiety or depression  12 system review of systems performed and all else negative    Objective:    PHYSICAL EXAM:    /57 (BP Location: Right arm, Patient Position: Sitting)   Pulse 63   Temp 97.7 °F (36.5 °C) (Oral)   Resp 18   LMP  (LMP Unknown)   SpO2 100%  There is no height or weight on file to calculate BMI. 100%      GENERAL APPEARANCE:   Well developed  Well nourished  Acutely ill-appearing  EYES:    PERRL                                                                           Conjunctivae normal  Sclerae nonicteric.  HENT:   Atraumatic, normocephalic  External ears and nose normal  Moist mucous membranes and no ulcers  NECK:  Thyroid not enlarged  Trachea midline   RESPIRATORY:    Nonlabored breathing   Normal breath sounds  No rales. +wheezing  No dullness  CARDIOVASCULAR:    RRR  Normal S1, S2  No murmur  Lower extremity edema: none    GI:   Bowel sounds normal  Abdomen soft , nondistended, nontender  No abdominal masses  MUSCULOSKELETAL:  Normal movement of extremities  No tenderness, no deformities  No clubbing or cyanosis   Skin:    No visible rashes  No palpable nodules  Cap refill normal.  No mottling.   PSYCHIATRIC:  Speech and behavior appropriate  Normal mood and affect  Oriented to person, place and time  NEUROLOGIC:  Cranial nerves II through XII grossly intact.  Sensation intact.      Lab Review:   Results from last 7 days   Lab Units 01/22/25  0505 01/21/25  1835   WBC 10*3/mm3 3.48 6.98   HEMOGLOBIN g/dL 13.2 14.3   HEMATOCRIT % 42.5 44.4   PLATELETS 10*3/mm3 227 247     Results from last 7 days   Lab Units 01/22/25  0505 01/21/25  1835   SODIUM mmol/L 134* 134*   POTASSIUM mmol/L 3.7 3.6   CHLORIDE mmol/L 101 96*   CO2 mmol/L 20.9* 25.0   BUN mg/dL 18 19   CREATININE mg/dL  1.25* 1.25*   CALCIUM mg/dL 8.8 9.1   BILIRUBIN mg/dL 0.3 0.4   ALK PHOS U/L 97 115   ALT (SGPT) U/L 17 19   AST (SGOT) U/L 20 26   GLUCOSE mg/dL 245* 116*     Results from last 7 days   Lab Units 01/21/25  1849   PH, ARTERIAL pH units 7.378   PO2 ART mm Hg 43.6*   PCO2, ARTERIAL mm Hg 43.1   HCO3 ART mmol/L 25.3        Microbiology reviewed:  RVP positive for RSV           Imaging reviewed  chest X-ray 1/21 reviewed: Mildly congested pulmonary vasculature.  Small pleural effusions.       Assessment:  Acute RSV bronchitis  Acute exacerbation of moderate persistent asthma  Acute hypoxemic respiratory failure  LI  Endometrial cancer  Stage IIIa CKD  CAD  Hypertension/hyperlipidemia        Recommendations:  Scheduled and as needed bronchodilators and steroid for asthma exacerbation.  Treatment for viral process is supportive.  Wean oxygen as able.  Positive airway pressure for sleep apnea at night and bleed and oxygen as needed.      Thank you for allowing me to participate in the care of this patient.  I will continue to follow along with you.      Giorgio Deng MD  [facility], [facility]  Pulmonary and Critical Care Medicine    1/22/2025  08:23 EST

## 2025-01-23 LAB
ALBUMIN SERPL-MCNC: 3.4 G/DL (ref 3.5–5.2)
ANION GAP SERPL CALCULATED.3IONS-SCNC: 12.7 MMOL/L (ref 5–15)
BASOPHILS # BLD AUTO: 0.01 10*3/MM3 (ref 0–0.2)
BASOPHILS NFR BLD AUTO: 0.1 % (ref 0–1.5)
BUN SERPL-MCNC: 28 MG/DL (ref 6–20)
BUN/CREAT SERPL: 22 (ref 7–25)
CALCIUM SPEC-SCNC: 9.6 MG/DL (ref 8.6–10.5)
CHLORIDE SERPL-SCNC: 100 MMOL/L (ref 98–107)
CO2 SERPL-SCNC: 22.3 MMOL/L (ref 22–29)
CREAT SERPL-MCNC: 1.27 MG/DL (ref 0.57–1)
DEPRECATED RDW RBC AUTO: 46.9 FL (ref 37–54)
EGFRCR SERPLBLD CKD-EPI 2021: 48.8 ML/MIN/1.73
EOSINOPHIL # BLD AUTO: 0 10*3/MM3 (ref 0–0.4)
EOSINOPHIL NFR BLD AUTO: 0 % (ref 0.3–6.2)
ERYTHROCYTE [DISTWIDTH] IN BLOOD BY AUTOMATED COUNT: 14.9 % (ref 12.3–15.4)
GLUCOSE SERPL-MCNC: 164 MG/DL (ref 65–99)
HCT VFR BLD AUTO: 42.9 % (ref 34–46.6)
HGB BLD-MCNC: 13.6 G/DL (ref 12–15.9)
IMM GRANULOCYTES # BLD AUTO: 0.07 10*3/MM3 (ref 0–0.05)
IMM GRANULOCYTES NFR BLD AUTO: 0.5 % (ref 0–0.5)
LYMPHOCYTES # BLD AUTO: 1.13 10*3/MM3 (ref 0.7–3.1)
LYMPHOCYTES NFR BLD AUTO: 7.6 % (ref 19.6–45.3)
MAGNESIUM SERPL-MCNC: 2 MG/DL (ref 1.6–2.6)
MCH RBC QN AUTO: 27.4 PG (ref 26.6–33)
MCHC RBC AUTO-ENTMCNC: 31.7 G/DL (ref 31.5–35.7)
MCV RBC AUTO: 86.3 FL (ref 79–97)
MONOCYTES # BLD AUTO: 0.31 10*3/MM3 (ref 0.1–0.9)
MONOCYTES NFR BLD AUTO: 2.1 % (ref 5–12)
NEUTROPHILS NFR BLD AUTO: 13.3 10*3/MM3 (ref 1.7–7)
NEUTROPHILS NFR BLD AUTO: 89.7 % (ref 42.7–76)
NRBC BLD AUTO-RTO: 0 /100 WBC (ref 0–0.2)
PHOSPHATE SERPL-MCNC: 3.1 MG/DL (ref 2.5–4.5)
PLATELET # BLD AUTO: 266 10*3/MM3 (ref 140–450)
PMV BLD AUTO: 10.9 FL (ref 6–12)
POTASSIUM SERPL-SCNC: 5 MMOL/L (ref 3.5–5.2)
PROCALCITONIN SERPL-MCNC: 0.02 NG/ML (ref 0–0.25)
RBC # BLD AUTO: 4.97 10*6/MM3 (ref 3.77–5.28)
SODIUM SERPL-SCNC: 135 MMOL/L (ref 136–145)
WBC NRBC COR # BLD AUTO: 14.82 10*3/MM3 (ref 3.4–10.8)

## 2025-01-23 PROCEDURE — 85025 COMPLETE CBC W/AUTO DIFF WBC: CPT | Performed by: HOSPITALIST

## 2025-01-23 PROCEDURE — 94799 UNLISTED PULMONARY SVC/PX: CPT

## 2025-01-23 PROCEDURE — 97530 THERAPEUTIC ACTIVITIES: CPT

## 2025-01-23 PROCEDURE — 97162 PT EVAL MOD COMPLEX 30 MIN: CPT

## 2025-01-23 PROCEDURE — 94664 DEMO&/EVAL PT USE INHALER: CPT

## 2025-01-23 PROCEDURE — 80069 RENAL FUNCTION PANEL: CPT | Performed by: HOSPITALIST

## 2025-01-23 PROCEDURE — 84145 PROCALCITONIN (PCT): CPT | Performed by: HOSPITALIST

## 2025-01-23 PROCEDURE — 63710000001 PREDNISONE PER 1 MG: Performed by: INTERNAL MEDICINE

## 2025-01-23 PROCEDURE — 97535 SELF CARE MNGMENT TRAINING: CPT

## 2025-01-23 PROCEDURE — 94760 N-INVAS EAR/PLS OXIMETRY 1: CPT

## 2025-01-23 PROCEDURE — 94761 N-INVAS EAR/PLS OXIMETRY MLT: CPT

## 2025-01-23 PROCEDURE — 97165 OT EVAL LOW COMPLEX 30 MIN: CPT

## 2025-01-23 PROCEDURE — 83735 ASSAY OF MAGNESIUM: CPT | Performed by: HOSPITALIST

## 2025-01-23 RX ORDER — PREDNISONE 20 MG/1
40 TABLET ORAL
Status: DISCONTINUED | OUTPATIENT
Start: 2025-01-23 | End: 2025-01-30

## 2025-01-23 RX ADMIN — AMLODIPINE BESYLATE 10 MG: 10 TABLET ORAL at 09:08

## 2025-01-23 RX ADMIN — MONTELUKAST 10 MG: 10 TABLET, FILM COATED ORAL at 09:08

## 2025-01-23 RX ADMIN — IPRATROPIUM BROMIDE AND ALBUTEROL SULFATE 3 ML: 2.5; .5 SOLUTION RESPIRATORY (INHALATION) at 15:14

## 2025-01-23 RX ADMIN — BENZONATATE 200 MG: 100 CAPSULE ORAL at 21:11

## 2025-01-23 RX ADMIN — IPRATROPIUM BROMIDE AND ALBUTEROL SULFATE 3 ML: 2.5; .5 SOLUTION RESPIRATORY (INHALATION) at 20:50

## 2025-01-23 RX ADMIN — LOSARTAN POTASSIUM 100 MG: 100 TABLET, FILM COATED ORAL at 09:08

## 2025-01-23 RX ADMIN — POTASSIUM CHLORIDE 10 MEQ: 750 TABLET, EXTENDED RELEASE ORAL at 09:08

## 2025-01-23 RX ADMIN — Medication 10 ML: at 09:08

## 2025-01-23 RX ADMIN — ESCITALOPRAM 20 MG: 10 TABLET, FILM COATED ORAL at 21:11

## 2025-01-23 RX ADMIN — BISOPROLOL FUMARATE 5 MG: 5 TABLET ORAL at 09:11

## 2025-01-23 RX ADMIN — BENZONATATE 200 MG: 100 CAPSULE ORAL at 05:58

## 2025-01-23 RX ADMIN — IPRATROPIUM BROMIDE AND ALBUTEROL SULFATE 3 ML: 2.5; .5 SOLUTION RESPIRATORY (INHALATION) at 07:22

## 2025-01-23 RX ADMIN — ACETAMINOPHEN 650 MG: 325 TABLET, FILM COATED ORAL at 21:15

## 2025-01-23 RX ADMIN — PREDNISONE 40 MG: 20 TABLET ORAL at 09:08

## 2025-01-23 RX ADMIN — ATORVASTATIN CALCIUM 10 MG: 10 TABLET, FILM COATED ORAL at 09:08

## 2025-01-23 RX ADMIN — SENNOSIDES AND DOCUSATE SODIUM 2 TABLET: 50; 8.6 TABLET ORAL at 09:08

## 2025-01-23 RX ADMIN — FLUTICASONE PROPIONATE 2 SPRAY: 50 SPRAY, METERED NASAL at 09:11

## 2025-01-23 RX ADMIN — Medication 10 ML: at 21:11

## 2025-01-23 RX ADMIN — GUAIFENESIN 600 MG: 600 TABLET, EXTENDED RELEASE ORAL at 09:08

## 2025-01-23 RX ADMIN — CLOPIDOGREL BISULFATE 75 MG: 75 TABLET ORAL at 09:08

## 2025-01-23 RX ADMIN — RISPERIDONE 0.5 MG: 0.5 TABLET, FILM COATED ORAL at 09:08

## 2025-01-23 RX ADMIN — IPRATROPIUM BROMIDE AND ALBUTEROL SULFATE 3 ML: 2.5; .5 SOLUTION RESPIRATORY (INHALATION) at 11:37

## 2025-01-23 RX ADMIN — PANTOPRAZOLE SODIUM 40 MG: 40 TABLET, DELAYED RELEASE ORAL at 05:58

## 2025-01-23 RX ADMIN — GUAIFENESIN 600 MG: 600 TABLET, EXTENDED RELEASE ORAL at 21:11

## 2025-01-23 NOTE — PLAN OF CARE
Goal Outcome Evaluation:  Plan of Care Reviewed With: patient        Progress: improving  Outcome Evaluation: Pt is a 58 y/o F with h/o HTN, asthma and CAD who presented to [facility] on 1/21/2025 with c/o SOB and cough; found to have RSV bronchitis with acute respiratory failure with hypoxia. Pt reports she requires assist from her sister-in-law for all ADLs, and assist from her brother and nephew for mobility, all of whom she lives with in a home w/o steps; no AD/DME. Today, pt was Sachin for bed mobility, CGA for STS to RW and CG/Sachin for ambulation of 25' w/ RW. Pt demos impulsive tendencies and was quick to ambulate resulting in one R LOB requiring Sachin to correct and maintain her balance. Pt mobilized on 3.5L O2 via NC maintaining sats 90-93% throughout. Pt presents with impaired strength, balance and endurance, and will benefit from skilled PT services to address these deficits. SBAR w/ RN. Rec home w/ HH PT/OT and family assist.    Anticipated Discharge Disposition (PT): home with home health, home with assist

## 2025-01-23 NOTE — THERAPY EVALUATION
Patient Name: Flaca Hassan  : 1965    MRN: [id number]                              Today's Date: 2025       Admit Date: 2025    Visit Dx:     ICD-10-CM ICD-9-CM   1. RSV bronchitis  J20.5 466.0     079.6   2. Exacerbation of asthma, unspecified asthma severity, unspecified whether persistent  J45.901 493.92   3. Acute hypoxic respiratory failure  J96.01 518.81     Patient Active Problem List   Diagnosis    Hypertension    Obesity    Hyperlipidemia    Iron deficiency anemia due to chronic blood loss    Upper GI bleeding    ST elevation myocardial infarction (STEMI)    Coronary artery disease involving native heart without angina pectoris    Morgagni hernia    IFG (impaired fasting glucose)    Primary insomnia    PMB (postmenopausal bleeding)    Endometrial cancer    Uncomplicated asthma    LI (obstructive sleep apnea)    Dyspepsia    History of peptic ulcer disease    Rectal pain    Abdominal wall abscess    History of ST elevation myocardial infarction (STEMI)    Abdominal wall cellulitis    Prediabetes    Localized edema    Hypoxia    RSV bronchitis    Moderate persistent asthma with exacerbation    Stage 3a chronic kidney disease    Acute respiratory failure with hypoxia     Past Medical History:   Diagnosis Date    Anemia     Asthma     Breast cyst     Cancer     Coronary artery disease     stent    Depression     Diverticulosis     H/O: GI bleed     recurrent    Hematuria     History of coronary angioplasty with insertion of stent     History of transfusion     no reaction    Hyperlipidemia     Hypertension     Incontinence of urine     wears pads    Irritable bowel syndrome     Malignant neoplasm of endometrium 2021    Melena     Obesity     Oxygen dependent     uses oxygen 2 prn when walking if SOB    Uterine cancer     UTI (urinary tract infection)      Past Surgical History:   Procedure Laterality Date    BREAST CYST EXCISION Left     CARDIAC CATHETERIZATION N/A 2017     Procedure: Left Heart Cath;  Surgeon: Imer Montaño MD;  Location: [facility] CATH INVASIVE LOCATION;  Service:     CARDIAC CATHETERIZATION N/A 11/13/2017    Procedure: Stent ROBERTO coronary;  Surgeon: Imer Montaño MD;  Location[facility]ATH [facility]ASIVE LOC[facility];  Service:     COLONOSCOPY  02/11/2016    tics, NBIH,     COLONOSCOPY N/A 02/28/2022    Procedure: COLONOSCOPY TO CECUM and TI WITH APC CAUTERY TO RECTAL AVMs;  Surgeon: Ruth Ann Willson MD;  Location: [facility] ENDOSCOPY;  Service: Gastroenterology;  Laterality: N/A;  FAMILY HX COLON CA  --HEMORRHOIDS, BLEEDING RECTAL AVMs     D & C HYSTEROSCOPY ENDOMETRIAL ABLATION N/A 01/08/2021    Procedure: DILATATION AND CURETTAGE HYSTEROSCOPY ENDOMETRIAL  RESECTION;  Surgeon: Thu Blake MD;  Location:  [facility];  Service: Obstetrics/Gynecology;  Laterality: N/A;    ENDOSCOPY N/A 03/03/2017    erythematous mucosa in stomach, duodenal ulcer , mild to moderate chronic active gastritis, positive for h pylori    ENDOSCOPY N/A 11/08/2021    Procedure: ESOPHAGOGASTRODUODENOSCOPY WITH BX'S;  Surgeon: Ruth Ann Willson MD;  [facility];  Service: Gastroenterology;  Laterality: N/A;  pre: EPIGASTRIC PAIN, HX OF STOMACH ULCER  post: GASTRITIS    HYSTERECTOMY      UPPER GASTROINTESTINAL ENDOSCOPY  02/10/2016    normal-Ruth Ann Willson M.D.      General Information       Row Name 01/23/25 2407          Physical Therapy Time and Intention    Document Type evaluation  -MG     Mode of Treatment co-treatment;physical therapy;occupational therapy;other (see comments)  -MG       Row Name 01/23/25 4972          General Information    Patient Profile Reviewed yes  -MG     Prior Level of Function --  No AD/DM. Reports Ax1 for ADLs and Ax2 for mobility with help from her family.  -MG     Existing Precautions/Restrictions fall;oxygen therapy device and L/min  Currently on 3.5L  -MG     Barriers to Rehab none identified  -MG       Row Name 01/23/25 8489           Living Environment    People in Home sibling(s);other relative(s)  Brother, sister in law, nephew  -MG       Row Name 01/23/25 1337          Home Main Entrance    Number of Stairs, Main Entrance none  -MG       Row Name 01/23/25 1337          Stairs Within Home, Primary    Number of Stairs, Within Home, Primary none  -MG       Row Name 01/23/25 1337          Cognition    Orientation Status (Cognition) oriented x 3;verbal cues/prompts needed for orientation  -MG       Row Name 01/23/25 1337          Safety Issues/Impairments Affecting Functional Mobility    Safety Issues Affecting Function (Mobility) impulsivity;insight into deficits/self-awareness;judgment;safety precaution awareness;positioning of assistive device  -MG     Impairments Affecting Function (Mobility) balance;endurance/activity tolerance;strength;shortness of breath  -MG     Comment, Safety Issues/Impairments (Mobility) Co treatment medically appropriate and necessary due to patient acuity level, activity tolerance and safety of patient and staff. Evaluation established to achieve all goals in POC. Gait belt and non-skid socks donned.  -MG               User Key  (r) = Recorded By, (t) = Taken By, (c) = Cosigned By      Initials Name Provider Type    MG Kristel Cox, PT Physical Therapist                   Mobility       Row Name 01/23/25 1338          Bed Mobility    Supine-Sit [place] (Bed Mobility) minimum assist (75% patient effort);verbal cues  -MG     Assistive Device (Bed Mobility) head of bed elevated  -MG       Row Name 01/23/25 1338          Sit-Stand Transfer    Sit-Stand [place] (Transfers) contact guard;verbal cues  -MG     Assistive Device (Sit-Stand Transfers) walker, front-wheeled  -MG     Comment, (Sit-Stand Transfer) Cues for hand placement.  -MG       Row Name 01/23/25 1338          Gait/Stairs (Locomotion)    [place] Level (Gait) contact guard;minimum assist (75% patient effort);verbal cues  -MG     Assistive Device  (Gait) walker, front-wheeled  -MG     Distance in Feet (Gait) 25  -MG     Bilateral Gait Deviations forward flexed posture;heel strike decreased  -MG     Comment, (Gait/Stairs) Cues for posture and to stay within RW. Pt moving quickly at times, which resulted in one R LOB requiring Sachin to correct and maintain her balance. Pt amb on 3.5L O2 satting 90-91% following gait.  -MG               User Key  (r) = Recorded By, (t) = Taken By, (c) = Cosigned By      Initials Name Provider Type    MG Kristel Cox, PT Physical Therapist                   Obj/Interventions       Row Name 01/23/25 1340          Range of Motion Comprehensive    General Range of Motion no range of motion deficits identified  -MG       Row Name 01/23/25 1340          Strength Comprehensive (MMT)    General Manual Muscle Testing (MMT) Assessment lower extremity strength deficits identified;upper extremity strength deficits identified  -MG     Comment, General Manual Muscle Testing (MMT) Assessment Generalized weakness. Grossly 4/5.  -MG       Row Name 01/23/25 1340          Balance    Static Sitting Balance standby assist  -MG     Dynamic Sitting Balance standby assist  -MG     Position, Sitting Balance sitting edge of bed  -MG     Static Standing Balance contact guard  -MG     Dynamic Standing Balance contact guard;minimal assist  -MG     Position/Device Used, Standing Balance supported  -MG     Comment, Balance One R LOB requiring Sachin to correct and maintain her balance during gait.  -MG       Row Name 01/23/25 1340          Sensory Assessment (Somatosensory)    Sensory Assessment (Somatosensory) sensation intact  -MG               User Key  (r) = Recorded By, (t) = Taken By, (c) = Cosigned By      Initials Name Provider Type    Kristel Mart, PT Physical Therapist                   Goals/Plan       Row Name 01/23/25 1346          Bed Mobility Goal 1 (PT)    Activity/Assistive Device (Bed Mobility Goal 1, PT) bed mobility activities, all   -MG     [place] Level/Cues Needed (Bed Mobility Goal 1, PT) modified independence  -MG     Time Frame (Bed Mobility Goal 1, PT) 1 week  -MG       Row Name 01/23/25 1346          Transfer Goal 1 (PT)    Activity/Assistive Device (Transfer Goal 1, PT) transfers, all  -MG     [place] Level/Cues Needed (Transfer Goal 1, PT) standby assist  -MG     Time Frame (Transfer Goal 1, PT) 1 week  -MG       Row Name 01/23/25 1346          Gait Training Goal 1 (PT)    Activity/Assistive Device (Gait Training Goal 1, PT) gait (walking locomotion)  -MG     [place] Level (Gait Training Goal 1, PT) standby assist  -MG     Distance (Gait Training Goal 1, PT) 60  -MG     Time Frame (Gait Training Goal 1, PT) 1 week  -MG       Row Name 01/23/25 1346          Therapy Assessment/Plan (PT)    Planned Therapy Interventions (PT) balance training;bed mobility training;gait training;home exercise program;patient/family education;stretching;stair training;strengthening;neuromuscular re-education;ROM (range of motion);postural re-education;transfer training  -MG               User Key  (r) = Recorded By, (t) = Taken By, (c) = Cosigned By      Initials Name Provider Type    MG Kristel Cox, PT Physical Therapist                   Clinical Impression       Row Name 01/23/25 1340          Pain    Pretreatment Pain Rating 0/10 - no pain  -MG     Posttreatment Pain Rating 0/10 - no pain  -MG       Row Name 01/23/25 1340          Plan of Care Review    Plan of Care Reviewed With patient  -MG     Progress improving  -MG     Outcome Evaluation Pt is a 58 y/o F with h/o HTN, asthma and CAD who presented to [facility] on 1/21/2025 with c/o SOB and cough; found to have RSV bronchitis with acute respiratory failure with hypoxia. Pt reports she requires assist from her sister-in-law for all ADLs, and assist from her brother and nephew for mobility, all of whom she lives with in a home w/o steps; no AD/DME. Today, pt was Sachin for bed mobility, CGA  for STS to RW and CG/Sachin for ambulation of 25' w/ RW. Pt demos impulsive tendencies and was quick to ambulate resulting in one R LOB requiring Sachin to correct and maintain her balance. Pt mobilized on 3.5L O2 via NC maintaining sats 90-93% throughout. Pt presents with impaired strength, balance and endurance, and will benefit from skilled PT services to address these deficits. SBAR w/ RN. Rec home w/ HH PT/OT and family assist.  -MG       Row Name 01/23/25 1340          Therapy Assessment/Plan (PT)    Rehab Potential (PT) good  -MG     Criteria for Skilled Interventions Met (PT) yes  -MG     Therapy Frequency (PT) 5 times/wk  -MG       Row Name 01/23/25 1340          Vital Signs    Pre SpO2 (%) 93  -MG     O2 Delivery Pre Treatment nasal cannula  -MG     Intra SpO2 (%) 90  -MG     O2 Delivery Intra Treatment nasal cannula  -MG     Post SpO2 (%) 92  -MG     O2 Delivery Post Treatment nasal cannula  -MG     Pre Patient Position Supine  -MG     Intra Patient Position Standing  -MG     Post Patient Position Sitting  -MG       Row Name 01/23/25 1340          Positioning and Restraints    Pre-Treatment Position in bed  -MG     Post Treatment Position chair  -MG     In Chair notified nsg;reclined;call light within reach;encouraged to call for assist;exit alarm on;legs elevated  -MG               User Key  (r) = Recorded By, (t) = Taken By, (c) = Cosigned By      Initials Name Provider Type    MG Kristel Cox, PT Physical Therapist                   Outcome Measures       Row Name 01/23/25 1346 01/23/25 0911       How much help from another person do you currently need...    Turning from your back to your side while in flat bed without using bedrails? 4  -MG 4  -AC    Moving from lying on back to sitting on the side of a flat bed without bedrails? 3  -MG 4  -AC    Moving to and from a bed to a chair (including a wheelchair)? 3  -MG 3  -AC    Standing up from a chair using your arms (e.g., wheelchair, bedside chair)? 3   -MG 3  -AC    Climbing 3-5 steps with a railing? 3  -MG 3  -AC    To walk in hospital room? 3  -MG 3  -AC    AM-PAC 6 Clicks Score (PT) 19  -MG 20  -AC      Row Name 01/23/25 1257          Modified [place] Scale    Modified [place] Scale 4 - Moderately severe disability.  Unable to walk without assistance, and unable to attend to own bodily needs without assistance.  -KG       Row Name 01/23/25 1257          Functional Assessment    Outcome Measure Options AM-PAC 6 Clicks Daily Activity (OT);Modified [place]  -KG               User Key  (r) = Recorded By, (t) = Taken By, (c) = Cosigned By      Initials Name Provider Type    MG Kristel Cox, PT Physical Therapist    AC Trinidad Moore, RN Registered Nurse    Narendra Casey, OT Occupational Therapist                                 Physical Therapy Education       Title: PT OT SLP Therapies (In Progress)       Topic: Physical Therapy (In Progress)       Point: Mobility training (Done)       Learning Progress Summary            Patient Acceptance, E,D, VU,NR by  at 1/23/2025 1347                      Point: Home exercise program (Not Started)       Learner Progress:  Not documented in this visit.              Point: Body mechanics (Done)       Learning Progress Summary            Patient Acceptance, E,D, VU,NR by  at 1/23/2025 1347                      Point: Precautions (Done)       Learning Progress Summary            Patient Acceptance, E,D, VU,NR by  at 1/23/2025 1347                                      User Key       Initials Effective Dates Name Provider Type Discipline     05/24/22 -  Kristel Cox, PT Physical Therapist PT                  PT Recommendation and Plan  Planned Therapy Interventions (PT): balance training, bed mobility training, gait training, home exercise program, patient/family education, stretching, stair training, strengthening, neuromuscular re-education, ROM (range of motion), postural re-education, transfer training  Progress:  improving  Outcome Evaluation: Pt is a 58 y/o F with h/o HTN, asthma and CAD who presented to [facility] on 1/21/2025 with c/o SOB and cough; found to have RSV bronchitis with acute respiratory failure with hypoxia. Pt reports she requires assist from her sister-in-law for all ADLs, and assist from her brother and nephew for mobility, all of whom she lives with in a home w/o steps; no AD/DME. Today, pt was Sachin for bed mobility, CGA for STS to RW and CG/Sachin for ambulation of 25' w/ RW. Pt demos impulsive tendencies and was quick to ambulate resulting in one R LOB requiring Sachin to correct and maintain her balance. Pt mobilized on 3.5L O2 via NC maintaining sats 90-93% throughout. Pt presents with impaired strength, balance and endurance, and will benefit from skilled PT services to address these deficits. PAULAR w/ RN. Rec home w/ HH PT/OT and family assist.     Time Calculation:         PT Charges       Row Name 01/23/25 1347             Time Calculation    Start Time 1040  -MG      Stop Time 1058  -MG      Time Calculation (min) 18 min  -MG      PT Received On 01/23/25  -MG      PT - Next Appointment 01/24/25  -MG      PT Goal Re-Cert Due Date 02/06/25  -MG         Time Calculation- PT    Total Timed Code Minutes- PT 10 minute(s)  -MG                User Key  (r) = Recorded By, (t) = Taken By, (c) = Cosigned By      Initials Name Provider Type    MG Kristel Cox, PT Physical Therapist                  Therapy Charges for Today       Code Description Service Date Service Provider Modifiers Qty    50115335573 HC PT EVAL MOD COMPLEXITY 3 1/23/2025 Kristel Cox, PT GP 1    [id number]  PT THERAPEUTIC ACT EA 15 MIN 1/23/2025 Kristel Cox, PT GP 1            PT G-Codes  Outcome Measure Options: AM-PAC 6 Clicks Daily Activity (OT), Modified [place]  AM-PAC 6 Clicks Score (PT): 19  AM-PAC 6 Clicks Score (OT): 19  Modified [place] Scale: 4 - Moderately severe disability.  Unable to walk without assistance, and unable to  attend to own bodily needs without assistance.  PT Discharge Summary  Anticipated Discharge Disposition (PT): home with home health, home with assist    Kristel Cox, PT  1/23/2025

## 2025-01-23 NOTE — PLAN OF CARE
Goal Outcome Evaluation:  Plan of Care Reviewed With: patient           Outcome Evaluation: Pt admitted to [facility] for acute RSV bronchitis. Pt lives w/ her brother, sister-in-law, and nephew -- requires assistance for mobility & BADLs but does not use AD. Today, pt presents w/ strength, balance, and activity tolerance minimally below baseline. Needing Min A for bed mobility, CGA + RW for STS and functional mobility within room. Pt had 1 LOB due to impulsivity and needed Min A to correct. Maintained SpO2 in 90s on 3LNC. OT will continue to follow to address functional deficits, recommend d/c w/ HHOT    Anticipated Discharge Disposition (OT): home with home health, home with assist

## 2025-01-23 NOTE — CASE MANAGEMENT/SOCIAL WORK
Discharge Planning Assessment  [facility]     Patient Name: Flaca Hassan  MRN: [id number]  Today's Date: 1/23/2025    Admit Date: 1/21/2025    Plan: Home with family   Discharge Needs Assessment       Row Name 01/23/25 6757       Living Environment    People in Home sibling(s);other relative(s)    Name(s) of People in Home Brother, nephew and sister in law    Current Living Arrangements home    Potentially Unsafe Housing Conditions none    Primary Care Provided by self    Family Caregiver if Needed sibling(s)    Family Caregiver Names Sonya    Quality of Family Relationships involved;helpful       Resource/Environmental Concerns    Resource/Environmental Concerns none    Transportation Concerns none       Transition Planning    Patient/Family Anticipates Transition to home with family    Patient/Family Anticipated Services at Transition none    Transportation Anticipated family or friend will provide       Discharge Needs Assessment    Readmission Within the Last 30 Days no previous admission in last 30 days    Equipment Currently Used at Home oxygen;cpap;bp cuff    Concerns to be Addressed no discharge needs identified    Anticipated Changes Related to Illness none    Equipment Needed After Discharge none                   Discharge Plan       Row Name 01/23/25 1776       Plan    Plan Home with family    Patient/Family in Agreement with Plan yes    Plan Comments 1. Spoke with patient at bedside, introduced self, explained CCP role and verified face sheet and pharmacy information. Pt lives with her brother and sister-in-law, she is normally IADL's but family helps if needed. She has cont 02 at 2L through [employer] with portable tank and cpap, they live in 1 level home with no ANNALISE, has no HH or SNF history. She plans home with family to transport, no anticipated needs.  CCP will follow - Jewels MORENOManjeet                  Continued Care and Services - Admitted Since 1/21/2025    No active coordination exists for this  encounter.       Selected Continued Care - Episodes Includes continued care and service providers with selected services from the active episodes listed below      Chronic Care Management Episode start date: 1/20/2025 (Paused)   There are no active outsourced providers for this episode.                 Expected Discharge Date and Time       Expected Discharge Date Expected Discharge Time    Jan 25, 2025            Demographic Summary       Row Name 01/23/25 1647       General Information    Admission Type inpatient                   Functional Status       Row Name 01/23/25 1647       Functional Status    Usual Activity Tolerance good    Current Activity Tolerance moderate       Assessment of Health Literacy    Health Literacy Good       Functional Status, IADL    Medications independent    Meal Preparation independent    Housekeeping assistive person    Laundry assistive person    Shopping assistive person    IADL Comments Family helps       Mental Status    General Appearance WDL WDL       Mental Status Summary    Recent Changes in Mental Status/Cognitive Functioning no changes                   Psychosocial    No documentation.                  Abuse/Neglect    No documentation.                  Legal       Row Name 01/23/25 1647       Financial/Legal    Who Manages Finances if Patient Unable brother                   Substance Abuse    No documentation.                  Patient Forms    No documentation.                     Jewels Quintanilla RN

## 2025-01-23 NOTE — PROGRESS NOTES
Dedicated to Hospital Care    [phone]   LOS: 1 day     Name: Flaca Hassan  Age/Sex: 59 y.o. female  :  1965        PCP: Isidro Hernandez MD  Chief Complaint   Patient presents with    Shortness of Breath      Subjective   Still short of breath but does feel little better today.  Still coughing quite a bit.  Cough is more productive today.  Seen sitting on the side of the bed and is quite tachypneic.  GI: No Nausea, Vomiting, or Diarrhea and Other: No bleeding    amLODIPine, 10 mg, Oral, Daily  atorvastatin, 10 mg, Oral, Daily  bisoprolol, 5 mg, Oral, Daily  clopidogrel, 75 mg, Oral, Daily  escitalopram, 20 mg, Oral, Daily  fluticasone, 2 spray, Nasal, Daily  guaiFENesin, 600 mg, Oral, Q12H  ipratropium-albuterol, 3 mL, Nebulization, 4x Daily - RT  losartan, 100 mg, Oral, Q24H  montelukast, 10 mg, Oral, Daily  pantoprazole, 40 mg, Oral, Q AM  potassium chloride, 10 mEq, Oral, Daily  predniSONE, 40 mg, Oral, Daily With Breakfast  risperiDONE, 0.5 mg, Oral, Daily  senna-docusate sodium, 2 tablet, Oral, BID  sodium chloride, 10 mL, Intravenous, Q12H           Objective   Vital Signs  Temp:  [97.3 °F (36.3 °C)-98.1 °F (36.7 °C)] 98.1 °F (36.7 °C)  Heart Rate:  [59-92] 74  Resp:  [16-20] 20  BP: (103-151)/(58-87) 151/66  There is no height or weight on file to calculate BMI.    Intake/Output Summary (Last 24 hours) at 2025 1325  Last data filed at 2025 0911  Gross per 24 hour   Intake 720 ml   Output --   Net 720 ml       Physical Exam  Vitals reviewed.   Constitutional:       Appearance: She is obese. She is ill-appearing.   Cardiovascular:      Rate and Rhythm: Normal rate and regular rhythm.   Pulmonary:      Effort: Tachypnea and accessory muscle usage present.      Breath sounds: Decreased air movement present. Examination of the right-lower field reveals rales. Examination of the left-lower field reveals rales. Rales present.   Neurological:      General: No focal deficit present.       Mental Status: She is alert. Mental status is at baseline.           Results Review:       I reviewed the patient's new clinical results.  Results from last 7 days   Lab Units 01/23/25  0510 01/22/25  0505 01/21/25  1835   WBC 10*3/mm3 14.82* 3.48 6.98   HEMOGLOBIN g/dL 13.6 13.2 14.3   PLATELETS 10*3/mm3 266 227 247     Results from last 7 days   Lab Units 01/23/25  0510 01/22/25  0505 01/21/25  1835   SODIUM mmol/L 135* 134* 134*   POTASSIUM mmol/L 5.0 3.7 3.6   CHLORIDE mmol/L 100 101 96*   CO2 mmol/L 22.3 20.9* 25.0   BUN mg/dL 28* 18 19   CREATININE mg/dL 1.27* 1.25* 1.25*   CALCIUM mg/dL 9.6 8.8 9.1   MAGNESIUM mg/dL 2.0 1.8 1.7   PHOSPHORUS mg/dL 3.1 2.7  --    Estimated Creatinine Clearance: 49.9 mL/min (A) (by C-G formula based on SCr of 1.27 mg/dL (H)).      Assessment & Plan   Active Hospital Problems    Diagnosis  POA    **RSV bronchitis [J20.5]  Yes    Moderate persistent asthma with exacerbation [J45.41]  Yes    Stage 3a chronic kidney disease [N18.31]  Yes    Acute respiratory failure with hypoxia [J96.01]  Yes    LI (obstructive sleep apnea) [G47.33]  Yes    Endometrial cancer [C54.1]  Yes    IFG (impaired fasting glucose) [R73.01]  Yes    Coronary artery disease involving native heart without angina pectoris [I25.10]  Yes    Hypertension [I10]  Yes    Hyperlipidemia [E78.5]  Yes      Resolved Hospital Problems   No resolved problems to display.       PLAN  This is a 59-year-old lady with history of asthma sleep apnea obesity endometrial cancer coronary artery disease hypertension and hyperlipidemia that presents to the hospital with shortness of breath and cough and is found to have RSV bronchitis with acute respiratory failure with hypoxia  -Unfortunately the care for RSV is only supportive.  Plan to continue steroids and breathing treatments for now.  -She remains in respiratory distress today.  Tachypneic with accessory muscle usage.  -CT scan reviewed from yesterday does reveal evidence of  viral pneumonia.  With no fevers absent leukocytosis I agree with holding off on antibiotics and see much evidence for superimposed bacterial pneumonia  -Pulmonary is following and assisting with care.    -Continue to watch her closely.  She is really tight not moving a tremendous amount of air.  -Renal function electrolytes stable noted mild hyponatremia, discontinue the HCTZ component of her medications.  -Mechanical DVT prophylaxis  -Full code      Disposition  Expected Discharge Date: 1/25/2025; Expected Discharge Time:        Marco Antonio Esquivel MD  [facility]  01/23/25  13:25 EST

## 2025-01-23 NOTE — PLAN OF CARE
Goal Outcome Evaluation:   Pt A& O X 5 and VSS so far this shift. Activity encouraged as tolerated. Breathing noted to have become less labored in comparison to earlier this shift. Safety rounds, call light w/in reach. Will continue to assist and observe for remainder of shift and f/up w/ oncoming RN.

## 2025-01-23 NOTE — PROGRESS NOTES
Name: Flaca Hassan ADMIT: 2025   : 1965  PCP: Isidro Hernandez MD    MRN: [id number] LOS: 2 days   AGE/SEX: 59 y.o. female  ROOM: [facility]     Subjective   Subjective   Sitting up in the chair.  Continues to have productive cough, clear in color, shortness of breath.  No fevers, chills, nausea or vomiting.  No chest pain or palpitations.  On 2 L nasal cannula, which is baseline.    Review of Systems   As above  Objective   Objective   Vital Signs  Temp:  [97.5 °F (36.4 °C)-98.4 °F (36.9 °C)] 98.4 °F (36.9 °C)  Heart Rate:  [62-86] 67  Resp:  [18-20] 20  BP: (148-150)/(77-86) 150/86  SpO2:  [93 %-100 %] 100 %  on  Flow (L/min) (Oxygen Therapy):  [2-3] 2;   Device (Oxygen Therapy): nasal cannula  There is no height or weight on file to calculate BMI.  Physical Exam    General: Alert, sitting up in the bed, not in distress, ill-appearing  HEENT: Normocephalic, atraumatic  CV: Regular rate and rhythm, no murmurs rubs or gallops  Lungs: Decreased, mild expiratory wheezing bilaterally, on 2 L nasal cannula  Abdomen: Soft, nontender, nondistended  Extremities: No significant peripheral edema , no cyanosis     Results Review     I reviewed the patient's new clinical results.  Results from last 7 days   Lab Units 25  0510 25  0505 25  1835   WBC 10*3/mm3 15.68* 14.82* 3.48 6.98   HEMOGLOBIN g/dL 13.9 13.6 13.2 14.3   PLATELETS 10*3/mm3 302 266 227 247     Results from last 7 days   Lab Units 25  0510 25  0505 25  1835   SODIUM mmol/L 137 135* 134* 134*   POTASSIUM mmol/L 4.2 5.0 3.7 3.6   CHLORIDE mmol/L 102 100 101 96*   CO2 mmol/L 25.0 22.3 20.9* 25.0   BUN mg/dL 27* 28* 18 19   CREATININE mg/dL 1.04* 1.27* 1.25* 1.25*   GLUCOSE mg/dL 128* 164* 245* 116*   Estimated Creatinine Clearance: 61 mL/min (A) (by C-G formula based on SCr of 1.04 mg/dL (H)).  Results from last 7 days   Lab Units 25  0510 25  0505 25  1835   ALBUMIN  g/dL 3.4* 3.4* 4.3   BILIRUBIN mg/dL  --  0.3 0.4   ALK PHOS U/L  --  97 115   AST (SGOT) U/L  --  20 26   ALT (SGPT) U/L  --  17 19     Results from last 7 days   Lab Units 01/24/25  0633 01/23/25  0510 01/22/25  0505 01/21/25  1835   CALCIUM mg/dL 9.0 9.6 8.8 9.1   ALBUMIN g/dL  --  3.4* 3.4* 4.3   MAGNESIUM mg/dL 1.9 2.0 1.8 1.7   PHOSPHORUS mg/dL 3.4 3.1 2.7  --      Results from last 7 days   Lab Units 01/23/25  0510 01/21/25  1835   PROCALCITONIN ng/mL 0.02 <0.02   LACTATE mmol/L  --  1.3     COVID19   Date Value Ref Range Status   01/21/2025 Not Detected Not Detected - Ref. Range Final   08/12/2022 Detected (C) Not Detected - Ref. Range Final     Hemoglobin A1C   Date/Time Value Ref Range Status   01/22/2025 0505 6.40 (H) 4.80 - 5.60 % Final           CT Chest Without Contrast Diagnostic  Narrative: CT OF THE CHEST WITHOUT CONTRAST 01/22/2025     HISTORY: Respiratory distress.     Spiral images were obtained from the lung apices to the upper abdomen.  No intravenous contrast was given.     There are patchy areas of atelectasis or inflammatory infiltrate in the  bilateral lower lobes. Right hemidiaphragm is elevated. A few small to  slightly enlarged mediastinal nodes are seen.     Upper abdomen is otherwise unremarkable except for at least one  gallstone on the inferior most image (image 98).     There is some aortic and coronary calcification.     Impression: 1. Elevated right hemidiaphragm.  2. Mild patchy areas of atelectasis or pneumonia in the bilateral lower  lobes.  3. A few small to slightly enlarged mediastinal nodes are seen.  4. Short-term follow-up CT of the chest without contrast in  approximately 3 months suggested.     Radiation dose reduction techniques were utilized, including automated  exposure control and exposure modulation based on body size.        This report was finalized on 1/23/2025 5:46 PM by Dr. Oscar Atkinson M.D on Workstation: [id number]       Scheduled  Medications  amLODIPine, 10 mg, Oral, Daily  atorvastatin, 10 mg, Oral, Daily  bisoprolol, 5 mg, Oral, Daily  clopidogrel, 75 mg, Oral, Daily  escitalopram, 20 mg, Oral, Daily  fluticasone, 2 spray, Nasal, Daily  guaiFENesin, 600 mg, Oral, Q12H  ipratropium-albuterol, 3 mL, Nebulization, 4x Daily - RT  losartan, 100 mg, Oral, Q24H  montelukast, 10 mg, Oral, Daily  pantoprazole, 40 mg, Oral, Q AM  potassium chloride, 10 mEq, Oral, Daily  predniSONE, 40 mg, Oral, Daily With Breakfast  risperiDONE, 0.5 mg, Oral, Daily  senna-docusate sodium, 2 tablet, Oral, BID  sodium chloride, 10 mL, Intravenous, Q12H    Infusions   Diet  Diet: Cardiac, Diabetic, Renal; Healthy Heart (2-3 Na+); Consistent Carbohydrate; Low Sodium (2-3g), Low Potassium, Low Phosphorus; No Pork; Fluid Consistency: Thin (IDDSI 0)    I have personally reviewed     [x]  Laboratory   [x]  Microbiology   [x]  Radiology   [x]  EKG/Telemetry  [x]  Cardiology/Vascular   []  Pathology    []  Records       Assessment/Plan     Active Hospital Problems    Diagnosis  POA    **RSV bronchitis [J20.5]  Yes    Moderate persistent asthma with exacerbation [J45.41]  Yes    Stage 3a chronic kidney disease [N18.31]  Yes    Acute respiratory failure with hypoxia [J96.01]  Yes    LI (obstructive sleep apnea) [G47.33]  Yes    Endometrial cancer [C54.1]  Yes    IFG (impaired fasting glucose) [R73.01]  Yes    Coronary artery disease involving native heart without angina pectoris [I25.10]  Yes    Hypertension [I10]  Yes    Hyperlipidemia [E78.5]  Yes      Resolved Hospital Problems   No resolved problems to display.       Patient is a 59 h/o f asthma , CAD, HTN, HLD, CKD stage IIIa, endometrial cancer ,  presented  the hospital with shortness of breath and cough.    Acute exacerbation of moderate persistent asthma secondary to RSV infection  Acute hypoxemic respiratory failure  -Respiratory viral panel due to 1/21/2025 was positive for RSV  -CT scan without contrast 01/22/2025  showed mild patchy areas of atelectasis or pneumonia in the bilateral lower lobes.  -Status post IV steroids.  Transitioned to p.o.  -Scheduled DuoNebs  -Supportive care  -Pulmonology following      Enlarged mediastinal nodes  CT scan showed A few small to slightly enlarged mediastinal nodes are seen.Short-term follow-up CT of the chest without contrast in approximately 3 months suggested per radiology.  Suspect enlarged lymph node secondary to RSV infection/Pneumonia ,  defer outpatient evaluation to pulmonology.      History of CAD  Hypertension  -Continue statin, Plavix, amlodipine, losartan, bisoprolol   -BP stable        CKD stage IIIa  -Creatinine stable around baseline.    -Monitor daily BMP.  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  SCDs  Full code.  Discussed with patient.  Discussed  in multidisciplinary rounds  Disposition: Home with home health likely tomorrow  Expected Discharge Date: 1/26/2025; Expected Discharge Time:        Copied text in this note has been reviewed and is accurate as of 01/24/25.         Dictated utilizing Dragon dictation        Jen Blanco MD  [facility]  01/24/25  17:54 EST

## 2025-01-23 NOTE — THERAPY EVALUATION
Patient Name: Flaca Hassan  : 1965    MRN: [id number]                              Today's Date: 2025       Admit Date: 2025    Visit Dx:     ICD-10-CM ICD-9-CM   1. RSV bronchitis  J20.5 466.0     079.6   2. Exacerbation of asthma, unspecified asthma severity, unspecified whether persistent  J45.901 493.92   3. Acute hypoxic respiratory failure  J96.01 518.81     Patient Active Problem List   Diagnosis    Hypertension    Obesity    Hyperlipidemia    Iron deficiency anemia due to chronic blood loss    Upper GI bleeding    ST elevation myocardial infarction (STEMI)    Coronary artery disease involving native heart without angina pectoris    Morgagni hernia    IFG (impaired fasting glucose)    Primary insomnia    PMB (postmenopausal bleeding)    Endometrial cancer    Uncomplicated asthma    LI (obstructive sleep apnea)    Dyspepsia    History of peptic ulcer disease    Rectal pain    Abdominal wall abscess    History of ST elevation myocardial infarction (STEMI)    Abdominal wall cellulitis    Prediabetes    Localized edema    Hypoxia    RSV bronchitis    Moderate persistent asthma with exacerbation    Stage 3a chronic kidney disease    Acute respiratory failure with hypoxia     Past Medical History:   Diagnosis Date    Anemia     Asthma     Breast cyst     Cancer     Coronary artery disease     stent    Depression     Diverticulosis     H/O: GI bleed     recurrent    Hematuria     History of coronary angioplasty with insertion of stent     History of transfusion     no reaction    Hyperlipidemia     Hypertension     Incontinence of urine     wears pads    Irritable bowel syndrome     Malignant neoplasm of endometrium 2021    Melena     Obesity     Oxygen dependent     uses oxygen 2 prn when walking if SOB    Uterine cancer     UTI (urinary tract infection)      Past Surgical History:   Procedure Laterality Date    BREAST CYST EXCISION Left     CARDIAC CATHETERIZATION N/A 2017     Procedure: Left Heart Cath;  Surgeon: Imer Montaño MD;  Location: [facility] CATH INVASIVE LOCATION;  Service:     CARDIAC CATHETERIZATION N/A 11/13/2017    Procedure: Stent ROBERTO coronary;  Surgeon: Imer Montaño MD;  Location: [facility] CATH INVASIVE LOCATION;  Service:     COLONOSCOPY  02/11/2016    tics, NBIH,     COLONOSCOPY N/A 02/28/2022    Procedure: COLONOSCOPY TO CECUM and TI WITH APC CAUTERY TO RECTAL AVMs;  Surgeon: Ruth Ann Willson MD;  Location: [facility] ENDOSCOPY;  Service: Gastroenterology;  Laterality: N/A;  FAMILY HX COLON CA  --HEMORRHOIDS, BLEEDING RECTAL AVMs     D & C HYSTEROSCOPY ENDOMETRIAL ABLATION N/A 01/08/2021    Procedure: DILATATION AND CURETTAGE HYSTEROSCOPY ENDOMETRIAL  RESECTION;  Surgeon: Thu Blake MD;  Location: [facility] OR OSC;  Service: Obstetrics/Gynecology;  Laterality: N/A;    ENDOSCOPY N/A 03/03/2017    erythematous mucosa in stomach, duodenal ulcer , mild to moderate chronic active gastritis, positive for h pylori    ENDOSCOPY N/A 11/08/2021    Procedure: ESOPHAGOGASTRODUODENOSCOPY WITH BX'S;  Surgeon: Ruth Ann Willson MD;  Location: [facility] ENDOSCOPY;  Service: Gastroenterology;  Laterality: N/A;  pre: EPIGASTRIC PAIN, HX OF STOMACH ULCER  post: GASTRITIS    HYSTERECTOMY      UPPER GASTROINTESTINAL ENDOSCOPY  02/10/2016    normal-Ruth Ann Willson M.D.      General Information       Row Name 01/23/25 1248          OT Time and Intention    Subjective Information no complaints  -KG     Document Type evaluation  -KG     Mode of Treatment co-treatment;occupational therapy;physical therapy  -KG     Patient Effort good  -KG     Symptoms Noted During/After Treatment fatigue;shortness of breath  -KG       Row Name 01/23/25 1242          General Information    Patient Profile Reviewed yes  -KG     Prior Level of Function --  Pt reports needing assistance from family for mobility and BADLs -- does not use AD.  -KG     Existing Precautions/Restrictions fall;oxygen therapy  device and L/min  -KG     Barriers to Rehab none identified  -KG       Row Name 01/23/25 1248          Living Environment    People in Home sibling(s);other relative(s)  sister-in-law & nephew?  -KG       Row Name 01/23/25 1248          Home Main Entrance    Number of Stairs, Main Entrance none  -KG       Row Name 01/23/25 1248          Stairs Within Home, Primary    Number of Stairs, Within Home, Primary none  -KG       Row Name 01/23/25 1248          Cognition    Orientation Status (Cognition) oriented x 3;verbal cues/prompts needed for orientation  -KG       Row Name 01/23/25 1248          Safety Issues/Impairments Affecting Functional Mobility    Safety Issues Affecting Function (Mobility) impulsivity;judgment;positioning of assistive device  -KG     Impairments Affecting Function (Mobility) balance;endurance/activity tolerance;strength;shortness of breath  -KG               User Key  (r) = Recorded By, (t) = Taken By, (c) = Cosigned By      Initials Name Provider Type    KG Narendra Garza OT Occupational Therapist                     Mobility/ADL's       Row Name 01/23/25 1251          Bed Mobility    Bed Mobility supine-sit  -KG     Supine-Sit [place] (Bed Mobility) minimum assist (75% patient effort)  -KG       Row Name 01/23/25 1251          Transfers    Transfers sit-stand transfer;stand-sit transfer  -KG       Row Name 01/23/25 1251          Sit-Stand Transfer    Sit-Stand [place] (Transfers) contact guard  -KG     Assistive Device (Sit-Stand Transfers) walker, front-wheeled  -KG       Row Name 01/23/25 1251          Stand-Sit Transfer    Stand-Sit [place] (Transfers) contact guard  -KG     Assistive Device (Stand-Sit Transfers) walker, front-wheeled  -KG       Row Name 01/23/25 1251          Functional Mobility    Functional Mobility- Ind. Level contact guard assist  within room (simulating bathroom distance) -- 1 LOB due to impulsivity needing Min A to correct  -KG     Functional  Mobility- Device walker, front-wheeled  -KG     Functional Mobility- Safety Issues supplemental O2  -KG               User Key  (r) = Recorded By, (t) = Taken By, (c) = Cosigned By      Initials Name Provider Type    YULIET Narendra Garza OT Occupational Therapist                   Obj/Interventions       Row Name 01/23/25 1252          Sensory Assessment (Somatosensory)    Sensory Assessment (Somatosensory) sensation intact  -KG       Row Name 01/23/25 1252          Vision Assessment/Intervention    Visual Impairment/Limitations WNL  -KG       Row Name 01/23/25 1252          Range of Motion Comprehensive    General Range of Motion no range of motion deficits identified  -KG       Row Name 01/23/25 1252          Strength Comprehensive (MMT)    Comment, General Manual Muscle Testing (MMT) Assessment BUE strength 4/5 grossly  -KG       Row Name 01/23/25 1252          Motor Skills    Motor Skills functional endurance  -KG     Functional Endurance mild endurance deficits & SOA w/ activity  -KG       Row Name 01/23/25 1252          Balance    Balance Assessment sitting static balance;sitting dynamic balance;sit to stand dynamic balance;standing static balance;standing dynamic balance  -KG     Static Sitting Balance standby assist  -KG     Dynamic Sitting Balance standby assist  -KG     Position, Sitting Balance sitting edge of bed  -KG     Sit to Stand Dynamic Balance contact guard  -KG     Static Standing Balance contact guard  -KG     Dynamic Standing Balance contact guard  -KG     Position/Device Used, Standing Balance walker, front-wheeled  -KG     Balance Interventions sitting;standing;sit to stand;supported;static;dynamic;occupation based/functional task  -KG               User Key  (r) = Recorded By, (t) = Taken By, (c) = Cosigned By      Initials Name Provider Type    YULIET Narendra Garza OT Occupational Therapist                   Goals/Plan       Row Name 01/23/25 1256          Bed Mobility Goal 1 (OT)     Activity/Assistive Device (Bed Mobility Goal 1, OT) sit to supine;supine to sit  -KG     [place] Level/Cues Needed (Bed Mobility Goal 1, OT) standby assist  -KG     Time Frame (Bed Mobility Goal 1, OT) short term goal (STG);2 weeks  -KG     Progress/Outcomes (Bed Mobility Goal 1, OT) new goal  -KG       Row Name 01/23/25 1256          Transfer Goal 1 (OT)    Activity/Assistive Device (Transfer Goal 1, OT) sit-to-stand/stand-to-sit;bed-to-chair/chair-to-bed;toilet  -KG     [place] Level/Cues Needed (Transfer Goal 1, OT) standby assist  -KG     Time Frame (Transfer Goal 1, OT) short term goal (STG);2 weeks  -KG     Progress/Outcome (Transfer Goal 1, OT) new goal  -KG       Row Name 01/23/25 1256          Bathing Goal 1 (OT)    Activity/Device (Bathing Goal 1, OT) upper body bathing;lower body bathing  -KG     [place] Level/Cues Needed (Bathing Goal 1, OT) minimum assist (75% or more patient effort)  -KG     Time Frame (Bathing Goal 1, OT) short term goal (STG);2 weeks  -KG     Progress/Outcomes (Bathing Goal 1, OT) new goal  -KG       Row Name 01/23/25 1256          Dressing Goal 1 (OT)    Activity/Device (Dressing Goal 1, OT) upper body dressing;lower body dressing  -KG     [place]/Cues Needed (Dressing Goal 1, OT) minimum assist (75% or more patient effort)  -KG     Time Frame (Dressing Goal 1, OT) short term goal (STG);2 weeks  -KG     Progress/Outcome (Dressing Goal 1, OT) new goal  -KG       Row Name 01/23/25 1256          Toileting Goal 1 (OT)    Activity/Device (Toileting Goal 1, OT) adjust/manage clothing;perform perineal hygiene  -KG     [place] Level/Cues Needed (Toileting Goal 1, OT) minimum assist (75% or more patient effort)  -KG     Time Frame (Toileting Goal 1, OT) short term goal (STG);2 weeks  -KG     Progress/Outcome (Toileting Goal 1, OT) new goal  -KG       Row Name 01/23/25 1256          Grooming Goal 1 (OT)    Activity/Device (Grooming Goal 1, OT) oral care;wash face,  hands  -KG     [place] (Grooming Goal 1, OT) contact guard required;standby assist  -KG     Time Frame (Grooming Goal 1, OT) short term goal (STG);2 weeks  -KG     Progress/Outcome (Grooming Goal 1, OT) new goal  -KG       Row Name 01/23/25 1256          Therapy Assessment/Plan (OT)    Planned Therapy Interventions (OT) activity tolerance training;adaptive equipment training;BADL retraining;functional balance retraining;occupation/activity based interventions;patient/caregiver education/training;ROM/therapeutic exercise;transfer/mobility retraining;strengthening exercise  -KG               User Key  (r) = Recorded By, (t) = Taken By, (c) = Cosigned By      Initials Name Provider Type    KG Narendra Garza, JENELLE Occupational Therapist                   Clinical Impression       Row Name 01/23/25 1253          Pain Assessment    Pretreatment Pain Rating 0/10 - no pain  -KG     Posttreatment Pain Rating 0/10 - no pain  -KG       Row Name 01/23/25 1253          Plan of Care Review    Plan of Care Reviewed With patient  -KG     Outcome Evaluation Pt admitted to [facility] for acute RSV bronchitis. Pt lives w/ her brother, sister-in-law, and nephew -- requires assistance for mobility & BADLs but does not use AD. Today, pt presents w/ strength, balance, and activity tolerance minimally below baseline. Needing Min A for bed mobility, CGA + RW for STS and functional mobility within room. Pt had 1 LOB due to impulsivity and needed Min A to correct. Maintained SpO2 in 90s on 3LNC. OT will continue to follow to address functional deficits, recommend d/c w/ HHOT  -KG       Row Name 01/23/25 1252          Therapy Assessment/Plan (OT)    Criteria for Skilled Therapeutic Interventions Met (OT) skilled treatment is necessary  -KG     Therapy Frequency (OT) 3 times/wk  -KG       Row Name 01/23/25 1252          Therapy Plan Review/Discharge Plan (OT)    Anticipated Discharge Disposition (OT) home with home health;home with assist  -KG        Row Name 01/23/25 1253          Vital Signs    Pre Patient Position Supine  -KG     Intra Patient Position Standing  -KG     Post Patient Position Sitting  -KG       Row Name 01/23/25 1253          Positioning and Restraints    Pre-Treatment Position in bed  -KG     Post Treatment Position chair  -KG     In Chair notified nsg;reclined;call light within reach;encouraged to call for assist;exit alarm on  -KG               User Key  (r) = Recorded By, (t) = Taken By, (c) = Cosigned By      Initials Name Provider Type    Narendra Casey, JENELLE Occupational Therapist                   Outcome Measures       Row Name 01/23/25 1257          How much help from another is currently needed...    Putting on and taking off regular lower body clothing? 3  -KG     Bathing (including washing, rinsing, and drying) 3  -KG     Toileting (which includes using toilet bed pan or urinal) 3  -KG     Putting on and taking off regular upper body clothing 3  -KG     Taking care of personal grooming (such as brushing teeth) 3  -KG     Eating meals 4  -KG     AM-PAC 6 Clicks Score (OT) 19  -KG       Row Name 01/23/25 0911          How much help from another person do you currently need...    Turning from your back to your side while in flat bed without using bedrails? 4  -AC     Moving from lying on back to sitting on the side of a flat bed without bedrails? 4  -AC     Moving to and from a bed to a chair (including a wheelchair)? 3  -AC     Standing up from a chair using your arms (e.g., wheelchair, bedside chair)? 3  -AC     Climbing 3-5 steps with a railing? 3  -AC     To walk in hospital room? 3  -AC     AM-PAC 6 Clicks Score (PT) 20  -AC       Row Name 01/23/25 1257          Modified [place] Scale    Modified [place] Scale 4 - Moderately severe disability.  Unable to walk without assistance, and unable to attend to own bodily needs without assistance.  -KG       Row Name 01/23/25 1257          Functional Assessment    Outcome Measure  Options AM-PAC 6 Clicks Daily Activity (OT);Modified Faye  -KG               User Key  (r) = Recorded By, (t) = Taken By, (c) = Cosigned By      Initials Name Provider Type    Trinidad Howard, RN Registered Nurse    Narendra Casey, OT Occupational Therapist                    Occupational Therapy Education       Title: PT OT SLP Therapies (Not Started)       Topic: Occupational Therapy (Not Started)       Point: ADL training (Not Started)       Description:   Instruct learner(s) on proper safety adaptation and remediation techniques during self care or transfers.   Instruct in proper use of assistive devices.                  Learner Progress:  Not documented in this visit.              Point: Home exercise program (Not Started)       Description:   Instruct learner(s) on appropriate technique for monitoring, assisting and/or progressing therapeutic exercises/activities.                  Learner Progress:  Not documented in this visit.              Point: Precautions (Not Started)       Description:   Instruct learner(s) on prescribed precautions during self-care and functional transfers.                  Learner Progress:  Not documented in this visit.              Point: Body mechanics (Not Started)       Description:   Instruct learner(s) on proper positioning and spine alignment during self-care, functional mobility activities and/or exercises.                  Learner Progress:  Not documented in this visit.                                  OT Recommendation and Plan  Planned Therapy Interventions (OT): activity tolerance training, adaptive equipment training, BADL retraining, functional balance retraining, occupation/activity based interventions, patient/caregiver education/training, ROM/therapeutic exercise, transfer/mobility retraining, strengthening exercise  Therapy Frequency (OT): 3 times/wk  Plan of Care Review  Plan of Care Reviewed With: patient  Outcome Evaluation: Pt admitted to [facility] for  acute RSV bronchitis. Pt lives w/ her brother, sister-in-law, and nephew -- requires assistance for mobility & BADLs but does not use AD. Today, pt presents w/ strength, balance, and activity tolerance minimally below baseline. Needing Min A for bed mobility, CGA + RW for STS and functional mobility within room. Pt had 1 LOB due to impulsivity and needed Min A to correct. Maintained SpO2 in 90s on 3LNC. OT will continue to follow to address functional deficits, recommend d/c w/ HHOT     Time Calculation:   Evaluation Complexity (OT)  Review Occupational Profile/Medical/Therapy History Complexity: brief/low complexity  Assessment, Occupational Performance/Identification of Deficit Complexity: 1-3 performance deficits  Clinical Decision Making Complexity (OT): problem focused assessment/low complexity  Overall Complexity of Evaluation (OT): low complexity     Time Calculation- OT       Row Name 01/23/25 1257             Time Calculation- OT    OT Start Time 1037  -KG      OT Stop Time 1058  -KG      OT Time Calculation (min) 21 min  -KG      Total Timed Code Minutes- OT 15 minute(s)  -KG      OT Non-Billable Time (min) 6 min  -KG      OT Received On 01/23/25  -KG      OT - Next Appointment 01/24/25  -KG         Timed Charges    00643 - OT Self Care/Mgmt Minutes 15  -KG         Untimed Charges    OT Eval/Re-eval Minutes 6  -KG         Total Minutes    Timed Charges Total Minutes 15  -KG      Untimed Charges Total Minutes 6  -KG       Total Minutes 21  -KG                User Key  (r) = Recorded By, (t) = Taken By, (c) = Cosigned By      Initials Name Provider Type    KG Narendra Garza OT Occupational Therapist                  Therapy Charges for Today       Code Description Service Date Service Provider Modifiers Qty    45304912469  OT SELF CARE/MGMT/TRAIN EA 15 MIN 1/23/2025 Narendra Garza OT GO 1    [id number] HC OT EVAL LOW COMPLEXITY 2 1/23/2025 Narendra Garza OT GO 1                 Narendra Garza  OT  1/23/2025

## 2025-01-23 NOTE — PROGRESS NOTES
[facility] INPATIENT PROGRESS NOTE         [address]    2025      PATIENT IDENTIFICATION:  Name: Flaca Hassan ADMIT: 2025   : 1965  PCP: Isidro Hernandez MD    MRN: [id number] LOS: 1 days   AGE/SEX: 59 y.o. female  ROOM: [facility]                     LOS 1    Reason for visit: RSV      SUBJECTIVE:      Says that she feels that her shortness of breath is a little bit better than yesterday but still moderately bad.  Has a nonproductive cough.  No chest pain.  On 3 L supplemental oxygen.      Objective   OBJECTIVE:    Vital Sign Min/Max for last 24 hours  Temp  Min: 97.3 °F (36.3 °C)  Max: 98.1 °F (36.7 °C)   BP  Min: 103/58  Max: 151/66   Pulse  Min: 59  Max: 92   Resp  Min: 16  Max: 20   SpO2  Min: 92 %  Max: 98 %   No data recorded   No data recorded    Vitals:    252 25 2252 25 0722 25 0751   BP:  149/87  151/66   BP Location:  Right arm  Left arm   Patient Position:  Sitting  Lying   Pulse: 62 68 92 91   Resp: 16 18 20 18   Temp:  97.3 °F (36.3 °C)  98.1 °F (36.7 °C)   TempSrc:  Oral  Oral   SpO2: 98% 92% 97% 95%        There were no vitals filed for this visit.    There is no height or weight on file to calculate BMI.                          There is no height or weight on file to calculate BMI.    Intake/Output Summary (Last 24 hours) at 2025 0843  Last data filed at 2025 2242  Gross per 24 hour   Intake 360 ml   Output --   Net 360 ml         Exam:  GEN:  No distress, appears stated age  EYES:   PERRL, anicteric sclerae  ENT:    External ears/nose normal, OP clear  NECK:  No adenopathy, midline trachea  LUNGS: Normal chest on inspection, palpation and auscultation  CV:  Normal S1S2, without murmur  ABD:  Nontender, nondistended, no hepatosplenomegaly, +BS  EXT:  No edema.  No cyanosis or clubbing.  No mottling and normal cap refill.    Assessment     Scheduled meds:  amLODIPine, 10 mg, Oral, Daily  atorvastatin, 10 mg, Oral,  Daily  bisoprolol, 5 mg, Oral, Daily  clopidogrel, 75 mg, Oral, Daily  escitalopram, 20 mg, Oral, Daily  fluticasone, 2 spray, Nasal, Daily  guaiFENesin, 600 mg, Oral, Q12H  ipratropium-albuterol, 3 mL, Nebulization, 4x Daily - RT  losartan, 100 mg, Oral, Q24H  methylPREDNISolone sodium succinate, 40 mg, Intravenous, Q12H  montelukast, 10 mg, Oral, Daily  pantoprazole, 40 mg, Oral, Q AM  potassium chloride, 10 mEq, Oral, Daily  risperiDONE, 0.5 mg, Oral, Daily  senna-docusate sodium, 2 tablet, Oral, BID  sodium chloride, 10 mL, Intravenous, Q12H      IV meds:                         Data Review:  Results from last 7 days   Lab Units 01/23/25  0510 01/22/25  0505 01/21/25  1835   SODIUM mmol/L 135* 134* 134*   POTASSIUM mmol/L 5.0 3.7 3.6   CHLORIDE mmol/L 100 101 96*   CO2 mmol/L 22.3 20.9* 25.0   BUN mg/dL 28* 18 19   CREATININE mg/dL 1.27* 1.25* 1.25*   GLUCOSE mg/dL 164* 245* 116*   CALCIUM mg/dL 9.6 8.8 9.1         Estimated Creatinine Clearance: 49.9 mL/min (A) (by C-G formula based on SCr of 1.27 mg/dL (H)).  Results from last 7 days   Lab Units 01/23/25  0510 01/22/25  0505 01/21/25  1835   WBC 10*3/mm3 14.82* 3.48 6.98   HEMOGLOBIN g/dL 13.6 13.2 14.3   PLATELETS 10*3/mm3 266 227 247         Results from last 7 days   Lab Units 01/22/25  0505 01/21/25  1835   ALT (SGPT) U/L 17 19   AST (SGOT) U/L 20 26     Results from last 7 days   Lab Units 01/21/25  1849   PH, ARTERIAL pH units 7.378   PO2 ART mm Hg 43.6*   PCO2, ARTERIAL mm Hg 43.1   HCO3 ART mmol/L 25.3     Results from last 7 days   Lab Units 01/23/25  0510 01/21/25  1835   PROCALCITONIN ng/mL 0.02 <0.02   LACTATE mmol/L  --  1.3         Hemoglobin A1C   Date/Time Value Ref Range Status   01/22/2025 0505 6.40 (H) 4.80 - 5.60 % Final         Imaging reviewed  CT chest 1/22 reviewed    Microbiology reviewed  RVP: +RSV          Active Hospital Problems    Diagnosis  POA    **RSV bronchitis [J20.5]  Yes    Moderate persistent asthma with exacerbation  [J45.41]  Yes    Stage 3a chronic kidney disease [N18.31]  Yes    Acute respiratory failure with hypoxia [J96.01]  Yes    LI (obstructive sleep apnea) [G47.33]  Yes    Endometrial cancer [C54.1]  Yes    IFG (impaired fasting glucose) [R73.01]  Yes    Coronary artery disease involving native heart without angina pectoris [I25.10]  Yes    Hypertension [I10]  Yes    Hyperlipidemia [E78.5]  Yes      Resolved Hospital Problems   No resolved problems to display.         ASSESSMENT:  Acute RSV bronchitis  Acute exacerbation of moderate persistent asthma  Acute hypoxemic respiratory failure  LI  Endometrial cancer  Stage IIIa CKD  CAD  Hypertension/hyperlipidemia      PLAN:  Scheduled and as needed bronchodilators and steroid for asthma exacerbation. Change steroid to PO.  Treatment for viral process is supportive.  Wean oxygen as able.  Positive airway pressure for sleep apnea at night and bleed and oxygen as needed.          Giorgio Deng MD  Pulmonary and Critical Care Medicine  [facility], [facility]  1/23/2025    08:43 EST

## 2025-01-24 LAB
ANION GAP SERPL CALCULATED.3IONS-SCNC: 10 MMOL/L (ref 5–15)
BUN SERPL-MCNC: 27 MG/DL (ref 6–20)
BUN/CREAT SERPL: 26 (ref 7–25)
CALCIUM SPEC-SCNC: 9 MG/DL (ref 8.6–10.5)
CHLORIDE SERPL-SCNC: 102 MMOL/L (ref 98–107)
CO2 SERPL-SCNC: 25 MMOL/L (ref 22–29)
CREAT SERPL-MCNC: 1.04 MG/DL (ref 0.57–1)
DEPRECATED RDW RBC AUTO: 47.5 FL (ref 37–54)
EGFRCR SERPLBLD CKD-EPI 2021: 62 ML/MIN/1.73
ERYTHROCYTE [DISTWIDTH] IN BLOOD BY AUTOMATED COUNT: 15.3 % (ref 12.3–15.4)
GLUCOSE SERPL-MCNC: 128 MG/DL (ref 65–99)
HCT VFR BLD AUTO: 43.1 % (ref 34–46.6)
HGB BLD-MCNC: 13.9 G/DL (ref 12–15.9)
MAGNESIUM SERPL-MCNC: 1.9 MG/DL (ref 1.6–2.6)
MCH RBC QN AUTO: 27.6 PG (ref 26.6–33)
MCHC RBC AUTO-ENTMCNC: 32.3 G/DL (ref 31.5–35.7)
MCV RBC AUTO: 85.7 FL (ref 79–97)
PHOSPHATE SERPL-MCNC: 3.4 MG/DL (ref 2.5–4.5)
PLATELET # BLD AUTO: 302 10*3/MM3 (ref 140–450)
PMV BLD AUTO: 10.9 FL (ref 6–12)
POTASSIUM SERPL-SCNC: 4.2 MMOL/L (ref 3.5–5.2)
RBC # BLD AUTO: 5.03 10*6/MM3 (ref 3.77–5.28)
SODIUM SERPL-SCNC: 137 MMOL/L (ref 136–145)
WBC NRBC COR # BLD AUTO: 15.68 10*3/MM3 (ref 3.4–10.8)

## 2025-01-24 PROCEDURE — 94664 DEMO&/EVAL PT USE INHALER: CPT

## 2025-01-24 PROCEDURE — 94799 UNLISTED PULMONARY SVC/PX: CPT

## 2025-01-24 PROCEDURE — 85027 COMPLETE CBC AUTOMATED: CPT | Performed by: STUDENT IN AN ORGANIZED HEALTH CARE EDUCATION/TRAINING PROGRAM

## 2025-01-24 PROCEDURE — 80048 BASIC METABOLIC PNL TOTAL CA: CPT | Performed by: STUDENT IN AN ORGANIZED HEALTH CARE EDUCATION/TRAINING PROGRAM

## 2025-01-24 PROCEDURE — 84100 ASSAY OF PHOSPHORUS: CPT | Performed by: STUDENT IN AN ORGANIZED HEALTH CARE EDUCATION/TRAINING PROGRAM

## 2025-01-24 PROCEDURE — 83735 ASSAY OF MAGNESIUM: CPT | Performed by: STUDENT IN AN ORGANIZED HEALTH CARE EDUCATION/TRAINING PROGRAM

## 2025-01-24 PROCEDURE — 94761 N-INVAS EAR/PLS OXIMETRY MLT: CPT

## 2025-01-24 RX ADMIN — Medication 10 ML: at 21:25

## 2025-01-24 RX ADMIN — CLOPIDOGREL BISULFATE 75 MG: 75 TABLET ORAL at 09:00

## 2025-01-24 RX ADMIN — ACETAMINOPHEN 650 MG: 325 TABLET, FILM COATED ORAL at 21:24

## 2025-01-24 RX ADMIN — GUAIFENESIN 600 MG: 600 TABLET, EXTENDED RELEASE ORAL at 09:00

## 2025-01-24 RX ADMIN — BENZONATATE 200 MG: 100 CAPSULE ORAL at 12:21

## 2025-01-24 RX ADMIN — SENNOSIDES AND DOCUSATE SODIUM 2 TABLET: 50; 8.6 TABLET ORAL at 09:01

## 2025-01-24 RX ADMIN — ESCITALOPRAM 20 MG: 10 TABLET, FILM COATED ORAL at 21:24

## 2025-01-24 RX ADMIN — AMLODIPINE BESYLATE 10 MG: 10 TABLET ORAL at 09:00

## 2025-01-24 RX ADMIN — ACETAMINOPHEN 650 MG: 325 TABLET, FILM COATED ORAL at 14:24

## 2025-01-24 RX ADMIN — ATORVASTATIN CALCIUM 10 MG: 10 TABLET, FILM COATED ORAL at 09:00

## 2025-01-24 RX ADMIN — FLUTICASONE PROPIONATE 2 SPRAY: 50 SPRAY, METERED NASAL at 09:03

## 2025-01-24 RX ADMIN — GUAIFENESIN 600 MG: 600 TABLET, EXTENDED RELEASE ORAL at 21:24

## 2025-01-24 RX ADMIN — IPRATROPIUM BROMIDE AND ALBUTEROL SULFATE 3 ML: 2.5; .5 SOLUTION RESPIRATORY (INHALATION) at 11:21

## 2025-01-24 RX ADMIN — IPRATROPIUM BROMIDE AND ALBUTEROL SULFATE 3 ML: 2.5; .5 SOLUTION RESPIRATORY (INHALATION) at 21:52

## 2025-01-24 RX ADMIN — IPRATROPIUM BROMIDE AND ALBUTEROL SULFATE 3 ML: 2.5; .5 SOLUTION RESPIRATORY (INHALATION) at 07:33

## 2025-01-24 RX ADMIN — LOSARTAN POTASSIUM 100 MG: 100 TABLET, FILM COATED ORAL at 09:00

## 2025-01-24 RX ADMIN — MONTELUKAST 10 MG: 10 TABLET, FILM COATED ORAL at 09:00

## 2025-01-24 RX ADMIN — BISOPROLOL FUMARATE 5 MG: 5 TABLET ORAL at 09:01

## 2025-01-24 RX ADMIN — RISPERIDONE 0.5 MG: 0.5 TABLET, FILM COATED ORAL at 09:01

## 2025-01-24 RX ADMIN — BENZONATATE 200 MG: 100 CAPSULE ORAL at 21:24

## 2025-01-24 RX ADMIN — Medication 10 ML: at 09:00

## 2025-01-24 RX ADMIN — POTASSIUM CHLORIDE 10 MEQ: 750 TABLET, EXTENDED RELEASE ORAL at 09:00

## 2025-01-24 RX ADMIN — PANTOPRAZOLE SODIUM 40 MG: 40 TABLET, DELAYED RELEASE ORAL at 06:50

## 2025-01-24 RX ADMIN — IPRATROPIUM BROMIDE AND ALBUTEROL SULFATE 3 ML: 2.5; .5 SOLUTION RESPIRATORY (INHALATION) at 16:03

## 2025-01-24 NOTE — PROGRESS NOTES
[facility] INPATIENT PROGRESS NOTE         [address]    2025      PATIENT IDENTIFICATION:  Name: Flaca Hassan ADMIT: 2025   : 1965  PCP: Isidro Hernandez MD    MRN: [id number] LOS: 2 days   AGE/SEX: 59 y.o. female  ROOM: [facility]                     LOS 2    Reason for visit: RSV      SUBJECTIVE:      Complains of persistent and nonproductive cough.  Less short of breath.  Down to 2 L of oxygen which is what she uses at home.  Breast with her that I suspect that she will likely have a bit of a nonproductive cough for several weeks before it completely resolves.  From my standpoint we could discharge and continue with further outpatient treatment.      Objective   OBJECTIVE:    Vital Sign Min/Max for last 24 hours  Temp  Min: 97.5 °F (36.4 °C)  Max: 98.4 °F (36.9 °C)   BP  Min: 124/66  Max: 150/86   Pulse  Min: 73  Max: 86   Resp  Min: 18  Max: 20   SpO2  Min: 93 %  Max: 98 %   No data recorded   No data recorded    Vitals:    25 2300 25 0626 25 0734 25 0739   BP:  150/86     BP Location:  Right arm     Patient Position:  Lying     Pulse:  78 76 80   Resp: 18 18 20 20   Temp: 97.6 °F (36.4 °C) 98.4 °F (36.9 °C)     TempSrc: Oral Oral     SpO2:  95% 93% 98%        There were no vitals filed for this visit.    There is no height or weight on file to calculate BMI.                          There is no height or weight on file to calculate BMI.    Intake/Output Summary (Last 24 hours) at 2025 0956  Last data filed at 2025 2115  Gross per 24 hour   Intake 860 ml   Output --   Net 860 ml         Exam:  GEN:  No distress, appears stated age  EYES:   PERRL, anicteric sclerae  ENT:    External ears/nose normal, OP clear  NECK:  No adenopathy, midline trachea  LUNGS: Normal chest on inspection, palpation and auscultation  CV:  Normal S1S2, without murmur  ABD:  Nontender, nondistended, no hepatosplenomegaly, +BS  EXT:  No edema.  No cyanosis or clubbing.   No mottling and normal cap refill.    Assessment     Scheduled meds:  amLODIPine, 10 mg, Oral, Daily  atorvastatin, 10 mg, Oral, Daily  bisoprolol, 5 mg, Oral, Daily  clopidogrel, 75 mg, Oral, Daily  escitalopram, 20 mg, Oral, Daily  fluticasone, 2 spray, Nasal, Daily  guaiFENesin, 600 mg, Oral, Q12H  ipratropium-albuterol, 3 mL, Nebulization, 4x Daily - RT  losartan, 100 mg, Oral, Q24H  montelukast, 10 mg, Oral, Daily  pantoprazole, 40 mg, Oral, Q AM  potassium chloride, 10 mEq, Oral, Daily  predniSONE, 40 mg, Oral, Daily With Breakfast  risperiDONE, 0.5 mg, Oral, Daily  senna-docusate sodium, 2 tablet, Oral, BID  sodium chloride, 10 mL, Intravenous, Q12H      IV meds:                         Data Review:  Results from last 7 days   Lab Units 01/24/25  0633 01/23/25  0510 01/22/25  0505 01/21/25  1835   SODIUM mmol/L 137 135* 134* 134*   POTASSIUM mmol/L 4.2 5.0 3.7 3.6   CHLORIDE mmol/L 102 100 101 96*   CO2 mmol/L 25.0 22.3 20.9* 25.0   BUN mg/dL 27* 28* 18 19   CREATININE mg/dL 1.04* 1.27* 1.25* 1.25*   GLUCOSE mg/dL 128* 164* 245* 116*   CALCIUM mg/dL 9.0 9.6 8.8 9.1         Estimated Creatinine Clearance: 61 mL/min (A) (by C-G formula based on SCr of 1.04 mg/dL (H)).  Results from last 7 days   Lab Units 01/24/25  0633 01/23/25  0510 01/22/25  0505 01/21/25  1835   WBC 10*3/mm3 15.68* 14.82* 3.48 6.98   HEMOGLOBIN g/dL 13.9 13.6 13.2 14.3   PLATELETS 10*3/mm3 302 266 227 247         Results from last 7 days   Lab Units 01/22/25  0505 01/21/25  1835   ALT (SGPT) U/L 17 19   AST (SGOT) U/L 20 26     Results from last 7 days   Lab Units 01/21/25  1849   PH, ARTERIAL pH units 7.378   PO2 ART mm Hg 43.6*   PCO2, ARTERIAL mm Hg 43.1   HCO3 ART mmol/L 25.3     Results from last 7 days   Lab Units 01/23/25  0510 01/21/25  1835   PROCALCITONIN ng/mL 0.02 <0.02   LACTATE mmol/L  --  1.3         Hemoglobin A1C   Date/Time Value Ref Range Status   01/22/2025 0505 6.40 (H) 4.80 - 5.60 % Final         Imaging reviewed  CT  chest 1/22 reviewed    Microbiology reviewed  RVP: +RSV          Active Hospital Problems    Diagnosis  POA    **RSV bronchitis [J20.5]  Yes    Moderate persistent asthma with exacerbation [J45.41]  Yes    Stage 3a chronic kidney disease [N18.31]  Yes    Acute respiratory failure with hypoxia [J96.01]  Yes    LI (obstructive sleep apnea) [G47.33]  Yes    Endometrial cancer [C54.1]  Yes    IFG (impaired fasting glucose) [R73.01]  Yes    Coronary artery disease involving native heart without angina pectoris [I25.10]  Yes    Hypertension [I10]  Yes    Hyperlipidemia [E78.5]  Yes      Resolved Hospital Problems   No resolved problems to display.         ASSESSMENT:  Acute RSV bronchitis  Acute exacerbation of moderate persistent asthma  Acute hypoxemic respiratory failure  LI  Endometrial cancer  Stage IIIa CKD  CAD  Hypertension/hyperlipidemia      PLAN:  Scheduled and as needed bronchodilators and steroid for asthma exacerbation. Changed steroid to PO.  Treatment for viral process is supportive.  Wean oxygen as able.  Positive airway pressure for sleep apnea at night and bleed and oxygen as needed.          Giorgio Deng MD  Pulmonary and Critical Care Medicine  [facility], [facility]  1/24/2025    09:56 EST

## 2025-01-24 NOTE — DISCHARGE PLACEMENT REQUEST
"Flaca Hassan (59 y.o. Female)       Date of Birth   1965    Social Security Number       Address   [address] [place] [address]    Home Phone   [phone]    MRN   [id number]       Confucianist   Congregational    Marital Status                               Admission Date   1/21/25    Admission Type   Emergency    Admitting Provider   Germán Patrick MD    Attending Provider   Jen Blanco MD    Department, Room/Bed   [address], [address]/1       Discharge Date       Discharge Disposition       Discharge Destination                                 Attending Provider: Jen Blanco MD    Allergies: Peanut (Diagnostic)    Isolation: Contact   Infection: RSV (01/21/25)   Code Status: CPR    Ht: 157.5 cm (62.01\")   Wt: 90.6 kg (199 lb 11.8 oz)    Admission Cmt: None   Principal Problem: RSV bronchitis [J20.5]                   Active Insurance as of 1/21/2025       Primary Coverage       Payor Plan Insurance Group Employer/Plan Group    AETNA MEDICARE REPLACEMENT AETNA MEDICARE REPLACEMENT [id number]       Payor Plan Address Payor Plan Phone Number Payor Plan Fax Number Effective Dates    [address] [phone]  1/1/2025 - None Entered    [place] [address]         Subscriber Name Subscriber Birth Date Member ID       FLACA HASSAN 1965 [id number]               Secondary Coverage       Payor Plan Insurance Group Employer/Plan Group    [place]        Payor Plan Address Payor Plan Phone Number Payor Plan Fax Number Effective Dates    [address]   6/1/2015 - None Entered    [place] [address]         Subscriber Name Subscriber Birth Date Member ID       FLACA HASSAN 1965 [id number]                     Emergency Contacts        (Rel.) Home Phone Work Phone Mobile Phone    Sonya Posey (Brother) [phone] -- --    Ines Guardado (Relative) [phone] -- --    Eleazar Cortez " (Relative) [phone] -- [phone]

## 2025-01-24 NOTE — PLAN OF CARE
Goal Outcome Evaluation:      No acute distress noted. Still having SOA with activity.Pt assist with adls, safety rounds, call light w/in reach. Will continue to assist and monitor for remainder of shift and f/up w/ oncoming RN.

## 2025-01-24 NOTE — CASE MANAGEMENT/SOCIAL WORK
Continued Stay Note  [facility]     Patient Name: Flaca Hassan  MRN: [id number]  Today's Date: 1/24/2025    Admit Date: 1/21/2025    Plan: Home with family potentially HH if patient agreeable   Discharge Plan       Row Name 01/24/25 1458       Plan    Plan Home with family potentially HH if patient agreeable    Patient/Family in Agreement with Plan yes    Plan Comments PT recommended HH at dc, pt declined this initially, attempted to reach pt brother to get his thoughts/preferences, no answer, LVM to return call, unsure if he speaks English. CCP will follow - Jewels WELCH                   Discharge Codes    No documentation.                 Expected Discharge Date and Time       Expected Discharge Date Expected Discharge Time    Jan 25, 2025               Jewels Quintanilla RN

## 2025-01-25 ENCOUNTER — APPOINTMENT (OUTPATIENT)
Dept: NEUROLOGY | Facility: HOSPITAL | Age: 60
End: 2025-01-25
Payer: MEDICARE

## 2025-01-25 ENCOUNTER — APPOINTMENT (OUTPATIENT)
Dept: GENERAL RADIOLOGY | Facility: HOSPITAL | Age: 60
End: 2025-01-25
Payer: MEDICARE

## 2025-01-25 LAB
ANION GAP SERPL CALCULATED.3IONS-SCNC: 12.5 MMOL/L (ref 5–15)
ARTERIAL PATENCY WRIST A: POSITIVE
ATMOSPHERIC PRESS: 754 MMHG
BASE EXCESS BLDA CALC-SCNC: 0.3 MMOL/L (ref 0–2)
BDY SITE: ABNORMAL
BILIRUB UR QL STRIP: NEGATIVE
BUN SERPL-MCNC: 21 MG/DL (ref 6–20)
BUN/CREAT SERPL: 23.1 (ref 7–25)
CALCIUM SPEC-SCNC: 9.1 MG/DL (ref 8.6–10.5)
CHLORIDE SERPL-SCNC: 100 MMOL/L (ref 98–107)
CLARITY UR: CLEAR
CO2 BLDA-SCNC: 25.9 MMOL/L (ref 23–27)
CO2 SERPL-SCNC: 21.5 MMOL/L (ref 22–29)
COLOR UR: YELLOW
CREAT SERPL-MCNC: 0.91 MG/DL (ref 0.57–1)
DEPRECATED RDW RBC AUTO: 47.5 FL (ref 37–54)
DEVICE COMMENT: ABNORMAL
EGFRCR SERPLBLD CKD-EPI 2021: 72.8 ML/MIN/1.73
ERYTHROCYTE [DISTWIDTH] IN BLOOD BY AUTOMATED COUNT: 15.1 % (ref 12.3–15.4)
GAS FLOW AIRWAY: 3 LPM
GLUCOSE BLDC GLUCOMTR-MCNC: 176 MG/DL (ref 70–130)
GLUCOSE SERPL-MCNC: 134 MG/DL (ref 65–99)
GLUCOSE UR STRIP-MCNC: NEGATIVE MG/DL
HCO3 BLDA-SCNC: 24.7 MMOL/L (ref 22–28)
HCT VFR BLD AUTO: 45.3 % (ref 34–46.6)
HEMODILUTION: NO
HGB BLD-MCNC: 14.2 G/DL (ref 12–15.9)
HGB UR QL STRIP.AUTO: NEGATIVE
KETONES UR QL STRIP: NEGATIVE
LEUKOCYTE ESTERASE UR QL STRIP.AUTO: NEGATIVE
MAGNESIUM SERPL-MCNC: 1.7 MG/DL (ref 1.6–2.6)
MCH RBC QN AUTO: 26.9 PG (ref 26.6–33)
MCHC RBC AUTO-ENTMCNC: 31.3 G/DL (ref 31.5–35.7)
MCV RBC AUTO: 86 FL (ref 79–97)
MODALITY: ABNORMAL
NITRITE UR QL STRIP: NEGATIVE
PCO2 BLDA: 38.4 MM HG (ref 35–45)
PH BLDA: 7.42 PH UNITS (ref 7.35–7.45)
PH UR STRIP.AUTO: 6 [PH] (ref 5–8)
PHOSPHATE SERPL-MCNC: 4 MG/DL (ref 2.5–4.5)
PLATELET # BLD AUTO: 295 10*3/MM3 (ref 140–450)
PMV BLD AUTO: 10.7 FL (ref 6–12)
PO2 BLDA: 61.4 MM HG (ref 80–100)
POTASSIUM SERPL-SCNC: 4.4 MMOL/L (ref 3.5–5.2)
PROT UR QL STRIP: NEGATIVE
RBC # BLD AUTO: 5.27 10*6/MM3 (ref 3.77–5.28)
SAO2 % BLDCOA: 91.6 % (ref 92–98.5)
SODIUM SERPL-SCNC: 134 MMOL/L (ref 136–145)
SP GR UR STRIP: 1.01 (ref 1–1.03)
TOTAL RATE: 20 BREATHS/MINUTE
UROBILINOGEN UR QL STRIP: NORMAL
WBC NRBC COR # BLD AUTO: 9.07 10*3/MM3 (ref 3.4–10.8)

## 2025-01-25 PROCEDURE — 71045 X-RAY EXAM CHEST 1 VIEW: CPT

## 2025-01-25 PROCEDURE — 82803 BLOOD GASES ANY COMBINATION: CPT | Performed by: STUDENT IN AN ORGANIZED HEALTH CARE EDUCATION/TRAINING PROGRAM

## 2025-01-25 PROCEDURE — 81003 URINALYSIS AUTO W/O SCOPE: CPT | Performed by: STUDENT IN AN ORGANIZED HEALTH CARE EDUCATION/TRAINING PROGRAM

## 2025-01-25 PROCEDURE — 63710000001 PREDNISONE PER 1 MG: Performed by: INTERNAL MEDICINE

## 2025-01-25 PROCEDURE — 94799 UNLISTED PULMONARY SVC/PX: CPT

## 2025-01-25 PROCEDURE — 85027 COMPLETE CBC AUTOMATED: CPT | Performed by: STUDENT IN AN ORGANIZED HEALTH CARE EDUCATION/TRAINING PROGRAM

## 2025-01-25 PROCEDURE — 93005 ELECTROCARDIOGRAM TRACING: CPT | Performed by: STUDENT IN AN ORGANIZED HEALTH CARE EDUCATION/TRAINING PROGRAM

## 2025-01-25 PROCEDURE — 84100 ASSAY OF PHOSPHORUS: CPT | Performed by: STUDENT IN AN ORGANIZED HEALTH CARE EDUCATION/TRAINING PROGRAM

## 2025-01-25 PROCEDURE — 80048 BASIC METABOLIC PNL TOTAL CA: CPT | Performed by: STUDENT IN AN ORGANIZED HEALTH CARE EDUCATION/TRAINING PROGRAM

## 2025-01-25 PROCEDURE — 83735 ASSAY OF MAGNESIUM: CPT | Performed by: STUDENT IN AN ORGANIZED HEALTH CARE EDUCATION/TRAINING PROGRAM

## 2025-01-25 PROCEDURE — 95816 EEG AWAKE AND DROWSY: CPT

## 2025-01-25 PROCEDURE — 94760 N-INVAS EAR/PLS OXIMETRY 1: CPT

## 2025-01-25 PROCEDURE — 93010 ELECTROCARDIOGRAM REPORT: CPT | Performed by: INTERNAL MEDICINE

## 2025-01-25 PROCEDURE — 95816 EEG AWAKE AND DROWSY: CPT | Performed by: PSYCHIATRY & NEUROLOGY

## 2025-01-25 PROCEDURE — 36600 WITHDRAWAL OF ARTERIAL BLOOD: CPT | Performed by: STUDENT IN AN ORGANIZED HEALTH CARE EDUCATION/TRAINING PROGRAM

## 2025-01-25 PROCEDURE — 82948 REAGENT STRIP/BLOOD GLUCOSE: CPT

## 2025-01-25 PROCEDURE — 94664 DEMO&/EVAL PT USE INHALER: CPT

## 2025-01-25 PROCEDURE — 94761 N-INVAS EAR/PLS OXIMETRY MLT: CPT

## 2025-01-25 RX ORDER — GUAIFENESIN 600 MG/1
600 TABLET, EXTENDED RELEASE ORAL EVERY 12 HOURS SCHEDULED
Qty: 14 TABLET | Refills: 0 | Status: SHIPPED | OUTPATIENT
Start: 2025-01-25 | End: 2025-01-30

## 2025-01-25 RX ORDER — BENZONATATE 200 MG/1
200 CAPSULE ORAL 3 TIMES DAILY PRN
Qty: 21 CAPSULE | Refills: 0 | Status: SHIPPED | OUTPATIENT
Start: 2025-01-25 | End: 2025-01-30

## 2025-01-25 RX ORDER — IPRATROPIUM BROMIDE AND ALBUTEROL SULFATE 2.5; .5 MG/3ML; MG/3ML
3 SOLUTION RESPIRATORY (INHALATION) 4 TIMES DAILY PRN
Qty: 360 ML | Refills: 0 | Status: SHIPPED | OUTPATIENT
Start: 2025-01-25 | End: 2025-01-25

## 2025-01-25 RX ORDER — PREDNISONE 20 MG/1
TABLET ORAL
Qty: 9 TABLET | Refills: 0 | Status: SHIPPED | OUTPATIENT
Start: 2025-01-26 | End: 2025-01-25

## 2025-01-25 RX ORDER — FUROSEMIDE 40 MG/1
40 TABLET ORAL DAILY
Status: DISCONTINUED | OUTPATIENT
Start: 2025-01-25 | End: 2025-01-28

## 2025-01-25 RX ORDER — BENZONATATE 200 MG/1
200 CAPSULE ORAL 3 TIMES DAILY PRN
Qty: 21 CAPSULE | Refills: 0 | Status: SHIPPED | OUTPATIENT
Start: 2025-01-25 | End: 2025-01-25

## 2025-01-25 RX ORDER — IPRATROPIUM BROMIDE AND ALBUTEROL SULFATE 2.5; .5 MG/3ML; MG/3ML
3 SOLUTION RESPIRATORY (INHALATION) 4 TIMES DAILY PRN
Qty: 360 ML | Refills: 0 | Status: SHIPPED | OUTPATIENT
Start: 2025-01-25 | End: 2025-01-30

## 2025-01-25 RX ORDER — GUAIFENESIN 600 MG/1
600 TABLET, EXTENDED RELEASE ORAL EVERY 12 HOURS SCHEDULED
Qty: 14 TABLET | Refills: 0 | Status: SHIPPED | OUTPATIENT
Start: 2025-01-25 | End: 2025-01-25

## 2025-01-25 RX ORDER — PREDNISONE 20 MG/1
TABLET ORAL
Qty: 9 TABLET | Refills: 0 | Status: SHIPPED | OUTPATIENT
Start: 2025-01-26 | End: 2025-01-30 | Stop reason: HOSPADM

## 2025-01-25 RX ADMIN — IPRATROPIUM BROMIDE AND ALBUTEROL SULFATE 3 ML: 2.5; .5 SOLUTION RESPIRATORY (INHALATION) at 21:55

## 2025-01-25 RX ADMIN — GUAIFENESIN 600 MG: 600 TABLET, EXTENDED RELEASE ORAL at 09:34

## 2025-01-25 RX ADMIN — ACETAMINOPHEN 650 MG: 650 LIQUID ORAL at 22:32

## 2025-01-25 RX ADMIN — Medication 10 ML: at 22:34

## 2025-01-25 RX ADMIN — POTASSIUM CHLORIDE 10 MEQ: 750 TABLET, EXTENDED RELEASE ORAL at 09:34

## 2025-01-25 RX ADMIN — LOSARTAN POTASSIUM 100 MG: 100 TABLET, FILM COATED ORAL at 09:34

## 2025-01-25 RX ADMIN — ATORVASTATIN CALCIUM 10 MG: 10 TABLET, FILM COATED ORAL at 09:34

## 2025-01-25 RX ADMIN — PANTOPRAZOLE SODIUM 40 MG: 40 TABLET, DELAYED RELEASE ORAL at 05:41

## 2025-01-25 RX ADMIN — IPRATROPIUM BROMIDE AND ALBUTEROL SULFATE 3 ML: 2.5; .5 SOLUTION RESPIRATORY (INHALATION) at 09:13

## 2025-01-25 RX ADMIN — PREDNISONE 40 MG: 20 TABLET ORAL at 09:34

## 2025-01-25 RX ADMIN — Medication 2.5 MG: at 22:32

## 2025-01-25 RX ADMIN — ESCITALOPRAM 20 MG: 10 TABLET, FILM COATED ORAL at 22:33

## 2025-01-25 RX ADMIN — FUROSEMIDE 40 MG: 40 TABLET ORAL at 13:08

## 2025-01-25 RX ADMIN — GUAIFENESIN 600 MG: 600 TABLET, EXTENDED RELEASE ORAL at 22:33

## 2025-01-25 RX ADMIN — SENNOSIDES AND DOCUSATE SODIUM 2 TABLET: 50; 8.6 TABLET ORAL at 09:34

## 2025-01-25 RX ADMIN — CLOPIDOGREL BISULFATE 75 MG: 75 TABLET ORAL at 09:34

## 2025-01-25 RX ADMIN — IPRATROPIUM BROMIDE AND ALBUTEROL SULFATE 3 ML: 2.5; .5 SOLUTION RESPIRATORY (INHALATION) at 15:56

## 2025-01-25 RX ADMIN — FLUTICASONE PROPIONATE 2 SPRAY: 50 SPRAY, METERED NASAL at 09:35

## 2025-01-25 RX ADMIN — ACETAMINOPHEN 650 MG: 650 LIQUID ORAL at 00:45

## 2025-01-25 RX ADMIN — MONTELUKAST 10 MG: 10 TABLET, FILM COATED ORAL at 09:34

## 2025-01-25 RX ADMIN — BENZONATATE 200 MG: 100 CAPSULE ORAL at 05:41

## 2025-01-25 RX ADMIN — RISPERIDONE 0.5 MG: 0.5 TABLET, FILM COATED ORAL at 09:34

## 2025-01-25 RX ADMIN — AMLODIPINE BESYLATE 10 MG: 10 TABLET ORAL at 09:34

## 2025-01-25 RX ADMIN — BISOPROLOL FUMARATE 5 MG: 5 TABLET ORAL at 09:34

## 2025-01-25 RX ADMIN — Medication 10 ML: at 09:35

## 2025-01-25 RX ADMIN — BENZONATATE 200 MG: 100 CAPSULE ORAL at 22:33

## 2025-01-25 NOTE — CODE DOCUMENTATION
Patient Name:  Flaca Hassan  YOB: 1965  MRN:  [id number]  Admit Date:  1/21/2025    Visit Diagnoses:     ICD-10-CM ICD-9-CM   1. RSV bronchitis  J20.5 466.0     079.6   2. Exacerbation of asthma, unspecified asthma severity, unspecified whether persistent  J45.901 493.92   3. Acute hypoxic respiratory failure  J96.01 518.81   4. Acute respiratory failure with hypoxia  J96.01 518.81       Reason For Rapid:    mental status change    RN Communicated With:   primary RN to update Dr. Blanco      Rapid Outcome:   stay on tele unit    Communication From Rapid Team:    Primary RN called RRT d/t finding pt in room and unable to talk, stuff strewn around room. Pt placed back in bed. NIH 0-1. Language barrier makes full assessment difficult. Somewhat labored breathing w/ accessory muscle use (not new) and hx LI. ABG drawn, no critical results. EKG and CXR ordered. Pt able to move all extremities equally and communicate her needs. No acute findings at this time. Encephalopathy? Hx CKD. Further work-up by Dr. Blanco.        Most Recent Vital Signs  Temp:  [97.5 °F (36.4 °C)-98.4 °F (36.9 °C)] 97.5 °F (36.4 °C)  Heart Rate:  [62-90] 79  Resp:  [16-28] 28  BP: (140-169)/(69-97) 140/82  SpO2:  [93 %-100 %] 95 %  on  Flow (L/min) (Oxygen Therapy):  [2-3] 2;   Device (Oxygen Therapy): nasal cannula    Labs:  Results from last 7 days   Lab Units 01/21/25  1835   COVID19  Not Detected     Glucose   Date/Time Value Ref Range Status   01/25/2025 1211 176 (H) 70 - 130 mg/dL Final     Site   Date Value Ref Range Status   01/25/2025 Right Radial  Final     Blayne's Test   Date Value Ref Range Status   01/25/2025 Positive  Final     pH, Arterial   Date Value Ref Range Status   01/25/2025 7.416 7.350 - 7.450 pH units Final     pCO2, Arterial   Date Value Ref Range Status   01/25/2025 38.4 35.0 - 45.0 mm Hg Final     pO2, Arterial   Date Value Ref Range Status   01/25/2025 61.4 (L) 80.0 - 100.0 mm Hg Final     HCO3,  Arterial   Date Value Ref Range Status   01/25/2025 24.7 22.0 - 28.0 mmol/L Final     Base Excess, Arterial   Date Value Ref Range Status   01/25/2025 0.3 0.0 - 2.0 mmol/L Final     Comment:     Serial Number: [id number]:  [id number]     O2 Saturation, Arterial   Date Value Ref Range Status   01/25/2025 91.6 (L) 92.0 - 98.5 % Final     CO2 Content   Date Value Ref Range Status   01/25/2025 25.9 23 - 27 mmol/L Final     Barometric Pressure for Blood Gas   Date Value Ref Range Status   01/25/2025 754.0000 mmHg Final     Modality   Date Value Ref Range Status   01/25/2025 Cannula  Final     Results from last 7 days   Lab Units 01/25/25  0550 01/24/25  0633 01/23/25  0510 01/22/25  0505   WBC 10*3/mm3 9.07 15.68* 14.82* 3.48   HEMOGLOBIN g/dL 14.2 13.9 13.6 13.2   PLATELETS 10*3/mm3 295 302 266 227     Results from last 7 days   Lab Units 01/25/25  0550 01/24/25  0633 01/23/25  0510 01/22/25  0505 01/21/25  1835   SODIUM mmol/L 134* 137 135* 134* 134*   POTASSIUM mmol/L 4.4 4.2 5.0 3.7 3.6   CHLORIDE mmol/L 100 102 100 101 96*   CO2 mmol/L 21.5* 25.0 22.3 20.9* 25.0   BUN mg/dL 21* 27* 28* 18 19   CREATININE mg/dL 0.91 1.04* 1.27* 1.25* 1.25*   GLUCOSE mg/dL 134* 128* 164* 245* 116*   ALBUMIN g/dL  --   --  3.4* 3.4* 4.3   BILIRUBIN mg/dL  --   --   --  0.3 0.4   ALK PHOS U/L  --   --   --  97 115   AST (SGOT) U/L  --   --   --  20 26   ALT (SGPT) U/L  --   --   --  17 19   Estimated Creatinine Clearance: 69.7 mL/min (by C-G formula based on SCr of 0.91 mg/dL).  Results from last 7 days   Lab Units 01/21/25  1939 01/21/25  1835   HSTROP T ng/L 9 10   PROBNP pg/mL  --  124.0     Results from last 7 days   Lab Units 01/23/25  0510 01/21/25  1835   PROCALCITONIN ng/mL 0.02 <0.02   LACTATE mmol/L  --  1.3     Results from last 7 days   Lab Units 01/25/25  1234 01/21/25  1849   PH, ARTERIAL pH units 7.416 7.378   PO2 ART mm Hg 61.4* 43.6*   PCO2, ARTERIAL mm Hg 38.4 43.1   HCO3 ART mmol/L 24.7 25.3   O2 SATURATION ART % 91.6*  "78.1*   MODALITY  Cannula Cannula   No results found for: \"STREPPNEUAG\", \"LEGANTIGENUR\"    Results from last 7 days   Lab Units 01/21/25  0389   ADENOVIRUS DETECTION BY PCR  Not Detected   CORONAVIRUS 229E  Not Detected   CORONAVIRUS HKU1  Not Detected   CORONAVIRUS NL63  Not Detected   CORONAVIRUS OC43  Not Detected   HUMAN METAPNEUMOVIRUS  Not Detected   HUMAN RHINOVIRUS/ENTEROVIRUS  Not Detected   INFLUENZA B PCR  Not Detected   PARAINFLUENZA 1  Not Detected   PARAINFLUENZA VIRUS 2  Not Detected   PARAINFLUENZA VIRUS 3  Not Detected   PARAINFLUENZA VIRUS 4  Not Detected   BORDETELLA PERTUSSIS PCR  Not Detected   CHLAMYDOPHILA PNEUMONIAE PCR  Not Detected   MYCOPLAMA PNEUMO PCR  Not Detected   INFLUENZA A PCR  Not Detected   RSV, PCR  Detected*       NIH Stroke Scale:     1a. Level of Consciousness: 0-->Alert, keenly responsive  1b. LOC Questions: 0-->Answers both questions correctly  1c. LOC Commands: 0-->Performs both tasks correctly  2. Best Gaze: 0-->Normal  3. Visual: 0-->No visual loss  4. Facial Palsy: 0-->Normal symmetrical movements  5a. Motor Arm, Left: 0-->No drift, limb holds 90 (or 45) degrees for full 10 secs  5b. Motor Arm, Right: 0-->No drift, limb holds 90 (or 45) degrees for full 10 secs  6a. Motor Leg, Left: 0-->No drift, leg holds 30 degree position for full 5 secs  6b. Motor Leg, Right: 0-->No drift, leg holds 30 degree position for full 5 secs  7. Limb Ataxia: 0-->Absent  8. Sensory: 0-->Normal, no sensory loss  9. Best Language: 1-->Mild-to-moderate aphasia, some obvious loss of fluency or facility of comprehension, without significant limitation on ideas expressed or form of expression. Reduction of speech and/or comprehension, however, makes conversation. . . (see row details) (language barrier)  10. Dysarthria: 0-->Normal (language barrier)  11. Extinction and Inattention (formerly Neglect): 0-->No abnormality    Total (NIH Stroke Scale): 1    Please refer to full rapid documentation on " summary page under Index / Code Timeline

## 2025-01-25 NOTE — PROGRESS NOTES
Name: Flaca Hassan ADMIT: 2025   : 1965  PCP: Isidro Hernandez MD    MRN: [id number] LOS: 3 days   AGE/SEX: 59 y.o. female  ROOM: [facility]     Subjective   Subjective   Patient was seen in the morning, was alert, oriented.  Continued to have shortness of breath and cough, but gradually improving.  Wants to be discharged      Review of Systems   As above  Objective   Objective   Vital Signs  Temp:  [97.5 °F (36.4 °C)-98.4 °F (36.9 °C)] 97.7 °F (36.5 °C)  Heart Rate:  [72-90] 81  Resp:  [16-28] 16  BP: (140-169)/(69-97) 154/81  SpO2:  [93 %-100 %] 99 %  on  Flow (L/min) (Oxygen Therapy):  [2-3] 2;   Device (Oxygen Therapy): nasal cannula  There is no height or weight on file to calculate BMI.  Physical Exam    General: Alert, sitting up in the bed, not in distress, ill-appearing  HEENT: Normocephalic, atraumatic  CV: Regular rate and rhythm, no murmurs rubs or gallops  Lungs: Decreased, mild expiratory wheezing bilaterally, on 3 L nasal cannula  Abdomen: Soft, nontender, nondistended  Extremities: No significant peripheral edema , no cyanosis     Results Review     I reviewed the patient's new clinical results.  Results from last 7 days   Lab Units 25  0550 25  0633 25  0510 25  0505   WBC 10*3/mm3 9.07 15.68* 14.82* 3.48   HEMOGLOBIN g/dL 14.2 13.9 13.6 13.2   PLATELETS 10*3/mm3 295 302 266 227     Results from last 7 days   Lab Units 25  0550 25  0633 25  0510 25  0505   SODIUM mmol/L 134* 137 135* 134*   POTASSIUM mmol/L 4.4 4.2 5.0 3.7   CHLORIDE mmol/L 100 102 100 101   CO2 mmol/L 21.5* 25.0 22.3 20.9*   BUN mg/dL 21* 27* 28* 18   CREATININE mg/dL 0.91 1.04* 1.27* 1.25*   GLUCOSE mg/dL 134* 128* 164* 245*   Estimated Creatinine Clearance: 69.7 mL/min (by C-G formula based on SCr of 0.91 mg/dL).  Results from last 7 days   Lab Units 25  0510 25  0505 25  1835   ALBUMIN g/dL 3.4* 3.4* 4.3   BILIRUBIN mg/dL  --  0.3 0.4   ALK PHOS  U/L  --  97 115   AST (SGOT) U/L  --  20 26   ALT (SGPT) U/L  --  17 19     Results from last 7 days   Lab Units 01/25/25  0550 01/24/25  0633 01/23/25  0510 01/22/25  0505 01/21/25  1835   CALCIUM mg/dL 9.1 9.0 9.6 8.8 9.1   ALBUMIN g/dL  --   --  3.4* 3.4* 4.3   MAGNESIUM mg/dL 1.7 1.9 2.0 1.8 1.7   PHOSPHORUS mg/dL 4.0 3.4 3.1 2.7  --      Results from last 7 days   Lab Units 01/23/25  0510 01/21/25  1835   PROCALCITONIN ng/mL 0.02 <0.02   LACTATE mmol/L  --  1.3     COVID19   Date Value Ref Range Status   01/21/2025 Not Detected Not Detected - Ref. Range Final   08/12/2022 Detected (C) Not Detected - Ref. Range Final     Glucose   Date/Time Value Ref Range Status   01/25/2025 1211 176 (H) 70 - 130 mg/dL Final           XR Chest 1 View  Narrative: XR CHEST 1 VW-1/25/2025     HISTORY: Difficulty breathing.     Heart size is mildly enlarged. There is elevation of the right  hemidiaphragm. Lungs are underinflated. There may be some minimal  pleural fluid on the left. There also may be some minimal patchy  atelectasis or infiltrate in the left base. Mild degenerative changes  are seen in the spine.     Impression: 1. Underinflation of the lungs with elevation of the right  hemidiaphragm. This diaphragmatic elevation has been seen on previous  studies.  2. There may be some minimal left pleural effusion with minimal  atelectasis or infiltrate in the left base.        This report was finalized on 1/25/2025 1:08 PM by Dr. Oscar Atkinson M.D on Workstation: [id number]       Scheduled Medications  amLODIPine, 10 mg, Oral, Daily  atorvastatin, 10 mg, Oral, Daily  bisoprolol, 5 mg, Oral, Daily  clopidogrel, 75 mg, Oral, Daily  escitalopram, 20 mg, Oral, Daily  fluticasone, 2 spray, Nasal, Daily  furosemide, 40 mg, Oral, Daily  guaiFENesin, 600 mg, Oral, Q12H  ipratropium-albuterol, 3 mL, Nebulization, 4x Daily - RT  losartan, 100 mg, Oral, Q24H  montelukast, 10 mg, Oral, Daily  pantoprazole, 40 mg, Oral, Q AM  potassium  chloride, 10 mEq, Oral, Daily  predniSONE, 40 mg, Oral, Daily With Breakfast  risperiDONE, 0.5 mg, Oral, Daily  senna-docusate sodium, 2 tablet, Oral, BID  sodium chloride, 10 mL, Intravenous, Q12H    Infusions   Diet  Diet: Cardiac, Diabetic, Renal; Healthy Heart (2-3 Na+); Consistent Carbohydrate; Low Sodium (2-3g), Low Potassium, Low Phosphorus; No Pork; Fluid Consistency: Thin (IDDSI 0)    I have personally reviewed     [x]  Laboratory   [x]  Microbiology   [x]  Radiology   [x]  EKG/Telemetry  [x]  Cardiology/Vascular   []  Pathology    []  Records       Assessment/Plan     Active Hospital Problems    Diagnosis  POA    **RSV bronchitis [J20.5]  Yes    Moderate persistent asthma with exacerbation [J45.41]  Yes    Stage 3a chronic kidney disease [N18.31]  Yes    Acute respiratory failure with hypoxia [J96.01]  Yes    LI (obstructive sleep apnea) [G47.33]  Yes    Endometrial cancer [C54.1]  Yes    IFG (impaired fasting glucose) [R73.01]  Yes    Coronary artery disease involving native heart without angina pectoris [I25.10]  Yes    Hypertension [I10]  Yes    Hyperlipidemia [E78.5]  Yes      Resolved Hospital Problems   No resolved problems to display.       Patient is a 59 h/o f asthma , CAD, HTN, HLD, CKD stage IIIa, endometrial cancer ,  presented  the hospital with shortness of breath and cough.      AMS  -Discharge was pending however RN found patient sitting in the chair chair with head down on bedside table. All contents on table including cell phone, water, and other items scattered and spilled on floor.   -Team D was called, stat ABG was obtained with normal pH, pCO2 of 48.  Stat chest x-ray with no acute findings  -No imaging was indicated per team D  -urinalysis with no signs of infection  -Confusion improved not quite at baseline  -EEG ordered  -Consult neurology    Acute exacerbation of moderate persistent asthma secondary to RSV infection  Acute hypoxemic respiratory failure  -Respiratory viral panel  due to 1/21/2025 was positive for RSV  -CT scan without contrast 01/22/2025 showed mild patchy areas of atelectasis or pneumonia in the bilateral lower lobes.  -Status post IV steroids.  Transitioned to p.o.  -Scheduled DuoNebs  -Supportive care  -Pulmonology following  -      Enlarged mediastinal nodes  CT scan showed A few small to slightly enlarged mediastinal nodes are seen.Short-term follow-up CT of the chest without contrast in approximately 3 months suggested per radiology.  Suspect enlarged lymph node secondary to RSV infection/Pneumonia ,  defer outpatient evaluation to pulmonology.      History of CAD  Hypertension  -Continue statin, Plavix, amlodipine, losartan, bisoprolol   -BP stable        CKD stage IIIa  -Creatinine stable around baseline.    -Monitor daily BMP.  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  SCDs  Full code.  Discussed with patient.  Discussed with RN  Disposition: Home with home health timing to be determined  Expected Discharge Date: 1/25/2025; Expected Discharge Time:        Copied text in this note has been reviewed and is accurate as of 01/25/25.         Dictated utilizing Dragon dictation        Jen Blanco MD  [facility]  01/25/25  17:30 EST

## 2025-01-25 NOTE — PROGRESS NOTES
Continued Stay Note  [facility]     Patient Name: Flaca Hassan  MRN: [id number]  Today's Date: 1/25/2025    Admit Date: 1/21/2025    Plan: Home with [facility] [place].........Teresa RN   Discharge Plan       Row Name 01/25/25 1219       Plan    Plan Home with [facility] [place].........Teresa RN    Plan Comments Inbound call from RN requesting update on HH arrangements. [facility], verified they have accepted and will follow at home. Update to RN.......Teresa RN                   Discharge Codes    No documentation.                 Expected Discharge Date and Time       Expected Discharge Date Expected Discharge Time    Jan 25, 2025               Rea Harrison, RN

## 2025-01-25 NOTE — NURSING NOTE
Pt found by this RN in chair with head down on bedside table. All contents on table including cell phone, water, and other items scattered and spilled on floor. Pt staring off into space and unresponsive to RN. Team D called. Pt eventually became responsive and was at first refusing to answer questions and follow directions from RN. Pt now responding and following directions. MD aware.

## 2025-01-25 NOTE — PROGRESS NOTES
[facility] INPATIENT PROGRESS NOTE         [address]    2025      PATIENT IDENTIFICATION:  Name: Flaca Hassan ADMIT: 2025   : 1965  PCP: Isidro Hernandez MD    MRN: [id number] LOS: 3 days   AGE/SEX: 59 y.o. female  ROOM: [facility]                     LOS 3    Reason for visit: RSV      SUBJECTIVE:      Complains of shortness of breath and cough.  She is on 3 L supplemental oxygen.  She uses 2 L at home.  She is hoping to be able to go home.  Mild coarse wheezing noted on auscultation.  It would likely take several weeks for her to feel back to her baseline.  No objection to discharge.  Follow-up with primary care..  Can follow-up with us in the office as well    Objective   OBJECTIVE:    Vital Sign Min/Max for last 24 hours  Temp  Min: 97.5 °F (36.4 °C)  Max: 98.4 °F (36.9 °C)   BP  Min: 142/69  Max: 169/97   Pulse  Min: 62  Max: 90   Resp  Min: 16  Max: 20   SpO2  Min: 93 %  Max: 100 %   No data recorded   No data recorded    Vitals:    25 0741 25 0913 25 0918 25 0921   BP: 169/97      BP Location: Right arm      Patient Position: Lying      Pulse: 80 90 87 84   Resp: 20 16     Temp: 97.5 °F (36.4 °C)      TempSrc: Oral      SpO2: 95% 97% 100% 100%        There were no vitals filed for this visit.    There is no height or weight on file to calculate BMI.                          There is no height or weight on file to calculate BMI.    Intake/Output Summary (Last 24 hours) at 2025 0940  Last data filed at 2025 2124  Gross per 24 hour   Intake 730 ml   Output --   Net 730 ml         Exam:  GEN:  No distress, appears stated age  EYES:   PERRL, anicteric sclerae  ENT:    External ears/nose normal, OP clear  NECK:  No adenopathy, midline trachea  LUNGS: Normal chest on inspection, palpation and auscultation  CV:  Normal S1S2, without murmur  ABD:  Nontender, nondistended, no hepatosplenomegaly, +BS  EXT:  No edema.  No cyanosis or clubbing.  No  "mottling and normal cap refill.    Assessment     Scheduled meds:  amLODIPine, 10 mg, Oral, Daily  atorvastatin, 10 mg, Oral, Daily  bisoprolol, 5 mg, Oral, Daily  clopidogrel, 75 mg, Oral, Daily  escitalopram, 20 mg, Oral, Daily  fluticasone, 2 spray, Nasal, Daily  guaiFENesin, 600 mg, Oral, Q12H  ipratropium-albuterol, 3 mL, Nebulization, 4x Daily - RT  losartan, 100 mg, Oral, Q24H  montelukast, 10 mg, Oral, Daily  pantoprazole, 40 mg, Oral, Q AM  potassium chloride, 10 mEq, Oral, Daily  predniSONE, 40 mg, Oral, Daily With Breakfast  risperiDONE, 0.5 mg, Oral, Daily  senna-docusate sodium, 2 tablet, Oral, BID  sodium chloride, 10 mL, Intravenous, Q12H      IV meds:                         Data Review:  Results from last 7 days   Lab Units 01/25/25  0550 01/24/25  0633 01/23/25  0510 01/22/25  0505 01/21/25  1835   SODIUM mmol/L 134* 137 135* 134* 134*   POTASSIUM mmol/L 4.4 4.2 5.0 3.7 3.6   CHLORIDE mmol/L 100 102 100 101 96*   CO2 mmol/L 21.5* 25.0 22.3 20.9* 25.0   BUN mg/dL 21* 27* 28* 18 19   CREATININE mg/dL 0.91 1.04* 1.27* 1.25* 1.25*   GLUCOSE mg/dL 134* 128* 164* 245* 116*   CALCIUM mg/dL 9.1 9.0 9.6 8.8 9.1         Estimated Creatinine Clearance: 69.7 mL/min (by C-G formula based on SCr of 0.91 mg/dL).  Results from last 7 days   Lab Units 01/25/25  0550 01/24/25  0633 01/23/25  0510 01/22/25  0505 01/21/25  1835   WBC 10*3/mm3 9.07 15.68* 14.82* 3.48 6.98   HEMOGLOBIN g/dL 14.2 13.9 13.6 13.2 14.3   PLATELETS 10*3/mm3 295 302 266 227 247         Results from last 7 days   Lab Units 01/22/25  0505 01/21/25  1835   ALT (SGPT) U/L 17 19   AST (SGOT) U/L 20 26     Results from last 7 days   Lab Units 01/21/25  1849   PH, ARTERIAL pH units 7.378   PO2 ART mm Hg 43.6*   PCO2, ARTERIAL mm Hg 43.1   HCO3 ART mmol/L 25.3     Results from last 7 days   Lab Units 01/23/25  0510 01/21/25  1835   PROCALCITONIN ng/mL 0.02 <0.02   LACTATE mmol/L  --  1.3         No results found for: \"HGBA1C\", \"POCGLU\"        Imaging " reviewed  CT chest 1/22 reviewed    Microbiology reviewed  RVP: +RSV          Active Hospital Problems    Diagnosis  POA    **RSV bronchitis [J20.5]  Yes    Moderate persistent asthma with exacerbation [J45.41]  Yes    Stage 3a chronic kidney disease [N18.31]  Yes    Acute respiratory failure with hypoxia [J96.01]  Yes    LI (obstructive sleep apnea) [G47.33]  Yes    Endometrial cancer [C54.1]  Yes    IFG (impaired fasting glucose) [R73.01]  Yes    Coronary artery disease involving native heart without angina pectoris [I25.10]  Yes    Hypertension [I10]  Yes    Hyperlipidemia [E78.5]  Yes      Resolved Hospital Problems   No resolved problems to display.         ASSESSMENT:  Acute RSV bronchitis  Acute exacerbation of moderate persistent asthma  Acute hypoxemic respiratory failure  LI  Endometrial cancer  Stage IIIa CKD  CAD  Hypertension/hyperlipidemia      PLAN:  Scheduled and as needed bronchodilators and steroid for asthma exacerbation. Changed steroid to PO.  Treatment for viral process is supportive.  Wean oxygen as able.  Positive airway pressure for sleep apnea at night and bleed and oxygen as needed.  Home with home health likely tomorrow.  No objection from our standpoint.  We will have her follow-up with us in the office.        Giorgio Deng MD  Pulmonary and Critical Care Medicine  [facility], [facility]  1/25/2025    09:40 EST

## 2025-01-26 ENCOUNTER — APPOINTMENT (OUTPATIENT)
Dept: MRI IMAGING | Facility: HOSPITAL | Age: 60
End: 2025-01-26
Payer: MEDICARE

## 2025-01-26 ENCOUNTER — APPOINTMENT (OUTPATIENT)
Dept: ULTRASOUND IMAGING | Facility: HOSPITAL | Age: 60
End: 2025-01-26
Payer: MEDICARE

## 2025-01-26 LAB
ALBUMIN SERPL-MCNC: 3.9 G/DL (ref 3.5–5.2)
ALP SERPL-CCNC: 89 U/L (ref 39–117)
ALT SERPL W P-5'-P-CCNC: 23 U/L (ref 1–33)
AMMONIA BLD-SCNC: 28 UMOL/L (ref 11–51)
ANION GAP SERPL CALCULATED.3IONS-SCNC: 12 MMOL/L (ref 5–15)
AST SERPL-CCNC: 21 U/L (ref 1–32)
BILIRUB CONJ SERPL-MCNC: 0.3 MG/DL (ref 0–0.3)
BILIRUB INDIRECT SERPL-MCNC: 0.6 MG/DL
BILIRUB SERPL-MCNC: 0.9 MG/DL (ref 0–1.2)
BUN SERPL-MCNC: 29 MG/DL (ref 6–20)
BUN/CREAT SERPL: 24.2 (ref 7–25)
CALCIUM SPEC-SCNC: 9.1 MG/DL (ref 8.6–10.5)
CHLORIDE SERPL-SCNC: 98 MMOL/L (ref 98–107)
CO2 SERPL-SCNC: 24 MMOL/L (ref 22–29)
CREAT SERPL-MCNC: 1.2 MG/DL (ref 0.57–1)
DEPRECATED RDW RBC AUTO: 45.7 FL (ref 37–54)
EGFRCR SERPLBLD CKD-EPI 2021: 52.3 ML/MIN/1.73
ERYTHROCYTE [DISTWIDTH] IN BLOOD BY AUTOMATED COUNT: 15.2 % (ref 12.3–15.4)
FOLATE SERPL-MCNC: 13.6 NG/ML (ref 4.78–24.2)
GEN 5 1HR TROPONIN T REFLEX: 15 NG/L
GLUCOSE BLDC GLUCOMTR-MCNC: 135 MG/DL (ref 70–130)
GLUCOSE SERPL-MCNC: 142 MG/DL (ref 65–99)
HCT VFR BLD AUTO: 45.1 % (ref 34–46.6)
HGB BLD-MCNC: 14.6 G/DL (ref 12–15.9)
LIPASE SERPL-CCNC: 47 U/L (ref 13–60)
MAGNESIUM SERPL-MCNC: 1.8 MG/DL (ref 1.6–2.6)
MCH RBC QN AUTO: 27.2 PG (ref 26.6–33)
MCHC RBC AUTO-ENTMCNC: 32.4 G/DL (ref 31.5–35.7)
MCV RBC AUTO: 84.1 FL (ref 79–97)
PHOSPHATE SERPL-MCNC: 3.9 MG/DL (ref 2.5–4.5)
PLATELET # BLD AUTO: 315 10*3/MM3 (ref 140–450)
PMV BLD AUTO: 10.3 FL (ref 6–12)
POTASSIUM SERPL-SCNC: 4 MMOL/L (ref 3.5–5.2)
PROCALCITONIN SERPL-MCNC: 0.02 NG/ML (ref 0–0.25)
PROT SERPL-MCNC: 7.9 G/DL (ref 6–8.5)
QT INTERVAL: 449 MS
QT INTERVAL: 486 MS
QTC INTERVAL: 541 MS
QTC INTERVAL: 568 MS
RBC # BLD AUTO: 5.36 10*6/MM3 (ref 3.77–5.28)
SODIUM SERPL-SCNC: 134 MMOL/L (ref 136–145)
TROPONIN T % DELTA: 0
TROPONIN T NUMERIC DELTA: 0 NG/L
TROPONIN T SERPL HS-MCNC: 15 NG/L
TSH SERPL DL<=0.05 MIU/L-ACNC: 1.3 UIU/ML (ref 0.27–4.2)
VIT B12 BLD-MCNC: 883 PG/ML (ref 211–946)
WBC NRBC COR # BLD AUTO: 11.67 10*3/MM3 (ref 3.4–10.8)

## 2025-01-26 PROCEDURE — 94761 N-INVAS EAR/PLS OXIMETRY MLT: CPT

## 2025-01-26 PROCEDURE — 85027 COMPLETE CBC AUTOMATED: CPT | Performed by: STUDENT IN AN ORGANIZED HEALTH CARE EDUCATION/TRAINING PROGRAM

## 2025-01-26 PROCEDURE — 76705 ECHO EXAM OF ABDOMEN: CPT

## 2025-01-26 PROCEDURE — 94760 N-INVAS EAR/PLS OXIMETRY 1: CPT

## 2025-01-26 PROCEDURE — 83735 ASSAY OF MAGNESIUM: CPT | Performed by: STUDENT IN AN ORGANIZED HEALTH CARE EDUCATION/TRAINING PROGRAM

## 2025-01-26 PROCEDURE — 99222 1ST HOSP IP/OBS MODERATE 55: CPT | Performed by: STUDENT IN AN ORGANIZED HEALTH CARE EDUCATION/TRAINING PROGRAM

## 2025-01-26 PROCEDURE — 83690 ASSAY OF LIPASE: CPT | Performed by: STUDENT IN AN ORGANIZED HEALTH CARE EDUCATION/TRAINING PROGRAM

## 2025-01-26 PROCEDURE — 93010 ELECTROCARDIOGRAM REPORT: CPT | Performed by: INTERNAL MEDICINE

## 2025-01-26 PROCEDURE — 82948 REAGENT STRIP/BLOOD GLUCOSE: CPT

## 2025-01-26 PROCEDURE — 93005 ELECTROCARDIOGRAM TRACING: CPT | Performed by: STUDENT IN AN ORGANIZED HEALTH CARE EDUCATION/TRAINING PROGRAM

## 2025-01-26 PROCEDURE — 84100 ASSAY OF PHOSPHORUS: CPT | Performed by: STUDENT IN AN ORGANIZED HEALTH CARE EDUCATION/TRAINING PROGRAM

## 2025-01-26 PROCEDURE — 94664 DEMO&/EVAL PT USE INHALER: CPT

## 2025-01-26 PROCEDURE — 94799 UNLISTED PULMONARY SVC/PX: CPT

## 2025-01-26 PROCEDURE — 84443 ASSAY THYROID STIM HORMONE: CPT | Performed by: STUDENT IN AN ORGANIZED HEALTH CARE EDUCATION/TRAINING PROGRAM

## 2025-01-26 PROCEDURE — 82746 ASSAY OF FOLIC ACID SERUM: CPT | Performed by: STUDENT IN AN ORGANIZED HEALTH CARE EDUCATION/TRAINING PROGRAM

## 2025-01-26 PROCEDURE — 82140 ASSAY OF AMMONIA: CPT | Performed by: STUDENT IN AN ORGANIZED HEALTH CARE EDUCATION/TRAINING PROGRAM

## 2025-01-26 PROCEDURE — 80048 BASIC METABOLIC PNL TOTAL CA: CPT | Performed by: STUDENT IN AN ORGANIZED HEALTH CARE EDUCATION/TRAINING PROGRAM

## 2025-01-26 PROCEDURE — 82607 VITAMIN B-12: CPT | Performed by: STUDENT IN AN ORGANIZED HEALTH CARE EDUCATION/TRAINING PROGRAM

## 2025-01-26 PROCEDURE — 87040 BLOOD CULTURE FOR BACTERIA: CPT | Performed by: STUDENT IN AN ORGANIZED HEALTH CARE EDUCATION/TRAINING PROGRAM

## 2025-01-26 PROCEDURE — 84145 PROCALCITONIN (PCT): CPT | Performed by: STUDENT IN AN ORGANIZED HEALTH CARE EDUCATION/TRAINING PROGRAM

## 2025-01-26 PROCEDURE — 80076 HEPATIC FUNCTION PANEL: CPT | Performed by: STUDENT IN AN ORGANIZED HEALTH CARE EDUCATION/TRAINING PROGRAM

## 2025-01-26 PROCEDURE — 84484 ASSAY OF TROPONIN QUANT: CPT | Performed by: STUDENT IN AN ORGANIZED HEALTH CARE EDUCATION/TRAINING PROGRAM

## 2025-01-26 RX ORDER — LOSARTAN POTASSIUM 50 MG/1
50 TABLET ORAL
Status: DISCONTINUED | OUTPATIENT
Start: 2025-01-26 | End: 2025-01-27

## 2025-01-26 RX ORDER — PANTOPRAZOLE SODIUM 40 MG/10ML
40 INJECTION, POWDER, LYOPHILIZED, FOR SOLUTION INTRAVENOUS
Status: DISCONTINUED | OUTPATIENT
Start: 2025-01-26 | End: 2025-01-27

## 2025-01-26 RX ADMIN — LOSARTAN POTASSIUM 50 MG: 50 TABLET, FILM COATED ORAL at 18:44

## 2025-01-26 RX ADMIN — BENZONATATE 200 MG: 100 CAPSULE ORAL at 20:23

## 2025-01-26 RX ADMIN — PANTOPRAZOLE SODIUM 40 MG: 40 INJECTION, POWDER, FOR SOLUTION INTRAVENOUS at 12:01

## 2025-01-26 RX ADMIN — GUAIFENESIN 600 MG: 600 TABLET, EXTENDED RELEASE ORAL at 12:01

## 2025-01-26 RX ADMIN — Medication 10 ML: at 12:04

## 2025-01-26 RX ADMIN — AMLODIPINE BESYLATE 10 MG: 10 TABLET ORAL at 12:01

## 2025-01-26 RX ADMIN — IPRATROPIUM BROMIDE AND ALBUTEROL SULFATE 3 ML: 2.5; .5 SOLUTION RESPIRATORY (INHALATION) at 19:40

## 2025-01-26 RX ADMIN — BENZONATATE 200 MG: 100 CAPSULE ORAL at 07:41

## 2025-01-26 RX ADMIN — POTASSIUM CHLORIDE 10 MEQ: 750 TABLET, EXTENDED RELEASE ORAL at 12:02

## 2025-01-26 RX ADMIN — CLOPIDOGREL BISULFATE 75 MG: 75 TABLET ORAL at 12:02

## 2025-01-26 RX ADMIN — GUAIFENESIN 600 MG: 600 TABLET, EXTENDED RELEASE ORAL at 20:23

## 2025-01-26 RX ADMIN — IPRATROPIUM BROMIDE AND ALBUTEROL SULFATE 3 ML: 2.5; .5 SOLUTION RESPIRATORY (INHALATION) at 11:16

## 2025-01-26 RX ADMIN — FLUTICASONE PROPIONATE 2 SPRAY: 50 SPRAY, METERED NASAL at 12:04

## 2025-01-26 RX ADMIN — ATORVASTATIN CALCIUM 10 MG: 10 TABLET, FILM COATED ORAL at 20:23

## 2025-01-26 RX ADMIN — IPRATROPIUM BROMIDE AND ALBUTEROL SULFATE 3 ML: 2.5; .5 SOLUTION RESPIRATORY (INHALATION) at 07:55

## 2025-01-26 RX ADMIN — SENNOSIDES AND DOCUSATE SODIUM 2 TABLET: 50; 8.6 TABLET ORAL at 12:01

## 2025-01-26 RX ADMIN — IPRATROPIUM BROMIDE AND ALBUTEROL SULFATE 3 ML: 2.5; .5 SOLUTION RESPIRATORY (INHALATION) at 15:17

## 2025-01-26 RX ADMIN — MONTELUKAST 10 MG: 10 TABLET, FILM COATED ORAL at 12:01

## 2025-01-26 RX ADMIN — Medication 10 ML: at 20:24

## 2025-01-26 RX ADMIN — BISOPROLOL FUMARATE 5 MG: 5 TABLET ORAL at 12:01

## 2025-01-26 RX ADMIN — SENNOSIDES AND DOCUSATE SODIUM 2 TABLET: 50; 8.6 TABLET ORAL at 20:24

## 2025-01-26 RX ADMIN — PANTOPRAZOLE SODIUM 40 MG: 40 TABLET, DELAYED RELEASE ORAL at 07:41

## 2025-01-26 NOTE — PROGRESS NOTES
Name: Flaca Hassan ADMIT: 2025   : 1965  PCP: Isidro Hernandez MD    MRN: [id number] LOS: 4 days   AGE/SEX: 59 y.o. female  ROOM: [facility]     Subjective   Subjective   Patient sitting up in the bed, drowsy but but arousable, confused, did tell me her name and that she is in the hospital, was not able to tell me year.  Does follow commands.  Complains of abdominal pain when asked by, points to her epigastric and right upper quadrant region      Review of Systems   As above  Objective   Objective   Vital Signs  Temp:  [97.5 °F (36.4 °C)-98.6 °F (37 °C)] 98.6 °F (37 °C)  Heart Rate:  [75-95] 92  Resp:  [16-28] 16  BP: ()/(64-93) 160/93  SpO2:  [92 %-100 %] 97 %  on  Flow (L/min) (Oxygen Therapy):  [2] 2;   Device (Oxygen Therapy): nasal cannula  There is no height or weight on file to calculate BMI.  Physical Exam    General: Alert, sitting up in the bed, confused, ill-appearing  HEENT: Normocephalic, atraumatic  CV: Regular rate and rhythm, no murmurs rubs or gallops  Lungs: Decreased, mild expiratory wheezing bilaterally, on 3 L nasal cannula  Abdomen: Soft, nontender, nondistended  Extremities: No significant peripheral edema , no cyanosis     Results Review     I reviewed the patient's new clinical results.  Results from last 7 days   Lab Units 25  0703 25  0550 25  0633 25  0510   WBC 10*3/mm3 11.67* 9.07 15.68* 14.82*   HEMOGLOBIN g/dL 14.6 14.2 13.9 13.6   PLATELETS 10*3/mm3 315 295 302 266     Results from last 7 days   Lab Units 25  0703 25  0550 25  0633 25  0510   SODIUM mmol/L 134* 134* 137 135*   POTASSIUM mmol/L 4.0 4.4 4.2 5.0   CHLORIDE mmol/L 98 100 102 100   CO2 mmol/L 24.0 21.5* 25.0 22.3   BUN mg/dL 29* 21* 27* 28*   CREATININE mg/dL 1.20* 0.91 1.04* 1.27*   GLUCOSE mg/dL 142* 134* 128* 164*   Estimated Creatinine Clearance: 52.8 mL/min (A) (by C-G formula based on SCr of 1.2 mg/dL (H)).  Results from last 7 days   Lab Units  01/26/25  0703 01/23/25  0510 01/22/25  0505 01/21/25  1835   ALBUMIN g/dL 3.9 3.4* 3.4* 4.3   BILIRUBIN mg/dL 0.9  --  0.3 0.4   ALK PHOS U/L 89  --  97 115   AST (SGOT) U/L 21  --  20 26   ALT (SGPT) U/L 23  --  17 19     Results from last 7 days   Lab Units 01/26/25  0703 01/25/25  0550 01/24/25  0633 01/23/25  0510 01/22/25  0505 01/21/25  1835 01/21/25  1835   CALCIUM mg/dL 9.1 9.1 9.0 9.6 8.8  --  9.1   ALBUMIN g/dL 3.9  --   --  3.4* 3.4*  --  4.3   MAGNESIUM mg/dL 1.8 1.7 1.9 2.0 1.8  --  1.7   PHOSPHORUS mg/dL 3.9 4.0 3.4 3.1 2.7   < >  --     < > = values in this interval not displayed.     Results from last 7 days   Lab Units 01/26/25  0703 01/23/25  0510 01/21/25  1835   PROCALCITONIN ng/mL 0.02 0.02 <0.02   LACTATE mmol/L  --   --  1.3     COVID19   Date Value Ref Range Status   01/21/2025 Not Detected Not Detected - Ref. Range Final   08/12/2022 Detected (C) Not Detected - Ref. Range Final     Glucose   Date/Time Value Ref Range Status   01/26/2025 0809 135 (H) 70 - 130 mg/dL Final   01/25/2025 1211 176 (H) 70 - 130 mg/dL Final           EEG  Date of onset: 1/25/2025  Date of offset: 1/25/2025     Indication: 59 year old woman with encephalopathy, unresponsiveness.       Technical description:  This is a 21 channel digital EEG recording that   began on 1812 and ended on 1838.  Electrodes were placed based on the   10-20 international electrode placement system.       Background:  The EEG recording demonstrates normal background activity   with mainly alpha frequencies with intermixed theta frequencies.  9 Hz   frequencies are present posteriorly and symmetrically.  The patient enters   drowsiness only.  Hyperventilation was not performed but photic   stimulation was performed with no additional abnormalities noted.  There   are no asymmetries between the two hemispheres.  No interictal activity is   present.     The EKG monitor shows a heart rate that is around 80 beats per minute.     Clinical  interpretation:  This routine awake and drowsy EEG is normal.  No   potentially epileptogenic activity, seizure activity, or focal slowing is   present.  Careful clinical correlation is advised.     XR Chest 1 View  Narrative: XR CHEST 1 VW-1/25/2025     HISTORY: Difficulty breathing.     Heart size is mildly enlarged. There is elevation of the right  hemidiaphragm. Lungs are underinflated. There may be some minimal  pleural fluid on the left. There also may be some minimal patchy  atelectasis or infiltrate in the left base. Mild degenerative changes  are seen in the spine.     Impression: 1. Underinflation of the lungs with elevation of the right  hemidiaphragm. This diaphragmatic elevation has been seen on previous  studies.  2. There may be some minimal left pleural effusion with minimal  atelectasis or infiltrate in the left base.        This report was finalized on 1/25/2025 1:08 PM by Dr. Oscar Atkinson M.D on Workstation: [id number]       Scheduled Medications  amLODIPine, 10 mg, Oral, Daily  atorvastatin, 10 mg, Oral, Daily  bisoprolol, 5 mg, Oral, Daily  clopidogrel, 75 mg, Oral, Daily  escitalopram, 20 mg, Oral, Daily  fluticasone, 2 spray, Nasal, Daily  [Held by provider] furosemide, 40 mg, Oral, Daily  guaiFENesin, 600 mg, Oral, Q12H  ipratropium-albuterol, 3 mL, Nebulization, 4x Daily - RT  [Held by provider] losartan, 100 mg, Oral, Q24H  montelukast, 10 mg, Oral, Daily  pantoprazole, 40 mg, Intravenous, Q AM  potassium chloride, 10 mEq, Oral, Daily  [Held by provider] predniSONE, 40 mg, Oral, Daily With Breakfast  [Held by provider] risperiDONE, 0.5 mg, Oral, Daily  senna-docusate sodium, 2 tablet, Oral, BID  sodium chloride, 10 mL, Intravenous, Q12H    Infusions   Diet  Diet: Cardiac, Diabetic, Renal; Healthy Heart (2-3 Na+); Consistent Carbohydrate; Low Sodium (2-3g), Low Potassium, Low Phosphorus; No Pork; Fluid Consistency: Thin (IDDSI 0)    I have personally reviewed     [x]  Laboratory   []   Microbiology   []  Radiology   []  EKG/Telemetry  []  Cardiology/Vascular   []  Pathology    []  Records       Assessment/Plan     Active Hospital Problems    Diagnosis  POA    **RSV bronchitis [J20.5]  Yes    Moderate persistent asthma with exacerbation [J45.41]  Yes    Stage 3a chronic kidney disease [N18.31]  Yes    Acute respiratory failure with hypoxia [J96.01]  Yes    LI (obstructive sleep apnea) [G47.33]  Yes    Endometrial cancer [C54.1]  Yes    IFG (impaired fasting glucose) [R73.01]  Yes    Coronary artery disease involving native heart without angina pectoris [I25.10]  Yes    Hypertension [I10]  Yes    Hyperlipidemia [E78.5]  Yes      Resolved Hospital Problems   No resolved problems to display.       Patient is a 59 h/o f asthma , CAD, HTN, HLD, CKD stage IIIa, endometrial cancer ,  presented  the hospital with shortness of breath and cough.      AMS  Encephalopathy.   -Team D was called 01/25, stat ABG was obtained with normal pH, pCO2 of 48.  Stat chest x-ray with no acute findings  -urinalysis with no signs of infection, chest x-ray stable  -Routine EEG with no seizure activity  -Neurology consulted, MRI pending  -Prednisone can be contributing confusion, will hold  -Holding Risperdal as well  -TSH normal, ammonia normal.  B12 normal.  -Delirium precautions    Abdominal pain  -Complains of epigastric and right upper quad abdominal pain.  -Having BM per report  -Lipase and liver enzymes normal  -Initiated on IV PPI,  -Abdominal ultrasound pending to evaluate pancreas and gallbladder    Acute exacerbation of moderate persistent asthma secondary to RSV infection  Acute hypoxemic respiratory failure  -Respiratory viral panel due to 1/21/2025 was positive for RSV  -CT scan without contrast 01/22/2025 showed mild patchy areas of atelectasis or pneumonia in the bilateral lower lobes.  -Status post IV steroids.  Transitioned to p.o., holding p.o. prednisone currently  -Scheduled DuoNebs  -Supportive  care  -Pulmonology following, cleared to discharge from pulmonology standpoint  -      Enlarged mediastinal nodes  CT scan showed A few small to slightly enlarged mediastinal nodes are seen.Short-term follow-up CT of the chest without contrast in approximately 3 months suggested per radiology.  Suspect enlarged lymph node secondary to RSV infection/Pneumonia ,  defer outpatient evaluation to pulmonology.      History of CAD  Hypertension  -Continue statin, Plavix, amlodipine, , bisoprolol   -Hold losartan        CKD stage IIIa  -Creatinine stable around baseline.    -Monitor daily BMP.  Avoid nephrotoxic drugs  -Creatinine slightly up.  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  SCDs  Full code.  Discussed with patient.  Discussed with RN  Discussed with neurology  Disposition: Home with home health timing to be determined   Expected Discharge Date: 1/25/2025; Expected Discharge Time:        Copied text in this note has been reviewed and is accurate as of 01/26/25.         Dictated utilizing Dragon dictation        Jen Blanco MD  [facility]  01/26/25  12:27 EST

## 2025-01-26 NOTE — CONSULTS
"Neurology Consult Note    Consult Date: 1/26/2025    Referring MD: No ref. provider found    Reason for Consult I have been asked to see the patient in neurological consultation to render advice and opinion regarding altered mental status    Flaca Hassan is a 59 y.o. female with past medical history of asthma, obstructive sleep apnea follows with outpatient [facility] presented to the hospital on 1/21/2025 with 4 days history of shortness of air and cough.  His admission patient is being treated for acute exacerbation of asthma secondary to RSV infection.  GI has been consulted for altered mental status.    Yesterday at the time of discharge the nurse found her sitting in the chair head down on bedside \"All contents on table including cell phone, water, and other items scattered and spilled on floor. \"    Today morning patient is lethargic drowsy not answering any questions appropriately she is able to state her name but that is about it she is moving all 4 extremities I could not appreciate any other cranial nerve deficits    Past Medical History:   Diagnosis Date    Anemia     Asthma     Breast cyst     Cancer     Coronary artery disease     stent    Depression     Diverticulosis     H/O: GI bleed     recurrent    Hematuria     History of coronary angioplasty with insertion of stent     History of transfusion     no reaction    Hyperlipidemia     Hypertension     Incontinence of urine     wears pads    Irritable bowel syndrome     Malignant neoplasm of endometrium 02/2021    Melena     Obesity     Oxygen dependent     uses oxygen 2 prn when walking if SOB    Uterine cancer     UTI (urinary tract infection)        Exam  /93 (BP Location: Right arm, Patient Position: Lying)   Pulse 85   Temp 98.6 °F (37 °C) (Oral)   Resp 26   LMP  (LMP Unknown)   SpO2 92%   Gen: NAD, vitals reviewed  Today morning patient is lethargic drowsy not answering any questions appropriately she is able to state her " name but that is about it she is moving all 4 extremities I could not appreciate any other cranial nerve deficits  No asterixis no nystagmus  Strength at least 4+/5 bilateral upper lower extremities  Able to feel pinprick in all 4 extremity    DATA:    Lab Results   Component Value Date    GLUCOSE 142 (H) 01/26/2025    CALCIUM 9.1 01/26/2025     (L) 01/26/2025    K 4.0 01/26/2025    CO2 24.0 01/26/2025    CL 98 01/26/2025    BUN 29 (H) 01/26/2025    CREATININE 1.20 (H) 01/26/2025    EGFRIFAFRI 71 04/14/2021    EGFRIFNONA 59 (L) 04/14/2021    BCR 24.2 01/26/2025    ANIONGAP 12.0 01/26/2025     Lab Results   Component Value Date    WBC 11.67 (H) 01/26/2025    HGB 14.6 01/26/2025    HCT 45.1 01/26/2025    MCV 84.1 01/26/2025     01/26/2025       Lab review:   Sodium 134  Creatinine 1.20  Blood glucose 142  Normal LFTs    TSH 1.3    WBC 11.6  Hemoglobin 14 and platelets 315    Urinalysis negative    Imaging review:     EEG done yesterday showed no epileptiform discharges or seizures    No brain imaging this of admission    Diagnoses:  Acute metabolic encephalopathy versus hospital induced delirium    Acute on chronic asthma exacerbation  RSV respiratory infection  Mediastinal lymph nodes    Pre-stroke MRS: 1    Assessment patient is a 59-year-old with history of chronic asthma presents to the ED with complaints of shortness of breath and cough she has been treated for acute on chronic asthma infection secondary to RSV infection.  Since yesterday she has become confused-believe this is most likely hospital induced delirium but we will do an infectious workup and also check an MRI as this has been persistent for greater than 24 hours at this point.    Check MRI brain  Urine analysis is negative we will check blood cultures  B12 folate ammonia levels  We will continue to follow      MDM   Reviewed: Previous charts, nursing notes and vitals   Reviewed: Previous labs and EEG   Interpretation: Labs and EEG  Total  time providing care is :30-74 minutes. This excluded time spent performing separately reportable procedures and services  Consults :Neurology/Stroke    Please note that portions of this note were completed with a voice recognition program.     Ruslan Joaquin MD  Neuro Hospitalist /Vascular Neurology.

## 2025-01-26 NOTE — PROGRESS NOTES
[facility] INPATIENT PROGRESS NOTE         [address]    2025      PATIENT IDENTIFICATION:  Name: Flaca Hassan ADMIT: 2025   : 1965  PCP: Isidro Hernandez MD    MRN: [id number] LOS: 4 days   AGE/SEX: 59 y.o. female  ROOM: [facility]                     LOS 4    Reason for visit: RSV      SUBJECTIVE:      Resting comfortably.  No new pulmonary complaints overnight.  Working on home with home health.  No objection from our standpoint.  Discharge held up due to recent confusion and delirium.    Objective   OBJECTIVE:    Vital Sign Min/Max for last 24 hours  Temp  Min: 97.5 °F (36.4 °C)  Max: 98.6 °F (37 °C)   BP  Min: 89/70  Max: 164/96   Pulse  Min: 75  Max: 90   Resp  Min: 16  Max: 28   SpO2  Min: 92 %  Max: 100 %   No data recorded   No data recorded    Vitals:    25 0038 25 0755 25 0801 25 0810   BP: 102/64 142/81  160/93   BP Location: Right arm   Right arm   Patient Position: Lying   Lying   Pulse: 77 82 82 85   Resp:    26   Temp:  98.6 °F (37 °C)  98.6 °F (37 °C)   TempSrc:    Oral   SpO2: 95% 92% 99% 92%        There were no vitals filed for this visit.    There is no height or weight on file to calculate BMI.                          There is no height or weight on file to calculate BMI.    Intake/Output Summary (Last 24 hours) at 2025 0955  Last data filed at 2025 0033  Gross per 24 hour   Intake 240 ml   Output --   Net 240 ml         Exam:  GEN:  No distress, appears stated age  EYES:   PERRL, anicteric sclerae  ENT:    External ears/nose normal, OP clear  NECK:  No adenopathy, midline trachea  LUNGS: Normal chest on inspection, palpation and auscultation  CV:  Normal S1S2, without murmur  ABD:  Nontender, nondistended, no hepatosplenomegaly, +BS  EXT:  No edema.  No cyanosis or clubbing.  No mottling and normal cap refill.    Assessment     Scheduled meds:  amLODIPine, 10 mg, Oral, Daily  atorvastatin, 10 mg, Oral, Daily  bisoprolol, 5  mg, Oral, Daily  clopidogrel, 75 mg, Oral, Daily  escitalopram, 20 mg, Oral, Daily  fluticasone, 2 spray, Nasal, Daily  [Held by provider] furosemide, 40 mg, Oral, Daily  guaiFENesin, 600 mg, Oral, Q12H  ipratropium-albuterol, 3 mL, Nebulization, 4x Daily - RT  [Held by provider] losartan, 100 mg, Oral, Q24H  montelukast, 10 mg, Oral, Daily  pantoprazole, 40 mg, Intravenous, Q AM  potassium chloride, 10 mEq, Oral, Daily  [Held by provider] predniSONE, 40 mg, Oral, Daily With Breakfast  [Held by provider] risperiDONE, 0.5 mg, Oral, Daily  senna-docusate sodium, 2 tablet, Oral, BID  sodium chloride, 10 mL, Intravenous, Q12H      IV meds:                         Data Review:  Results from last 7 days   Lab Units 01/26/25  0703 01/25/25  0550 01/24/25  0633 01/23/25  0510 01/22/25  0505   SODIUM mmol/L 134* 134* 137 135* 134*   POTASSIUM mmol/L 4.0 4.4 4.2 5.0 3.7   CHLORIDE mmol/L 98 100 102 100 101   CO2 mmol/L 24.0 21.5* 25.0 22.3 20.9*   BUN mg/dL 29* 21* 27* 28* 18   CREATININE mg/dL 1.20* 0.91 1.04* 1.27* 1.25*   GLUCOSE mg/dL 142* 134* 128* 164* 245*   CALCIUM mg/dL 9.1 9.1 9.0 9.6 8.8         Estimated Creatinine Clearance: 52.8 mL/min (A) (by C-G formula based on SCr of 1.2 mg/dL (H)).  Results from last 7 days   Lab Units 01/26/25  0703 01/25/25  0550 01/24/25  0633 01/23/25  0510 01/22/25  0505   WBC 10*3/mm3 11.67* 9.07 15.68* 14.82* 3.48   HEMOGLOBIN g/dL 14.6 14.2 13.9 13.6 13.2   PLATELETS 10*3/mm3 315 295 302 266 227         Results from last 7 days   Lab Units 01/26/25  0703 01/22/25  0505 01/21/25  1835   ALT (SGPT) U/L 23 17 19   AST (SGOT) U/L 21 20 26     Results from last 7 days   Lab Units 01/25/25  1234 01/21/25  1849   PH, ARTERIAL pH units 7.416 7.378   PO2 ART mm Hg 61.4* 43.6*   PCO2, ARTERIAL mm Hg 38.4 43.1   HCO3 ART mmol/L 24.7 25.3     Results from last 7 days   Lab Units 01/26/25  0703 01/23/25  0510 01/21/25  1835   PROCALCITONIN ng/mL 0.02 0.02 <0.02   LACTATE mmol/L  --   --  1.3          Glucose   Date/Time Value Ref Range Status   01/26/2025 0809 135 (H) 70 - 130 mg/dL Final   01/25/2025 1211 176 (H) 70 - 130 mg/dL Final           Imaging reviewed  CT chest 1/22 reviewed    Microbiology reviewed  RVP: +RSV          EEG is normal.                Active Hospital Problems    Diagnosis  POA    **RSV bronchitis [J20.5]  Yes    Moderate persistent asthma with exacerbation [J45.41]  Yes    Stage 3a chronic kidney disease [N18.31]  Yes    Acute respiratory failure with hypoxia [J96.01]  Yes    LI (obstructive sleep apnea) [G47.33]  Yes    Endometrial cancer [C54.1]  Yes    IFG (impaired fasting glucose) [R73.01]  Yes    Coronary artery disease involving native heart without angina pectoris [I25.10]  Yes    Hypertension [I10]  Yes    Hyperlipidemia [E78.5]  Yes      Resolved Hospital Problems   No resolved problems to display.         ASSESSMENT:  Acute RSV bronchitis  Acute exacerbation of moderate persistent asthma  Acute hypoxemic respiratory failure  LI  Endometrial cancer  Stage IIIa CKD  CAD  Hypertension/hyperlipidemia  Delirium      PLAN:  Scheduled and as needed bronchodilators and steroid for asthma exacerbation. Changed steroid to PO with taper.  Treatment for viral process is supportive.  Wean oxygen as able.  Positive airway pressure for sleep apnea.  Neurology following and evaluating for delirium.  EEG negative.  Plan for MRI noted.        Giorgio Deng MD  Pulmonary and Critical Care Medicine  [facility], [facility]  1/26/2025    09:55 EST

## 2025-01-26 NOTE — PLAN OF CARE
Goal Outcome Evaluation:         Pt VSS so far this shift. C/O not being able to sleep; Melatonin appeared affective. Activity increased this shift and pt able to ambulate to restroom well. Safety rounds, bed alarm active and audible, call light within reach. Awaiting Neuro to see. May need psych meds adjusted though due to hallucination yesterday on day shift. Pt denied any more hallucinations this shift so far.Pt verbalized seeing a man telling her to leave within 3 days. Will continue to assist and monitor for remainder of shift and f/up w/ oncoming RN.

## 2025-01-27 ENCOUNTER — APPOINTMENT (OUTPATIENT)
Dept: CT IMAGING | Facility: HOSPITAL | Age: 60
End: 2025-01-27
Payer: MEDICARE

## 2025-01-27 ENCOUNTER — APPOINTMENT (OUTPATIENT)
Dept: GENERAL RADIOLOGY | Facility: HOSPITAL | Age: 60
End: 2025-01-27
Payer: MEDICARE

## 2025-01-27 LAB
ANION GAP SERPL CALCULATED.3IONS-SCNC: 11.8 MMOL/L (ref 5–15)
ARTERIAL PATENCY WRIST A: POSITIVE
ATMOSPHERIC PRESS: 752.3 MMHG
BASE EXCESS BLDA CALC-SCNC: 1.1 MMOL/L (ref 0–2)
BDY SITE: ABNORMAL
BUN SERPL-MCNC: 30 MG/DL (ref 6–20)
BUN/CREAT SERPL: 28 (ref 7–25)
CALCIUM SPEC-SCNC: 9 MG/DL (ref 8.6–10.5)
CHLORIDE SERPL-SCNC: 103 MMOL/L (ref 98–107)
CO2 BLDA-SCNC: 26.8 MMOL/L (ref 23–27)
CO2 SERPL-SCNC: 23.2 MMOL/L (ref 22–29)
CREAT SERPL-MCNC: 1.07 MG/DL (ref 0.57–1)
DEPRECATED RDW RBC AUTO: 47.4 FL (ref 37–54)
DEVICE COMMENT: ABNORMAL
EGFRCR SERPLBLD CKD-EPI 2021: 60 ML/MIN/1.73
ERYTHROCYTE [DISTWIDTH] IN BLOOD BY AUTOMATED COUNT: 15 % (ref 12.3–15.4)
GAS FLOW AIRWAY: 4 LPM
GLUCOSE BLDC GLUCOMTR-MCNC: 118 MG/DL (ref 70–130)
GLUCOSE SERPL-MCNC: 150 MG/DL (ref 65–99)
HCO3 BLDA-SCNC: 25.6 MMOL/L (ref 22–28)
HCT VFR BLD AUTO: 47.1 % (ref 34–46.6)
HEMODILUTION: NO
HGB BLD-MCNC: 14.9 G/DL (ref 12–15.9)
MAGNESIUM SERPL-MCNC: 1.9 MG/DL (ref 1.6–2.6)
MCH RBC QN AUTO: 27.3 PG (ref 26.6–33)
MCHC RBC AUTO-ENTMCNC: 31.6 G/DL (ref 31.5–35.7)
MCV RBC AUTO: 86.3 FL (ref 79–97)
MODALITY: ABNORMAL
PCO2 BLDA: 39.2 MM HG (ref 35–45)
PH BLDA: 7.42 PH UNITS (ref 7.35–7.45)
PHOSPHATE SERPL-MCNC: 3.3 MG/DL (ref 2.5–4.5)
PLATELET # BLD AUTO: 335 10*3/MM3 (ref 140–450)
PMV BLD AUTO: 11 FL (ref 6–12)
PO2 BLDA: 57.1 MM HG (ref 80–100)
POTASSIUM SERPL-SCNC: 4.3 MMOL/L (ref 3.5–5.2)
QT INTERVAL: 379 MS
QTC INTERVAL: 456 MS
RBC # BLD AUTO: 5.46 10*6/MM3 (ref 3.77–5.28)
SAO2 % BLDCOA: 89.9 % (ref 92–98.5)
SODIUM SERPL-SCNC: 138 MMOL/L (ref 136–145)
TOTAL RATE: 28 BREATHS/MINUTE
WBC NRBC COR # BLD AUTO: 10.93 10*3/MM3 (ref 3.4–10.8)

## 2025-01-27 PROCEDURE — 25510000001 IOPAMIDOL PER 1 ML: Performed by: STUDENT IN AN ORGANIZED HEALTH CARE EDUCATION/TRAINING PROGRAM

## 2025-01-27 PROCEDURE — 83735 ASSAY OF MAGNESIUM: CPT | Performed by: STUDENT IN AN ORGANIZED HEALTH CARE EDUCATION/TRAINING PROGRAM

## 2025-01-27 PROCEDURE — 97530 THERAPEUTIC ACTIVITIES: CPT

## 2025-01-27 PROCEDURE — 99232 SBSQ HOSP IP/OBS MODERATE 35: CPT | Performed by: PHYSICIAN ASSISTANT

## 2025-01-27 PROCEDURE — 25010000002 HEPARIN (PORCINE) PER 1000 UNITS: Performed by: STUDENT IN AN ORGANIZED HEALTH CARE EDUCATION/TRAINING PROGRAM

## 2025-01-27 PROCEDURE — 94761 N-INVAS EAR/PLS OXIMETRY MLT: CPT

## 2025-01-27 PROCEDURE — 71275 CT ANGIOGRAPHY CHEST: CPT

## 2025-01-27 PROCEDURE — 25010000002 METHYLPREDNISOLONE PER 40 MG: Performed by: INTERNAL MEDICINE

## 2025-01-27 PROCEDURE — 97110 THERAPEUTIC EXERCISES: CPT

## 2025-01-27 PROCEDURE — 36600 WITHDRAWAL OF ARTERIAL BLOOD: CPT

## 2025-01-27 PROCEDURE — 93005 ELECTROCARDIOGRAM TRACING: CPT | Performed by: STUDENT IN AN ORGANIZED HEALTH CARE EDUCATION/TRAINING PROGRAM

## 2025-01-27 PROCEDURE — 94799 UNLISTED PULMONARY SVC/PX: CPT

## 2025-01-27 PROCEDURE — 71045 X-RAY EXAM CHEST 1 VIEW: CPT

## 2025-01-27 PROCEDURE — 94660 CPAP INITIATION&MGMT: CPT

## 2025-01-27 PROCEDURE — 25810000003 SODIUM CHLORIDE 0.9 % SOLUTION: Performed by: STUDENT IN AN ORGANIZED HEALTH CARE EDUCATION/TRAINING PROGRAM

## 2025-01-27 PROCEDURE — 93010 ELECTROCARDIOGRAM REPORT: CPT | Performed by: INTERNAL MEDICINE

## 2025-01-27 PROCEDURE — 94664 DEMO&/EVAL PT USE INHALER: CPT

## 2025-01-27 PROCEDURE — 82803 BLOOD GASES ANY COMBINATION: CPT

## 2025-01-27 PROCEDURE — 85027 COMPLETE CBC AUTOMATED: CPT | Performed by: STUDENT IN AN ORGANIZED HEALTH CARE EDUCATION/TRAINING PROGRAM

## 2025-01-27 PROCEDURE — 80048 BASIC METABOLIC PNL TOTAL CA: CPT | Performed by: STUDENT IN AN ORGANIZED HEALTH CARE EDUCATION/TRAINING PROGRAM

## 2025-01-27 PROCEDURE — 84100 ASSAY OF PHOSPHORUS: CPT | Performed by: STUDENT IN AN ORGANIZED HEALTH CARE EDUCATION/TRAINING PROGRAM

## 2025-01-27 PROCEDURE — 82948 REAGENT STRIP/BLOOD GLUCOSE: CPT

## 2025-01-27 PROCEDURE — 99222 1ST HOSP IP/OBS MODERATE 55: CPT | Performed by: SURGERY

## 2025-01-27 RX ORDER — LOSARTAN POTASSIUM 100 MG/1
100 TABLET ORAL
Status: DISCONTINUED | OUTPATIENT
Start: 2025-01-27 | End: 2025-01-27

## 2025-01-27 RX ORDER — LOSARTAN POTASSIUM 50 MG/1
50 TABLET ORAL
Status: DISCONTINUED | OUTPATIENT
Start: 2025-01-27 | End: 2025-01-30 | Stop reason: HOSPADM

## 2025-01-27 RX ORDER — METHYLPREDNISOLONE SODIUM SUCCINATE 40 MG/ML
40 INJECTION, POWDER, LYOPHILIZED, FOR SOLUTION INTRAMUSCULAR; INTRAVENOUS EVERY 12 HOURS
Status: DISCONTINUED | OUTPATIENT
Start: 2025-01-27 | End: 2025-01-30

## 2025-01-27 RX ORDER — SODIUM CHLORIDE 9 MG/ML
75 INJECTION, SOLUTION INTRAVENOUS CONTINUOUS
Status: DISCONTINUED | OUTPATIENT
Start: 2025-01-27 | End: 2025-01-28

## 2025-01-27 RX ORDER — BUDESONIDE AND FORMOTEROL FUMARATE DIHYDRATE 160; 4.5 UG/1; UG/1
2 AEROSOL RESPIRATORY (INHALATION)
Status: DISCONTINUED | OUTPATIENT
Start: 2025-01-27 | End: 2025-01-30 | Stop reason: HOSPADM

## 2025-01-27 RX ORDER — IOPAMIDOL 755 MG/ML
100 INJECTION, SOLUTION INTRAVASCULAR
Status: COMPLETED | OUTPATIENT
Start: 2025-01-27 | End: 2025-01-27

## 2025-01-27 RX ORDER — PANTOPRAZOLE SODIUM 40 MG/10ML
40 INJECTION, POWDER, LYOPHILIZED, FOR SOLUTION INTRAVENOUS
Status: DISCONTINUED | OUTPATIENT
Start: 2025-01-27 | End: 2025-01-30 | Stop reason: HOSPADM

## 2025-01-27 RX ORDER — ALUMINA, MAGNESIA, AND SIMETHICONE 2400; 2400; 240 MG/30ML; MG/30ML; MG/30ML
15 SUSPENSION ORAL ONCE
Status: COMPLETED | OUTPATIENT
Start: 2025-01-27 | End: 2025-01-27

## 2025-01-27 RX ORDER — HEPARIN SODIUM 5000 [USP'U]/ML
5000 INJECTION, SOLUTION INTRAVENOUS; SUBCUTANEOUS EVERY 8 HOURS SCHEDULED
Status: DISCONTINUED | OUTPATIENT
Start: 2025-01-27 | End: 2025-01-30 | Stop reason: HOSPADM

## 2025-01-27 RX ADMIN — AMLODIPINE BESYLATE 10 MG: 10 TABLET ORAL at 09:30

## 2025-01-27 RX ADMIN — HEPARIN SODIUM 5000 UNITS: 5000 INJECTION INTRAVENOUS; SUBCUTANEOUS at 20:36

## 2025-01-27 RX ADMIN — IPRATROPIUM BROMIDE AND ALBUTEROL SULFATE 3 ML: 2.5; .5 SOLUTION RESPIRATORY (INHALATION) at 21:07

## 2025-01-27 RX ADMIN — BISOPROLOL FUMARATE 5 MG: 5 TABLET ORAL at 09:31

## 2025-01-27 RX ADMIN — IPRATROPIUM BROMIDE AND ALBUTEROL SULFATE 3 ML: 2.5; .5 SOLUTION RESPIRATORY (INHALATION) at 09:04

## 2025-01-27 RX ADMIN — METHYLPREDNISOLONE SODIUM SUCCINATE 40 MG: 40 INJECTION, POWDER, FOR SOLUTION INTRAMUSCULAR; INTRAVENOUS at 05:41

## 2025-01-27 RX ADMIN — MONTELUKAST 10 MG: 10 TABLET, FILM COATED ORAL at 09:31

## 2025-01-27 RX ADMIN — ALUMINUM HYDROXIDE, MAGNESIUM HYDROXIDE, AND DIMETHICONE 15 ML: 400; 400; 40 SUSPENSION ORAL at 09:30

## 2025-01-27 RX ADMIN — GUAIFENESIN 600 MG: 600 TABLET, EXTENDED RELEASE ORAL at 20:36

## 2025-01-27 RX ADMIN — METHYLPREDNISOLONE SODIUM SUCCINATE 40 MG: 40 INJECTION, POWDER, FOR SOLUTION INTRAMUSCULAR; INTRAVENOUS at 17:58

## 2025-01-27 RX ADMIN — ACETAMINOPHEN 650 MG: 325 TABLET, FILM COATED ORAL at 15:24

## 2025-01-27 RX ADMIN — IOPAMIDOL 95 ML: 755 INJECTION, SOLUTION INTRAVENOUS at 11:12

## 2025-01-27 RX ADMIN — Medication 10 ML: at 09:45

## 2025-01-27 RX ADMIN — IPRATROPIUM BROMIDE AND ALBUTEROL SULFATE 3 ML: 2.5; .5 SOLUTION RESPIRATORY (INHALATION) at 12:26

## 2025-01-27 RX ADMIN — FLUTICASONE PROPIONATE 2 SPRAY: 50 SPRAY, METERED NASAL at 09:44

## 2025-01-27 RX ADMIN — GUAIFENESIN 600 MG: 600 TABLET, EXTENDED RELEASE ORAL at 09:31

## 2025-01-27 RX ADMIN — PANTOPRAZOLE SODIUM 40 MG: 40 INJECTION, POWDER, FOR SOLUTION INTRAVENOUS at 17:58

## 2025-01-27 RX ADMIN — IPRATROPIUM BROMIDE AND ALBUTEROL SULFATE 3 ML: 2.5; .5 SOLUTION RESPIRATORY (INHALATION) at 16:21

## 2025-01-27 RX ADMIN — BUDESONIDE AND FORMOTEROL FUMARATE DIHYDRATE 2 PUFF: 160; 4.5 AEROSOL RESPIRATORY (INHALATION) at 21:14

## 2025-01-27 RX ADMIN — CLOPIDOGREL BISULFATE 75 MG: 75 TABLET ORAL at 09:30

## 2025-01-27 RX ADMIN — HEPARIN SODIUM 5000 UNITS: 5000 INJECTION INTRAVENOUS; SUBCUTANEOUS at 15:19

## 2025-01-27 RX ADMIN — IPRATROPIUM BROMIDE AND ALBUTEROL SULFATE 3 ML: .5; 3 SOLUTION RESPIRATORY (INHALATION) at 04:35

## 2025-01-27 RX ADMIN — POTASSIUM CHLORIDE 10 MEQ: 750 TABLET, EXTENDED RELEASE ORAL at 09:30

## 2025-01-27 RX ADMIN — ATORVASTATIN CALCIUM 10 MG: 10 TABLET, FILM COATED ORAL at 20:36

## 2025-01-27 RX ADMIN — PANTOPRAZOLE SODIUM 40 MG: 40 INJECTION, POWDER, FOR SOLUTION INTRAVENOUS at 05:41

## 2025-01-27 RX ADMIN — LOSARTAN POTASSIUM 50 MG: 50 TABLET, FILM COATED ORAL at 15:24

## 2025-01-27 RX ADMIN — SODIUM CHLORIDE 75 ML/HR: 9 INJECTION, SOLUTION INTRAVENOUS at 12:14

## 2025-01-27 NOTE — NURSING NOTE
Upon shift report rounding with SYLVESTER Chiu, Pt noted to be in a fixed stare to right upper side of room with tears running down face. Pt does not verbally respond, but did look at this RN upon introduction, then patient looked back to corner. SYLVESTER Chiu states this is the way she has been today and yesterday, stating it is not new. EEG has been obtained and is negative, Neurology has been consulted.

## 2025-01-27 NOTE — PROGRESS NOTES
Overnight cross cover intensivist note.   [address]  1/27/2025    Patient:  Name:  Flaca Hassan  MRN:  [id number]  1965  59 y.o.  female         CC/reason for visit: resp distress, called by rapid response team    Interval History:  Called to evaluate patient for increased soa by rapid response team.  Chart reviewed.  Denies cp  Awake mild confusion but answers questions seemingly appropriately  Mild increase wob  States she has a bipap at home and would like to wear one here      Physical Exam:  /82   Pulse 82   Temp 97.9 °F (36.6 °C) (Oral)   Resp (!) 32   LMP  (LMP Unknown)   SpO2 96%   There is no height or weight on file to calculate BMI.    Intake/Output Summary (Last 24 hours) at 1/27/2025 0524  Last data filed at 1/26/2025 0700  Gross per 24 hour   Intake 120 ml   Output --   Net 120 ml     General appearance: ill confused but conversant   Eyes: anicteric sclerae, moist conjunctivae; no lidlag;    HENT: Atraumatic; oropharynx clear with moist mucous membranes    Neck: Trachea midline;  supple   Lungs: katie diminished right base with mild increased wob and accessory muscle use   CV: RRR, no rub   Abdomen: Soft, obese non rigid; BS+  Skin: WWP no diffuse visible rash  Psych/Neuro: confused but alert affect, answers questions appropriately in short words        Data Review:  Notable Labs:  Results from last 7 days   Lab Units 01/26/25  0703 01/25/25  0550 01/24/25  0633 01/23/25  0510 01/22/25  0505 01/21/25  1835   WBC 10*3/mm3 11.67* 9.07 15.68* 14.82* 3.48 6.98   HEMOGLOBIN g/dL 14.6 14.2 13.9 13.6 13.2 14.3   PLATELETS 10*3/mm3 315 295 302 266 227 247     Results from last 7 days   Lab Units 01/26/25  0703 01/25/25  0550 01/24/25  0633 01/23/25  0510 01/22/25  0505 01/21/25  1835   SODIUM mmol/L 134* 134* 137 135* 134* 134*   POTASSIUM mmol/L 4.0 4.4 4.2 5.0 3.7 3.6   CHLORIDE mmol/L 98 100 102 100 101 96*   CO2 mmol/L 24.0 21.5* 25.0 22.3 20.9* 25.0   BUN mg/dL  29* 21* 27* 28* 18 19   CREATININE mg/dL 1.20* 0.91 1.04* 1.27* 1.25* 1.25*   GLUCOSE mg/dL 142* 134* 128* 164* 245* 116*   CALCIUM mg/dL 9.1 9.1 9.0 9.6 8.8 9.1   MAGNESIUM mg/dL 1.8 1.7 1.9 2.0 1.8 1.7   PHOSPHORUS mg/dL 3.9 4.0 3.4 3.1 2.7  --    Estimated Creatinine Clearance: 52.8 mL/min (A) (by C-G formula based on SCr of 1.2 mg/dL (H)).    Results from last 7 days   Lab Units 01/26/25  0703 01/25/25  0550 01/24/25  0633 01/23/25  0510 01/22/25  0505 01/21/25  1835   AST (SGOT) U/L 21  --   --   --  20 26   ALT (SGPT) U/L 23  --   --   --  17 19   PROCALCITONIN ng/mL 0.02  --   --  0.02  --  <0.02   LACTATE mmol/L  --   --   --   --   --  1.3   PLATELETS 10*3/mm3 315 295 302 266 227 247       Results from last 7 days   Lab Units 01/27/25  0435 01/25/25  1234 01/21/25  1849   PH, ARTERIAL pH units 7.422 7.416 7.378   PCO2, ARTERIAL mm Hg 39.2 38.4 43.1   PO2 ART mm Hg 57.1* 61.4* 43.6*   HCO3 ART mmol/L 25.6 24.7 25.3       Imaging:  Reviewed chest images personally from past 3 days    ASSESSMENT  /  PLAN:  Acute Viral Bronchitis - rsv  Acute exacerbation of moderate persistent asthma  Acute hypoxemic respiratory failure  LI on home bipap  Right hemidiaphragm elevation - appears more diaphragmatic hernia on imaging  Endometrial cancer  Stage IIIa CKD  CAD  Hypertension/hyperlipidemia  Delirium  Chart reviewed  Stat cxr reviewed  Stat abg reveiwed    Pt with home bipap, wishes to restart will order    Solumedrol - note prednisone had been held however given increased wob   Duonebs  Symbicort with ae asthma    Oxygen needs increased, pt admitted with history of endometrial cancer, immobility, will send for stat ct chest angio as well    Dw rrt and bedside RN  if no improvement with above will need to be moved to icu      Patient seen by [facility] night coverage. Care will be assumed by another physician in the group in the morning. Please call our answering service at [phone] with any concerns.   Patient will be signed out to day to for ongoing evaluation and care.      Total critical care time was 40 minutes, excluding any separately billable procedure time.  Time did not overlap with any other provider.      Electronically signed by Jose Guerrero MD, 01/27/25, 5:36 AM EST.

## 2025-01-27 NOTE — NURSING NOTE
Pt was breathing rapidly and oxygen turned up to 5L NC. Had difficulty keeping SaO2 up. Resp. Therapist called and came immediately. Aerosol given. This RN, asked RT to evaluate and if feels Rapid Response needs called. After also evaluating, RR was called. ABG's obtained, chest x-ray, High flow 12L oxygen. Dr. Guerrero came and evaluated, see orders. Notified RT that patient will be needing placed on Bipap. Patient agrees to BiPap.

## 2025-01-27 NOTE — PROGRESS NOTES
DOS: 2025  NAME: Flaca Hassan   : 1965  PCP: Isidro Hernandez MD  Chief Complaint   Patient presents with    Shortness of Breath       Chief complaint: AMS  Subjective: Family at bedside.  They say today is a better day.  They thinks she is basically back to herself.      Objective:  Vital signs: /93 (BP Location: Right arm)   Pulse 83   Temp 97.9 °F (36.6 °C) (Oral)   Resp (!) 32   LMP  (LMP Unknown)   SpO2 93%      Gen: NAD, vitals reviewed  MS: oriented x3, recent/remote memory intact, normal attention/concentration, language intact, no neglect.  CN: visual acuity grossly normal, PERRL, EOMI, no facial droop, no dysarthria  Motor: no prontor drift, symmetric power throughout  Sensory: intact to light touch all 4 ext.    ROS:  No weakness, numbness  No fevers, chills      Laboratory results:  Lab Results   Component Value Date    GLUCOSE 150 (H) 2025    CALCIUM 9.0 2025     2025    K 4.3 2025    CO2 23.2 2025     2025    BUN 30 (H) 2025    CREATININE 1.07 (H) 2025    EGFRIFAFRI 71 2021    EGFRIFNONA 59 (L) 2021    BCR 28.0 (H) 2025    ANIONGAP 11.8 2025     Lab Results   Component Value Date    WBC 10.93 (H) 2025    HGB 14.9 2025    HCT 47.1 (H) 2025    MCV 86.3 2025     2025     Lab Results   Component Value Date    LDL 57 2024    LDL 78 2023    LDL 83 2021         Lab 25  0505   HEMOGLOBIN A1C 6.40*        Review of labs: Labs on , sodium 134, 0.2, TSH in range, B12 greater than 800, folate 13 lipase 47, Pro-Rom 0.02, WBCs 11,000, hemoglobin 14, platelets 315, blood cultures negative to date, troponin 15    Review and interpretation of imaging:     Workup to date:  Spot EEG normal    Diagnoses:  AMS  Asthma exacerbation  RSV  LI  Endometrial cancer      Impression: 59-year-old female with past medical history of CAD, HTN, endometrial  adenocarcinoma, asthma, LI on nocturnal bipap, on 2L o2 at home and follows with pulmonary, admitted on 1/21 for dyspnea, sats in 70s at [facility] and is also RSV positive.  Neurology has been consulted for altered mental status.  She is required oxygen has been treated with bronchodilators and steroids.  There was report of acute confusional where she was sitting in the chair, head down, belongings on the floor.  She was lethargic on interview and MRI and EEG was ordered.  BP 89/70 on 126 midnight 12 L high flow early this morning.  Apparently had rapid for increased sob and was put on bipap and IV steroids    Her cognition is improving day after day, and she has non focal exam.  EEG is normal.  Do not feel strongly about MRI brain at this point, will hold off for now.  Moreover description c/w encephalopathy which appears to be improving.  She still has issues with dyspnea for which CTA ordered    We will be following along with you for serial exams      Thank you for this consultation.  Discussed above plan with neuro attending, Dr. Ribeiro, family, patient who agrees with above plan.  Neurology team is available for concerns or questions.

## 2025-01-27 NOTE — NURSING NOTE
Pt has family members in room at this time and is much more responsive. Took pills whole in applesauce, swallowing with instruction and request for cereal, which was given. Questions answered for family members.

## 2025-01-27 NOTE — NURSING NOTE
Pt placed on BiPap by RT Turner. Patient admits to feeling better and breathing much easier now that she is on BiPap. Pulse ox currently 92%.

## 2025-01-27 NOTE — THERAPY TREATMENT NOTE
Patient Name: Flaca Hassan  : 1965    MRN: [id number]                              Today's Date: 2025       Admit Date: 2025    Visit Dx:     ICD-10-CM ICD-9-CM   1. RSV bronchitis  J20.5 466.0     079.6   2. Exacerbation of asthma, unspecified asthma severity, unspecified whether persistent  J45.901 493.92   3. Acute hypoxic respiratory failure  J96.01 518.81   4. Acute respiratory failure with hypoxia  J96.01 518.81     Patient Active Problem List   Diagnosis    Hypertension    Obesity    Hyperlipidemia    Iron deficiency anemia due to chronic blood loss    Upper GI bleeding    ST elevation myocardial infarction (STEMI)    Coronary artery disease involving native heart without angina pectoris    Morgagni hernia    IFG (impaired fasting glucose)    Primary insomnia    PMB (postmenopausal bleeding)    Endometrial cancer    Uncomplicated asthma    LI (obstructive sleep apnea)    Dyspepsia    History of peptic ulcer disease    Rectal pain    Abdominal wall abscess    History of ST elevation myocardial infarction (STEMI)    Abdominal wall cellulitis    Prediabetes    Localized edema    Hypoxia    RSV bronchitis    Moderate persistent asthma with exacerbation    Stage 3a chronic kidney disease    Acute respiratory failure with hypoxia     Past Medical History:   Diagnosis Date    Anemia     Asthma     Breast cyst     Cancer     Coronary artery disease     stent    Depression     Diverticulosis     H/O: GI bleed     recurrent    Hematuria     History of coronary angioplasty with insertion of stent     History of transfusion     no reaction    Hyperlipidemia     Hypertension     Incontinence of urine     wears pads    Irritable bowel syndrome     Malignant neoplasm of endometrium 2021    Melena     Obesity     Oxygen dependent     uses oxygen 2 prn when walking if SOB    Uterine cancer     UTI (urinary tract infection)      Past Surgical History:   Procedure Laterality Date    BREAST CYST  EXCISION Left     CARDIAC CATHETERIZATION N/A 11/13/2017    Procedure: Left Heart Cath;  Surgeon: Imer Montaño MD;  Location: [facility] CATH INVASIVE LOCATION;  Service:     CARDIAC CATHETERIZATION N/A 11/13/2017    Procedure: Stent ROBERTO coronary;  Surgeon: Imer Montaño MD;  Location: [facility] CATH INVASIVE LOCATION;  Service:     COLONOSCOPY  02/11/2016    tics, NBIH,     COLONOSCOPY N/A 02/28/2022    Procedure: COLONOSCOPY TO CECUM and TI WITH APC CAUTERY TO RECTAL AVMs;  Surgeon: Ruth Ann Willson MD;  Location: [facility] ENDOSCOPY;  Service: Gastroenterology;  Laterality: N/A;  FAMILY HX COLON CA  --HEMORRHOIDS, BLEEDING RECTAL AVMs     D & C HYSTEROSCOPY ENDOMETRIAL ABLATION N/A 01/08/2021    Procedure: DILATATION AND CURETTAGE HYSTEROSCOPY ENDOMETRIAL  RESECTION;  Surgeon: Thu Blake MD;  [place] [facility] [place] [facility] [place]  Service: Obstetrics/Gynecology;  Laterality: N/A;    ENDOSCOPY N/A 03/03/2017    erythematous mucosa in stomach, duodenal ulcer , mild to moderate chronic active gastritis, positive for h pylori    ENDOSCOPY N/A 11/08/2021    Procedure: ESOPHAGOGASTRODUODENOSCOPY WITH BX'S;  Surgeon: Ruth Ann Willson MD;  Location: [facility] ENDOSCOPY;  Service: Gastroenterology;  Laterality: N/A;  pre: EPIGASTRIC PAIN, HX OF STOMACH ULCER  post: GASTRITIS    HYSTERECTOMY      UPPER GASTROINTESTINAL ENDOSCOPY  02/10/2016    normal-Ruth Ann Willson M.D.      General Information       Row Name 01/27/25 1647          Physical Therapy Time and Intention    Document Type therapy note (daily note)  -EB     Mode of Treatment co-treatment;occupational therapy;physical therapy  -EB       Row Name 01/27/25 1647          General Information    Patient Profile Reviewed yes  -EB     Existing Precautions/Restrictions fall;oxygen therapy device and L/min  4L O2  -EB       Row Name 01/27/25 1647          Cognition    Orientation Status (Cognition) oriented x 3  -EB       Row Name 01/27/25 0214          Safety  Issues/Impairments Affecting Functional Mobility    Impairments Affecting Function (Mobility) balance;endurance/activity tolerance;strength;shortness of breath  -EB     Comment, Safety Issues/Impairments (Mobility) Co treatment medically appropriate and necessary due to patient acuity level, activity tolerance and safety of patient and staff. Treatment is focusing on progression of care and goals established in the POC.  -EB               User Key  (r) = Recorded By, (t) = Taken By, (c) = Cosigned By      Initials Name Provider Type    Linda Price PTA Physical Therapist Assistant                   Mobility       Row Name 01/27/25 1648          Bed Mobility    Supine-Sit Donovan (Bed Mobility) minimum assist (75% patient effort);verbal cues;nonverbal cues (demo/gesture)  -EB     Assistive Device (Bed Mobility) bed rails;head of bed elevated  -EB     Comment, (Bed Mobility) increased time with mobility. on 4L O2 stats 92%  -EB       Row Name 01/27/25 1642          Sit-Stand Transfer    Sit-Stand Donovan (Transfers) minimum assist (75% patient effort);2 person assist;nonverbal cues (demo/gesture);verbal cues  -EB     Assistive Device (Sit-Stand Transfers) walker, front-wheeled  -EB     Comment, (Sit-Stand Transfer) cues for hand placement  -EB       Row Name 01/27/25 1647          Gait/Stairs (Locomotion)    Donovan Level (Gait) minimum assist (75% patient effort)  -EB     Assistive Device (Gait) walker, front-wheeled  -EB     Distance in Feet (Gait) 3  -EB     Deviations/Abnormal Patterns (Gait) gait speed decreased;carrillo decreased;stride length decreased;weight shifting decreased  -EB     Bilateral Gait Deviations forward flexed posture;heel strike decreased  -EB     Comment, (Gait/Stairs) cues for posture. SOA, decreased activity tolerance. sat UIC  -EB               User Key  (r) = Recorded By, (t) = Taken By, (c) = Cosigned By      Initials Name Provider Type    Linda Price PTA  Physical Therapist Assistant                   Obj/Interventions       Row Name 01/27/25 1649          Motor Skills    Therapeutic Exercise --  BLE: LAQS (X10)  -EB               User Key  (r) = Recorded By, (t) = Taken By, (c) = Cosigned By      Initials Name Provider Type    Linda Price PTA Physical Therapist Assistant                   Goals/Plan    No documentation.                  Clinical Impression       Row Name 01/27/25 1650          Pain    Pretreatment Pain Rating 0/10 - no pain  -EB     Posttreatment Pain Rating 0/10 - no pain  -EB       Row Name 01/27/25 1650          Plan of Care Review    Plan of Care Reviewed With patient  -EB     Progress improving  -EB     Outcome Evaluation Pt seen for PT/OT co-tx session today. Pt on 4L of O2 and saturating at 92%.  Pt completed  bed mobility with Sachin and STS with MinAX2. Pt ambulated 3ft, Sachin rwx. Cues for posture. Pt needed increased time processing cues. Pt fatigues quickly and SOA. Pt had a drop in O2 stats during exercises in the chair. Pt required several minutes and increase O2 for saturations to return to 90% after O2 dropped to 69-70%.  Cues for PLB. Pt UIC at the end of tx session. Will continue to follow and progress pt as able.  -EB       Row Name 01/27/25 1650          Therapy Assessment/Plan (PT)    Therapy Frequency (PT) 5 times/wk  -EB       Row Name 01/27/25 1650          Positioning and Restraints    Pre-Treatment Position in bed  -EB     Post Treatment Position chair  -EB     In Chair reclined;call light within reach;encouraged to call for assist;exit alarm on  -EB               User Key  (r) = Recorded By, (t) = Taken By, (c) = Cosigned By      Initials Name Provider Type    Linda Price PTA Physical Therapist Assistant                   Outcome Measures       Row Name 01/27/25 1656 01/27/25 0925       How much help from another person do you currently need...    Turning from your back to your side while in flat bed without using  bedrails? 3  -EB 3  -SM    Moving from lying on back to sitting on the side of a flat bed without bedrails? 3  -EB 3  -SM    Moving to and from a bed to a chair (including a wheelchair)? 3  -EB 3  -SM    Standing up from a chair using your arms (e.g., wheelchair, bedside chair)? 2  -EB 2  -SM    Climbing 3-5 steps with a railing? 1  -EB 2  -SM    To walk in hospital room? 2  -EB 2  -SM    AM-PAC 6 Clicks Score (PT) 14  -EB 15  -SM      Row Name 01/27/25 1542          Functional Assessment    Outcome Measure Options AM-PAC 6 Clicks Daily Activity (OT)  -ES               User Key  (r) = Recorded By, (t) = Taken By, (c) = Cosigned By      Initials Name Provider Type    Shantel Blanco RN Registered Nurse    Linda Price PTA Physical Therapist Assistant    Flory Hatch, OTR/L, CSRS Occupational Therapist                                 Physical Therapy Education       Title: PT OT SLP Therapies (Done)       Topic: Physical Therapy (Done)       Point: Mobility training (Done)       Learning Progress Summary            Patient Acceptance, E,D, VU,NR by  at 1/27/2025 1656    Acceptance, E,D, VU,NR by  at 1/23/2025 1347                      Point: Home exercise program (Done)       Learning Progress Summary            Patient Acceptance, E,D, VU,NR by  at 1/27/2025 1656                      Point: Body mechanics (Resolved)       Learning Progress Summary            Patient Acceptance, E,D, VU,NR by  at 1/23/2025 1347                      Point: Precautions (Resolved)       Learning Progress Summary            Patient Acceptance, E,D, VU,NR by  at 1/23/2025 1347                                      User Key       Initials Effective Dates Name Provider Type Discipline     05/24/22 -  Kristel Cox, PT Physical Therapist PT     02/14/23 -  Linda Yi PTA Physical Therapist Assistant PT                  PT Recommendation and Plan     Progress: improving  Outcome Evaluation: Pt seen for PT/OT  co-tx session today. Pt on 4L of O2 and saturating at 92%.  Pt completed  bed mobility with Sachin and STS with MinAX2. Pt ambulated 3ft, Sachin rwx. Cues for posture. Pt needed increased time processing cues. Pt fatigues quickly and SOA. Pt had a drop in O2 stats during exercises in the chair. Pt required several minutes and increase O2 for saturations to return to 90% after O2 dropped to 69-70%.  Cues for PLB. Pt UIC at the end of tx session. Will continue to follow and progress pt as able.     Time Calculation:         PT Charges       Row Name 01/27/25 1645             Time Calculation    Start Time 1415  -EB      Stop Time 1441  -EB      Time Calculation (min) 26 min  -EB      PT Received On 01/27/25  -EB      PT - Next Appointment 01/28/25  -EB         Time Calculation- PT    Total Timed Code Minutes- PT 26 minute(s)  -EB                User Key  (r) = Recorded By, (t) = Taken By, (c) = Cosigned By      Initials Name Provider Type     Linda Yi PTA Physical Therapist Assistant                  Therapy Charges for Today       Code Description Service Date Service Provider Modifiers Qty    62670518649  PT THERAPEUTIC ACT EA 15 MIN 1/27/2025 Linda Yi PTA GP 1    19142875791 HC PT THER PROC EA 15 MIN 1/27/2025 Linda Yi PTA GP 1            PT G-Codes  Outcome Measure Options: AM-PAC 6 Clicks Daily Activity (OT)  AM-PAC 6 Clicks Score (PT): 14  AM-PAC 6 Clicks Score (OT): 19  Modified Humphreys Scale: 4 - Moderately severe disability.  Unable to walk without assistance, and unable to attend to own bodily needs without assistance.       Linda Yi PTA  1/27/2025

## 2025-01-27 NOTE — PROGRESS NOTES
Name: Flaca Hassan ADMIT: 2025   : 1965  PCP: Isidro Hernandez MD    MRN: [id number] LOS: 5 days   AGE/SEX: 59 y.o. female  ROOM: [facility]     Subjective   Subjective   Rapid response was called overnight due to increased shortness of breath, pulmonology evaluated, initiated on BiPAP and IV steroids.  Patient morning remains on BiPAP, tearful, continues to complain of abdominal pain.  Points to epigastric and lower abdominal region.      Review of Systems   As above  Objective   Objective   Vital Signs  Temp:  [97.9 °F (36.6 °C)-99.3 °F (37.4 °C)] 97.9 °F (36.6 °C)  Heart Rate:  [70-91] 83  Resp:  [16-34] 18  BP: (141-176)/(76-94) 176/93  SpO2:  [86 %-99 %] 90 %  on  Flow (L/min) (Oxygen Therapy):  [2-12] 4;   Device (Oxygen Therapy): nasal cannula  There is no height or weight on file to calculate BMI.  Physical Exam    General: Alert, laying in bed, not in distress  HEENT: Normocephalic, atraumatic  CV: Regular rate and rhythm, no murmurs rubs or gallops  Lungs: Diminished, no wheezing, on BiPAP  Abdomen: Soft, nontender, nondistended  Extremities: No significant peripheral edema , no cyanosis     Results Review     I reviewed the patient's new clinical results.  Results from last 7 days   Lab Units 25  0703 25  0550 25  0633   WBC 10*3/mm3 10.93* 11.67* 9.07 15.68*   HEMOGLOBIN g/dL 14.9 14.6 14.2 13.9   PLATELETS 10*3/mm3 335 315 295 302     Results from last 7 days   Lab Units 25  0703 25  0550 25  0633   SODIUM mmol/L 138 134* 134* 137   POTASSIUM mmol/L 4.3 4.0 4.4 4.2   CHLORIDE mmol/L 103 98 100 102   CO2 mmol/L 23.2 24.0 21.5* 25.0   BUN mg/dL 30* 29* 21* 27*   CREATININE mg/dL 1.07* 1.20* 0.91 1.04*   GLUCOSE mg/dL 150* 142* 134* 128*   Estimated Creatinine Clearance: 59.3 mL/min (A) (by C-G formula based on SCr of 1.07 mg/dL (H)).  Results from last 7 days   Lab Units 25  0703 25  0510 25  0504  01/21/25  1835   ALBUMIN g/dL 3.9 3.4* 3.4* 4.3   BILIRUBIN mg/dL 0.9  --  0.3 0.4   ALK PHOS U/L 89  --  97 115   AST (SGOT) U/L 21  --  20 26   ALT (SGPT) U/L 23  --  17 19     Results from last 7 days   Lab Units 01/27/25  0527 01/26/25  0703 01/25/25  0550 01/24/25  0633 01/23/25  0510 01/22/25  0505 01/21/25  1835 01/21/25  1835   CALCIUM mg/dL 9.0 9.1 9.1 9.0 9.6 8.8  --  9.1   ALBUMIN g/dL  --  3.9  --   --  3.4* 3.4*  --  4.3   MAGNESIUM mg/dL 1.9 1.8 1.7 1.9 2.0 1.8  --  1.7   PHOSPHORUS mg/dL 3.3 3.9 4.0 3.4 3.1 2.7   < >  --     < > = values in this interval not displayed.     Results from last 7 days   Lab Units 01/26/25  0703 01/23/25  0510 01/21/25  1835   PROCALCITONIN ng/mL 0.02 0.02 <0.02   LACTATE mmol/L  --   --  1.3     COVID19   Date Value Ref Range Status   01/21/2025 Not Detected Not Detected - Ref. Range Final   08/12/2022 Detected (C) Not Detected - Ref. Range Final     Glucose   Date/Time Value Ref Range Status   01/27/2025 0435 118 70 - 130 mg/dL Final   01/26/2025 0809 135 (H) 70 - 130 mg/dL Final   01/25/2025 1211 176 (H) 70 - 130 mg/dL Final           CT Angiogram Chest  Narrative: CT ANGIOGRAM CHEST-     INDICATION: Shortness of air, worsening heart failure     COMPARISON: CT chest January 22, 2025     TECHNIQUE:  CTA of the chest with IV contrast. Coronal and sagittal reformats. Three  dimensional reconstructions. Radiation dose reduction techniques were  utilized, including automated exposure control and exposure modulation  based on body size.     FINDINGS:      Pulmonary arteries: Streak artifact from contrast in the venous system.  Motion artifact. Poor evaluation of segmental and subsegmental pulmonary  arteries in the left lower lobe. No definitive pulmonary embolism.     Chest wall: No lymphadenopathy.     Thyroid: Hypoattenuating left thyroid lobe nodule, series 4, axial mage  1, measures 1.4 cm, unchanged.     Mediastinum: Coronary artery atherosclerotic calcifications. Heart  is  normal in size. No pericardial effusion. No mediastinal or hilar  lymphadenopathy.     Lungs/pleura: No effusions. Central and peripheral airway occlusions  seen in the right middle lobe and right lower lobe. Subsegmental airway  occlusions seen in the basilar left lower lobe. Right middle lobe  collapse. Subtotal right lower lobe collapse, with the superior segment  right lower lobe appearing aerated and the basilar segments collapsed.  Small amount of subsegmental atelectasis suspected in the lingula.  Partial atelectasis in the basilar left lower lobe.     Upper abdomen: Large transverse colon and fat-containing  Morgagni/diaphragmatic hernia extends into the anterior mediastinum and  right thorax, with some mass effect on the adjacent heart, with the  hernia neck measuring approximately 6.5 cm, series 6, coronal image 42  and series 7, sagittal image 112.     Osseous structures: Right glenohumeral osteoarthritis.     Impression:    1. No definitive pulmonary embolism. Poor evaluation of left lower lobe  segmental and subsegmental pulmonary arteries secondary to streak  artifact from contrast in the venous system and respiratory motion  artifact. If symptoms persist, follow-up CTA to reevaluate.  2. Large transverse colon and fat-containing Morgagni/diaphragmatic  hernia, with transverse colon and fat extend into the anterior  mediastinum and right hemithorax, with associated central and peripheral  airway occlusions seen in the right middle lobe and right lower lobe  with right middle lobe collapse and subtotal right lower lobe collapse.  3. Suspect partial atelectasis with subsegmental airway occlusions in  the basilar left lower lobe.     This report was finalized on 1/27/2025 11:50 AM by Dr. Nikhil Hernandez M.D on Workstation: [id number]     XR Chest 1 View  Narrative: Patient: RICK GONZALEZ  Time Out: 05:38  Exam(s): XR CXR 1 VIEW     EXAM:    XR Chest, 1 View    CLINICAL HISTORY:     Reason for  exam: increased respiratory rate.    TECHNIQUE:    Frontal view of the chest.    COMPARISON:    No relevant prior studies available.    FINDINGS:    Lungs:  Hypoinflation, consider interstitial edema or atypical   infection.    Pleural space:  Unremarkable.    Heart:  Obscured cardiac silhouette.    Bones joints:  No acute displaced fracture.    Upper abdomen:  Elevated right diaphragm.    IMPRESSION:       1.  Hypoinflation, consider interstitial edema or atypical infection.  2.  Recommend repeat imaging in 3 months.  Impression: Electronically signed by Germán Moreno MD on 01-27-25 at 0538    Scheduled Medications  amLODIPine, 10 mg, Oral, Daily  atorvastatin, 10 mg, Oral, Daily  bisoprolol, 5 mg, Oral, Daily  budesonide-formoterol, 2 puff, Inhalation, BID - RT  clopidogrel, 75 mg, Oral, Daily  fluticasone, 2 spray, Nasal, Daily  [Held by provider] furosemide, 40 mg, Oral, Daily  guaiFENesin, 600 mg, Oral, Q12H  heparin (porcine), 5,000 Units, Subcutaneous, Q8H  ipratropium-albuterol, 3 mL, Nebulization, 4x Daily - RT  losartan, 50 mg, Oral, Q24H  methylPREDNISolone sodium succinate, 40 mg, Intravenous, Q12H  montelukast, 10 mg, Oral, Daily  pantoprazole, 40 mg, Intravenous, BID AC  potassium chloride, 10 mEq, Oral, Daily  [Held by provider] predniSONE, 40 mg, Oral, Daily With Breakfast  senna-docusate sodium, 2 tablet, Oral, BID  sodium chloride, 10 mL, Intravenous, Q12H    Infusions  sodium chloride, 75 mL/hr, Last Rate: 75 mL/hr (01/27/25 1214)    Diet  NPO Diet NPO Type: Sips with Meds    I have personally reviewed     [x]  Laboratory   []  Microbiology   []  Radiology   []  EKG/Telemetry  []  Cardiology/Vascular   []  Pathology    []  Records       Assessment/Plan     Active Hospital Problems    Diagnosis  POA    **RSV bronchitis [J20.5]  Yes    Moderate persistent asthma with exacerbation [J45.41]  Yes    Stage 3a chronic kidney disease [N18.31]  Yes    Acute respiratory failure with hypoxia [J96.01]  Yes     LI (obstructive sleep apnea) [G47.33]  Yes    Endometrial cancer [C54.1]  Yes    IFG (impaired fasting glucose) [R73.01]  Yes    Coronary artery disease involving native heart without angina pectoris [I25.10]  Yes    Hypertension [I10]  Yes    Hyperlipidemia [E78.5]  Yes      Resolved Hospital Problems   No resolved problems to display.       Patient is a 59 h/o f asthma , CAD, HTN, HLD, CKD stage IIIa, endometrial cancer ,  presented  the hospital with shortness of breath and cough.      AMS  Encephalopathy.   -Team D was called 01/25, stat ABG was obtained with normal pH, pCO2 of 48.  Stat chest x-ray with no acute findings  -urinalysis with no signs of infection, chest x-ray stable  -Routine EEG with no seizure activity  -Neurology consulted, MRI ordered however patient unable to tolerate  -Holding Risperdal as well  -TSH normal, ammonia normal.  B12 normal.  -Encephalopathy due to hospital delirium.  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  -General Surgery recommended outpatient follow-up and discussion of elective laparoscopic/robotic Morgagni hernia repair in the future.     Acute exacerbation of moderate persistent asthma secondary to RSV infection  Acute hypoxemic respiratory failure  -Respiratory viral panel due to 1/21/2025 was positive for RSV  -CT scan without contrast 01/22/2025 showed mild patchy areas of atelectasis or pneumonia in the bilateral lower lobes.  -Scheduled DuoNebs  -Supportive care  -CTA 12/27/2025 with no obvious PE  -Pulmonology following, reinitiated on IV steroids and BiPAP      Enlarged mediastinal nodes  CT scan showed A few small to slightly enlarged mediastinal nodes are  seen.Short-term follow-up CT of the chest without contrast in approximately 3 months suggested per radiology.  Suspect enlarged lymph node secondary to RSV infection/Pneumonia ,  defer outpatient evaluation to pulmonology.      History of CAD  Hypertension  -Continue statin, Plavix, amlodipine, , bisoprolol, losartan        CKD stage IIIa  -Creatinine stable around baseline.    -Monitor daily BMP.  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  SCDs  Full code.  Discussed with patient.  Discussed in multidisciplinary rounds  Disposition: Home with home health timing to be determined   Expected Discharge Date: 1/29/2025; Expected Discharge Time:        Copied text in this note has been reviewed and is accurate as of 01/27/25.         Dictated utilizing Dragon dictation        Jen Blanco MD  [facility]  01/27/25  14:50 EST

## 2025-01-27 NOTE — THERAPY TREATMENT NOTE
Patient Name: Flaca Hassan  : 1965    MRN: [id number]                              Today's Date: 2025       Admit Date: 2025    Visit Dx:     ICD-10-CM ICD-9-CM   1. RSV bronchitis  J20.5 466.0     079.6   2. Exacerbation of asthma, unspecified asthma severity, unspecified whether persistent  J45.901 493.92   3. Acute hypoxic respiratory failure  J96.01 518.81   4. Acute respiratory failure with hypoxia  J96.01 518.81     Patient Active Problem List   Diagnosis    Hypertension    Obesity    Hyperlipidemia    Iron deficiency anemia due to chronic blood loss    Upper GI bleeding    ST elevation myocardial infarction (STEMI)    Coronary artery disease involving native heart without angina pectoris    Morgagni hernia    IFG (impaired fasting glucose)    Primary insomnia    PMB (postmenopausal bleeding)    Endometrial cancer    Uncomplicated asthma    LI (obstructive sleep apnea)    Dyspepsia    History of peptic ulcer disease    Rectal pain    Abdominal wall abscess    History of ST elevation myocardial infarction (STEMI)    Abdominal wall cellulitis    Prediabetes    Localized edema    Hypoxia    RSV bronchitis    Moderate persistent asthma with exacerbation    Stage 3a chronic kidney disease    Acute respiratory failure with hypoxia     Past Medical History:   Diagnosis Date    Anemia     Asthma     Breast cyst     Cancer     Coronary artery disease     stent    Depression     Diverticulosis     H/O: GI bleed     recurrent    Hematuria     History of coronary angioplasty with insertion of stent     History of transfusion     no reaction    Hyperlipidemia     Hypertension     Incontinence of urine     wears pads    Irritable bowel syndrome     Malignant neoplasm of endometrium 2021    Melena     Obesity     Oxygen dependent     uses oxygen 2 prn when walking if SOB    Uterine cancer     UTI (urinary tract infection)      Past Surgical History:   Procedure Laterality Date    BREAST CYST  EXCISION Left     CARDIAC CATHETERIZATION N/A 11/13/2017    Procedure: Left Heart Cath;  Surgeon: Imer Montaño MD;  Location: [facility] CATH INVASIVE LOCATION;  Service:     CARDIAC CATHETERIZATION N/A 11/13/2017    Procedure: Stent ROBERTO coronary;  Surgeon: Imer Montaño MD;  Location: [facility] CATH INVASIVE LOCATION;  Service:     COLONOSCOPY  02/11/2016    tics, NBIH,     COLONOSCOPY N/A 02/28/2022    Procedure: COLONOSCOPY TO CECUM and TI WITH APC CAUTERY TO RECTAL AVMs;  Surgeon: Ruth Ann Willson MD;  Location: [facility] ENDOSCOPY;  Service: Gastroenterology;  Laterality: N/A;  FAMILY HX COLON CA  --HEMORRHOIDS, BLEEDING RECTAL AVMs     D & C HYSTEROSCOPY ENDOMETRIAL ABLATION N/A 01/08/2021    Procedure: DILATATION AND CURETTAGE HYSTEROSCOPY ENDOMETRIAL  RESECTION;  Surgeon: Thu Blake MD;  [place] [facility] [place] [facility] [place]  Service: Obstetrics/Gynecology;  Laterality: N/A;    ENDOSCOPY N/A 03/03/2017    erythematous mucosa in stomach, duodenal ulcer , mild to moderate chronic active gastritis, positive for h pylori    ENDOSCOPY N/A 11/08/2021    Procedure: ESOPHAGOGASTRODUODENOSCOPY WITH BX'S;  Surgeon: Ruth Ann Willson MD;  Location: [facility] ENDOSCOPY;  Service: Gastroenterology;  Laterality: N/A;  pre: EPIGASTRIC PAIN, HX OF STOMACH ULCER  post: GASTRITIS    HYSTERECTOMY      UPPER GASTROINTESTINAL ENDOSCOPY  02/10/2016    normal-Ruth Ann Willson M.D.      General Information       Row Name 01/27/25 1537          OT Time and Intention    Subjective Information complains of;dyspnea  -ES     Document Type therapy note (daily note)  -ES     Mode of Treatment co-treatment;physical therapy;occupational therapy  Patient presents with deficits impacting both mobility and functional independence in ADLs. The patient has multifaceted rehabilitation needs that require the expertise and skilled hands of both physical and occupational therapy.  -ES     Patient Effort adequate  -ES       Row Name 01/27/25 8730           General Information    Existing Precautions/Restrictions fall;oxygen therapy device and L/min  4L o2  -ES     Barriers to Rehab language barrier  -ES       Row Name 01/27/25 1531          Cognition    Orientation Status (Cognition) oriented x 3  patient cooperative, agreeable to therapy participation. Patient is very anxious throughout session, difficult to redirect at times.  -ES       Row Name 01/27/25 1531          Safety Issues/Impairments Affecting Functional Mobility    Impairments Affecting Function (Mobility) balance;endurance/activity tolerance;strength;shortness of breath  -ES               User Key  (r) = Recorded By, (t) = Taken By, (c) = Cosigned By      Initials Name Provider Type    ES Flory Hammond, OTR/L, CSRS Occupational Therapist                     Mobility/ADL's       Row Name 01/27/25 1535          Bed Mobility    Bed Mobility supine-sit  -ES     Supine-Sit Leake (Bed Mobility) minimum assist (75% patient effort);verbal cues;1 person assist  -ES     Comment, (Bed Mobility) incrased time with bed mobility. SOA noted seated EOB, o2 saturating 92% on 4L o2.  -ES       Row Name 01/27/25 1535          Transfers    Transfers sit-stand transfer;stand-sit transfer  -ES       Row Name 01/27/25 1535          Sit-Stand Transfer    Sit-Stand Leake (Transfers) minimum assist (75% patient effort);2 person assist  -ES     Assistive Device (Sit-Stand Transfers) walker, front-wheeled  -ES       Row Name 01/27/25 1535          Stand-Sit Transfer    Stand-Sit Leake (Transfers) minimum assist (75% patient effort);2 person assist  -ES     Assistive Device (Stand-Sit Transfers) walker, front-wheeled  -ES     Comment, (Stand-Sit Transfer) cues for hand placement for controlled descent  -ES       Row Name 01/27/25 153          Functional Mobility    Functional Mobility- Ind. Level contact guard assist;2 person assist required  -ES     Functional Mobility- Device walker, front-wheeled  -ES      Functional Mobility- Comment patient takes slow steps from edge of bed to bed side recliner, CGA x2.  -ES               User Key  (r) = Recorded By, (t) = Taken By, (c) = Cosigned By      Initials Name Provider Type    Flory Hatch OTR/L, BREANNA Occupational Therapist                   Obj/Interventions       Row Name 01/27/25 1537          Shoulder (Therapeutic Exercise)    Shoulder (Therapeutic Exercise) AROM (active range of motion)  -ES     Shoulder AROM (Therapeutic Exercise) bilateral;flexion;horizontal aBduction/aDduction;10 repetitions  -ES       Row Name 01/27/25 1537          Motor Skills    Functional Endurance fair minus/poor. Patient quickly fatigues, demonstrates SOA. O2 dropped to 70 with transfer, requires extended time and increase in O2 to return to 90%. Max cueing provided for PLBs  -ES     Therapeutic Exercise shoulder  -ES       Row Name 01/27/25 1537          Balance    Balance Assessment sitting dynamic balance;standing dynamic balance  -ES     Dynamic Sitting Balance standby assist  -ES     Position, Sitting Balance unsupported;sitting edge of bed  -ES     Dynamic Standing Balance contact guard;2-person assist  -ES     Position/Device Used, Standing Balance supported;walker, front-wheeled  -ES               User Key  (r) = Recorded By, (t) = Taken By, (c) = Cosigned By      Initials Name Provider Type    Flory Hatch OTR/L, BREANNA Occupational Therapist                   Goals/Plan    No documentation.                  Clinical Impression       Row Name 01/27/25 1537          Pain Assessment    Pre/Posttreatment Pain Comment patient with no complaints of pain  -ES       Row Name 01/27/25 1537          Plan of Care Review    Plan of Care Reviewed With patient  -ES     Progress improving  -ES     Outcome Evaluation Patient with good participation in OT/PT treatment this afternoon. Patient completes bed mobility with min A, SBA seated edge of bed. Patient O2 saturating 92% seated EOB.  Patient completes STS transfer, min A x2 and performs functional mobility to bed side chair, CGA x2. Patient completes UE AROM seated in chair, O2 quickly desatting with exercise. O2 drops to 69%, requires several mins and O2 increased for saturations to return to 90%. Patient provided education and cueing on PLB techniques, difficulty understanding use. Patient continues to demonstrate deficits related to strength, tolerance, balance, transfers and mobility. Will benefit from continued skilled OT services.  -ES       Row Name 01/27/25 2259          Positioning and Restraints    Pre-Treatment Position in bed  -ES     Post Treatment Position chair  -ES     In Chair reclined;call light within reach;encouraged to call for assist;exit alarm on;notified nsg  patient left with O2 saturation at 93%. RN notified of desaturations during therapy session.  -ES               User Key  (r) = Recorded By, (t) = Taken By, (c) = Cosigned By      Initials Name Provider Type    Flory Hatch, OTR/L, CSRS Occupational Therapist                   Outcome Measures       Row Name 01/27/25 1542          How much help from another is currently needed...    Putting on and taking off regular lower body clothing? 3  -ES     Bathing (including washing, rinsing, and drying) 3  -ES     Toileting (which includes using toilet bed pan or urinal) 3  -ES     Putting on and taking off regular upper body clothing 3  -ES     Taking care of personal grooming (such as brushing teeth) 3  -ES     Eating meals 4  -ES     AM-PAC 6 Clicks Score (OT) 19  -ES       Row Name 01/27/25 0795          How much help from another person do you currently need...    Turning from your back to your side while in flat bed without using bedrails? 3  -SM     Moving from lying on back to sitting on the side of a flat bed without bedrails? 3  -SM     Moving to and from a bed to a chair (including a wheelchair)? 3  -SM     Standing up from a chair using your arms (e.g.,  wheelchair, bedside chair)? 2  -SM     Climbing 3-5 steps with a railing? 2  -SM     To walk in hospital room? 2  -SM     AM-PAC 6 Clicks Score (PT) 15  -SM       Row Name 01/27/25 1542          Functional Assessment    Outcome Measure Options AM-PAC 6 Clicks Daily Activity (OT)  -ES               User Key  (r) = Recorded By, (t) = Taken By, (c) = Cosigned By      Initials Name Provider Type    Shantel Blanco RN Registered Nurse    Flory Hatch, OTR/L, CSRS Occupational Therapist                    Occupational Therapy Education       Title: PT OT SLP Therapies (Resolved)       Topic: Occupational Therapy (Resolved)       Point: ADL training (Resolved)       Description:   Instruct learner(s) on proper safety adaptation and remediation techniques during self care or transfers.   Instruct in proper use of assistive devices.                  Learner Progress:  Not documented in this visit.              Point: Home exercise program (Resolved)       Description:   Instruct learner(s) on appropriate technique for monitoring, assisting and/or progressing therapeutic exercises/activities.                  Learner Progress:  Not documented in this visit.              Point: Precautions (Resolved)       Description:   Instruct learner(s) on prescribed precautions during self-care and functional transfers.                  Learner Progress:  Not documented in this visit.              Point: Body mechanics (Resolved)       Description:   Instruct learner(s) on proper positioning and spine alignment during self-care, functional mobility activities and/or exercises.                  Learner Progress:  Not documented in this visit.                                  OT Recommendation and Plan     Plan of Care Review  Plan of Care Reviewed With: patient  Progress: improving  Outcome Evaluation: Patient with good participation in OT/PT treatment this afternoon. Patient completes bed mobility with min A, SBA seated edge  of bed. Patient O2 saturating 92% seated EOB. Patient completes STS transfer, min A x2 and performs functional mobility to bed side chair, CGA x2. Patient completes UE AROM seated in chair, O2 quickly desatting with exercise. O2 drops to 69%, requires several mins and O2 increased for saturations to return to 90%. Patient provided education and cueing on PLB techniques, difficulty understanding use. Patient continues to demonstrate deficits related to strength, tolerance, balance, transfers and mobility. Will benefit from continued skilled OT services.     Time Calculation:         Time Calculation- OT       Row Name 01/27/25 1543             Time Calculation- OT    OT Start Time 1415  -ES      OT Stop Time 1442  -ES      OT Time Calculation (min) 27 min  -ES      Total Timed Code Minutes- OT 27 minute(s)  -ES      OT Received On 01/27/25  -ES      OT - Next Appointment 01/28/25  -ES         Timed Charges    64539 - OT Therapeutic Exercise Minutes 10  -ES      84857 - OT Therapeutic Activity Minutes 17  -ES         Total Minutes    Timed Charges Total Minutes 27  -ES       Total Minutes 27  -ES                User Key  (r) = Recorded By, (t) = Taken By, (c) = Cosigned By      Initials Name Provider Type    ES Flory Hammond OTR/L, CSRS Occupational Therapist                  Therapy Charges for Today       Code Description Service Date Service Provider Modifiers Qty    62511084794  OT THER PROC EA 15 MIN 1/27/2025 Flory Hammond OTR/L, CSRS GO 1    [id number]  OT THERAPEUTIC ACT EA 15 MIN 1/27/2025 Flory Hammond OTR/L, CSRS GO 1                 MARLA Ralph/L, CSRS  1/27/2025   Abdominal Pain, N/V/D

## 2025-01-27 NOTE — PLAN OF CARE
Goal Outcome Evaluation:  Plan of Care Reviewed With: patient        Progress: improving  Outcome Evaluation: Patient with good participation in OT/PT treatment this afternoon. Patient completes bed mobility with min A, SBA seated edge of bed. Patient O2 saturating 92% seated EOB. Patient completes STS transfer, min A x2 and performs functional mobility to bed side chair, CGA x2. Patient completes UE AROM seated in chair, O2 quickly desatting with exercise. O2 drops to 69%, requires several mins and O2 increased for saturations to return to 90%. Patient provided education and cueing on PLB techniques, difficulty understanding use. Patient continues to demonstrate deficits related to strength, tolerance, balance, transfers and mobility. Will benefit from continued skilled OT services.

## 2025-01-27 NOTE — CONSULTS
General Surgery Consultation    Consulting Physician: Irlanda Baig MD  Referring Physician: Jen Blanco MD    Reason for consultation: Morgagni hernia containing transverse colon    CC: Shortness of breath    HPI:   The patient is a very pleasant 59 y.o. female that presented to the hospital 6 days ago with acute onset dyspnea and was diagnosed with RSV bronchitis.  She has had multiple chest x-ray performed which show elevation of the right hemidiaphragm.  She then underwent a CTA of the chest today which demonstrated the elevation of her right hemidiaphragm is actually due to a chronic more gagging hernia of the anterior diaphragm herniating into the right chest containing omental fat and a portion of transverse colon.  She speaks very little English but her brother-in-law at bedside translates for her.  He reports she has known about this diaphragm hernia for about 15 years and has met with a surgeon who recommended weight loss prior to any hernia repair.  Although the Morgagni hernia contains a portion of transverse colon, there is no evidence for bowel obstruction on CT.  She has been tolerating a regular diet and having regular bowel function.    Past Medical History:  History of endometrial cancer  Depression  Asthma  Coronary artery disease  Hypertension  Hyperlipidemia    Past Surgical History:  Multiple EGDs (last done 2021 by Dr. Willson)  Multiple colonoscopies (last done 2022 by Dr. Willson)  Hysteroscopy with endometrial ablation (2021, Dr. Blake)  Hysterectomy  Cardiac catheterization with stent  Left breast cyst excision    Medications:  Medications Prior to Admission   Medication Sig Dispense Refill Last Dose/Taking    amLODIPine (NORVASC) 10 MG tablet Take 1 tablet by mouth Daily. 90 tablet 4 1/21/2025    bisoprolol-hydrochlorothiazide (ZIAC) 5-6.25 MG per tablet Take 1 tablet by mouth Daily. 90 tablet 4 1/21/2025    clopidogrel (PLAVIX) 75 MG tablet TAKE 1 TABLET BY MOUTH DAILY 90 tablet  3 1/21/2025    escitalopram (LEXAPRO) 20 MG tablet Take 1 tablet by mouth Every Night.   1/21/2025    fluticasone (FLONASE) 50 MCG/ACT nasal spray Administer 2 sprays into the nostril(s) as directed by provider Daily.   1/21/2025    furosemide (LASIX) 40 MG tablet Take 1 tablet by mouth Daily. 90 tablet 3 1/21/2025    montelukast (SINGULAIR) 10 MG tablet Take 1 tablet by mouth Daily. 90 tablet 3 1/21/2025    olmesartan (BENICAR) 40 MG tablet Take 1 tablet by mouth Daily. 90 tablet 4 1/21/2025    pantoprazole (PROTONIX) 40 MG EC tablet TAKE 1 TABLET BY MOUTH DAILY 90 tablet 1 1/21/2025    potassium chloride (MICRO-K) 10 MEQ CR capsule TAKE 1 CAPSULE BY MOUTH DAILY 90 capsule 1 1/21/2025    Proctozone-HC 2.5 % rectal cream Insert 1 application into the rectum As Needed (28). 28 g 3 1/21/2025    risperiDONE (risperDAL) 0.5 MG tablet Take 1 tablet by mouth Daily.   1/21/2025    simvastatin (ZOCOR) 20 MG tablet Take 1 tablet by mouth Every Evening. 90 tablet 4 1/21/2025    Trelegy Ellipta 200-62.5-25 MCG/ACT inhaler Inhale 1 puff Daily.   1/21/2025    VENTOLIN  (90 Base) MCG/ACT inhaler Inhale 2 puffs Every 4 (Four) Hours As Needed for Wheezing or Shortness of Air.  6 1/21/2025    acetaminophen (TYLENOL) 500 MG tablet Take 2 tablets by mouth Every 6 (Six) Hours As Needed for Mild Pain.       Cholecalciferol (VITAMIN D3) 2000 units tablet Take 1 tablet by mouth Daily.  1     Folic Acid 20 MG capsule Take 1 capsule by mouth.       nitroglycerin (NITROSTAT) 0.4 MG SL tablet 1 under the tongue as needed for angina, may repeat q5mins for up three doses (Patient taking differently: Place 1 tablet under the tongue Every 5 (Five) Minutes As Needed for Chest Pain. 1 under the tongue as needed for angina, may repeat q5mins for up three doses) 25 tablet 3        Allergies: Peanuts (itching)    Social History: , non-smoker, no regular alcohol use    Family History: Brother with history of lung cancer, no family  history of colorectal cancer    Review of Systems:  Constitutional: denies any weight changes, fatigue, or weakness  Eyes: denies blurred/double vision or scleral icterus  Cardiovascular: denies chest pain, palpitations, or edemas  Respiratory: Positive for shortness of breath/dyspnea; denies hemoptysis  Gastrointestinal: denies nausea, vomiting, abdominal pain, melena, hematochezia, diarrhea, or constipation  Genitourinary: denies dysuria or hematuria  Endocrine: denies cold intolerance, lethargy, or flushing  Hematologic: denies excessive bruising or bleeding  Musculoskeletal: denies weakness, joint swelling, joint pain, or stiffness  Neurologic: denies seizures, CVA, paresthesia, or peripheral neuropathy  Skin: denies change in nevi, rashes, masses, or jaundice     All other systems reviewed and were negative.    Physical Exam:   Vitals:    01/27/25 1251   BP: 176/93   Pulse: 83   Resp: 18   Temp: 97.9   SpO2: 90%   Height: 157 cm  Weight: 90 kg  BMI: 36.5  GENERAL: awake and alert, no acute distress, oriented to person, place, and time  HEENT: normocephalic, atraumatic, no scleral icterus, moist mucous membranes  NECK: Supple, there is no thyromegaly or lymphadenopathy  RESPIRATORY: Clear to auscultation, nonlabored breathing on 4 L nasal cannula  CARDIOVASCULAR: regular rate and rhythm    GASTROINTESTINAL: Soft, nontender, nondistended  MUSCULOSKELETAL: no cyanosis, clubbing, or edema   NEUROLOGIC: alert and oriented, normal speech, cranial nerves 2-12 grossly intact, no focal deficits   SKIN: Moist, warm, no rashes, no jaundice      Diagnostic workup:   Pertinent labs:   Results from last 7 days   Lab Units 01/27/25  0527 01/26/25  0703 01/25/25  0550 01/24/25  0633 01/23/25  0510 01/22/25  0505 01/21/25  1835   WBC 10*3/mm3 10.93* 11.67* 9.07 15.68* 14.82* 3.48 6.98   HEMOGLOBIN g/dL 14.9 14.6 14.2 13.9 13.6 13.2 14.3   HEMATOCRIT % 47.1* 45.1 45.3 43.1 42.9 42.5 44.4   PLATELETS 10*3/mm3 335 315 295 302 266  227 247     Results from last 7 days   Lab Units 01/27/25  0527 01/26/25  0703 01/25/25  0550 01/23/25  0510 01/22/25  0505 01/21/25  1835   SODIUM mmol/L 138 134* 134*   < > 134* 134*   POTASSIUM mmol/L 4.3 4.0 4.4   < > 3.7 3.6   CHLORIDE mmol/L 103 98 100   < > 101 96*   CO2 mmol/L 23.2 24.0 21.5*   < > 20.9* 25.0   BUN mg/dL 30* 29* 21*   < > 18 19   CREATININE mg/dL 1.07* 1.20* 0.91   < > 1.25* 1.25*   CALCIUM mg/dL 9.0 9.1 9.1   < > 8.8 9.1   BILIRUBIN mg/dL  --  0.9  --   --  0.3 0.4   ALK PHOS U/L  --  89  --   --  97 115   ALT (SGPT) U/L  --  23  --   --  17 19   AST (SGOT) U/L  --  21  --   --  20 26   GLUCOSE mg/dL 150* 142* 134*   < > 245* 116*    < > = values in this interval not displayed.   Respiratory viral panel (1/21/2025): Positive for RSV    IMAGING:  CTA CHEST  IMPRESSION:  1. No definitive pulmonary embolism. Poor evaluation of left lower lobe segmental and subsegmental pulmonary arteries secondary to streak artifact from contrast in the venous system and respiratory motion artifact. If symptoms persist, follow-up CTA to reevaluate.  2. Large transverse colon and fat-containing Morgagni/diaphragmatic hernia, with transverse colon and fat extend into the anterior mediastinum and right hemithorax, with associated central and peripheral airway occlusions seen in the right middle lobe and right lower lobe with right middle lobe collapse and subtotal right lower lobe collapse.  3. Suspect partial atelectasis with subsegmental airway occlusions in the basilar left lower lobe.    Assessment and plan:     The patient is a 59 y.o. 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  This is not obstructive in any way.  She has known about this hernia for over 15 years and has been taking steps to  lose weight to make a surgical approach more successful.  She can follow-up with me as an outpatient to discuss potential elective laparoscopic/robotic Morgagni hernia repair in the future.  She will not require any urgent surgical intervention while here, and moreover any operative intervention while she is struggling with RSV bronchitis would further complicate her respiratory issues.  She can have a regular diet as tolerated and I will sign off.  Please call back with questions or concerns.    Irlanda Baig MD  General, Robotic, and Endoscopic Surgery  [facility]    [address], [address]  [place] [address]  P: [phone]  F: [phone]

## 2025-01-27 NOTE — PLAN OF CARE
Goal Outcome Evaluation:  Plan of Care Reviewed With: patient        Progress: improving  Outcome Evaluation: Pt seen for PT/OT co-tx session today. Pt on 4L of O2 and saturating at 92%.  Pt completed  bed mobility with Sachin and STS with MinAX2. Pt ambulated 3ft, Sachin rwx. Cues for posture. Pt needed increased time processing cues. Pt fatigues quickly and SOA. Pt had a drop in O2 stats during exercises in the chair. Pt required several minutes and increase O2 for saturations to return to 90% after O2 dropped to 69-70%.  Cues for PLB. Pt UIC at the end of tx session. Will continue to follow and progress pt as able.

## 2025-01-27 NOTE — CODE DOCUMENTATION
Patient Name:  Flaca Hassan  YOB: 1965  MRN:  [id number]  Admit Date:  1/21/2025    Visit Diagnoses:     ICD-10-CM ICD-9-CM   1. RSV bronchitis  J20.5 466.0     079.6   2. Exacerbation of asthma, unspecified asthma severity, unspecified whether persistent  J45.901 493.92   3. Acute hypoxic respiratory failure  J96.01 518.81   4. Acute respiratory failure with hypoxia  J96.01 518.81       Reason For Rapid:   SOB  RN Communicated With:  Rapid team, primary RN, patient, Dr INDERJIT mclean    Rapid Outcome:  Remain on unit  Communication From Rapid Team:   Dr INDERJIT Mclean came to bedside and assessed pt-see orders, pt nods that she feels better-currently on 6L HF, VSS, call with anymore questions/concerns    Most Recent Vital Signs  Temp:  [97.9 °F (36.6 °C)-99.3 °F (37.4 °C)] 97.9 °F (36.6 °C)  Heart Rate:  [72-95] 72  Resp:  [16-34] 32  BP: (141-174)/(76-94) 143/82  SpO2:  [92 %-99 %] 96 %  on  Flow (L/min) (Oxygen Therapy):  [2-12] 12;   Device (Oxygen Therapy): high-flow nasal cannula    Labs:  Results from last 7 days   Lab Units 01/21/25  1835   COVID19  Not Detected     Glucose   Date/Time Value Ref Range Status   01/27/2025 0435 118 70 - 130 mg/dL Final   01/26/2025 0809 135 (H) 70 - 130 mg/dL Final   01/25/2025 1211 176 (H) 70 - 130 mg/dL Final     Site   Date Value Ref Range Status   01/27/2025 Left Radial  Final     Blayne's Test   Date Value Ref Range Status   01/27/2025 Positive  Final     pH, Arterial   Date Value Ref Range Status   01/27/2025 7.422 7.350 - 7.450 pH units Final     pCO2, Arterial   Date Value Ref Range Status   01/27/2025 39.2 35.0 - 45.0 mm Hg Final     pO2, Arterial   Date Value Ref Range Status   01/27/2025 57.1 (L) 80.0 - 100.0 mm Hg Final     HCO3, Arterial   Date Value Ref Range Status   01/27/2025 25.6 22.0 - 28.0 mmol/L Final     Base Excess, Arterial   Date Value Ref Range Status   01/27/2025 1.1 0.0 - 2.0 mmol/L Final     Comment:     Serial Number: [id number]:  [id number]      O2 Saturation, Arterial   Date Value Ref Range Status   01/27/2025 89.9 (L) 92.0 - 98.5 % Final     CO2 Content   Date Value Ref Range Status   01/27/2025 26.8 23 - 27 mmol/L Final     Barometric Pressure for Blood Gas   Date Value Ref Range Status   01/27/2025 752.3000 mmHg Final     Modality   Date Value Ref Range Status   01/27/2025 Cannula  Final     Results from last 7 days   Lab Units 01/26/25  0703 01/25/25  0550 01/24/25  0633 01/23/25  0510   WBC 10*3/mm3 11.67* 9.07 15.68* 14.82*   HEMOGLOBIN g/dL 14.6 14.2 13.9 13.6   PLATELETS 10*3/mm3 315 295 302 266     Results from last 7 days   Lab Units 01/26/25  0703 01/25/25  0550 01/24/25  0633 01/23/25  0510 01/22/25  0505 01/21/25  1835   SODIUM mmol/L 134* 134* 137 135* 134* 134*   POTASSIUM mmol/L 4.0 4.4 4.2 5.0 3.7 3.6   CHLORIDE mmol/L 98 100 102 100 101 96*   CO2 mmol/L 24.0 21.5* 25.0 22.3 20.9* 25.0   BUN mg/dL 29* 21* 27* 28* 18 19   CREATININE mg/dL 1.20* 0.91 1.04* 1.27* 1.25* 1.25*   GLUCOSE mg/dL 142* 134* 128* 164* 245* 116*   ALBUMIN g/dL 3.9  --   --  3.4* 3.4* 4.3   BILIRUBIN mg/dL 0.9  --   --   --  0.3 0.4   ALK PHOS U/L 89  --   --   --  97 115   AST (SGOT) U/L 21  --   --   --  20 26   ALT (SGPT) U/L 23  --   --   --  17 19   Estimated Creatinine Clearance: 52.8 mL/min (A) (by C-G formula based on SCr of 1.2 mg/dL (H)).  Results from last 7 days   Lab Units 01/26/25  1054 01/26/25  0703 01/21/25  1939 01/21/25  1835   HSTROP T ng/L 15* 15* 9 10   PROBNP pg/mL  --   --   --  124.0     Results from last 7 days   Lab Units 01/26/25  0703 01/23/25  0510 01/21/25  1835   PROCALCITONIN ng/mL 0.02 0.02 <0.02   LACTATE mmol/L  --   --  1.3     Results from last 7 days   Lab Units 01/27/25  0435 01/25/25  1234 01/21/25  1849   PH, ARTERIAL pH units 7.422 7.416 7.378   PO2 ART mm Hg 57.1* 61.4* 43.6*   PCO2, ARTERIAL mm Hg 39.2 38.4 43.1   HCO3 ART mmol/L 25.6 24.7 25.3   O2 SATURATION ART % 89.9* 91.6* 78.1*   MODALITY  Cannula Cannula Cannula  "  No results found for: \"STREPPNEUAG\", \"LEGANTIGENUR\"    Results from last 7 days   Lab Units 01/21/25  8047   ADENOVIRUS DETECTION BY PCR  Not Detected   CORONAVIRUS 229E  Not Detected   CORONAVIRUS HKU1  Not Detected   CORONAVIRUS NL63  Not Detected   CORONAVIRUS OC43  Not Detected   HUMAN METAPNEUMOVIRUS  Not Detected   HUMAN RHINOVIRUS/ENTEROVIRUS  Not Detected   INFLUENZA B PCR  Not Detected   PARAINFLUENZA 1  Not Detected   PARAINFLUENZA VIRUS 2  Not Detected   PARAINFLUENZA VIRUS 3  Not Detected   PARAINFLUENZA VIRUS 4  Not Detected   BORDETELLA PERTUSSIS PCR  Not Detected   CHLAMYDOPHILA PNEUMONIAE PCR  Not Detected   MYCOPLAMA PNEUMO PCR  Not Detected   INFLUENZA A PCR  Not Detected   RSV, PCR  Detected*       NIH Stroke Scale:     1a. Level of Consciousness: 0-->Alert, keenly responsive  1b. LOC Questions: 0-->Answers both questions correctly  1c. LOC Commands: 0-->Performs both tasks correctly  2. Best Gaze: 0-->Normal  3. Visual: 0-->No visual loss  4. Facial Palsy: 0-->Normal symmetrical movements  5a. Motor Arm, Left: 0-->No drift, limb holds 90 (or 45) degrees for full 10 secs  5b. Motor Arm, Right: 0-->No drift, limb holds 90 (or 45) degrees for full 10 secs  6a. Motor Leg, Left: 0-->No drift, leg holds 30 degree position for full 5 secs  6b. Motor Leg, Right: 0-->No drift, leg holds 30 degree position for full 5 secs  7. Limb Ataxia: 0-->Absent  8. Sensory: 0-->Normal, no sensory loss  9. Best Language: 1-->Mild-to-moderate aphasia, some obvious loss of fluency or facility of comprehension, without significant limitation on ideas expressed or form of expression. Reduction of speech and/or comprehension, however, makes conversation. . . (see row details) (language barrier)  10. Dysarthria: 0-->Normal (language barrier)  11. Extinction and Inattention (formerly Neglect): 0-->No abnormality    Total (NIH Stroke Scale): 1    Please refer to full rapid documentation on summary page under Index / Code " Timeline

## 2025-01-27 NOTE — PROGRESS NOTES
[facility] INPATIENT PROGRESS NOTE         [address]    2025      PATIENT IDENTIFICATION:  Name: Flaca Hassan ADMIT: 2025   : 1965  PCP: Isidro Hernandez MD    MRN: [id number] LOS: 5 days   AGE/SEX: 59 y.o. female  ROOM: [facility]                     LOS 5    Reason for visit: RSV      SUBJECTIVE:      Events noted.  On NIPPV.  On 50% FiO2.  Noted that her steroids were held yesterday and she had worsening shortness of breath and wheezing.  Now switched back to IV steroids.  She is more alert on NIPPV this morning but still mildly confused.    Objective   OBJECTIVE:    Vital Sign Min/Max for last 24 hours  Temp  Min: 97.9 °F (36.6 °C)  Max: 99.3 °F (37.4 °C)   BP  Min: 141/76  Max: 176/93   Pulse  Min: 70  Max: 95   Resp  Min: 16  Max: 34   SpO2  Min: 86 %  Max: 99 %   No data recorded   No data recorded    Vitals:    25 0518 25 0521 25 0525 25 0800   BP:    176/93   BP Location:    Right arm   Patient Position:       Pulse: 79 82 84 83   Resp:   (!) 32    Temp:    97.9 °F (36.6 °C)   TempSrc:    Oral   SpO2: 97% 96% 92% 93%        There were no vitals filed for this visit.    There is no height or weight on file to calculate BMI.                          There is no height or weight on file to calculate BMI.  No intake or output data in the 24 hours ending 25 0858        Exam:  GEN:  No distress, appears stated age  EYES:   PERRL, anicteric sclerae  ENT:    External ears/nose normal, OP clear  NECK:  No adenopathy, midline trachea  LUNGS: Normal chest on inspection, palpation and diminished on auscultation  CV:  Normal S1S2, without murmur  ABD:  Nontender, nondistended, no hepatosplenomegaly, +BS  EXT:  No edema.  No cyanosis or clubbing.  No mottling and normal cap refill.    Assessment     Scheduled meds:  amLODIPine, 10 mg, Oral, Daily  atorvastatin, 10 mg, Oral, Daily  bisoprolol, 5 mg, Oral, Daily  budesonide-formoterol, 2 puff, Inhalation,  BID - RT  clopidogrel, 75 mg, Oral, Daily  fluticasone, 2 spray, Nasal, Daily  [Held by provider] furosemide, 40 mg, Oral, Daily  guaiFENesin, 600 mg, Oral, Q12H  ipratropium-albuterol, 3 mL, Nebulization, 4x Daily - RT  losartan, 50 mg, Oral, Q24H  methylPREDNISolone sodium succinate, 40 mg, Intravenous, Q12H  montelukast, 10 mg, Oral, Daily  pantoprazole, 40 mg, Intravenous, Q AM  potassium chloride, 10 mEq, Oral, Daily  [Held by provider] predniSONE, 40 mg, Oral, Daily With Breakfast  senna-docusate sodium, 2 tablet, Oral, BID  sodium chloride, 10 mL, Intravenous, Q12H      IV meds:                         Data Review:  Results from last 7 days   Lab Units 01/27/25  0527 01/26/25  0703 01/25/25  0550 01/24/25  0633 01/23/25  0510   SODIUM mmol/L 138 134* 134* 137 135*   POTASSIUM mmol/L 4.3 4.0 4.4 4.2 5.0   CHLORIDE mmol/L 103 98 100 102 100   CO2 mmol/L 23.2 24.0 21.5* 25.0 22.3   BUN mg/dL 30* 29* 21* 27* 28*   CREATININE mg/dL 1.07* 1.20* 0.91 1.04* 1.27*   GLUCOSE mg/dL 150* 142* 134* 128* 164*   CALCIUM mg/dL 9.0 9.1 9.1 9.0 9.6         Estimated Creatinine Clearance: 59.3 mL/min (A) (by C-G formula based on SCr of 1.07 mg/dL (H)).  Results from last 7 days   Lab Units 01/27/25  0527 01/26/25  0703 01/25/25  0550 01/24/25  0633 01/23/25  0510   WBC 10*3/mm3 10.93* 11.67* 9.07 15.68* 14.82*   HEMOGLOBIN g/dL 14.9 14.6 14.2 13.9 13.6   PLATELETS 10*3/mm3 335 315 295 302 266         Results from last 7 days   Lab Units 01/26/25  0703 01/22/25  0505 01/21/25  1835   ALT (SGPT) U/L 23 17 19   AST (SGOT) U/L 21 20 26     Results from last 7 days   Lab Units 01/27/25  0435 01/25/25  1234 01/21/25  1849   PH, ARTERIAL pH units 7.422 7.416 7.378   PO2 ART mm Hg 57.1* 61.4* 43.6*   PCO2, ARTERIAL mm Hg 39.2 38.4 43.1   HCO3 ART mmol/L 25.6 24.7 25.3     Results from last 7 days   Lab Units 01/26/25  0703 01/23/25  0510 01/21/25  1835   PROCALCITONIN ng/mL 0.02 0.02 <0.02   LACTATE mmol/L  --   --  1.3         Glucose    Date/Time Value Ref Range Status   01/27/2025 0435 118 70 - 130 mg/dL Final   01/26/2025 0809 135 (H) 70 - 130 mg/dL Final   01/25/2025 1211 176 (H) 70 - 130 mg/dL Final           Imaging reviewed  CT chest 1/22 reviewed    Chest x-ray 1/27 reviewed: Shows hypoinflation with all volumes and interstitial edema.  Also noted a chronically elevated right hemidiaphragm.            Microbiology reviewed  RVP: +RSV          EEG is normal.                Active Hospital Problems    Diagnosis  POA    **RSV bronchitis [J20.5]  Yes    Moderate persistent asthma with exacerbation [J45.41]  Yes    Stage 3a chronic kidney disease [N18.31]  Yes    Acute respiratory failure with hypoxia [J96.01]  Yes    LI (obstructive sleep apnea) [G47.33]  Yes    Endometrial cancer [C54.1]  Yes    IFG (impaired fasting glucose) [R73.01]  Yes    Coronary artery disease involving native heart without angina pectoris [I25.10]  Yes    Hypertension [I10]  Yes    Hyperlipidemia [E78.5]  Yes      Resolved Hospital Problems   No resolved problems to display.         ASSESSMENT:  Acute RSV bronchitis  Acute exacerbation of moderate persistent asthma  Acute hypoxemic respiratory failure  LI  Endometrial cancer  Stage IIIa CKD  CAD  Hypertension/hyperlipidemia  Delirium  Persistently elevated right hemidiaphragm      PLAN:  Scheduled and as needed bronchodilators and steroid for asthma exacerbation.  Steroids changed back to IV.  Treatment for viral process is supportive.  Wean oxygen as able.  Positive airway pressure for sleep apnea.  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  This may contribute to her shortness of breath.  When her delirium issues improve would consider sniff testing.  Neurology following and evaluating for delirium.  EEG negative.  Plan for MRI noted.          Giorgio Deng MD  Pulmonary and Critical Care Medicine  [facility]  [facility]  1/27/2025    08:58 EST

## 2025-01-28 ENCOUNTER — APPOINTMENT (OUTPATIENT)
Dept: GENERAL RADIOLOGY | Facility: HOSPITAL | Age: 60
End: 2025-01-28
Payer: MEDICARE

## 2025-01-28 LAB
ANION GAP SERPL CALCULATED.3IONS-SCNC: 10 MMOL/L (ref 5–15)
BUN SERPL-MCNC: 28 MG/DL (ref 6–20)
BUN/CREAT SERPL: 30.1 (ref 7–25)
CALCIUM SPEC-SCNC: 8.8 MG/DL (ref 8.6–10.5)
CHLORIDE SERPL-SCNC: 108 MMOL/L (ref 98–107)
CO2 SERPL-SCNC: 21 MMOL/L (ref 22–29)
CREAT SERPL-MCNC: 0.93 MG/DL (ref 0.57–1)
DEPRECATED RDW RBC AUTO: 45.1 FL (ref 37–54)
EGFRCR SERPLBLD CKD-EPI 2021: 70.9 ML/MIN/1.73
ERYTHROCYTE [DISTWIDTH] IN BLOOD BY AUTOMATED COUNT: 14.9 % (ref 12.3–15.4)
GLUCOSE SERPL-MCNC: 153 MG/DL (ref 65–99)
HCT VFR BLD AUTO: 42.5 % (ref 34–46.6)
HGB BLD-MCNC: 13.5 G/DL (ref 12–15.9)
MAGNESIUM SERPL-MCNC: 2 MG/DL (ref 1.6–2.6)
MCH RBC QN AUTO: 26.9 PG (ref 26.6–33)
MCHC RBC AUTO-ENTMCNC: 31.8 G/DL (ref 31.5–35.7)
MCV RBC AUTO: 84.8 FL (ref 79–97)
PHOSPHATE SERPL-MCNC: 3.4 MG/DL (ref 2.5–4.5)
PLATELET # BLD AUTO: 323 10*3/MM3 (ref 140–450)
PMV BLD AUTO: 10.5 FL (ref 6–12)
POTASSIUM SERPL-SCNC: 4.7 MMOL/L (ref 3.5–5.2)
RBC # BLD AUTO: 5.01 10*6/MM3 (ref 3.77–5.28)
SODIUM SERPL-SCNC: 139 MMOL/L (ref 136–145)
WBC NRBC COR # BLD AUTO: 11.39 10*3/MM3 (ref 3.4–10.8)

## 2025-01-28 PROCEDURE — 83735 ASSAY OF MAGNESIUM: CPT | Performed by: STUDENT IN AN ORGANIZED HEALTH CARE EDUCATION/TRAINING PROGRAM

## 2025-01-28 PROCEDURE — 94664 DEMO&/EVAL PT USE INHALER: CPT

## 2025-01-28 PROCEDURE — 94799 UNLISTED PULMONARY SVC/PX: CPT

## 2025-01-28 PROCEDURE — 85027 COMPLETE CBC AUTOMATED: CPT | Performed by: STUDENT IN AN ORGANIZED HEALTH CARE EDUCATION/TRAINING PROGRAM

## 2025-01-28 PROCEDURE — 99231 SBSQ HOSP IP/OBS SF/LOW 25: CPT | Performed by: PSYCHIATRY & NEUROLOGY

## 2025-01-28 PROCEDURE — 94761 N-INVAS EAR/PLS OXIMETRY MLT: CPT

## 2025-01-28 PROCEDURE — 25010000002 HEPARIN (PORCINE) PER 1000 UNITS: Performed by: STUDENT IN AN ORGANIZED HEALTH CARE EDUCATION/TRAINING PROGRAM

## 2025-01-28 PROCEDURE — 76000 FLUOROSCOPY <1 HR PHYS/QHP: CPT

## 2025-01-28 PROCEDURE — 80048 BASIC METABOLIC PNL TOTAL CA: CPT | Performed by: STUDENT IN AN ORGANIZED HEALTH CARE EDUCATION/TRAINING PROGRAM

## 2025-01-28 PROCEDURE — 25010000002 METHYLPREDNISOLONE PER 40 MG: Performed by: INTERNAL MEDICINE

## 2025-01-28 PROCEDURE — 94760 N-INVAS EAR/PLS OXIMETRY 1: CPT

## 2025-01-28 PROCEDURE — 84100 ASSAY OF PHOSPHORUS: CPT | Performed by: STUDENT IN AN ORGANIZED HEALTH CARE EDUCATION/TRAINING PROGRAM

## 2025-01-28 PROCEDURE — 25810000003 SODIUM CHLORIDE 0.9 % SOLUTION: Performed by: STUDENT IN AN ORGANIZED HEALTH CARE EDUCATION/TRAINING PROGRAM

## 2025-01-28 PROCEDURE — 97116 GAIT TRAINING THERAPY: CPT

## 2025-01-28 RX ORDER — FUROSEMIDE 40 MG/1
40 TABLET ORAL DAILY
Status: DISCONTINUED | OUTPATIENT
Start: 2025-01-28 | End: 2025-01-30 | Stop reason: HOSPADM

## 2025-01-28 RX ADMIN — Medication 10 ML: at 20:32

## 2025-01-28 RX ADMIN — METHYLPREDNISOLONE SODIUM SUCCINATE 40 MG: 40 INJECTION, POWDER, FOR SOLUTION INTRAMUSCULAR; INTRAVENOUS at 05:46

## 2025-01-28 RX ADMIN — SENNOSIDES AND DOCUSATE SODIUM 2 TABLET: 50; 8.6 TABLET ORAL at 20:32

## 2025-01-28 RX ADMIN — ACETAMINOPHEN 650 MG: 325 TABLET, FILM COATED ORAL at 20:31

## 2025-01-28 RX ADMIN — BISOPROLOL FUMARATE 5 MG: 5 TABLET ORAL at 08:00

## 2025-01-28 RX ADMIN — PANTOPRAZOLE SODIUM 40 MG: 40 INJECTION, POWDER, FOR SOLUTION INTRAVENOUS at 17:18

## 2025-01-28 RX ADMIN — PANTOPRAZOLE SODIUM 40 MG: 40 INJECTION, POWDER, FOR SOLUTION INTRAVENOUS at 08:00

## 2025-01-28 RX ADMIN — HEPARIN SODIUM 5000 UNITS: 5000 INJECTION INTRAVENOUS; SUBCUTANEOUS at 05:46

## 2025-01-28 RX ADMIN — BUDESONIDE AND FORMOTEROL FUMARATE DIHYDRATE 2 PUFF: 160; 4.5 AEROSOL RESPIRATORY (INHALATION) at 07:52

## 2025-01-28 RX ADMIN — SENNOSIDES AND DOCUSATE SODIUM 2 TABLET: 50; 8.6 TABLET ORAL at 08:00

## 2025-01-28 RX ADMIN — IPRATROPIUM BROMIDE AND ALBUTEROL SULFATE 3 ML: 2.5; .5 SOLUTION RESPIRATORY (INHALATION) at 07:52

## 2025-01-28 RX ADMIN — ACETAMINOPHEN 650 MG: 325 TABLET, FILM COATED ORAL at 14:08

## 2025-01-28 RX ADMIN — GUAIFENESIN 600 MG: 600 TABLET, EXTENDED RELEASE ORAL at 20:31

## 2025-01-28 RX ADMIN — SODIUM CHLORIDE 75 ML/HR: 9 INJECTION, SOLUTION INTRAVENOUS at 05:47

## 2025-01-28 RX ADMIN — METHYLPREDNISOLONE SODIUM SUCCINATE 40 MG: 40 INJECTION, POWDER, FOR SOLUTION INTRAMUSCULAR; INTRAVENOUS at 17:18

## 2025-01-28 RX ADMIN — IPRATROPIUM BROMIDE AND ALBUTEROL SULFATE 3 ML: 2.5; .5 SOLUTION RESPIRATORY (INHALATION) at 15:11

## 2025-01-28 RX ADMIN — ATORVASTATIN CALCIUM 10 MG: 10 TABLET, FILM COATED ORAL at 20:32

## 2025-01-28 RX ADMIN — LOSARTAN POTASSIUM 50 MG: 50 TABLET, FILM COATED ORAL at 08:00

## 2025-01-28 RX ADMIN — GUAIFENESIN 600 MG: 600 TABLET, EXTENDED RELEASE ORAL at 08:00

## 2025-01-28 RX ADMIN — FUROSEMIDE 40 MG: 40 TABLET ORAL at 10:16

## 2025-01-28 RX ADMIN — AMLODIPINE BESYLATE 10 MG: 10 TABLET ORAL at 08:00

## 2025-01-28 RX ADMIN — FLUTICASONE PROPIONATE 2 SPRAY: 50 SPRAY, METERED NASAL at 08:09

## 2025-01-28 RX ADMIN — MONTELUKAST 10 MG: 10 TABLET, FILM COATED ORAL at 08:00

## 2025-01-28 RX ADMIN — POTASSIUM CHLORIDE 10 MEQ: 750 TABLET, EXTENDED RELEASE ORAL at 08:00

## 2025-01-28 RX ADMIN — CLOPIDOGREL BISULFATE 75 MG: 75 TABLET ORAL at 08:00

## 2025-01-28 RX ADMIN — BUDESONIDE AND FORMOTEROL FUMARATE DIHYDRATE 2 PUFF: 160; 4.5 AEROSOL RESPIRATORY (INHALATION) at 19:36

## 2025-01-28 RX ADMIN — HEPARIN SODIUM 5000 UNITS: 5000 INJECTION INTRAVENOUS; SUBCUTANEOUS at 14:03

## 2025-01-28 RX ADMIN — IPRATROPIUM BROMIDE AND ALBUTEROL SULFATE 3 ML: 2.5; .5 SOLUTION RESPIRATORY (INHALATION) at 19:27

## 2025-01-28 RX ADMIN — HEPARIN SODIUM 5000 UNITS: 5000 INJECTION INTRAVENOUS; SUBCUTANEOUS at 22:46

## 2025-01-28 NOTE — PLAN OF CARE
Goal Outcome Evaluation:  Plan of Care Reviewed With: patient        Progress: improving  Outcome Evaluation: Pt seen for PT tx today. Pt continues to be on 4L. Pt sitting on EOB and agreeable to participate with PT. Pt stood with CGA at EOB and able to ambulate 2X30ft with SBA/CGA, rwx. Pt had no unsteadiness or LOB noted but did need 1 seated rest break d/t SOA. Pt's lowest O2 saturation 85% but pt able to increase to 89% with rest. Will continue to follow and progress pt as able.

## 2025-01-28 NOTE — THERAPY TREATMENT NOTE
Patient Name: Flaca Hassan  : 1965    MRN: [id number]                              Today's Date: 2025       Admit Date: 2025    Visit Dx:     ICD-10-CM ICD-9-CM   1. RSV bronchitis  J20.5 466.0     079.6   2. Exacerbation of asthma, unspecified asthma severity, unspecified whether persistent  J45.901 493.92   3. Acute hypoxic respiratory failure  J96.01 518.81   4. Acute respiratory failure with hypoxia  J96.01 518.81     Patient Active Problem List   Diagnosis    Hypertension    Obesity    Hyperlipidemia    Iron deficiency anemia due to chronic blood loss    Upper GI bleeding    ST elevation myocardial infarction (STEMI)    Coronary artery disease involving native heart without angina pectoris    Morgagni hernia    IFG (impaired fasting glucose)    Primary insomnia    PMB (postmenopausal bleeding)    Endometrial cancer    Uncomplicated asthma    LI (obstructive sleep apnea)    Dyspepsia    History of peptic ulcer disease    Rectal pain    Abdominal wall abscess    History of ST elevation myocardial infarction (STEMI)    Abdominal wall cellulitis    Prediabetes    Localized edema    Hypoxia    RSV bronchitis    Moderate persistent asthma with exacerbation    Stage 3a chronic kidney disease    Acute respiratory failure with hypoxia     Past Medical History:   Diagnosis Date    Anemia     Asthma     Breast cyst     Cancer     Coronary artery disease     stent    Depression     Diverticulosis     H/O: GI bleed     recurrent    Hematuria     History of coronary angioplasty with insertion of stent     History of transfusion     no reaction    Hyperlipidemia     Hypertension     Incontinence of urine     wears pads    Irritable bowel syndrome     Malignant neoplasm of endometrium 2021    Melena     Obesity     Oxygen dependent     uses oxygen 2 prn when walking if SOB    Uterine cancer     UTI (urinary tract infection)      Past Surgical History:   Procedure Laterality Date    BREAST CYST  EXCISION Left     CARDIAC CATHETERIZATION N/A 11/13/2017    Procedure: Left Heart Cath;  Surgeon: Imer Montaño MD;  Location: [facility] CATH INVASIVE LOCATION;  Service:     CARDIAC CATHETERIZATION N/A 11/13/2017    Procedure: Stent ROBERTO coronary;  Surgeon: Imer Montaño MD;  Location: [facility] CATH INVASIVE LOCATION;  Service:     COLONOSCOPY  02/11/2016    tics, NBIH,     COLONOSCOPY N/A 02/28/2022    Procedure: COLONOSCOPY TO CECUM and TI WITH APC CAUTERY TO RECTAL AVMs;  Surgeon: Ruth Ann Willson MD;  Location: [facility] ENDOSCOPY;  Service: Gastroenterology;  Laterality: N/A;  FAMILY HX COLON CA  --HEMORRHOIDS, BLEEDING RECTAL AVMs     D & C HYSTEROSCOPY ENDOMETRIAL ABLATION N/A 01/08/2021    Procedure: DILATATION AND CURETTAGE HYSTEROSCOPY ENDOMETRIAL  RESECTION;  Surgeon: Thu Blake MD;  Location: [facility] OR [facility];  Service: Obstetrics/Gynecology;  Laterality: N/A;    ENDOSCOPY N/A 03/03/2017    erythematous mucosa in stomach, duodenal ulcer , mild to moderate chronic active gastritis, positive for h pylori    ENDOSCOPY N/A 11/08/2021    Procedure: ESOPHAGOGASTRODUODENOSCOPY WITH BX'S;  Surgeon: Ruth Ann Willson MD;  Location: [facility] ENDOSCOPY;  Service: Gastroenterology;  Laterality: N/A;  pre: EPIGASTRIC PAIN, HX OF STOMACH ULCER  post: GASTRITIS    HYSTERECTOMY      UPPER GASTROINTESTINAL ENDOSCOPY  02/10/2016    normal-Ruth Ann Willson M.D.      General Information       Row Name 01/28/25 6056          Physical Therapy Time and Intention    Document Type therapy note (daily note)  -EB     Mode of Treatment physical therapy  -EB       Row Name 01/28/25 3091          General Information    Patient Profile Reviewed yes  -EB     Existing Precautions/Restrictions fall;oxygen therapy device and L/min  4L  -EB       Row Name 01/28/25 7280          Cognition    Orientation Status (Cognition) oriented x 3  -EB       Row Name 01/28/25 3348          Safety Issues/Impairments Affecting Functional Mobility     Impairments Affecting Function (Mobility) endurance/activity tolerance;strength;shortness of breath  -EB               User Key  (r) = Recorded By, (t) = Taken By, (c) = Cosigned By      Initials Name Provider Type    Linda Price PTA Physical Therapist Assistant                   Mobility       Row Name 01/28/25 1559          Bed Mobility    Comment, (Bed Mobility) NT-sitting on EOB when PT arrived and left. Pt on 4L of O2  -EB       Row Name 01/28/25 1559          Sit-Stand Transfer    Sit-Stand [place] (Transfers) contact guard  -EB     Assistive Device (Sit-Stand Transfers) walker, front-wheeled  -EB     Comment, (Sit-Stand Transfer) 2 stands  -EB       Row Name 01/28/25 1559          Gait/Stairs (Locomotion)    [place] Level (Gait) standby assist;contact guard  -EB     Assistive Device (Gait) walker, front-wheeled  -EB     Distance in Feet (Gait) 60  2X30ft  -EB     Deviations/Abnormal Patterns (Gait) gait speed decreased;carrillo decreased;stride length decreased  -EB     Bilateral Gait Deviations forward flexed posture;heel strike decreased  -EB     Comment, (Gait/Stairs) cues for upright posture. No unsteadiness or LOB noted. 1 seated rest break. O2 on 4L lowest O2 saturations 85% and increased to 89% with rest.  -EB               User Key  (r) = Recorded By, (t) = Taken By, (c) = Cosigned By      Initials Name Provider Type    Linda Price PTA Physical Therapist Assistant                   Obj/Interventions    No documentation.                  Goals/Plan    No documentation.                  Clinical Impression       Row Name 01/28/25 1603          Pain    Pretreatment Pain Rating 0/10 - no pain  -EB       Row Name 01/28/25 1603          Plan of Care Review    Plan of Care Reviewed With patient  -EB     Progress improving  -EB     Outcome Evaluation Pt seen for PT tx today. Pt continues to be on 4L. Pt sitting on EOB and agreeable to participate with PT. Pt stood with CGA at EOB and  able to ambulate 2X30ft with SBA/CGA, rwx. Pt had no unsteadiness or LOB noted but did need 1 seated rest break d/t SOA. Pt's lowest O2 saturation 85% but pt able to increase to 89% with rest. Will continue to follow and progress pt as able.  -EB       Row Name 01/28/25 1603          Therapy Assessment/Plan (PT)    Therapy Frequency (PT) 5 times/wk  -EB       Row Name 01/28/25 1603          Positioning and Restraints    Pre-Treatment Position in bed  -EB     Post Treatment Position bed  -EB     In Bed sitting EOB;call light within reach;encouraged to call for assist;exit alarm on  -EB               User Key  (r) = Recorded By, (t) = Taken By, (c) = Cosigned By      Initials Name Provider Type    Linda Price PTA Physical Therapist Assistant                   Outcome Measures       Row Name 01/28/25 1606          How much help from another person do you currently need...    Turning from your back to your side while in flat bed without using bedrails? 3  -EB     Moving from lying on back to sitting on the side of a flat bed without bedrails? 3  -EB     Moving to and from a bed to a chair (including a wheelchair)? 3  -EB     Standing up from a chair using your arms (e.g., wheelchair, bedside chair)? 3  -EB     Climbing 3-5 steps with a railing? 2  -EB     To walk in hospital room? 3  -EB     AM-PAC 6 Clicks Score (PT) 17  -EB               User Key  (r) = Recorded By, (t) = Taken By, (c) = Cosigned By      Initials Name Provider Type    Linda Price PTA Physical Therapist Assistant                                 Physical Therapy Education       Title: PT OT SLP Therapies (Done)       Topic: Physical Therapy (Done)       Point: Mobility training (Done)       Learning Progress Summary            Patient Acceptance, E,D, VU,NR by HUDSON at 1/28/2025 1607    Acceptance, E,D, VU,NR by HUDSON at 1/27/2025 1656    Acceptance, E,D, VU,NR by  at 1/23/2025 1347                      Point: Home exercise program (Done)        Learning Progress Summary            Patient Acceptance, E,D, VU,NR by EB at 1/27/2025 1656                      Point: Body mechanics (Resolved)       Learning Progress Summary            Patient Acceptance, E,D, VU,NR by MG at 1/23/2025 1347                      Point: Precautions (Resolved)       Learning Progress Summary            Patient Acceptance, E,D, VU,NR by MG at 1/23/2025 1347                                      User Key       Initials Effective Dates Name Provider Type Discipline     05/24/22 -  Kristel Cox, PT Physical Therapist PT    EB 02/14/23 -  Linda Yi PTA Physical Therapist Assistant PT                  PT Recommendation and Plan     Progress: improving  Outcome Evaluation: Pt seen for PT tx today. Pt continues to be on 4L. Pt sitting on EOB and agreeable to participate with PT. Pt stood with CGA at EOB and able to ambulate 2X30ft with SBA/CGA, rwx. Pt had no unsteadiness or LOB noted but did need 1 seated rest break d/t SOA. Pt's lowest O2 saturation 85% but pt able to increase to 89% with rest. Will continue to follow and progress pt as able.     Time Calculation:         PT Charges       Row Name 01/28/25 1607             Time Calculation    Start Time 1450  -EB      Stop Time 1507  -EB      Time Calculation (min) 17 min  -EB      PT Received On 01/28/25  -EB      PT - Next Appointment 01/29/25  -EB         Time Calculation- PT    Total Timed Code Minutes- PT 17 minute(s)  -EB                User Key  (r) = Recorded By, (t) = Taken By, (c) = Cosigned By      Initials Name Provider Type    EB Linda Yi PTA Physical Therapist Assistant                  Therapy Charges for Today       Code Description Service Date Service Provider Modifiers Qty    83028866289 HC PT THERAPEUTIC ACT EA 15 MIN 1/27/2025 Linda Yi PTA GP 1    54585625866 HC PT THER PROC EA 15 MIN 1/27/2025 Linda Yi PTA GP 1    05166753230 HC GAIT TRAINING EA 15 MIN 1/28/2025 Linda Yi PTA GP 1             PT G-Codes  Outcome Measure Options: AM-PAC 6 Clicks Daily Activity (OT)  AM-PAC 6 Clicks Score (PT): 17  AM-PAC 6 Clicks Score (OT): 19  Modified [place] Scale: 4 - Moderately severe disability.  Unable to walk without assistance, and unable to attend to own bodily needs without assistance.       Linda Yi, PTA  1/28/2025

## 2025-01-28 NOTE — PROGRESS NOTES
Neurology Progress Note    Reason for visit  Follow up for acute confusion    Interval History  Family and nursing reports she has been improving.   Did not use BIPAP last night.  No recurrence of acute confusion.    Medications:  Scheduled Meds:amLODIPine, 10 mg, Oral, Daily  atorvastatin, 10 mg, Oral, Daily  bisoprolol, 5 mg, Oral, Daily  budesonide-formoterol, 2 puff, Inhalation, BID - RT  clopidogrel, 75 mg, Oral, Daily  fluticasone, 2 spray, Nasal, Daily  furosemide, 40 mg, Oral, Daily  guaiFENesin, 600 mg, Oral, Q12H  heparin (porcine), 5,000 Units, Subcutaneous, Q8H  ipratropium-albuterol, 3 mL, Nebulization, 4x Daily - RT  losartan, 50 mg, Oral, Q24H  methylPREDNISolone sodium succinate, 40 mg, Intravenous, Q12H  montelukast, 10 mg, Oral, Daily  pantoprazole, 40 mg, Intravenous, BID AC  potassium chloride, 10 mEq, Oral, Daily  [Held by provider] predniSONE, 40 mg, Oral, Daily With Breakfast  senna-docusate sodium, 2 tablet, Oral, BID  sodium chloride, 10 mL, Intravenous, Q12H      Continuous Infusions:   PRN Meds:.  acetaminophen **OR** acetaminophen **OR** acetaminophen    benzonatate    senna-docusate sodium **AND** polyethylene glycol **AND** bisacodyl **AND** bisacodyl    ipratropium-albuterol    nitroglycerin    ondansetron    sodium chloride    sodium chloride    Review of Systems:   Review of Systems   Respiratory: Negative.     Cardiovascular: Negative.    Gastrointestinal: Negative.    Musculoskeletal: Negative.      Vital Signs  Temp:  [97.5 °F (36.4 °C)-98.8 °F (37.1 °C)] 98.8 °F (37.1 °C)  Heart Rate:  [72-84] 81  Resp:  [18-20] 18  BP: (136-149)/(69-82) 146/71    Physical Exam:  Constitutional:  appears comfortable  HEENT:  normal  CVS:  Regular rate and rhythm.    Musculoskeletal:  No signs of peripheral edema  Neurologic:   Alert and fluent   No tremor or rigidity   Moves all extremities  Psychiatric: no anxiety     Results Review:    B12   >800  Folate  13.6  Ammonia 28  TSH  1.3    Medical  Decision Making and Recommendations  Acute confusion  Resolved with no recurrence  Was likely related to sleep arousal  Agree she needs to wear CPAP  No further neurological evaluation indicated.    Neurology signing off, please call if needed further.          Joana Ribeiro MD  01/28/25  13:32 EST

## 2025-01-28 NOTE — PROGRESS NOTES
Name: Flaca Hassan ADMIT: 2025   : 1965  PCP: Isidro Hernandez MD    MRN: [id number] LOS: 6 days   AGE/SEX: 59 y.o. female  ROOM: [facility]     Subjective   Subjective   Patient seen this morning, more alert, oriented to name, place, disoriented to situation.  Follows commands.  Did not wear BiPAP overnight.      Review of Systems   As above  Objective   Objective   Vital Signs  Temp:  [97.7 °F (36.5 °C)-98.8 °F (37.1 °C)] 98.4 °F (36.9 °C)  Heart Rate:  [72-84] 80  Resp:  [18-20] 18  BP: (136-156)/(69-82) 156/76  SpO2:  [90 %-100 %] 93 %  on  Flow (L/min) (Oxygen Therapy):  [4-5] 5;   Device (Oxygen Therapy): nasal cannula  There is no height or weight on file to calculate BMI.  Physical Exam    General: Alert, laying in bed, not in distress  HEENT: Normocephalic, atraumatic  CV: Regular rate and rhythm, no murmurs rubs or gallops  Lungs: CTA anteriorly, no wheezing  Abdomen: Soft,  distended, nontender palpation  Extremities: No significant peripheral edema , no cyanosis     Results Review     I reviewed the patient's new clinical results.  Results from last 7 days   Lab Units 25  0553 25  0527 25  0703 25  0550   WBC 10*3/mm3 11.39* 10.93* 11.67* 9.07   HEMOGLOBIN g/dL 13.5 14.9 14.6 14.2   PLATELETS 10*3/mm3 323 335 315 295     Results from last 7 days   Lab Units 25  0553 25  0527 25  0703 25  0550   SODIUM mmol/L 139 138 134* 134*   POTASSIUM mmol/L 4.7 4.3 4.0 4.4   CHLORIDE mmol/L 108* 103 98 100   CO2 mmol/L 21.0* 23.2 24.0 21.5*   BUN mg/dL 28* 30* 29* 21*   CREATININE mg/dL 0.93 1.07* 1.20* 0.91   GLUCOSE mg/dL 153* 150* 142* 134*   Estimated Creatinine Clearance: 68.2 mL/min (by C-G formula based on SCr of 0.93 mg/dL).  Results from last 7 days   Lab Units 25  0703 25  0510 25  0505 25  1835   ALBUMIN g/dL 3.9 3.4* 3.4* 4.3   BILIRUBIN mg/dL 0.9  --  0.3 0.4   ALK PHOS U/L 89  --  97 115   AST (SGOT) U/L 21  --  20  26   ALT (SGPT) U/L 23  --  17 19     Results from last 7 days   Lab Units 01/28/25  0553 01/27/25  0527 01/26/25  0703 01/25/25  0550 01/24/25  0633 01/23/25  0510 01/22/25  0505 01/21/25  1835 01/21/25  1835   CALCIUM mg/dL 8.8 9.0 9.1 9.1   < > 9.6 8.8  --  9.1   ALBUMIN g/dL  --   --  3.9  --   --  3.4* 3.4*  --  4.3   MAGNESIUM mg/dL 2.0 1.9 1.8 1.7   < > 2.0 1.8  --  1.7   PHOSPHORUS mg/dL 3.4 3.3 3.9 4.0   < > 3.1 2.7   < >  --     < > = values in this interval not displayed.     Results from last 7 days   Lab Units 01/26/25  0703 01/23/25  0510 01/21/25  1835   PROCALCITONIN ng/mL 0.02 0.02 <0.02   LACTATE mmol/L  --   --  1.3     COVID19   Date Value Ref Range Status   01/21/2025 Not Detected Not Detected - Ref. Range Final   08/12/2022 Detected (C) Not Detected - Ref. Range Final     Glucose   Date/Time Value Ref Range Status   01/27/2025 0435 118 70 - 130 mg/dL Final   01/26/2025 0809 135 (H) 70 - 130 mg/dL Final           FL Sniff Test  Narrative: EXAMINATION: Sniff test under fluoroscopy.     DATE: 1/28/2025     HISTORY: Right-sided elevated hemidiaphragm and shortness of breath.     *  Dose Area Product: [id number]     FINDINGS: The patient was brought into the fluoroscopic room and placed  in the semiupright position due to inability to stand.  Multiple  breathing exercises were observed under fluoroscopy. Images obtained by  MERRITT Ortiz.      image of chest shows no acute cardiopulmonary process. Persistent  elevated right hemidiaphragm with hypoinflation of the lungs.     The bilateral hemidiaphragms show normal inspiratory and expiratory  excursion.  The right-sided elevated hemidiaphragm is normal in  appearance under fluoroscopy. No paradoxical motion observed.     Impression: 1: Normal hemidiaphragm movement seen bilaterally, with no evidence of  paralysis.             Scheduled Medications  amLODIPine, 10 mg, Oral, Daily  atorvastatin, 10 mg, Oral, Daily  bisoprolol, 5 mg, Oral,  Daily  budesonide-formoterol, 2 puff, Inhalation, BID - RT  clopidogrel, 75 mg, Oral, Daily  fluticasone, 2 spray, Nasal, Daily  furosemide, 40 mg, Oral, Daily  guaiFENesin, 600 mg, Oral, Q12H  heparin (porcine), 5,000 Units, Subcutaneous, Q8H  ipratropium-albuterol, 3 mL, Nebulization, 4x Daily - RT  losartan, 50 mg, Oral, Q24H  methylPREDNISolone sodium succinate, 40 mg, Intravenous, Q12H  montelukast, 10 mg, Oral, Daily  pantoprazole, 40 mg, Intravenous, BID AC  potassium chloride, 10 mEq, Oral, Daily  [Held by provider] predniSONE, 40 mg, Oral, Daily With Breakfast  senna-docusate sodium, 2 tablet, Oral, BID  sodium chloride, 10 mL, Intravenous, Q12H    Infusions     Diet  Diet: Cardiac, Diabetic, Renal; Healthy Heart (2-3 Na+); Consistent Carbohydrate; Low Sodium (2-3g), Low Potassium, Low Phosphorus; No Pork; Fluid Consistency: Thin (IDDSI 0)    I have personally reviewed     [x]  Laboratory   []  Microbiology   []  Radiology   []  EKG/Telemetry  []  Cardiology/Vascular   []  Pathology    []  Records       Assessment/Plan     Active Hospital Problems    Diagnosis  POA    **RSV bronchitis [J20.5]  Yes    Moderate persistent asthma with exacerbation [J45.41]  Yes    Stage 3a chronic kidney disease [N18.31]  Yes    Acute respiratory failure with hypoxia [J96.01]  Yes    LI (obstructive sleep apnea) [G47.33]  Yes    Endometrial cancer [C54.1]  Yes    IFG (impaired fasting glucose) [R73.01]  Yes    Coronary artery disease involving native heart without angina pectoris [I25.10]  Yes    Hypertension [I10]  Yes    Hyperlipidemia [E78.5]  Yes      Resolved Hospital Problems   No resolved problems to display.       Patient is a 59 h/o f asthma , CAD, HTN, HLD, CKD stage IIIa, endometrial cancer ,  presented  the hospital with shortness of breath and cough.      AMS  Encephalopathy.   -Team D was called 01/25, stat ABG was obtained with normal pH, pCO2 of 48.  Stat chest x-ray with no acute findings  -urinalysis with no  signs of infection, chest x-ray stable  -Routine EEG with no seizure activity  -Neurology evaluated MRI ordered however patient unable to tolerate,  -Holding Risperdal as well  -TSH normal, ammonia normal.  B12 normal.  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  -General Surgery recommended outpatient follow-up and discussion of elective laparoscopic/robotic Morgagni hernia repair in the future.       Acute exacerbation of moderate persistent asthma secondary to RSV infection  Acute hypoxemic respiratory failure  elevated right hemidiaphragm   -Respiratory viral panel due to 1/21/2025 was positive for RSV  -CT scan without contrast 01/22/2025 showed mild patchy areas of atelectasis or pneumonia in the bilateral lower lobes.  -Scheduled DuoNebs  -Supportive care  -CTA 12/27/2025 with no obvious PE  -Pulmonology following, reinitiated on IV steroids and BiPAP  -Concern for phrenic nerve palsy in the setting of elevated right hemidiaphragm, plan for sniff test       Enlarged mediastinal nodes  CT scan showed A few small to slightly enlarged mediastinal nodes are seen.Short-term follow-up CT of the chest without contrast in approximately 3 months suggested per radiology.  Suspect enlarged lymph node secondary to RSV infection/Pneumonia ,  defer outpatient evaluation to pulmonology.      History of CAD  Hypertension  -Continue statin, Plavix, amlodipine, , bisoprolol, losartan        CKD stage IIIa  -Monitor daily BMP.  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  Subq heparin   Full code.  Discussed with patient.  Discussed in multidisciplinary rounds  Disposition: Home with home health timing to be determined   Expected Discharge Date: 1/31/2025; Expected Discharge Time:        Copied text in this note has been reviewed and is accurate as of 01/28/25.         Dictated utilizing Dragon dictation        Jen Blanco MD  [facility]  01/28/25  14:21 EST

## 2025-01-28 NOTE — PLAN OF CARE
"Goal Outcome Evaluation:               Pt shows much improvement tonight. Alert and Oriented to Person, Place and Situation and understanding of month and year. She's very talkative and upbeat, pleasant. She did not want on bipap tonight because, \"she's breathing well and comfortable\". No respiratory distress noted, no shortness of air, 96 to 100% pulse ox. Getting up to bedside commode with standby assist. Urinating well. Family also expresses seeing much improvement. Only slept a short amount of time, stating she's not tired. No complaints this shift.                             "

## 2025-01-28 NOTE — PLAN OF CARE
Goal Outcome Evaluation:      Alert and oriented today.  Pt reports abdominal pain comes and goes.  No nausea.  Tolerating diet.  Tylenol prn.  O2 at 4LNC.  Reinforced need to wear bipap tonight.  Pt family asked to bring pt's bipap from home.  Up with standby assist to chair and bsc.

## 2025-01-28 NOTE — PROGRESS NOTES
[facility] INPATIENT PROGRESS NOTE         [address]    2025      PATIENT IDENTIFICATION:  Name: Flaca Hassan ADMIT: 2025   : 1965  PCP: Isidro Hernandez MD    MRN: [id number] LOS: 6 days   AGE/SEX: 59 y.o. female  ROOM: [facility]                     LOS 6    Reason for visit: RSV      SUBJECTIVE:      Confusion is improved.  She did not use the BiPAP last night and nursing staff bumped up her oxygen to 10 L.  We discussed at bedside with nursing staff that this could increase her risk for CO2 retention and worsening confusion.  She needs to use her BiPAP at night for sleep apnea treatment.  Goal oxygen saturation 89 to 92%.  Good air movement noted on auscultation.  No wheezing or rhonchi on auscultation today.    Objective   OBJECTIVE:    Vital Sign Min/Max for last 24 hours  Temp  Min: 97.5 °F (36.4 °C)  Max: 98.8 °F (37.1 °C)   BP  Min: 136/69  Max: 149/82   Pulse  Min: 72  Max: 84   Resp  Min: 18  Max: 20   SpO2  Min: 90 %  Max: 100 %   No data recorded   No data recorded    Vitals:    25 0420 25 0744 25 0752 25 0806   BP:  146/71     BP Location:  Left arm     Patient Position:  Lying     Pulse: 77 79 81    Resp:  20 18    Temp:  98.8 °F (37.1 °C)     TempSrc:  Oral     SpO2: 93% 100% 96% 92%        There were no vitals filed for this visit.    There is no height or weight on file to calculate BMI.                          There is no height or weight on file to calculate BMI.    Intake/Output Summary (Last 24 hours) at 2025 0915  Last data filed at 2025 1700  Gross per 24 hour   Intake 120 ml   Output --   Net 120 ml           Exam:  GEN:  No distress, appears stated age  EYES:   PERRL, anicteric sclerae  ENT:    External ears/nose normal, OP clear  NECK:  No adenopathy, midline trachea  LUNGS: Normal chest on inspection, palpation and diminished on auscultation  CV:  Normal S1S2, without murmur  ABD:  Nontender, nondistended, no  hepatosplenomegaly, +BS  EXT:  No edema.  No cyanosis or clubbing.  No mottling and normal cap refill.    Assessment     Scheduled meds:  amLODIPine, 10 mg, Oral, Daily  atorvastatin, 10 mg, Oral, Daily  bisoprolol, 5 mg, Oral, Daily  budesonide-formoterol, 2 puff, Inhalation, BID - RT  clopidogrel, 75 mg, Oral, Daily  fluticasone, 2 spray, Nasal, Daily  furosemide, 40 mg, Oral, Daily  guaiFENesin, 600 mg, Oral, Q12H  heparin (porcine), 5,000 Units, Subcutaneous, Q8H  ipratropium-albuterol, 3 mL, Nebulization, 4x Daily - RT  losartan, 50 mg, Oral, Q24H  methylPREDNISolone sodium succinate, 40 mg, Intravenous, Q12H  montelukast, 10 mg, Oral, Daily  pantoprazole, 40 mg, Intravenous, BID AC  potassium chloride, 10 mEq, Oral, Daily  [Held by provider] predniSONE, 40 mg, Oral, Daily With Breakfast  senna-docusate sodium, 2 tablet, Oral, BID  sodium chloride, 10 mL, Intravenous, Q12H      IV meds:                         Data Review:  Results from last 7 days   Lab Units 01/28/25  0553 01/27/25  0527 01/26/25  0703 01/25/25  0550 01/24/25  0633   SODIUM mmol/L 139 138 134* 134* 137   POTASSIUM mmol/L 4.7 4.3 4.0 4.4 4.2   CHLORIDE mmol/L 108* 103 98 100 102   CO2 mmol/L 21.0* 23.2 24.0 21.5* 25.0   BUN mg/dL 28* 30* 29* 21* 27*   CREATININE mg/dL 0.93 1.07* 1.20* 0.91 1.04*   GLUCOSE mg/dL 153* 150* 142* 134* 128*   CALCIUM mg/dL 8.8 9.0 9.1 9.1 9.0         Estimated Creatinine Clearance: 68.2 mL/min (by C-G formula based on SCr of 0.93 mg/dL).  Results from last 7 days   Lab Units 01/28/25  0553 01/27/25  0527 01/26/25  0703 01/25/25  0550 01/24/25  0633   WBC 10*3/mm3 11.39* 10.93* 11.67* 9.07 15.68*   HEMOGLOBIN g/dL 13.5 14.9 14.6 14.2 13.9   PLATELETS 10*3/mm3 323 335 315 295 302         Results from last 7 days   Lab Units 01/26/25  0703 01/22/25  0505 01/21/25  1835   ALT (SGPT) U/L 23 17 19   AST (SGOT) U/L 21 20 26     Results from last 7 days   Lab Units 01/27/25  0435 01/25/25  1234 01/21/25  1849   PH,  ARTERIAL pH units 7.422 7.416 7.378   PO2 ART mm Hg 57.1* 61.4* 43.6*   PCO2, ARTERIAL mm Hg 39.2 38.4 43.1   HCO3 ART mmol/L 25.6 24.7 25.3     Results from last 7 days   Lab Units 01/26/25  0703 01/23/25  0510 01/21/25  1835   PROCALCITONIN ng/mL 0.02 0.02 <0.02   LACTATE mmol/L  --   --  1.3         Glucose   Date/Time Value Ref Range Status   01/27/2025 0435 118 70 - 130 mg/dL Final   01/26/2025 0809 135 (H) 70 - 130 mg/dL Final   01/25/2025 1211 176 (H) 70 - 130 mg/dL Final           Imaging reviewed  CT chest 1/22 reviewed    Chest x-ray 1/27 reviewed: Shows hypoinflation with all volumes and interstitial edema.  Also noted a chronically elevated right hemidiaphragm.            Microbiology reviewed  RVP: +RSV          EEG is normal.                Active Hospital Problems    Diagnosis  POA    **RSV bronchitis [J20.5]  Yes    Moderate persistent asthma with exacerbation [J45.41]  Yes    Stage 3a chronic kidney disease [N18.31]  Yes    Acute respiratory failure with hypoxia [J96.01]  Yes    LI (obstructive sleep apnea) [G47.33]  Yes    Endometrial cancer [C54.1]  Yes    IFG (impaired fasting glucose) [R73.01]  Yes    Coronary artery disease involving native heart without angina pectoris [I25.10]  Yes    Hypertension [I10]  Yes    Hyperlipidemia [E78.5]  Yes      Resolved Hospital Problems   No resolved problems to display.         ASSESSMENT:  Acute RSV bronchitis  Acute exacerbation of moderate persistent asthma  Acute hypoxemic respiratory failure  LI  Endometrial cancer  Stage IIIa CKD  CAD  Hypertension/hyperlipidemia  Delirium  Persistently elevated right hemidiaphragm      PLAN:  Scheduled and as needed bronchodilators and steroid for asthma exacerbation.  Steroids changed back to IV.  Treatment for viral process is supportive.  Wean oxygen as able.  Goal oxygen saturation 89 to 92%.  Positive airway pressure for sleep apnea.  Needs to use it with all sleep.  With persistently elevated right  hemidiaphragm patient may have a component of phrenic nerve palsy and right sided diaphragmatic weakness.  This may contribute to her shortness of breath.  Will obtain sniff testing.          Giorgio Deng MD  Pulmonary and Critical Care Medicine  [facility], [facility]  1/28/2025    09:15 EST

## 2025-01-29 LAB
ANION GAP SERPL CALCULATED.3IONS-SCNC: 9 MMOL/L (ref 5–15)
BUN SERPL-MCNC: 39 MG/DL (ref 6–20)
BUN/CREAT SERPL: 39 (ref 7–25)
CALCIUM SPEC-SCNC: 8.8 MG/DL (ref 8.6–10.5)
CHLORIDE SERPL-SCNC: 103 MMOL/L (ref 98–107)
CO2 SERPL-SCNC: 23 MMOL/L (ref 22–29)
CREAT SERPL-MCNC: 1 MG/DL (ref 0.57–1)
DEPRECATED RDW RBC AUTO: 47.5 FL (ref 37–54)
EGFRCR SERPLBLD CKD-EPI 2021: 65 ML/MIN/1.73
ERYTHROCYTE [DISTWIDTH] IN BLOOD BY AUTOMATED COUNT: 15.2 % (ref 12.3–15.4)
GLUCOSE SERPL-MCNC: 136 MG/DL (ref 65–99)
HCT VFR BLD AUTO: 42.9 % (ref 34–46.6)
HGB BLD-MCNC: 13.3 G/DL (ref 12–15.9)
MAGNESIUM SERPL-MCNC: 2.3 MG/DL (ref 1.6–2.6)
MCH RBC QN AUTO: 26.8 PG (ref 26.6–33)
MCHC RBC AUTO-ENTMCNC: 31 G/DL (ref 31.5–35.7)
MCV RBC AUTO: 86.3 FL (ref 79–97)
PHOSPHATE SERPL-MCNC: 4.4 MG/DL (ref 2.5–4.5)
PLATELET # BLD AUTO: 283 10*3/MM3 (ref 140–450)
PMV BLD AUTO: 10.8 FL (ref 6–12)
POTASSIUM SERPL-SCNC: 4.5 MMOL/L (ref 3.5–5.2)
RBC # BLD AUTO: 4.97 10*6/MM3 (ref 3.77–5.28)
SODIUM SERPL-SCNC: 135 MMOL/L (ref 136–145)
WBC NRBC COR # BLD AUTO: 10.41 10*3/MM3 (ref 3.4–10.8)

## 2025-01-29 PROCEDURE — 80048 BASIC METABOLIC PNL TOTAL CA: CPT | Performed by: STUDENT IN AN ORGANIZED HEALTH CARE EDUCATION/TRAINING PROGRAM

## 2025-01-29 PROCEDURE — 85027 COMPLETE CBC AUTOMATED: CPT | Performed by: STUDENT IN AN ORGANIZED HEALTH CARE EDUCATION/TRAINING PROGRAM

## 2025-01-29 PROCEDURE — 25010000002 METHYLPREDNISOLONE PER 40 MG: Performed by: INTERNAL MEDICINE

## 2025-01-29 PROCEDURE — 84100 ASSAY OF PHOSPHORUS: CPT | Performed by: STUDENT IN AN ORGANIZED HEALTH CARE EDUCATION/TRAINING PROGRAM

## 2025-01-29 PROCEDURE — 94799 UNLISTED PULMONARY SVC/PX: CPT

## 2025-01-29 PROCEDURE — 94761 N-INVAS EAR/PLS OXIMETRY MLT: CPT

## 2025-01-29 PROCEDURE — 83735 ASSAY OF MAGNESIUM: CPT | Performed by: STUDENT IN AN ORGANIZED HEALTH CARE EDUCATION/TRAINING PROGRAM

## 2025-01-29 PROCEDURE — 94760 N-INVAS EAR/PLS OXIMETRY 1: CPT

## 2025-01-29 PROCEDURE — 94664 DEMO&/EVAL PT USE INHALER: CPT

## 2025-01-29 PROCEDURE — 94660 CPAP INITIATION&MGMT: CPT

## 2025-01-29 PROCEDURE — 25010000002 HEPARIN (PORCINE) PER 1000 UNITS: Performed by: STUDENT IN AN ORGANIZED HEALTH CARE EDUCATION/TRAINING PROGRAM

## 2025-01-29 RX ADMIN — IPRATROPIUM BROMIDE AND ALBUTEROL SULFATE 3 ML: 2.5; .5 SOLUTION RESPIRATORY (INHALATION) at 07:39

## 2025-01-29 RX ADMIN — PANTOPRAZOLE SODIUM 40 MG: 40 INJECTION, POWDER, FOR SOLUTION INTRAVENOUS at 18:25

## 2025-01-29 RX ADMIN — HEPARIN SODIUM 5000 UNITS: 5000 INJECTION INTRAVENOUS; SUBCUTANEOUS at 14:36

## 2025-01-29 RX ADMIN — ATORVASTATIN CALCIUM 10 MG: 10 TABLET, FILM COATED ORAL at 21:34

## 2025-01-29 RX ADMIN — FUROSEMIDE 40 MG: 40 TABLET ORAL at 09:55

## 2025-01-29 RX ADMIN — CLOPIDOGREL BISULFATE 75 MG: 75 TABLET ORAL at 09:54

## 2025-01-29 RX ADMIN — LOSARTAN POTASSIUM 50 MG: 50 TABLET, FILM COATED ORAL at 09:55

## 2025-01-29 RX ADMIN — HEPARIN SODIUM 5000 UNITS: 5000 INJECTION INTRAVENOUS; SUBCUTANEOUS at 21:35

## 2025-01-29 RX ADMIN — HEPARIN SODIUM 5000 UNITS: 5000 INJECTION INTRAVENOUS; SUBCUTANEOUS at 05:55

## 2025-01-29 RX ADMIN — SENNOSIDES AND DOCUSATE SODIUM 2 TABLET: 50; 8.6 TABLET ORAL at 09:54

## 2025-01-29 RX ADMIN — Medication 10 ML: at 09:56

## 2025-01-29 RX ADMIN — IPRATROPIUM BROMIDE AND ALBUTEROL SULFATE 3 ML: 2.5; .5 SOLUTION RESPIRATORY (INHALATION) at 16:01

## 2025-01-29 RX ADMIN — BISOPROLOL FUMARATE 5 MG: 5 TABLET ORAL at 09:55

## 2025-01-29 RX ADMIN — METHYLPREDNISOLONE SODIUM SUCCINATE 40 MG: 40 INJECTION, POWDER, FOR SOLUTION INTRAMUSCULAR; INTRAVENOUS at 18:25

## 2025-01-29 RX ADMIN — AMLODIPINE BESYLATE 10 MG: 10 TABLET ORAL at 09:54

## 2025-01-29 RX ADMIN — IPRATROPIUM BROMIDE AND ALBUTEROL SULFATE 3 ML: 2.5; .5 SOLUTION RESPIRATORY (INHALATION) at 11:09

## 2025-01-29 RX ADMIN — GUAIFENESIN 600 MG: 600 TABLET, EXTENDED RELEASE ORAL at 09:55

## 2025-01-29 RX ADMIN — PANTOPRAZOLE SODIUM 40 MG: 40 INJECTION, POWDER, FOR SOLUTION INTRAVENOUS at 10:02

## 2025-01-29 RX ADMIN — Medication 10 ML: at 21:35

## 2025-01-29 RX ADMIN — BUDESONIDE AND FORMOTEROL FUMARATE DIHYDRATE 2 PUFF: 160; 4.5 AEROSOL RESPIRATORY (INHALATION) at 23:51

## 2025-01-29 RX ADMIN — POTASSIUM CHLORIDE 10 MEQ: 750 TABLET, EXTENDED RELEASE ORAL at 09:56

## 2025-01-29 RX ADMIN — IPRATROPIUM BROMIDE AND ALBUTEROL SULFATE 3 ML: 2.5; .5 SOLUTION RESPIRATORY (INHALATION) at 23:46

## 2025-01-29 RX ADMIN — GUAIFENESIN 600 MG: 600 TABLET, EXTENDED RELEASE ORAL at 21:33

## 2025-01-29 RX ADMIN — METHYLPREDNISOLONE SODIUM SUCCINATE 40 MG: 40 INJECTION, POWDER, FOR SOLUTION INTRAMUSCULAR; INTRAVENOUS at 05:55

## 2025-01-29 RX ADMIN — FLUTICASONE PROPIONATE 2 SPRAY: 50 SPRAY, METERED NASAL at 09:56

## 2025-01-29 RX ADMIN — MONTELUKAST 10 MG: 10 TABLET, FILM COATED ORAL at 09:55

## 2025-01-29 NOTE — PLAN OF CARE
Goal Outcome Evaluation:                 After mask was changed on BiPap patient tolerated well. The smaller mask leaked and it was bothersome to patient. Prior to being placed on BiPap, patient was on 3-4L NC.

## 2025-01-29 NOTE — PROGRESS NOTES
Name: Flaca Hassan ADMIT: 2025   : 1965  PCP: Isidro Hernandez MD    MRN: [id number] LOS: 7 days   AGE/SEX: 59 y.o. female  ROOM: [facility]     Subjective   Subjective   Sitting up in a chair.  Did wear BiPAP overnight.  Continues to have cough, shortness of breath, reports her symptoms fluctuating, for couple couple minutes she did not feel good then again became shortness of breath.  Oriented to name, place, not to year.    Review of Systems   As above  Objective   Objective   Vital Signs  Temp:  [98.2 °F (36.8 °C)-98.8 °F (37.1 °C)] 98.2 °F (36.8 °C)  Heart Rate:  [62-87] 87  Resp:  [18-20] 18  BP: (148-156)/(76-89) 155/87  SpO2:  [89 %-100 %] 94 %  on  Flow (L/min) (Oxygen Therapy):  [3-5] 3;   Device (Oxygen Therapy): nasal cannula  There is no height or weight on file to calculate BMI.  Physical Exam    General: Alert, sitting up in the chair, not in distress,  HEENT: Normocephalic, atraumatic  CV: Regular rate and rhythm, no murmurs rubs or gallops  Lungs: Diminished, no wheezing, on 3 L nasal cannula  Abdomen: Soft,  distended, nontender palpation  Extremities: No significant peripheral edema , no cyanosis     Results Review     I reviewed the patient's new clinical results.  Results from last 7 days   Lab Units 25  0550 25  0553 25  0525  0703   WBC 10*3/mm3 10.41 11.39* 10.93* 11.67*   HEMOGLOBIN g/dL 13.3 13.5 14.9 14.6   PLATELETS 10*3/mm3 283 323 335 315     Results from last 7 days   Lab Units 25  0550 25  0553 25  0525  0703   SODIUM mmol/L 135* 139 138 134*   POTASSIUM mmol/L 4.5 4.7 4.3 4.0   CHLORIDE mmol/L 103 108* 103 98   CO2 mmol/L 23.0 21.0* 23.2 24.0   BUN mg/dL 39* 28* 30* 29*   CREATININE mg/dL 1.00 0.93 1.07* 1.20*   GLUCOSE mg/dL 136* 153* 150* 142*   Estimated Creatinine Clearance: 63.4 mL/min (by C-G formula based on SCr of 1 mg/dL).  Results from last 7 days   Lab Units 25  0703 25  0510   ALBUMIN g/dL  3.9 3.4*   BILIRUBIN mg/dL 0.9  --    ALK PHOS U/L 89  --    AST (SGOT) U/L 21  --    ALT (SGPT) U/L 23  --      Results from last 7 days   Lab Units 01/29/25  0550 01/28/25  0553 01/27/25  0527 01/26/25  0703 01/24/25  0633 01/23/25  0510   CALCIUM mg/dL 8.8 8.8 9.0 9.1   < > 9.6   ALBUMIN g/dL  --   --   --  3.9  --  3.4*   MAGNESIUM mg/dL 2.3 2.0 1.9 1.8   < > 2.0   PHOSPHORUS mg/dL 4.4 3.4 3.3 3.9   < > 3.1    < > = values in this interval not displayed.     Results from last 7 days   Lab Units 01/26/25  0703 01/23/25  0510   PROCALCITONIN ng/mL 0.02 0.02     COVID19   Date Value Ref Range Status   01/21/2025 Not Detected Not Detected - Ref. Range Final   08/12/2022 Detected (C) Not Detected - Ref. Range Final     Glucose   Date/Time Value Ref Range Status   01/27/2025 0435 118 70 - 130 mg/dL Final           FL Sniff Test  Narrative: EXAMINATION: Sniff test under fluoroscopy.     DATE: 1/28/2025     HISTORY: Right-sided elevated hemidiaphragm and shortness of breath.     *  Dose Area Product: [id number]     FINDINGS: The patient was brought into the fluoroscopic room and placed  in the semiupright position due to inability to stand.  Multiple  breathing exercises were observed under fluoroscopy. Images obtained by  MERRITT Ortiz.      image of chest shows no acute cardiopulmonary process. Persistent  elevated right hemidiaphragm with hypoinflation of the lungs.     The bilateral hemidiaphragms show normal inspiratory and expiratory  excursion.  The right-sided elevated hemidiaphragm is normal in  appearance under fluoroscopy. No paradoxical motion observed.     Impression: 1: Elevated right hemidiaphragm with normal hemidiaphragm movement seen  bilaterally, with no evidence of paralysis.              This report was finalized on 1/28/2025 3:47 PM by Dr. Oscar Atkinson M.D on Workstation: [id number]       Scheduled Medications  amLODIPine, 10 mg, Oral, Daily  atorvastatin, 10 mg, Oral,  Daily  bisoprolol, 5 mg, Oral, Daily  budesonide-formoterol, 2 puff, Inhalation, BID - RT  clopidogrel, 75 mg, Oral, Daily  fluticasone, 2 spray, Nasal, Daily  furosemide, 40 mg, Oral, Daily  guaiFENesin, 600 mg, Oral, Q12H  heparin (porcine), 5,000 Units, Subcutaneous, Q8H  ipratropium-albuterol, 3 mL, Nebulization, 4x Daily - RT  losartan, 50 mg, Oral, Q24H  methylPREDNISolone sodium succinate, 40 mg, Intravenous, Q12H  montelukast, 10 mg, Oral, Daily  pantoprazole, 40 mg, Intravenous, BID AC  potassium chloride, 10 mEq, Oral, Daily  [Held by provider] predniSONE, 40 mg, Oral, Daily With Breakfast  senna-docusate sodium, 2 tablet, Oral, BID  sodium chloride, 10 mL, Intravenous, Q12H    Infusions     Diet  Diet: Cardiac, Diabetic, Renal; Healthy Heart (2-3 Na+); Consistent Carbohydrate; Low Sodium (2-3g), Low Potassium, Low Phosphorus; No Pork; Fluid Consistency: Thin (IDDSI 0)    I have personally reviewed     [x]  Laboratory   []  Microbiology   []  Radiology   []  EKG/Telemetry  []  Cardiology/Vascular   []  Pathology    []  Records       Assessment/Plan     Active Hospital Problems    Diagnosis  POA    **RSV bronchitis [J20.5]  Yes    Moderate persistent asthma with exacerbation [J45.41]  Yes    Stage 3a chronic kidney disease [N18.31]  Yes    Acute respiratory failure with hypoxia [J96.01]  Yes    LI (obstructive sleep apnea) [G47.33]  Yes    Endometrial cancer [C54.1]  Yes    IFG (impaired fasting glucose) [R73.01]  Yes    Coronary artery disease involving native heart without angina pectoris [I25.10]  Yes    Hypertension [I10]  Yes    Hyperlipidemia [E78.5]  Yes      Resolved Hospital Problems   No resolved problems to display.       Patient is a 59 h/o f asthma , CAD, HTN, HLD, CKD stage IIIa, endometrial cancer ,  presented  the hospital with shortness of breath and cough.      AMS  Encephalopathy.   -Team D was called 01/25, stat ABG was obtained with normal pH, pCO2 of 48.  Stat chest x-ray with no  acute findings  -urinalysis with no signs of infection, chest x-ray stable  -Routine EEG with no seizure activity  -Neurology evaluated MRI ordered however patient unable to tolerate,  -Holding Risperdal as well  -TSH normal, ammonia normal.  B12 normal.  -Encephalopathy due to hospital delirium per  Neurology  -Delirium precautions  -Improved, oriented to name, place, not to year.  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  -General Surgery recommended outpatient follow-up and discussion of elective laparoscopic/robotic Morgagni hernia repair in the future.       Acute exacerbation of moderate persistent asthma secondary to RSV infection  Acute hypoxemic respiratory failure  elevated right hemidiaphragm   -Respiratory viral panel due to 1/21/2025 was positive for RSV  -CT scan without contrast 01/22/2025 showed mild patchy areas of atelectasis or pneumonia in the bilateral lower lobes.  -Scheduled DuoNebs  -Supportive care  -CTA 12/27/2025 with no obvious PE  -, reinitiated on IV steroids and BiPAP  -Due to concern for phrenic nerve palsy in the setting of elevated right hemidiaphragm, underwent sniff test, elevated right hemidiaphragm but normal movement.   -Pulmonology managing    Enlarged mediastinal nodes  CT scan showed A few small to slightly enlarged mediastinal nodes are seen.Short-term follow-up CT of the chest without contrast in approximately 3 months suggested per radiology.  Suspect enlarged lymph node secondary to RSV infection/Pneumonia ,  defer outpatient evaluation to pulmonology.      History of CAD  Hypertension  -Continue statin, Plavix, amlodipine, , bisoprolol, losartan          CKD stage IIIa  -Monitor  daily BMP.  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  Subq heparin   Full code.  Discussed with patient.  Discussed in multidisciplinary rounds  Disposition: Home, likely in 1-2 days if cleared by pulmonology  Expected Discharge Date: 1/31/2025; Expected Discharge Time:        Copied text in this note has been reviewed and is accurate as of 01/29/25.         Dictated utilizing Dragon dictation        Jen Blanco MD  [facility]  01/29/25  12:07 EST

## 2025-01-29 NOTE — DISCHARGE PLACEMENT REQUEST
"Flaca Hassan (59 y.o. Female)       Date of Birth   1965    Social Security Number       Address   [address] [place] [address]    Home Phone   [phone]    MRN   [id number]       Hindu   Uatsdin    Marital Status                               Admission Date   1/21/25    Admission Type   Emergency    Admitting Provider   Germán Patrick MD    Attending Provider   Jen Blanco MD    Department, Room/Bed   [address], [address]/1       Discharge Date       Discharge Disposition       Discharge Destination                                 Attending Provider: Jen Blanco MD    Allergies: Peanut (Diagnostic)    Isolation: Contact   Infection: RSV (01/21/25)   Code Status: CPR    Ht: 157.5 cm (62.01\")   Wt: 90.6 kg (199 lb 11.8 oz)    Admission Cmt: None   Principal Problem: RSV bronchitis [J20.5]                   Active Insurance as of 1/21/2025       Primary Coverage       Payor Plan Insurance Group Employer/Plan Group    AETNA MEDICARE REPLACEMENT AETNA MEDICARE REPLACEMENT [id number]       Payor Plan Address Payor Plan Phone Number Payor Plan Fax Number Effective Dates    [address] [phone]  1/1/2025 - None Entered    [place] [address]         Subscriber Name Subscriber Birth Date Member ID       FLACA HASSAN 1965 [id number]               Secondary Coverage       Payor Plan Insurance Group Employer/Plan Group    [place]        Payor Plan Address Payor Plan Phone Number Payor Plan Fax Number Effective Dates    [address]   6/1/2015 - None Entered    [place] [address]         Subscriber Name Subscriber Birth Date Member ID       FLACA HASSAN 1965 [id number]                     Emergency Contacts        (Rel.) Home Phone Work Phone Mobile Phone    Sonya Posey (Brother) [phone] -- --    Ines Guardado (Relative) [phone] -- --    Eleazar Cortez " (Relative) [phone] -- [phone]    Kaci Grider (Relative) [phone] -- --

## 2025-01-29 NOTE — SIGNIFICANT NOTE
01/29/25 1449   OTHER   Discipline occupational therapist   Rehab Time/Intention   Session Not Performed other (see comments)  (attempt AM and PM treatment session. Staff in patient room both times, patient unavailable for tx. OT will follow up 1/30)   Recommendation   OT - Next Appointment 01/30/25

## 2025-01-29 NOTE — PROGRESS NOTES
[facility] INPATIENT PROGRESS NOTE         [address]    2025      PATIENT IDENTIFICATION:  Name: Flaca Hassan ADMIT: 2025   : 1965  PCP: Isidro Hernandez MD    MRN: [id number] LOS: 7 days   AGE/SEX: 59 y.o. female  ROOM: [facility]                     LOS 7    Reason for visit: RSV      SUBJECTIVE:      Sleeping comfortably on BiPAP at time of visit.  She is on too much oxygen.  On 50% FiO2 and saturations are 100%.  She is supposed to be on oxygen for goal saturation 88 to 92% given CO2 retention.  Decreased her to 35% and saturations maintained in the upper 90s.  Wean oxygen further as able.  Says that she is feeling a bit better today.  No wheezing or rhonchi on auscultation this morning.    Objective   OBJECTIVE:    Vital Sign Min/Max for last 24 hours  Temp  Min: 98.2 °F (36.8 °C)  Max: 98.8 °F (37.1 °C)   BP  Min: 148/76  Max: 156/76   Pulse  Min: 66  Max: 81   Resp  Min: 18  Max: 20   SpO2  Min: 89 %  Max: 100 %   No data recorded   No data recorded    Vitals:    25 1959 25 0011 25 0110 25 0745   BP: 152/89 148/85  148/76   BP Location: Left arm Left arm  Left arm   Patient Position: Lying Lying  Lying   Pulse: 75 66  81   Resp: 18 18  18   Temp: 98.8 °F (37.1 °C) 98.6 °F (37 °C)  98.2 °F (36.8 °C)   TempSrc: Oral Oral  Oral   SpO2: 93% (!) 89% 90% 100%        There were no vitals filed for this visit.    There is no height or weight on file to calculate BMI.                          There is no height or weight on file to calculate BMI.  No intake or output data in the 24 hours ending 25 0900          Exam:  GEN:  No distress, appears stated age  EYES:   PERRL, anicteric sclerae  ENT:    External ears/nose normal, OP clear  NECK:  No adenopathy, midline trachea  LUNGS: Normal chest on inspection, palpation and diminished on auscultation  CV:  Normal S1S2, without murmur  ABD:  Nontender, nondistended, no hepatosplenomegaly, +BS  EXT:  No  edema.  No cyanosis or clubbing.  No mottling and normal cap refill.    Assessment     Scheduled meds:  amLODIPine, 10 mg, Oral, Daily  atorvastatin, 10 mg, Oral, Daily  bisoprolol, 5 mg, Oral, Daily  budesonide-formoterol, 2 puff, Inhalation, BID - RT  clopidogrel, 75 mg, Oral, Daily  fluticasone, 2 spray, Nasal, Daily  furosemide, 40 mg, Oral, Daily  guaiFENesin, 600 mg, Oral, Q12H  heparin (porcine), 5,000 Units, Subcutaneous, Q8H  ipratropium-albuterol, 3 mL, Nebulization, 4x Daily - RT  losartan, 50 mg, Oral, Q24H  methylPREDNISolone sodium succinate, 40 mg, Intravenous, Q12H  montelukast, 10 mg, Oral, Daily  pantoprazole, 40 mg, Intravenous, BID AC  potassium chloride, 10 mEq, Oral, Daily  [Held by provider] predniSONE, 40 mg, Oral, Daily With Breakfast  senna-docusate sodium, 2 tablet, Oral, BID  sodium chloride, 10 mL, Intravenous, Q12H      IV meds:                         Data Review:  Results from last 7 days   Lab Units 01/29/25  0550 01/28/25  0553 01/27/25  0527 01/26/25  0703 01/25/25  0550   SODIUM mmol/L 135* 139 138 134* 134*   POTASSIUM mmol/L 4.5 4.7 4.3 4.0 4.4   CHLORIDE mmol/L 103 108* 103 98 100   CO2 mmol/L 23.0 21.0* 23.2 24.0 21.5*   BUN mg/dL 39* 28* 30* 29* 21*   CREATININE mg/dL 1.00 0.93 1.07* 1.20* 0.91   GLUCOSE mg/dL 136* 153* 150* 142* 134*   CALCIUM mg/dL 8.8 8.8 9.0 9.1 9.1         Estimated Creatinine Clearance: 63.4 mL/min (by C-G formula based on SCr of 1 mg/dL).  Results from last 7 days   Lab Units 01/29/25  0550 01/28/25  0553 01/27/25  0527 01/26/25  0703 01/25/25  0550   WBC 10*3/mm3 10.41 11.39* 10.93* 11.67* 9.07   HEMOGLOBIN g/dL 13.3 13.5 14.9 14.6 14.2   PLATELETS 10*3/mm3 283 323 335 315 295         Results from last 7 days   Lab Units 01/26/25  0703   ALT (SGPT) U/L 23   AST (SGOT) U/L 21     Results from last 7 days   Lab Units 01/27/25  0435 01/25/25  1234   PH, ARTERIAL pH units 7.422 7.416   PO2 ART mm Hg 57.1* 61.4*   PCO2, ARTERIAL mm Hg 39.2 38.4   HCO3 ART  mmol/L 25.6 24.7     Results from last 7 days   Lab Units 01/26/25  0703 01/23/25  0510   PROCALCITONIN ng/mL 0.02 0.02         Glucose   Date/Time Value Ref Range Status   01/27/2025 0435 118 70 - 130 mg/dL Final           Imaging reviewed  CT chest 1/22 reviewed    Chest x-ray 1/27 reviewed: Shows hypoinflation with all volumes and interstitial edema.  Also noted a chronically elevated right hemidiaphragm.        Sniff testing reviewed shows elevated right hemidiaphragm but normal movement bilaterally.        Microbiology reviewed  RVP: +RSV          EEG is normal.                Active Hospital Problems    Diagnosis  POA    **RSV bronchitis [J20.5]  Yes    Moderate persistent asthma with exacerbation [J45.41]  Yes    Stage 3a chronic kidney disease [N18.31]  Yes    Acute respiratory failure with hypoxia [J96.01]  Yes    LI (obstructive sleep apnea) [G47.33]  Yes    Endometrial cancer [C54.1]  Yes    IFG (impaired fasting glucose) [R73.01]  Yes    Coronary artery disease involving native heart without angina pectoris [I25.10]  Yes    Hypertension [I10]  Yes    Hyperlipidemia [E78.5]  Yes      Resolved Hospital Problems   No resolved problems to display.         ASSESSMENT:  Acute RSV bronchitis  Acute exacerbation of moderate persistent asthma  Acute hypoxemic respiratory failure  LI  Endometrial cancer  Stage IIIa CKD  CAD  Hypertension/hyperlipidemia  Delirium  Persistently elevated right hemidiaphragm: Normal movement on sniff testing      PLAN:  Scheduled and as needed bronchodilators and steroid for asthma exacerbation.  Steroids changed back to IV.  Treatment for viral process is supportive.  Wean oxygen as able.  Goal oxygen saturation 88 to 92%.  Positive airway pressure for sleep apnea.  Needs to use it with all sleep.  With persistently elevated right hemidiaphragm ordered sniff testing.  Shows elevated right hemidiaphragm but normal movement.  Likely home soon.  Will have her follow-up with us in the  office shortly after discharge.        Giorgio Deng MD  Pulmonary and Critical Care Medicine  [facility], [facility]  1/29/2025    09:00 EST

## 2025-01-29 NOTE — CASE MANAGEMENT/SOCIAL WORK
Continued Stay Note  [facility]     Patient Name: Flaca Hassan  MRN: [id number]  Today's Date: 1/29/2025    Admit Date: 1/21/2025    Plan: Home with [facility] and family assist   Discharge Plan       Row Name 01/29/25 4179       Plan    Plan Home with [facility] and family assist    Patient/Family in Agreement with Plan yes    Plan Comments CCP reviewed chart, discussed during MDR, pt weaned form 5L to 3L, she states she doesn't feel well, is tolerating bipap with sleep although she doesn't like it. She walked with Pt 60 ft. [facility] may be ready for dc home tomorrow, CCP will follow - Jewels WELCH                   Discharge Codes    No documentation.                 Expected Discharge Date and Time       Expected Discharge Date Expected Discharge Time    Jan 31, 2025               Jewels Quintanilla RN

## 2025-01-29 NOTE — NURSING NOTE
"Read note from RT, talked to patient about wearing BiPap. Pt states, I told her its okay to do tomorrow. This RN explained that she was willing to have it on and the  Really wants her to have it on. Pt. States, \"its okay, put it on\". SYLVESTER Romeo and this RN placed it on patient, but is alarming some. Pt is 97% currently. Called RT and asked her to please come and evaluate it. RT states they will be up in a bit  "

## 2025-01-30 ENCOUNTER — READMISSION MANAGEMENT (OUTPATIENT)
Dept: CALL CENTER | Facility: HOSPITAL | Age: 60
End: 2025-01-30
Payer: MEDICARE

## 2025-01-30 VITALS
TEMPERATURE: 98.8 F | DIASTOLIC BLOOD PRESSURE: 79 MMHG | RESPIRATION RATE: 18 BRPM | SYSTOLIC BLOOD PRESSURE: 127 MMHG | OXYGEN SATURATION: 95 % | HEART RATE: 74 BPM

## 2025-01-30 LAB
ANION GAP SERPL CALCULATED.3IONS-SCNC: 8 MMOL/L (ref 5–15)
BUN SERPL-MCNC: 37 MG/DL (ref 6–20)
BUN/CREAT SERPL: 41.1 (ref 7–25)
CALCIUM SPEC-SCNC: 8.8 MG/DL (ref 8.6–10.5)
CHLORIDE SERPL-SCNC: 104 MMOL/L (ref 98–107)
CO2 SERPL-SCNC: 25 MMOL/L (ref 22–29)
CREAT SERPL-MCNC: 0.9 MG/DL (ref 0.57–1)
DEPRECATED RDW RBC AUTO: 44.9 FL (ref 37–54)
EGFRCR SERPLBLD CKD-EPI 2021: 73.8 ML/MIN/1.73
ERYTHROCYTE [DISTWIDTH] IN BLOOD BY AUTOMATED COUNT: 14.9 % (ref 12.3–15.4)
GLUCOSE SERPL-MCNC: 140 MG/DL (ref 65–99)
HCT VFR BLD AUTO: 42.1 % (ref 34–46.6)
HGB BLD-MCNC: 13.6 G/DL (ref 12–15.9)
MAGNESIUM SERPL-MCNC: 2.2 MG/DL (ref 1.6–2.6)
MCH RBC QN AUTO: 27.3 PG (ref 26.6–33)
MCHC RBC AUTO-ENTMCNC: 32.3 G/DL (ref 31.5–35.7)
MCV RBC AUTO: 84.4 FL (ref 79–97)
PHOSPHATE SERPL-MCNC: 3.8 MG/DL (ref 2.5–4.5)
PLATELET # BLD AUTO: 327 10*3/MM3 (ref 140–450)
PMV BLD AUTO: 11 FL (ref 6–12)
POTASSIUM SERPL-SCNC: 4 MMOL/L (ref 3.5–5.2)
RBC # BLD AUTO: 4.99 10*6/MM3 (ref 3.77–5.28)
SODIUM SERPL-SCNC: 137 MMOL/L (ref 136–145)
WBC NRBC COR # BLD AUTO: 9.96 10*3/MM3 (ref 3.4–10.8)

## 2025-01-30 PROCEDURE — 85027 COMPLETE CBC AUTOMATED: CPT | Performed by: STUDENT IN AN ORGANIZED HEALTH CARE EDUCATION/TRAINING PROGRAM

## 2025-01-30 PROCEDURE — 94799 UNLISTED PULMONARY SVC/PX: CPT

## 2025-01-30 PROCEDURE — 84100 ASSAY OF PHOSPHORUS: CPT | Performed by: STUDENT IN AN ORGANIZED HEALTH CARE EDUCATION/TRAINING PROGRAM

## 2025-01-30 PROCEDURE — 25010000002 METHYLPREDNISOLONE PER 40 MG: Performed by: INTERNAL MEDICINE

## 2025-01-30 PROCEDURE — 25010000002 HEPARIN (PORCINE) PER 1000 UNITS: Performed by: STUDENT IN AN ORGANIZED HEALTH CARE EDUCATION/TRAINING PROGRAM

## 2025-01-30 PROCEDURE — 83735 ASSAY OF MAGNESIUM: CPT | Performed by: STUDENT IN AN ORGANIZED HEALTH CARE EDUCATION/TRAINING PROGRAM

## 2025-01-30 PROCEDURE — 94664 DEMO&/EVAL PT USE INHALER: CPT

## 2025-01-30 PROCEDURE — 94760 N-INVAS EAR/PLS OXIMETRY 1: CPT

## 2025-01-30 PROCEDURE — 94761 N-INVAS EAR/PLS OXIMETRY MLT: CPT

## 2025-01-30 PROCEDURE — 80048 BASIC METABOLIC PNL TOTAL CA: CPT | Performed by: STUDENT IN AN ORGANIZED HEALTH CARE EDUCATION/TRAINING PROGRAM

## 2025-01-30 RX ORDER — BENZONATATE 200 MG/1
200 CAPSULE ORAL 3 TIMES DAILY PRN
Qty: 21 CAPSULE | Refills: 0 | Status: SHIPPED | OUTPATIENT
Start: 2025-01-30 | End: 2025-02-06

## 2025-01-30 RX ORDER — GUAIFENESIN 600 MG/1
600 TABLET, EXTENDED RELEASE ORAL EVERY 12 HOURS SCHEDULED
Qty: 14 TABLET | Refills: 0 | Status: SHIPPED | OUTPATIENT
Start: 2025-01-30 | End: 2025-02-06

## 2025-01-30 RX ORDER — PREDNISONE 20 MG/1
40 TABLET ORAL
Status: DISCONTINUED | OUTPATIENT
Start: 2025-01-31 | End: 2025-01-30 | Stop reason: HOSPADM

## 2025-01-30 RX ORDER — IPRATROPIUM BROMIDE AND ALBUTEROL SULFATE 2.5; .5 MG/3ML; MG/3ML
3 SOLUTION RESPIRATORY (INHALATION) 4 TIMES DAILY PRN
Qty: 360 ML | Refills: 0 | Status: SHIPPED | OUTPATIENT
Start: 2025-01-30 | End: 2025-03-01

## 2025-01-30 RX ORDER — PREDNISONE 20 MG/1
TABLET ORAL
Qty: 11 TABLET | Refills: 0 | Status: SHIPPED | OUTPATIENT
Start: 2025-01-31 | End: 2025-02-09

## 2025-01-30 RX ADMIN — MONTELUKAST 10 MG: 10 TABLET, FILM COATED ORAL at 10:38

## 2025-01-30 RX ADMIN — POTASSIUM CHLORIDE 10 MEQ: 750 TABLET, EXTENDED RELEASE ORAL at 10:38

## 2025-01-30 RX ADMIN — CLOPIDOGREL BISULFATE 75 MG: 75 TABLET ORAL at 10:38

## 2025-01-30 RX ADMIN — IPRATROPIUM BROMIDE AND ALBUTEROL SULFATE 3 ML: 2.5; .5 SOLUTION RESPIRATORY (INHALATION) at 10:20

## 2025-01-30 RX ADMIN — AMLODIPINE BESYLATE 10 MG: 10 TABLET ORAL at 10:38

## 2025-01-30 RX ADMIN — BUDESONIDE AND FORMOTEROL FUMARATE DIHYDRATE 2 PUFF: 160; 4.5 AEROSOL RESPIRATORY (INHALATION) at 06:57

## 2025-01-30 RX ADMIN — IPRATROPIUM BROMIDE AND ALBUTEROL SULFATE 3 ML: 2.5; .5 SOLUTION RESPIRATORY (INHALATION) at 15:24

## 2025-01-30 RX ADMIN — LOSARTAN POTASSIUM 50 MG: 50 TABLET, FILM COATED ORAL at 10:38

## 2025-01-30 RX ADMIN — Medication 10 ML: at 10:39

## 2025-01-30 RX ADMIN — IPRATROPIUM BROMIDE AND ALBUTEROL SULFATE 3 ML: 2.5; .5 SOLUTION RESPIRATORY (INHALATION) at 06:56

## 2025-01-30 RX ADMIN — FLUTICASONE PROPIONATE 2 SPRAY: 50 SPRAY, METERED NASAL at 10:39

## 2025-01-30 RX ADMIN — METHYLPREDNISOLONE SODIUM SUCCINATE 40 MG: 40 INJECTION, POWDER, FOR SOLUTION INTRAMUSCULAR; INTRAVENOUS at 06:35

## 2025-01-30 RX ADMIN — GUAIFENESIN 600 MG: 600 TABLET, EXTENDED RELEASE ORAL at 10:38

## 2025-01-30 RX ADMIN — HEPARIN SODIUM 5000 UNITS: 5000 INJECTION INTRAVENOUS; SUBCUTANEOUS at 06:36

## 2025-01-30 RX ADMIN — BISOPROLOL FUMARATE 5 MG: 5 TABLET ORAL at 10:38

## 2025-01-30 RX ADMIN — FUROSEMIDE 40 MG: 40 TABLET ORAL at 10:38

## 2025-01-30 RX ADMIN — PANTOPRAZOLE SODIUM 40 MG: 40 INJECTION, POWDER, FOR SOLUTION INTRAVENOUS at 10:38

## 2025-01-30 NOTE — PLAN OF CARE
Goal Outcome Evaluation:  Plan of Care Reviewed With: patient        Progress: improving  Outcome Evaluation: Patient is AOX4, 4L oxygen, Up on chair all day, Takes medication without distress, Stand-by assistX1, Will monitor continue.

## 2025-01-30 NOTE — DISCHARGE SUMMARY
Patient Name: Flaca Hassan  : 1965  MRN: [id number]    Date of Admission: 2025  Date of Discharge:  2025  Primary Care Physician: Isidro Hernandez MD      Chief Complaint:   Shortness of Breath      Discharge Diagnoses     Active Hospital Problems    Diagnosis  POA    **RSV bronchitis [J20.5]  Yes    Moderate persistent asthma with exacerbation [J45.41]  Yes    Stage 3a chronic kidney disease [N18.31]  Yes    Acute respiratory failure with hypoxia [J96.01]  Yes    LI (obstructive sleep apnea) [G47.33]  Yes    Endometrial cancer [C54.1]  Yes    IFG (impaired fasting glucose) [R73.01]  Yes    Coronary artery disease involving native heart without angina pectoris [I25.10]  Yes    Hypertension [I10]  Yes    Hyperlipidemia [E78.5]  Yes      Resolved Hospital Problems   No resolved problems to display.        Hospital Course     Patient is a 59 h/o f asthma , CAD, HTN, HLD, CKD stage IIIa, endometrial cancer ,  presented  the hospital with shortness of breath and cough.       Acute exacerbation of moderate persistent asthma secondary to RSV infection  Acute on chronic hypoxemic respiratory failure  elevated right hemidiaphragm   -On 2 L nasal cannula at baseline  -Respiratory viral panel due to 2025 was positive for RSV  -CT scan without contrast 2025 showed mild patchy areas of atelectasis or pneumonia in the bilateral lower lobes.  -CTA 2025 with no obvious PE  -Treated with IV steroids and transitioned to p.o., DuoNebs, BiPAP, pulmonology followed inpatient  Due to concern for phrenic nerve palsy in the setting of elevated right hemidiaphragm, underwent sniff test, showed elevated right hemidiaphragm but normal movement.   -Patient was cleared to be discharged from pulmonology standpoint with plan for close follow-up.  Discharged on DuoNebs, prednisone taper, Mucinex, as needed Tessalon Perles.  Outpatient Trelegy inhaler was continued     AMS  Encephalopathy.   -Team D was  called 01/25, stat ABG was obtained with normal pH, pCO2 of 48.  Stat chest x-ray with no acute findings  -urinalysis with no signs of infection, chest x-ray stable  -Routine EEG with no seizure activity  -Neurology evaluated MRI ordered however patient unable to tolerate,  -TSH normal, ammonia normal.  B12 normal.  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  -General Surgery recommended outpatient follow-up and discussion of elective laparoscopic/robotic Morgagni hernia repair in the future.            Enlarged mediastinal nodes  CT scan showed A few small to slightly enlarged mediastinal nodes are seen.Short-term follow-up CT of the chest without contrast in approximately 3 months suggested per radiology.  Suspect enlarged lymph node secondary to RSV infection/Pneumonia ,  defer outpatient evaluation to pulmonology.        History of CAD  Hypertension  Continue outpatient meds           CKD stage IIIa  -Monitor daily BMP.  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            Day of Discharge     Subjective:  Patient seen this morning.  Was on BiPAP  overnight but reports to have difficulty tolerating hospital BiPAP machine, and prefers home BiPAP.    Physical Exam:  Temp:  [97.3 °F (36.3 °C)-99.3 °F (37.4 °C)] 99.3 °F (37.4 °C)  Heart Rate:  [67-82] 76  Resp:  [18-25] 18  BP: (137-153)/(65-77) 153/77  There is no height or weight on file to calculate BMI.  Physical Exam    General: Alert, sitting up in a chair, not in distress,  HEENT: Normocephalic, atraumatic  CV: Regular rate and rhythm, no murmurs rubs or gallops  Lungs: Decreased, no wheezing, nonlabored breathing,  Abdomen: Soft, distended, nontender  Extremities: No significant peripheral edema , no cyanosis     Consultants     Consult Orders (all) (From admission, onward)       Start     Ordered    01/27/25 1225  Inpatient General Surgery Consult  Once        Specialty:  General Surgery  Provider:  Irlanda Baig MD    01/27/25 1225    01/27/25 0927  Inpatient Gastroenterology Consult  Once,   Status:  Canceled        Specialty:  Gastroenterology  Provider:  Ousmane Vergara MD    01/27/25 0927    01/25/25 1731  Inpatient Neurology Consult General  Once        Specialty:  Neurology  Provider:  Ruslan Joaquin MD    01/25/25 1730    01/22/25 0925  Inpatient Case Management  Consult  Once        Provider:  (Not yet assigned)    01/22/25 0924    01/21/25 2243  Inpatient Pulmonology Consult  Once        Specialty:  Pulmonary Disease  Provider:  Olivia Nichole MD    01/21/25 2242 01/21/25 1955  [facility] (on-call MD unless specified) Details  Once        Specialty:  Hospitalist  Provider:  (Not yet assigned)    01/21/25 1955 01/21/25 1953  [facility] (on-call MD unless specified) Details  Once,   Status:  Canceled        Specialty:  Hospitalist  Provider:  (Not yet assigned)    01/21/25 1952                  Procedures     * Surgery not found *      Imaging Results (All)       Procedure Component Value Units Date/Time    FL Sniff Test [[id number]] Collected: 01/28/25 1249     Updated:  01/28/25 1550    Narrative:      EXAMINATION: Sniff test under fluoroscopy.     DATE: 1/28/2025     HISTORY: Right-sided elevated hemidiaphragm and shortness of breath.     *  Dose Area Product: 288 mGym2     FINDINGS: The patient was brought into the fluoroscopic room and placed  in the semiupright position due to inability to stand.  Multiple  breathing exercises were observed under fluoroscopy. Images obtained by  MERRITT Ortiz.      image of chest shows no acute cardiopulmonary process. Persistent  elevated right hemidiaphragm with hypoinflation of the lungs.     The bilateral hemidiaphragms show normal inspiratory and expiratory  excursion.  The right-sided elevated hemidiaphragm is normal in  appearance under fluoroscopy. No paradoxical motion observed.       Impression:      1: Elevated right hemidiaphragm with normal hemidiaphragm movement seen  bilaterally, with no evidence of paralysis.              This report was finalized on 1/28/2025 3:47 PM by Dr. Oscar Atkinson M.D on Workstation: [id number]       CT Angiogram Chest [[id number]] Collected: 01/27/25 1132     Updated: 01/27/25 1153    Narrative:      CT ANGIOGRAM CHEST-     INDICATION: Shortness of air, worsening heart failure     COMPARISON: CT chest January 22, 2025     TECHNIQUE:  CTA of the chest with IV contrast. Coronal and sagittal reformats. Three  dimensional reconstructions. Radiation dose reduction techniques were  utilized, including automated exposure control and exposure modulation  based on body size.     FINDINGS:      Pulmonary arteries: Streak artifact from contrast in the venous system.  Motion artifact. Poor evaluation of segmental and subsegmental pulmonary  arteries in the left lower lobe. No definitive pulmonary embolism.     Chest wall: No lymphadenopathy.     Thyroid: Hypoattenuating left thyroid lobe nodule, series 4, axial mage  1, measures 1.4 cm, unchanged.     Mediastinum: Coronary artery atherosclerotic  calcifications. Heart is  normal in size. No pericardial effusion. No mediastinal or hilar  lymphadenopathy.     Lungs/pleura: No effusions. Central and peripheral airway occlusions  seen in the right middle lobe and right lower lobe. Subsegmental airway  occlusions seen in the basilar left lower lobe. Right middle lobe  collapse. Subtotal right lower lobe collapse, with the superior segment  right lower lobe appearing aerated and the basilar segments collapsed.  Small amount of subsegmental atelectasis suspected in the lingula.  Partial atelectasis in the basilar left lower lobe.     Upper abdomen: Large transverse colon and fat-containing  Morgagni/diaphragmatic hernia extends into the anterior mediastinum and  right thorax, with some mass effect on the adjacent heart, with the  hernia neck measuring approximately 6.5 cm, series 6, coronal image 42  and series 7, sagittal image 112.     Osseous structures: Right glenohumeral osteoarthritis.       Impression:         1. No definitive pulmonary embolism. Poor evaluation of left lower lobe  segmental and subsegmental pulmonary arteries secondary to streak  artifact from contrast in the venous system and respiratory motion  artifact. If symptoms persist, follow-up CTA to reevaluate.  2. Large transverse colon and fat-containing Morgagni/diaphragmatic  hernia, with transverse colon and fat extend into the anterior  mediastinum and right hemithorax, with associated central and peripheral  airway occlusions seen in the right middle lobe and right lower lobe  with right middle lobe collapse and subtotal right lower lobe collapse.  3. Suspect partial atelectasis with subsegmental airway occlusions in  the basilar left lower lobe.     This report was finalized on 1/27/2025 11:50 AM by Dr. Nikhil Hernandez M.D on Workstation: [id number]       XR Chest 1 View [[id number]] Collected: 01/27/25 0539     Updated: 01/27/25 0539    Narrative:        Patient: RICK GONZALEZ   Time Out: 05:38  Exam(s): XR CXR 1 VIEW     EXAM:    XR Chest, 1 View    CLINICAL HISTORY:     Reason for exam: increased respiratory rate.    TECHNIQUE:    Frontal view of the chest.    COMPARISON:    No relevant prior studies available.    FINDINGS:    Lungs:  Hypoinflation, consider interstitial edema or atypical   infection.    Pleural space:  Unremarkable.    Heart:  Obscured cardiac silhouette.    Bones joints:  No acute displaced fracture.    Upper abdomen:  Elevated right diaphragm.    IMPRESSION:       1.  Hypoinflation, consider interstitial edema or atypical infection.  2.  Recommend repeat imaging in 3 months.      Impression:          Electronically signed by Germán Moreno MD on 01-27-25 at 0538    US Gallbladder [[id number]] Collected: 01/26/25 1246     Updated: 01/26/25 1251    Narrative:      US GALLBLADDER-1/26/2025     HISTORY: Right upper quadrant pain.     The gallbladder is well distended with no gallstones wall thickening or  pericholecystic fluid. The common bile duct is not dilated measuring 3.5  mm.     Pancreas is not well seen. Liver appears relatively unremarkable. Right  kidney measures 8.0 cm in length.       Impression:      1. Normal-appearing gallbladder.  2. Poor visualization of the pancreas.  3. Atrophic right kidney.        This report was finalized on 1/26/2025 12:48 PM by Dr. Oscar Atkinson M.D on Workstation: [id number]       XR Chest 1 View [[id number]] Collected: 01/25/25 1307     Updated: 01/25/25 1311    Narrative:      XR CHEST 1 VW-1/25/2025     HISTORY: Difficulty breathing.     Heart size is mildly enlarged. There is elevation of the right  hemidiaphragm. Lungs are underinflated. There may be some minimal  pleural fluid on the left. There also may be some minimal patchy  atelectasis or infiltrate in the left base. Mild degenerative changes  are seen in the spine.       Impression:      1. Underinflation of the lungs with elevation of the right  hemidiaphragm.  This diaphragmatic elevation has been seen on previous  studies.  2. There may be some minimal left pleural effusion with minimal  atelectasis or infiltrate in the left base.        This report was finalized on 1/25/2025 1:08 PM by Dr. Oscar Atkinson M.D on Workstation: [id number]       CT Chest Without Contrast Diagnostic [[id number]] Collected: 01/22/25 1240     Updated: 01/23/25 1749    Narrative:      CT OF THE CHEST WITHOUT CONTRAST 01/22/2025     HISTORY: Respiratory distress.     Spiral images were obtained from the lung apices to the upper abdomen.  No intravenous contrast was given.     There are patchy areas of atelectasis or inflammatory infiltrate in the  bilateral lower lobes. Right hemidiaphragm is elevated. A few small to  slightly enlarged mediastinal nodes are seen.     Upper abdomen is otherwise unremarkable except for at least one  gallstone on the inferior most image (image 98).     There is some aortic and coronary calcification.       Impression:      1. Elevated right hemidiaphragm.  2. Mild patchy areas of atelectasis or pneumonia in the bilateral lower  lobes.  3. A few small to slightly enlarged mediastinal nodes are seen.  4. Short-term follow-up CT of the chest without contrast in  approximately 3 months suggested.     Radiation dose reduction techniques were utilized, including automated  exposure control and exposure modulation based on body size.        This report was finalized on 1/23/2025 5:46 PM by Dr. Oscar Atkinson M.D on Workstation: [id number]       XR Chest 1 View [[id number]] Collected: 01/21/25 1907     Updated: 01/22/25 1023    Addenda:        XR CHEST 1 VW-     HISTORY: Female who is 59 years-old, dyspnea, hypoxia     TECHNIQUE: Frontal views of the chest     COMPARISON: 1/2/2025     FINDINGS: The heart size appears borderline. Pulmonary vasculature  appears mildly congested mild atelectasis or infiltrate at the bases,  follow-up suggested. There may be minimal pleural  effusions. No  pneumothorax. Right hemidiaphragm is elevated. No acute osseous process.     IMPRESSION: As described.           This report was finalized on 1/22/2025 10:20 AM by Dr. Pan Alexandre M.D on Workstation: [id number]     Signed: 01/22/25 1020 by Pan Alexandre MD            XR CHEST 1 VW-     HISTORY: Male who is 65 years-old, short of breath     TECHNIQUE: Frontal views of the chest     COMPARISON: 1/2/2025     FINDINGS: The heart size appears borderline. Pulmonary vasculature  appears mildly congested mild atelectasis or infiltrate at the bases,  follow-up suggested. There may be minimal pleural effusions. No  pneumothorax. Right hemidiaphragm is elevated. No acute osseous process.     IMPRESSION: As described.        This report was finalized on 1/21/2025 7:30 PM by Dr. Pan Alexandre M.D on Workstation: [id number]     Signed: 01/21/25 1930 by Pan Alexandre MD    Narrative:      XR CHEST 1 VW-     HISTORY: Male who is 65 years-old, short of breath     TECHNIQUE: Frontal views of the chest     COMPARISON: 10/30/2018     FINDINGS: The heart size appears borderline. Pulmonary vasculature  appears mildly congested mild atelectasis or infiltrate at the bases,  follow-up suggested. There may be minimal pleural effusions. No  pneumothorax. Right hemidiaphragm is elevated. No acute osseous process.       Impression:      As described.     This report was finalized on 1/21/2025 7:09 PM by Dr. Pan Alexandre M.D on Workstation: [id number]               Results for orders placed in visit on 01/10/22    Adult Transthoracic Echo Complete W/ Cont if Necessary Per Protocol    Interpretation Summary  · Calculated left ventricular EF = 59.4% Estimated left ventricular EF was in agreement with the calculated left ventricular EF.  · Left ventricular diastolic function was normal.  · There is no evidence of pericardial effusion. .    Pertinent Labs     Results from last 7 days   Lab Units 01/30/25  8080  "01/29/25  0550 01/28/25  0553 01/27/25  0527   WBC 10*3/mm3 9.96 10.41 11.39* 10.93*   HEMOGLOBIN g/dL 13.6 13.3 13.5 14.9   PLATELETS 10*3/mm3 327 283 323 335     Results from last 7 days   Lab Units 01/30/25  0535 01/29/25  0550 01/28/25  0553 01/27/25  0527   SODIUM mmol/L 137 135* 139 138   POTASSIUM mmol/L 4.0 4.5 4.7 4.3   CHLORIDE mmol/L 104 103 108* 103   CO2 mmol/L 25.0 23.0 21.0* 23.2   BUN mg/dL 37* 39* 28* 30*   CREATININE mg/dL 0.90 1.00 0.93 1.07*   GLUCOSE mg/dL 140* 136* 153* 150*   Estimated Creatinine Clearance: 70.4 mL/min (by C-G formula based on SCr of 0.9 mg/dL).  Results from last 7 days   Lab Units 01/26/25  0703   ALBUMIN g/dL 3.9   BILIRUBIN mg/dL 0.9   ALK PHOS U/L 89   AST (SGOT) U/L 21   ALT (SGPT) U/L 23     Results from last 7 days   Lab Units 01/30/25  0535 01/29/25  0550 01/28/25  0553 01/27/25  0527 01/26/25  0703   CALCIUM mg/dL 8.8 8.8 8.8 9.0 9.1   ALBUMIN g/dL  --   --   --   --  3.9   MAGNESIUM mg/dL 2.2 2.3 2.0 1.9 1.8   PHOSPHORUS mg/dL 3.8 4.4 3.4 3.3 3.9     Results from last 7 days   Lab Units 01/26/25  1054 01/26/25  0703   LIPASE U/L  --  47   AMMONIA umol/L 28  --      Results from last 7 days   Lab Units 01/26/25  1054 01/26/25  0703   HSTROP T ng/L 15* 15*           Invalid input(s): \"LDLCALC\"  Results from last 7 days   Lab Units 01/26/25  1054   BLOODCX  No growth at 4 days         Test Results Pending at Discharge     Pending Labs       Order Current Status    Blood Culture - Blood, Hand, Right Preliminary result            Discharge Details        Discharge Medications        New Medications        Instructions Start Date   benzonatate 200 MG capsule  Commonly known as: TESSALON   200 mg, Oral, 3 Times Daily PRN      guaiFENesin 600 MG 12 hr tablet  Commonly known as: MUCINEX   600 mg, Oral, Every 12 Hours Scheduled      ipratropium-albuterol 0.5-2.5 mg/3 ml nebulizer  Commonly known as: DUO-NEB   Take 3 mL (one vial) by nebulization 4 (Four) Times a Day As Needed " for Wheezing.      predniSONE 20 MG tablet  Commonly known as: DELTASONE   Take 2 tablets by mouth Daily With Breakfast for 3 days, THEN 1 tablet Daily With Breakfast for 3 days, THEN 0.5 tablets Daily With Breakfast for 3 days.   Start Date: January 31, 2025            Changes to Medications        Instructions Start Date   nitroglycerin 0.4 MG SL tablet  Commonly known as: NITROSTAT  What changed:   how much to take  how to take this  when to take this  reasons to take this   1 under the tongue as needed for angina, may repeat q5mins for up three doses             Continue These Medications        Instructions Start Date   acetaminophen 500 MG tablet  Commonly known as: TYLENOL   1,000 mg, Every 6 Hours PRN      amLODIPine 10 MG tablet  Commonly known as: NORVASC   10 mg, Oral, Daily      bisoprolol-hydrochlorothiazide 5-6.25 MG per tablet  Commonly known as: ZIAC   1 tablet, Oral, Daily      clopidogrel 75 MG tablet  Commonly known as: PLAVIX   75 mg, Oral, Daily      escitalopram 20 MG tablet  Commonly known as: LEXAPRO   20 mg, Oral, Nightly      fluticasone 50 MCG/ACT nasal spray  Commonly known as: FLONASE   2 sprays, Daily      Folic Acid 20 MG capsule   1 capsule      furosemide 40 MG tablet  Commonly known as: LASIX   40 mg, Oral, Daily      montelukast 10 MG tablet  Commonly known as: SINGULAIR   10 mg, Oral, Daily      olmesartan 40 MG tablet  Commonly known as: BENICAR   40 mg, Oral, Daily      pantoprazole 40 MG EC tablet  Commonly known as: PROTONIX   40 mg, Oral, Daily      potassium chloride 10 MEQ CR capsule  Commonly known as: MICRO-K   10 mEq, Oral, Daily      Proctozone-HC 2.5 % rectal cream  Generic drug: Hydrocortisone (Perianal)   1 application , Rectal, As Needed      risperiDONE 0.5 MG tablet  Commonly known as: risperDAL   0.5 mg, Daily      simvastatin 20 MG tablet  Commonly known as: ZOCOR   20 mg, Oral, Every Evening      Trelegy Ellipta 200-62.5-25 MCG/ACT inhaler  Generic drug:  Fluticasone-Umeclidin-Vilant   1 puff, Daily      Ventolin  (90 Base) MCG/ACT inhaler  Generic drug: albuterol sulfate HFA   2 puffs, Every 4 Hours PRN      Vitamin D3 50 MCG (2000 UT) tablet   1 tablet, Daily               Allergies   Allergen Reactions    Peanut (Diagnostic) Itching       Discharge Disposition:  Home-Health Care [facility]      Discharge Diet:  Diet Order   Procedures    Diet: Cardiac, Diabetic, Renal; Healthy Heart (2-3 Na+); Consistent Carbohydrate; Low Sodium (2-3g), Low Potassium, Low Phosphorus; No Pork; Fluid Consistency: Thin (IDDSI 0)       Discharge Activity:       CODE STATUS:    Code Status and Medical Interventions: CPR (Attempt to Resuscitate); Full Support   Ordered at: 01/21/25 2117     Code Status (Patient has no pulse and is not breathing):    CPR (Attempt to Resuscitate)     Medical Interventions (Patient has pulse or is breathing):    Full Support       Future Appointments   Date Time Provider Department Center   8/28/2025  2:00 PM Imer Montaño MD [facility]   11/17/2025  3:15 PM Enid Hernandez MD [id number] TE     Additional Instructions for the Follow-ups that You Need to Schedule       Ambulatory Referral to Home Health (Hospital)   As directed      Face to Face Visit Date: 1/24/2025   Follow-up provider for Plan of Care?: I treated the patient in an acute care facility and will not continue treatment after discharge.   Follow-up provider: ENID HERNANDEZ [[id number]]   Reason/Clinical Findings: mobility below baseline   Describe mobility limitations that make leaving home difficult: doesnt drive   Nursing/Therapeutic Services Requested: Physical Therapy   PT orders: Therapeutic exercise Strengthening   Frequency: 1 Week 1               Contact information for follow-up providers       Giorgio Deng MD Follow up in 1 week(s).    Specialties: Pulmonary Disease, Intensive Care  Contact information:  [address]  [address]  [phone]               Enid Roberts MD. Schedule an appointment as soon as possible for a visit in 1 week(s).    Specialty: Family Medicine  Contact information:  [address] [facility] [address]  [facility] [id number] [phone]               Irlanda Baig MD Follow up.    Specialty: General Surgery  Why: Call to schedule appointment for discussion of your diaphragmatic hernia.  Contact information:  [address] [facility] [address]  [facility] [id number] [phone]                       Contact information for after-discharge care       Home Medical Care       [facility] .    Service: Home Health Services  Contact information:  [address]  [facility] [address] [phone]                                   Additional Instructions for the Follow-ups that You Need to Schedule       Ambulatory Referral to Home Health (Hospital)   As directed      Face to Face Visit Date: 1/24/2025   Follow-up provider for Plan of Care?: I treated the patient in an acute care facility and will not continue treatment after discharge.   Follow-up provider: ENID ROBERTS [[id number]]   Reason/Clinical Findings: mobility below baseline   Describe mobility limitations that make leaving home difficult: doesnt drive   Nursing/Therapeutic Services Requested: Physical Therapy   PT orders: Therapeutic exercise Strengthening   Frequency: 1 Week 1            Time Spent on Discharge:  Greater than 30 minutes      Jen Blanco MD  [facility]  01/30/25  13:05 EST

## 2025-01-30 NOTE — PROGRESS NOTES
[facility] INPATIENT PROGRESS NOTE         [address]    2025      PATIENT IDENTIFICATION:  Name: Flaca Hassan ADMIT: 2025   : 1965  PCP: Isidro Hernandez MD    MRN: [id number] LOS: 8 days   AGE/SEX: 59 y.o. female  ROOM: [facility]                     LOS 8    Reason for visit: RSV      SUBJECTIVE:      Resting comfortably.  Says that she does not like our sleep machine has not is comfortable with hers at home.  If family would bring hers and they can use it while here but she is hoping to go home today.  Mild wheezing on auscultation noted.  No objection to discharge from my standpoint and further treatment can be done as an outpatient.  Weaning oxygen as able.    Objective   OBJECTIVE:    Vital Sign Min/Max for last 24 hours  Temp  Min: 97.3 °F (36.3 °C)  Max: 99.3 °F (37.4 °C)   BP  Min: 137/71  Max: 153/77   Pulse  Min: 67  Max: 87   Resp  Min: 18  Max: 25   SpO2  Min: 89 %  Max: 97 %   No data recorded   No data recorded    Vitals:    25 0657 25 0702 25 0755 25 1020   BP:   153/77    BP Location:   Left arm    Patient Position:   Lying    Pulse: 70 74 82 76   Resp: 18 18 18 18   Temp:   99.3 °F (37.4 °C)    TempSrc:   Oral    SpO2: 95% 97% 93% 93%        There were no vitals filed for this visit.    There is no height or weight on file to calculate BMI.                          There is no height or weight on file to calculate BMI.    Intake/Output Summary (Last 24 hours) at 2025 1048  Last data filed at 2025 0700  Gross per 24 hour   Intake 360 ml   Output --   Net 360 ml             Exam:  GEN:  No distress, appears stated age  EYES:   PERRL, anicteric sclerae  ENT:    External ears/nose normal, OP clear  NECK:  No adenopathy, midline trachea  LUNGS: Normal chest on inspection, palpation and diminished on auscultation  CV:  Normal S1S2, without murmur  ABD:  Nontender, nondistended, no hepatosplenomegaly, +BS  EXT:  No edema.  No cyanosis  or clubbing.  No mottling and normal cap refill.    Assessment     Scheduled meds:  amLODIPine, 10 mg, Oral, Daily  atorvastatin, 10 mg, Oral, Daily  bisoprolol, 5 mg, Oral, Daily  budesonide-formoterol, 2 puff, Inhalation, BID - RT  clopidogrel, 75 mg, Oral, Daily  fluticasone, 2 spray, Nasal, Daily  furosemide, 40 mg, Oral, Daily  guaiFENesin, 600 mg, Oral, Q12H  heparin (porcine), 5,000 Units, Subcutaneous, Q8H  ipratropium-albuterol, 3 mL, Nebulization, 4x Daily - RT  losartan, 50 mg, Oral, Q24H  methylPREDNISolone sodium succinate, 40 mg, Intravenous, Q12H  montelukast, 10 mg, Oral, Daily  pantoprazole, 40 mg, Intravenous, BID AC  potassium chloride, 10 mEq, Oral, Daily  [Held by provider] predniSONE, 40 mg, Oral, Daily With Breakfast  senna-docusate sodium, 2 tablet, Oral, BID  sodium chloride, 10 mL, Intravenous, Q12H      IV meds:                         Data Review:  Results from last 7 days   Lab Units 01/30/25  0535 01/29/25  0550 01/28/25  0553 01/27/25  0527 01/26/25  0703   SODIUM mmol/L 137 135* 139 138 134*   POTASSIUM mmol/L 4.0 4.5 4.7 4.3 4.0   CHLORIDE mmol/L 104 103 108* 103 98   CO2 mmol/L 25.0 23.0 21.0* 23.2 24.0   BUN mg/dL 37* 39* 28* 30* 29*   CREATININE mg/dL 0.90 1.00 0.93 1.07* 1.20*   GLUCOSE mg/dL 140* 136* 153* 150* 142*   CALCIUM mg/dL 8.8 8.8 8.8 9.0 9.1         Estimated Creatinine Clearance: 70.4 mL/min (by C-G formula based on SCr of 0.9 mg/dL).  Results from last 7 days   Lab Units 01/30/25  0535 01/29/25  0550 01/28/25  0553 01/27/25  0527 01/26/25  0703   WBC 10*3/mm3 9.96 10.41 11.39* 10.93* 11.67*   HEMOGLOBIN g/dL 13.6 13.3 13.5 14.9 14.6   PLATELETS 10*3/mm3 327 283 323 335 315         Results from last 7 days   Lab Units 01/26/25  0703   ALT (SGPT) U/L 23   AST (SGOT) U/L 21     Results from last 7 days   Lab Units 01/27/25  0435 01/25/25  1234   PH, ARTERIAL pH units 7.422 7.416   PO2 ART mm Hg 57.1* 61.4*   PCO2, ARTERIAL mm Hg 39.2 38.4   HCO3 ART mmol/L 25.6 24.7  "    Results from last 7 days   Lab Units 01/26/25  0703   PROCALCITONIN ng/mL 0.02         No results found for: \"HGBA1C\", \"POCGLU\"          Imaging reviewed  CT chest 1/22 reviewed    Chest x-ray 1/27 reviewed: Shows hypoinflation with all volumes and interstitial edema.  Also noted a chronically elevated right hemidiaphragm.        Sniff testing reviewed shows elevated right hemidiaphragm but normal movement bilaterally.        Microbiology reviewed  RVP: +RSV          EEG is normal.                Active Hospital Problems    Diagnosis  POA    **RSV bronchitis [J20.5]  Yes    Moderate persistent asthma with exacerbation [J45.41]  Yes    Stage 3a chronic kidney disease [N18.31]  Yes    Acute respiratory failure with hypoxia [J96.01]  Yes    LI (obstructive sleep apnea) [G47.33]  Yes    Endometrial cancer [C54.1]  Yes    IFG (impaired fasting glucose) [R73.01]  Yes    Coronary artery disease involving native heart without angina pectoris [I25.10]  Yes    Hypertension [I10]  Yes    Hyperlipidemia [E78.5]  Yes      Resolved Hospital Problems   No resolved problems to display.         ASSESSMENT:  Acute RSV bronchitis  Acute exacerbation of moderate persistent asthma  Acute hypoxemic respiratory failure  LI  Endometrial cancer  Stage IIIa CKD  CAD  Hypertension/hyperlipidemia  Delirium  Persistently elevated right hemidiaphragm: Normal movement on sniff testing      PLAN:  Scheduled and as needed bronchodilators and steroid for asthma exacerbation.  Steroids changed back to IV.  Treatment for viral process is supportive.  Wean oxygen as able.  Goal oxygen saturation 88 to 92%.  Positive airway pressure for sleep apnea.  Needs to use it with all sleep.  With persistently elevated right hemidiaphragm ordered sniff testing.  Shows elevated right hemidiaphragm but normal movement.  Likely home soon.  Will have her follow-up with us in the office shortly after discharge.  If she is not going to home home then she would be " more comfortable on her home BiPAP as opposed to ours.  She will ask family to bring in.        Giorgio Deng MD  Pulmonary and Critical Care Medicine  [facility], [facility]  1/30/2025    10:48 EST

## 2025-01-30 NOTE — CASE MANAGEMENT/SOCIAL WORK
Continued Stay Note  [facility]     Patient Name: Flaca Hassan  MRN: [id number]  Today's Date: 1/30/2025    Admit Date: 1/21/2025    Plan: Home with [facility] and neb   Discharge Plan       Row Name 01/30/25 1333       Plan    Plan Home with [facility] and neb    Patient/Family in Agreement with Plan yes    Plan Comments Spoke with Dr. Blanco, pulmonary service has cleared for dc. Nebulizer delivered to patient bedside by [facility]. Pt brother notified of dc, he will transport, and bring her POC. Spoke with Rachel/Cathi, she will reach out to family to schedule, orders are in system. Based on interdisciplinary assessments, the recommended discharge plan is home with HH. -Jewels WELCH                   Discharge Codes    No documentation.                 Expected Discharge Date and Time       Expected Discharge Date Expected Discharge Time    Jan 30, 2025               Jewels Quintanilla RN

## 2025-01-30 NOTE — CASE MANAGEMENT/SOCIAL WORK
Continued Stay Note  [facility]     Patient Name: Flaca Hassan  MRN: [id number]  Today's Date: 1/30/2025    Admit Date: 1/21/2025    Plan: Home with [facility] and nebulizer from [facility]   Discharge Plan       Row Name 01/30/25 1011       Plan    Plan Home with [facility] and nebulizer from [facility]    Patient/Family in Agreement with Plan yes    Plan Comments Spoke with [facility] pt not current with them for oxygen, spoke with pt brother Sonya, he states pt uses [facility], referral placed for neb ordered at dc. Pt normally on 2L, on 4L this morning, unsure when will reach dc readiness but pt plan remains home with family CCP will follow - Jewels WELCH                   Discharge Codes    No documentation.                 Expected Discharge Date and Time       Expected Discharge Date Expected Discharge Time    Jan 31, 2025               Jewels Quintanilla RN

## 2025-01-31 ENCOUNTER — TRANSITIONAL CARE MANAGEMENT TELEPHONE ENCOUNTER (OUTPATIENT)
Dept: CALL CENTER | Facility: HOSPITAL | Age: 60
End: 2025-01-31
Payer: MEDICARE

## 2025-01-31 LAB — BACTERIA SPEC AEROBE CULT: NORMAL

## 2025-01-31 NOTE — CASE MANAGEMENT/SOCIAL WORK
Case Management Discharge Note      Final Note: home with [facility]          Selected Continued Care - Discharged on 1/30/2025 Admission date: 1/21/2025 - Discharge disposition: Home-Health Care Svc      Destination    No services have been selected for the patient.                Durable Medical Equipment    No services have been selected for the patient.                Dialysis/Infusion    No services have been selected for the patient.                Home Medical Care Coordination complete.      Service Provider Services Address Phone Fax Patient Preferred    [facility], [address], [facility] [address] [phone] --              Therapy    No services have been selected for the patient.                Community Resources    No services have been selected for the patient.                Community & DME    No services have been selected for the patient.                    Selected Continued Care - Episodes Includes continued care and service providers with selected services from the active episodes listed below      Chronic Care Management Episode start date: 1/20/2025   There are no active outsourced providers for this episode.                 Transportation Services  Private: Car    Final Discharge Disposition Code: 06 - home with home health care

## 2025-01-31 NOTE — OUTREACH NOTE
Call Center TCM Note      Flowsheet Row Responses   [facility] patient discharged from? [place]   Does the patient have one of the following disease processes/diagnoses(primary or secondary)? Other   TCM attempt successful? No  [ [id number]]   Unsuccessful attempts Attempt 1   Call Status Left message            Adry Wilson RN    1/31/2025, 10:59 EST

## 2025-01-31 NOTE — OUTREACH NOTE
Call Center TCM Note      Flowsheet Row Responses   [facility] patient discharged from? [place]   Does the patient have one of the following disease processes/diagnoses(primary or secondary)? Other   TCM attempt successful? No   Unsuccessful attempts Attempt 2   Call Status Left message            Adry Wilson RN    1/31/2025, 16:30 EST

## 2025-01-31 NOTE — PAYOR COMM NOTE
"Flaca Hassan (59 y.o. Female)        PLEASE SEE ATTACHED DC SUMMARY    REF#[id number]     THANK YOU    DEBRA CASANOVA LPN CCP   Date of Birth   1965    Social Security Number       Address   [address] [place] [address]    Home Phone   [phone]    MRN   [id number]       Synagogue   [employer]    Marital Status                               Admission Date   1/21/25    Admission Type   Emergency    Admitting Provider   Germán Patrick MD    Attending Provider       Department, Room/Bed   [address], N645/1       Discharge Date   1/30/2025    Discharge Disposition   Home-Health Care Svc    Discharge Destination                                 Attending Provider: (none)   Allergies: Peanut (Diagnostic)    Isolation: None   Infection: RSV (01/21/25)   Code Status: Prior    Ht: 157.5 cm (62.01\")   Wt: 90.6 kg (199 lb 11.8 oz)    Admission Cmt: None   Principal Problem: RSV bronchitis [J20.5]                   Active Insurance as of 1/21/2025       Primary Coverage       Payor Plan Insurance Group Employer/Plan Group    AETNA MEDICARE REPLACEMENT AETNA MEDICARE REPLACEMENT [id number]       Payor Plan Address Payor Plan Phone Number Payor Plan Fax Number Effective Dates    [address] [phone]  1/1/2025 - None Entered    [place] [address]         Subscriber Name Subscriber Birth Date Member ID       FLACA HASSAN S 1965 [id number]               Secondary Coverage       Payor Plan Insurance Group Employer/Plan Group    [place]        Payor Plan Address Payor Plan Phone Number Payor Plan Fax Number Effective Dates    [address]   6/1/2015 - None Entered    [place] [address]         Subscriber Name Subscriber Birth Date Member ID       FLACA HASSAN S 1965 [id number]                     Emergency Contacts        (Rel.) Home Phone Work Phone Mobile Phone    Sonya Posey (Brother) " [phone] -- --    Ines Guardado (Relative) [phone] -- --    DiegoEleazar (Relative) [phone] -- [phone]    Kaci Grider (Relative) [phone] -- --              [place]: PEG [id number]  Tax ID [id number]     Discharge Summary        Jen Blanco MD at 25 1305              Patient Name: Flaca Hassan  : 1965  MRN: [id number]    Date of Admission: 2025  Date of Discharge:  2025  Primary Care Physician: Isidro Hernandez MD      Chief Complaint:   Shortness of Breath      Discharge Diagnoses     Active Hospital Problems    Diagnosis  POA    **RSV bronchitis [J20.5]  Yes    Moderate persistent asthma with exacerbation [J45.41]  Yes    Stage 3a chronic kidney disease [N18.31]  Yes    Acute respiratory failure with hypoxia [J96.01]  Yes    LI (obstructive sleep apnea) [G47.33]  Yes    Endometrial cancer [C54.1]  Yes    IFG (impaired fasting glucose) [R73.01]  Yes    Coronary artery disease involving native heart without angina pectoris [I25.10]  Yes    Hypertension [I10]  Yes    Hyperlipidemia [E78.5]  Yes      Resolved Hospital Problems   No resolved problems to display.        Hospital Course     Patient is a 59 h/o f asthma , CAD, HTN, HLD, CKD stage IIIa, endometrial cancer ,  presented  the hospital with shortness of breath and cough.       Acute exacerbation of moderate persistent asthma secondary to RSV infection  Acute on chronic hypoxemic respiratory failure  elevated right hemidiaphragm   -On 2 L nasal cannula at baseline  -Respiratory viral panel due to 2025 was positive for RSV  -CT scan without contrast 2025 showed mild patchy areas of atelectasis or pneumonia in the bilateral lower lobes.  -CTA 2025 with no obvious PE  -Treated with IV steroids and transitioned to p.o., DuoNebs, BiPAP, pulmonology followed inpatient  Due to concern for phrenic nerve palsy in the setting of elevated right hemidiaphragm, underwent sniff test, showed  elevated right hemidiaphragm but normal movement.   -Patient was cleared to be discharged from pulmonology standpoint with plan for close follow-up.  Discharged on DuoNebs, prednisone taper, Mucinex, as needed Tessalon Perles.  Outpatient Trelegy inhaler was continued     AMS  Encephalopathy.   -Team D was called 01/25, stat ABG was obtained with normal pH, pCO2 of 48.  Stat chest x-ray with no acute findings  -urinalysis with no signs of infection, chest x-ray stable  -Routine EEG with no seizure activity  -Neurology evaluated MRI ordered however patient unable to tolerate,  -TSH normal, ammonia normal.  B12 normal.  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  -General Surgery recommended outpatient follow-up and discussion of elective laparoscopic/robotic Morgagni hernia repair in the future.            Enlarged mediastinal nodes  CT scan showed A few small to slightly enlarged mediastinal nodes are seen.Short-term follow-up CT of the chest without contrast in approximately 3 months suggested per radiology.  Suspect enlarged lymph node secondary to RSV infection/Pneumonia ,  defer outpatient evaluation to pulmonology.        History of CAD  Hypertension  Continue outpatient meds           CKD stage IIIa  -Monitor daily BMP.  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            Day of Discharge     Subjective:  Patient seen this morning.  Was on BiPAP overnight but reports to have difficulty tolerating hospital BiPAP machine, and prefers home BiPAP.    Physical Exam:  Temp:  [97.3 °F (36.3 °C)-99.3 °F (37.4 °C)] 99.3 °F (37.4 °C)  Heart Rate:  [67-82] 76  Resp:  [18-25] 18  BP: (137-153)/(65-77) 153/77  There is no height or weight on file to calculate BMI.  Physical Exam    General: Alert, sitting up in a chair, not in distress,  HEENT: Normocephalic, atraumatic  CV: Regular rate and rhythm, no murmurs rubs or gallops  Lungs: Decreased, no wheezing, nonlabored breathing,  Abdomen: Soft, distended, nontender  Extremities: No significant peripheral edema , no cyanosis     Consultants     Consult Orders (all) (From admission, onward)       Start     Ordered    01/27/25 1225  Inpatient General Surgery Consult  Once        Specialty:  General Surgery  Provider:  Irlanda Baig MD    01/27/25 1225    01/27/25 0927  Inpatient Gastroenterology Consult  Once,   Status:  Canceled        Specialty:  Gastroenterology  Provider:  Ousmane Vergara MD    01/27/25 0927    01/25/25 1731  Inpatient Neurology Consult General  Once        Specialty:  Neurology  Provider:  Ruslan Joaquin MD    01/25/25 1730 01/22/25 0925  Inpatient Case Management  Consult  Once        Provider:  (Not yet assigned)    01/22/25 0924    01/21/25 2243  Inpatient Pulmonology Consult  Once        Specialty:  Pulmonary Disease  Provider:  Olivia Nichole MD    01/21/25 2242 01/21/25 1955  [facility] (on-call MD unless specified) Details  Once        Specialty:  Hospitalist  Provider:  (Not yet assigned)    01/21/25 1955 01/21/25 1953  [facility] (on-call MD unless specified) Details   Once,   Status:  Canceled        Specialty:  Hospitalist  Provider:  (Not yet assigned)    01/21/25 1952                  Procedures     * Surgery not found *      Imaging Results (All)       Procedure Component Value Units Date/Time    FL Sniff Test [[id number]] Collected: 01/28/25 1249     Updated: 01/28/25 1550    Narrative:      EXAMINATION: Sniff test under fluoroscopy.     DATE: 1/28/2025     HISTORY: Right-sided elevated hemidiaphragm and shortness of breath.     *  Dose Area Product: 288 mGym2     FINDINGS: The patient was brought into the fluoroscopic room and placed  in the semiupright position due to inability to stand.  Multiple  breathing exercises were observed under fluoroscopy. Images obtained by  MERRITT Ortiz.      image of chest shows no acute cardiopulmonary process. Persistent  elevated right hemidiaphragm with hypoinflation of the lungs.     The bilateral hemidiaphragms show normal inspiratory and expiratory  excursion.  The right-sided elevated hemidiaphragm is normal in  appearance under fluoroscopy. No paradoxical motion observed.       Impression:      1: Elevated right hemidiaphragm with normal hemidiaphragm movement seen  bilaterally, with no evidence of paralysis.              This report was finalized on 1/28/2025 3:47 PM by Dr. Oscar Atkinson M.D on Workstation: [id number]       CT Angiogram Chest [[id number]] Collected: 01/27/25 1132     Updated: 01/27/25 1153    Narrative:      CT ANGIOGRAM CHEST-     INDICATION: Shortness of air, worsening heart failure     COMPARISON: CT chest January 22, 2025     TECHNIQUE:  CTA of the chest with IV contrast. Coronal and sagittal reformats. Three  dimensional reconstructions. Radiation dose reduction techniques were  utilized, including automated exposure control and exposure modulation  based on body size.     FINDINGS:      Pulmonary arteries: Streak artifact from contrast in the venous system.  Motion artifact. Poor evaluation of  segmental and subsegmental pulmonary  arteries in the left lower lobe. No definitive pulmonary embolism.     Chest wall: No lymphadenopathy.     Thyroid: Hypoattenuating left thyroid lobe nodule, series 4, axial mage  1, measures 1.4 cm, unchanged.     Mediastinum: Coronary artery atherosclerotic calcifications. Heart is  normal in size. No pericardial effusion. No mediastinal or hilar  lymphadenopathy.     Lungs/pleura: No effusions. Central and peripheral airway occlusions  seen in the right middle lobe and right lower lobe. Subsegmental airway  occlusions seen in the basilar left lower lobe. Right middle lobe  collapse. Subtotal right lower lobe collapse, with the superior segment  right lower lobe appearing aerated and the basilar segments collapsed.  Small amount of subsegmental atelectasis suspected in the lingula.  Partial atelectasis in the basilar left lower lobe.     Upper abdomen: Large transverse colon and fat-containing  Morgagni/diaphragmatic hernia extends into the anterior mediastinum and  right thorax, with some mass effect on the adjacent heart, with the  hernia neck measuring approximately 6.5 cm, series 6, coronal image 42  and series 7, sagittal image 112.     Osseous structures: Right glenohumeral osteoarthritis.       Impression:         1. No definitive pulmonary embolism. Poor evaluation of left lower lobe  segmental and subsegmental pulmonary arteries secondary to streak  artifact from contrast in the venous system and respiratory motion  artifact. If symptoms persist, follow-up CTA to reevaluate.  2. Large transverse colon and fat-containing Morgagni/diaphragmatic  hernia, with transverse colon and fat extend into the anterior  mediastinum and right hemithorax, with associated central and peripheral  airway occlusions seen in the right middle lobe and right lower lobe  with right middle lobe collapse and subtotal right lower lobe collapse.  3. Suspect partial atelectasis with subsegmental  airway occlusions in  the basilar left lower lobe.     This report was finalized on 1/27/2025 11:50 AM by Dr. Nikhil Hernandez M.D on Workstation: [id number]       XR Chest 1 View [[id number]] Collected: 01/27/25 0539     Updated: 01/27/25 0539    Narrative:        Patient: RICK GONZALEZ  Time Out: 05:38  Exam(s): XR CXR 1 VIEW     EXAM:    XR Chest, 1 View    CLINICAL HISTORY:     Reason for exam: increased respiratory rate.    TECHNIQUE:    Frontal view of the chest.    COMPARISON:    No relevant prior studies available.    FINDINGS:    Lungs:  Hypoinflation, consider interstitial edema or atypical   infection.    Pleural space:  Unremarkable.    Heart:  Obscured cardiac silhouette.    Bones joints:  No acute displaced fracture.    Upper abdomen:  Elevated right diaphragm.    IMPRESSION:       1.  Hypoinflation, consider interstitial edema or atypical infection.  2.  Recommend repeat imaging in 3 months.      Impression:          Electronically signed by Germán Moreno MD on 01-27-25 at 0538    US Gallbladder [[id number]] Collected: 01/26/25 1246     Updated: 01/26/25 1251    Narrative:      US GALLBLADDER-1/26/2025     HISTORY: Right upper quadrant pain.     The gallbladder is well distended with no gallstones wall thickening or  pericholecystic fluid. The common bile duct is not dilated measuring 3.5  mm.     Pancreas is not well seen. Liver appears relatively unremarkable. Right  kidney measures 8.0 cm in length.       Impression:      1. Normal-appearing gallbladder.  2. Poor visualization of the pancreas.  3. Atrophic right kidney.        This report was finalized on 1/26/2025 12:48 PM by Dr. Oscar Atkinson M.D on Workstation: [id number]       XR Chest 1 View [[id number]] Collected: 01/25/25 1307     Updated: 01/25/25 1311    Narrative:      XR CHEST 1 VW-1/25/2025     HISTORY: Difficulty breathing.     Heart size is mildly enlarged. There is elevation of the right  hemidiaphragm. Lungs are  underinflated. There may be some minimal  pleural fluid on the left. There also may be some minimal patchy  atelectasis or infiltrate in the left base. Mild degenerative changes  are seen in the spine.       Impression:      1. Underinflation of the lungs with elevation of the right  hemidiaphragm. This diaphragmatic elevation has been seen on previous  studies.  2. There may be some minimal left pleural effusion with minimal  atelectasis or infiltrate in the left base.        This report was finalized on 1/25/2025 1:08 PM by Dr. Oscar Atkinson M.D on Workstation: [id number]       CT Chest Without Contrast Diagnostic [[id number]] Collected: 01/22/25 1240     Updated: 01/23/25 1749    Narrative:      CT OF THE CHEST WITHOUT CONTRAST 01/22/2025     HISTORY: Respiratory distress.     Spiral images were obtained from the lung apices to the upper abdomen.  No intravenous contrast was given.     There are patchy areas of atelectasis or inflammatory infiltrate in the  bilateral lower lobes. Right hemidiaphragm is elevated. A few small to  slightly enlarged mediastinal nodes are seen.     Upper abdomen is otherwise unremarkable except for at least one  gallstone on the inferior most image (image 98).     There is some aortic and coronary calcification.       Impression:      1. Elevated right hemidiaphragm.  2. Mild patchy areas of atelectasis or pneumonia in the bilateral lower  lobes.  3. A few small to slightly enlarged mediastinal nodes are seen.  4. Short-term follow-up CT of the chest without contrast in  approximately 3 months suggested.     Radiation dose reduction techniques were utilized, including automated  exposure control and exposure modulation based on body size.        This report was finalized on 1/23/2025 5:46 PM by Dr. Oscar Atkinson M.D on Workstation: [id number]       XR Chest 1 View [[id number]] Collected: 01/21/25 1907     Updated: 01/22/25 1023    Addenda:        XR CHEST 1 VW-     HISTORY:  Female who is 59 years-old, dyspnea, hypoxia     TECHNIQUE: Frontal views of the chest     COMPARISON: 1/2/2025     FINDINGS: The heart size appears borderline. Pulmonary vasculature  appears mildly congested mild atelectasis or infiltrate at the bases,  follow-up suggested. There may be minimal pleural effusions. No  pneumothorax. Right hemidiaphragm is elevated. No acute osseous process.     IMPRESSION: As described.           This report was finalized on 1/22/2025 10:20 AM by Dr. Pan Alexandre M.D on Workstation: [id number]     Signed: 01/22/25 1020 by Pan Alexandre MD            XR CHEST 1 VW-     HISTORY: Male who is 65 years-old, short of breath     TECHNIQUE: Frontal views of the chest     COMPARISON: 1/2/2025     FINDINGS: The heart size appears borderline. Pulmonary vasculature  appears mildly congested mild atelectasis or infiltrate at the bases,  follow-up suggested. There may be minimal pleural effusions. No  pneumothorax. Right hemidiaphragm is elevated. No acute osseous process.     IMPRESSION: As described.        This report was finalized on 1/21/2025 7:30 PM by Dr. Pan Alexandre M.D on Workstation: [id number]     Signed: 01/21/25 1930 by Pan Alexandre MD    Narrative:      XR CHEST 1 VW-     HISTORY: Male who is 65 years-old, short of breath     TECHNIQUE: Frontal views of the chest     COMPARISON: 10/30/2018     FINDINGS: The heart size appears borderline. Pulmonary vasculature  appears mildly congested mild atelectasis or infiltrate at the bases,  follow-up suggested. There may be minimal pleural effusions. No  pneumothorax. Right hemidiaphragm is elevated. No acute osseous process.       Impression:      As described.     This report was finalized on 1/21/2025 7:09 PM by Dr. Pan Alexandre M.D on Workstation: [id number]               Results for orders placed in visit on 01/10/22    Adult Transthoracic Echo Complete W/ Cont if Necessary Per Protocol    Interpretation Summary  ·  "Calculated left ventricular EF = 59.4% Estimated left ventricular EF was in agreement with the calculated left ventricular EF.  · Left ventricular diastolic function was normal.  · There is no evidence of pericardial effusion. .    Pertinent Labs     Results from last 7 days   Lab Units 01/30/25  0535 01/29/25  0550 01/28/25  0553 01/27/25  0527   WBC 10*3/mm3 9.96 10.41 11.39* 10.93*   HEMOGLOBIN g/dL 13.6 13.3 13.5 14.9   PLATELETS 10*3/mm3 327 283 323 335     Results from last 7 days   Lab Units 01/30/25  0535 01/29/25  0550 01/28/25  0553 01/27/25  0527   SODIUM mmol/L 137 135* 139 138   POTASSIUM mmol/L 4.0 4.5 4.7 4.3   CHLORIDE mmol/L 104 103 108* 103   CO2 mmol/L 25.0 23.0 21.0* 23.2   BUN mg/dL 37* 39* 28* 30*   CREATININE mg/dL 0.90 1.00 0.93 1.07*   GLUCOSE mg/dL 140* 136* 153* 150*   Estimated Creatinine Clearance: 70.4 mL/min (by C-G formula based on SCr of 0.9 mg/dL).  Results from last 7 days   Lab Units 01/26/25  0703   ALBUMIN g/dL 3.9   BILIRUBIN mg/dL 0.9   ALK PHOS U/L 89   AST (SGOT) U/L 21   ALT (SGPT) U/L 23     Results from last 7 days   Lab Units 01/30/25  0535 01/29/25  0550 01/28/25  0553 01/27/25  0527 01/26/25  0703   CALCIUM mg/dL 8.8 8.8 8.8 9.0 9.1   ALBUMIN g/dL  --   --   --   --  3.9   MAGNESIUM mg/dL 2.2 2.3 2.0 1.9 1.8   PHOSPHORUS mg/dL 3.8 4.4 3.4 3.3 3.9     Results from last 7 days   Lab Units 01/26/25  1054 01/26/25  0703   LIPASE U/L  --  47   AMMONIA umol/L 28  --      Results from last 7 days   Lab Units 01/26/25  1054 01/26/25  0703   HSTROP T ng/L 15* 15*           Invalid input(s): \"LDLCALC\"  Results from last 7 days   Lab Units 01/26/25  1054   BLOODCX  No growth at 4 days         Test Results Pending at Discharge     Pending Labs       Order Current Status    Blood Culture - Blood, Hand, Right Preliminary result            Discharge Details        Discharge Medications        New Medications        Instructions Start Date   benzonatate 200 MG capsule  Commonly known " as: TESSALON   200 mg, Oral, 3 Times Daily PRN      guaiFENesin 600 MG 12 hr tablet  Commonly known as: MUCINEX   600 mg, Oral, Every 12 Hours Scheduled      ipratropium-albuterol 0.5-2.5 mg/3 ml nebulizer  Commonly known as: DUO-NEB   Take 3 mL (one vial) by nebulization 4 (Four) Times a Day As Needed for Wheezing.      predniSONE 20 MG tablet  Commonly known as: DELTASONE   Take 2 tablets by mouth Daily With Breakfast for 3 days, THEN 1 tablet Daily With Breakfast for 3 days, THEN 0.5 tablets Daily With Breakfast for 3 days.   Start Date: January 31, 2025            Changes to Medications        Instructions Start Date   nitroglycerin 0.4 MG SL tablet  Commonly known as: NITROSTAT  What changed:   how much to take  how to take this  when to take this  reasons to take this   1 under the tongue as needed for angina, may repeat q5mins for up three doses             Continue These Medications        Instructions Start Date   acetaminophen 500 MG tablet  Commonly known as: TYLENOL   1,000 mg, Every 6 Hours PRN      amLODIPine 10 MG tablet  Commonly known as: NORVASC   10 mg, Oral, Daily      bisoprolol-hydrochlorothiazide 5-6.25 MG per tablet  Commonly known as: ZIAC   1 tablet, Oral, Daily      clopidogrel 75 MG tablet  Commonly known as: PLAVIX   75 mg, Oral, Daily      escitalopram 20 MG tablet  Commonly known as: LEXAPRO   20 mg, Oral, Nightly      fluticasone 50 MCG/ACT nasal spray  Commonly known as: FLONASE   2 sprays, Daily      Folic Acid 20 MG capsule   1 capsule      furosemide 40 MG tablet  Commonly known as: LASIX   40 mg, Oral, Daily      montelukast 10 MG tablet  Commonly known as: SINGULAIR   10 mg, Oral, Daily      olmesartan 40 MG tablet  Commonly known as: BENICAR   40 mg, Oral, Daily      pantoprazole 40 MG EC tablet  Commonly known as: PROTONIX   40 mg, Oral, Daily      potassium chloride 10 MEQ CR capsule  Commonly known as: MICRO-K   10 mEq, Oral, Daily      Proctozone-HC 2.5 % rectal  cream  Generic drug: Hydrocortisone (Perianal)   1 application , Rectal, As Needed      risperiDONE 0.5 MG tablet  Commonly known as: risperDAL   0.5 mg, Daily      simvastatin 20 MG tablet  Commonly known as: ZOCOR   20 mg, Oral, Every Evening      Trelegy Ellipta 200-62.5-25 MCG/ACT inhaler  Generic drug: Fluticasone-Umeclidin-Vilant   1 puff, Daily      Ventolin  (90 Base) MCG/ACT inhaler  Generic drug: albuterol sulfate HFA   2 puffs, Every 4 Hours PRN      Vitamin D3 50 MCG (2000 UT) tablet   1 tablet, Daily               Allergies   Allergen Reactions    Peanut (Diagnostic) Itching       Discharge Disposition:  Home-Health Care Svc      Discharge Diet:  Diet Order   Procedures    Diet: Cardiac, Diabetic, Renal; Healthy Heart (2-3 Na+); Consistent Carbohydrate; Low Sodium (2-3g), Low Potassium, Low Phosphorus; No Pork; Fluid Consistency: Thin (IDDSI 0)       Discharge Activity:       CODE STATUS:    Code Status and Medical Interventions: CPR (Attempt to Resuscitate); Full Support   Ordered at: 01/21/25 2117     Code Status (Patient has no pulse and is not breathing):    CPR (Attempt to Resuscitate)     Medical Interventions (Patient has pulse or is breathing):    Full Support       Future Appointments   Date Time Provider Department Center   8/28/2025  2:00 PM Imer Montaño MD [facility]   11/17/2025  3:15 PM Enid Hernandez MD [facility]     Additional Instructions for the Follow-ups that You Need to Schedule       Ambulatory Referral to Home Health (Hospital)   As directed      Face to Face Visit Date: 1/24/2025   Follow-up provider for Plan of Care?: I treated the patient in an acute care facility and will not continue treatment after discharge.   Follow-up provider: ENID HERNANDEZ [[id number]]   Reason/Clinical Findings: mobility below baseline   Describe mobility limitations that make leaving home difficult: doesnt drive   Nursing/Therapeutic Services Requested: Physical Therapy   PT  orders: Therapeutic exercise Strengthening   Frequency: 1 Week 1               Contact information for follow-up providers       Giorgio Deng MD Follow up in 1 week(s).    Specialties: Pulmonary Disease, Intensive Care  Contact information:  [address] [facility] [address]  [facility] [id number] [phone]               Enid Roberts MD. Schedule an appointment as soon as possible for a visit in 1 week(s).    Specialty: Family Medicine  Contact information:  [address] [facility]  [address]  [facility] [address]  [phone]               Irlanda Baig MD Follow up.    Specialty: General Surgery  Why: Call to schedule appointment for discussion of your diaphragmatic hernia.  Contact information:  [address] [facility] [address]  [place] [id number]  [phone]                       Contact information for after-discharge care       Home Medical Care       [facility] .    Service: Home Health Services  Contact information:  [address], [address]  [place] [address] [phone]                                   Additional Instructions for the Follow-ups that You Need to Schedule       Ambulatory Referral to Home Health ([facility])   As directed      Face to Face Visit Date: 1/24/2025   Follow-up provider for Plan of Care?: I treated the patient in an acute care facility and will not continue treatment after discharge.   Follow-up provider: ENID ROBERTS [[id number]]   Reason/Clinical Findings: mobility below baseline   Describe mobility limitations that make leaving home difficult: doesnt drive   Nursing/Therapeutic Services Requested: Physical Therapy   PT orders: Therapeutic exercise Strengthening   Frequency: 1 Week 1            Time Spent on Discharge:  Greater than 30 minutes      Jen Blanco MD  [facility]  01/30/25  13:05 EST                Electronically signed by Jen Blanco MD at 01/30/25 0504       Discharge Order (From admission,  onward)       Start     Ordered    01/30/25 1301  Discharge patient  Once        Expected Discharge Date: 01/30/25   Discharge Disposition: Home-Health Care Svc   Physician of Record for Attribution - Please select from Treatment Team: DAVID ELLIOTT [[id number]]   Review needed by CMO to determine Physician of Record: No      Question Answer Comment   Physician of Record for Attribution - Please select from Treatment Team DAVID ELLIOTT    Review needed by CMO to determine Physician of Record No        01/30/25 1301    01/25/25 1111  Discharge patient  Once,   Status:  Canceled        Expected Discharge Date: 01/25/25   Discharge Disposition: Home-Health Care [facility]   Physician of Record for Attribution - Please select from Treatment Team: DAVID ELLIOTT [[id number]]   Review needed by CMO to determine Physician of Record: No      Question Answer Comment   Physician of Record for Attribution - Please select from Treatment Team DAVID ELLIOTT    Review needed by CMO to determine Physician of Record No        01/25/25 1111

## 2025-02-02 ENCOUNTER — TRANSITIONAL CARE MANAGEMENT TELEPHONE ENCOUNTER (OUTPATIENT)
Dept: CALL CENTER | Facility: HOSPITAL | Age: 60
End: 2025-02-02
Payer: MEDICARE

## 2025-02-02 NOTE — OUTREACH NOTE
Call Center TCM Note      Flowsheet Row Responses   [facility] patient discharged from? [place]   Does the patient have one of the following disease processes/diagnoses(primary or secondary)? Other   TCM attempt successful? No   Unsuccessful attempts Attempt 3            Olena Jules RN    2/2/2025, 09:03 EST

## 2025-02-03 ENCOUNTER — TELEPHONE (OUTPATIENT)
Dept: FAMILY MEDICINE CLINIC | Facility: CLINIC | Age: 60
End: 2025-02-03
Payer: MEDICARE

## 2025-02-04 ENCOUNTER — TELEPHONE (OUTPATIENT)
Dept: FAMILY MEDICINE CLINIC | Facility: CLINIC | Age: 60
End: 2025-02-04

## 2025-02-04 NOTE — PROGRESS NOTES
Transitional Care Follow Up Visit  Subjective     Flaca JOSH Hassan is a 59 y.o. female who presents for a transitional care management visit.    Within 48 business hours after discharge our office contacted her via telephone to coordinate her care and needs.      I reviewed and discussed the details of that call along with the discharge summary, hospital problems, inpatient lab results, inpatient diagnostic studies, and consultation reports with Flaca.     Current outpatient and discharge medications have been reconciled for the patient.  Reviewed by: Isidro Hernandez MD          1/30/2025     6:43 PM   Date of TCM Phone Call   [facility]   Date of Admission 1/21/2025   Date of Discharge 1/30/2025   Discharge Disposition [place] [facility]     Risk for Readmission (LACE) Score: 14 (1/30/2025  6:00 AM)      History of Present Illness   Course During Hospital Stay:      Date of Admission: 1/21/2025  Date of Discharge:  1/30/2025  Primary Care Physician: Isidro Hernandez MD        Chief Complaint:   Shortness of Breath        Discharge Diagnoses            Active Hospital Problems     Diagnosis   POA    **RSV bronchitis [J20.5]   Yes    Moderate persistent asthma with exacerbation [J45.41]   Yes    Stage 3a chronic kidney disease [N18.31]   Yes    Acute respiratory failure with hypoxia [J96.01]   Yes    LI (obstructive sleep apnea) [G47.33]   Yes    Endometrial cancer [C54.1]   Yes    IFG (impaired fasting glucose) [R73.01]   Yes    Coronary artery disease involving native heart without angina pectoris [I25.10]   Yes    Hypertension [I10]   Yes    Hyperlipidemia [E78.5]   Yes       Resolved Hospital Problems   No resolved problems to display.         Hospital Course      Patient is a 59 h/o f asthma , CAD, HTN, HLD, CKD stage IIIa, endometrial cancer ,  presented  the hospital with shortness of breath and cough.        Acute exacerbation of moderate persistent asthma secondary to RSV  infection  Acute on chronic hypoxemic respiratory failure  elevated right hemidiaphragm   -On 2 L nasal cannula at baseline  -Respiratory viral panel due to 1/21/2025 was positive for RSV  -CT scan without contrast 01/22/2025 showed mild patchy areas of atelectasis or pneumonia in the bilateral lower lobes.  -CTA 12/27/2025 with no obvious PE  -Treated with IV steroids and transitioned to p.o., DuoNebs, BiPAP, pulmonology followed inpatient  Due to concern for phrenic nerve palsy in the setting of elevated right hemidiaphragm, underwent sniff test, showed elevated right hemidiaphragm but normal movement.   -Patient was cleared to be discharged from pulmonology standpoint with plan for close follow-up.  Discharged on DuoNebs, prednisone taper, Mucinex, as needed Tessalon Perles.  Outpatient Trelegy inhaler was continued     AMS  Encephalopathy.   -Team D was called 01/25, stat ABG was obtained with normal pH, pCO2 of 48.  Stat chest x-ray with no acute findings  -urinalysis with no signs of infection, chest x-ray stable  -Routine EEG with no seizure activity  -Neurology evaluated MRI ordered however patient unable to tolerate,  -TSH normal, ammonia normal.  B12 normal.  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  -General Surgery recommended outpatient follow-up and discussion of elective laparoscopic/robotic Morgagni hernia repair in the future.            Enlarged mediastinal nodes  CT scan showed A few small to slightly enlarged mediastinal nodes are seen.Short-term follow-up CT of the chest without contrast in approximately 3 months suggested  "per radiology.  Suspect enlarged lymph node secondary to RSV infection/Pneumonia ,  defer outpatient evaluation to pulmonology.        History of CAD  Hypertension  Continue outpatient meds           CKD stage IIIa  -Monitor daily BMP.  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  Reviewed by: Isidro Hernandez MD         The following portions of the patient's history were reviewed and updated as appropriate: allergies, current medications, past family history, past medical history, past social history, past surgical history, and problem list.    Review of Systems   Constitutional:  Negative for activity change, chills and fever.   Respiratory:  Negative for cough.    Cardiovascular:  Negative for chest pain.   Psychiatric/Behavioral:  Negative for dysphoric mood.        Objective   /64   Pulse 92   Temp 98.6 °F (37 °C) (Oral)   Resp 18   Ht 157.5 cm (62.01\")   Wt 90.7 kg (200 lb)   SpO2 90%   BMI 36.57 kg/m²   Physical Exam  Vitals and nursing note reviewed.   Constitutional:       General: She is not in acute distress.     Appearance: She is well-developed.   Cardiovascular:      Rate and Rhythm: Normal rate and regular rhythm.   Pulmonary:      Effort: Pulmonary effort is normal.      Breath sounds: Normal breath sounds.   Neurological:      Mental Status: She is alert and oriented to person, place, and time.   Psychiatric:         Behavior: Behavior normal.         Thought Content: Thought content normal.     Hospital records reviewed with pt confirming HPI.            Assessment & Plan   Diagnoses and all orders for this visit:    1. " Moderate persistent asthma with exacerbation (Primary)  -     Ambulatory Referral to Pulmonology  -     Ambulatory Referral to Chronic Care Management Medication Adherence, Care Coordination    2. Encephalopathy, unspecified type  -     Ambulatory Referral to Chronic Care Management Medication Adherence, Care Coordination    3. Morgagni hernia  -     Ambulatory Referral to General Surgery    4. Abnormal chest CT  -     CT Chest Without Contrast; Future  -     Ambulatory Referral to Pulmonology    5. Hospital discharge follow-up    6. Debility  -     Ambulatory Referral to Physical Therapy for Evaluation & Treatment  -     Ambulatory Referral to Chronic Care Management Medication Adherence, Care Coordination    7. Dystrophic nail  -     Ambulatory Referral to Podiatry

## 2025-02-04 NOTE — TELEPHONE ENCOUNTER
SPOKE WITH FIORDALIZA AT [facility] - VERBAL ORDERS GIVEN FOR - HOME HEALTH SKILLED NURSING TWICE A WEEK FOR TWO WEEKS, ONCE A WEEK FOR SEVEN WEEKS.

## 2025-02-04 NOTE — TELEPHONE ENCOUNTER
[facility]    Relationship: Other    Best call back number: [phone]     What orders are you requesting (i.e. lab or imaging): HOME HEALTH SKILLED NURSING TWICE A WEEK FOR TWO WEEKS, ONCE A WEEK FOR SEVEN WEEKS.    Additional notes: IGNACIA STATED SHE COMPLETED A SKI LED NURSING EVALUATION FOR THE PATIENT ON 02-.    [facility] IS REQUESTING ORDERS TO CONTINUE SKILLED NURSING.    PLEASE CALL TO GIVE VERBAL ORDERS.

## 2025-02-05 ENCOUNTER — OFFICE VISIT (OUTPATIENT)
Dept: FAMILY MEDICINE CLINIC | Facility: CLINIC | Age: 60
End: 2025-02-05
Payer: MEDICARE

## 2025-02-05 VITALS
HEIGHT: 62 IN | BODY MASS INDEX: 36.8 KG/M2 | SYSTOLIC BLOOD PRESSURE: 116 MMHG | RESPIRATION RATE: 18 BRPM | WEIGHT: 200 LBS | TEMPERATURE: 98.6 F | HEART RATE: 92 BPM | OXYGEN SATURATION: 90 % | DIASTOLIC BLOOD PRESSURE: 64 MMHG

## 2025-02-05 DIAGNOSIS — G93.40 ENCEPHALOPATHY, UNSPECIFIED TYPE: ICD-10-CM

## 2025-02-05 DIAGNOSIS — R93.89 ABNORMAL CHEST CT: ICD-10-CM

## 2025-02-05 DIAGNOSIS — L60.3 DYSTROPHIC NAIL: ICD-10-CM

## 2025-02-05 DIAGNOSIS — J45.41 MODERATE PERSISTENT ASTHMA WITH EXACERBATION: Primary | ICD-10-CM

## 2025-02-05 DIAGNOSIS — Z09 HOSPITAL DISCHARGE FOLLOW-UP: ICD-10-CM

## 2025-02-05 DIAGNOSIS — R53.81 DEBILITY: ICD-10-CM

## 2025-02-05 DIAGNOSIS — Q79.0 MORGAGNI HERNIA: ICD-10-CM

## 2025-02-05 PROCEDURE — 3078F DIAST BP <80 MM HG: CPT | Performed by: FAMILY MEDICINE

## 2025-02-05 PROCEDURE — 1111F DSCHRG MED/CURRENT MED MERGE: CPT | Performed by: FAMILY MEDICINE

## 2025-02-05 PROCEDURE — 1159F MED LIST DOCD IN RCRD: CPT | Performed by: FAMILY MEDICINE

## 2025-02-05 PROCEDURE — 99495 TRANSJ CARE MGMT MOD F2F 14D: CPT | Performed by: FAMILY MEDICINE

## 2025-02-05 PROCEDURE — 1126F AMNT PAIN NOTED NONE PRSNT: CPT | Performed by: FAMILY MEDICINE

## 2025-02-05 PROCEDURE — 3074F SYST BP LT 130 MM HG: CPT | Performed by: FAMILY MEDICINE

## 2025-02-05 PROCEDURE — 1160F RVW MEDS BY RX/DR IN RCRD: CPT | Performed by: FAMILY MEDICINE

## 2025-02-06 ENCOUNTER — TELEPHONE (OUTPATIENT)
Dept: CASE MANAGEMENT | Facility: OTHER | Age: 60
End: 2025-02-06
Payer: MEDICARE

## 2025-02-06 ENCOUNTER — REFERRAL TRIAGE (OUTPATIENT)
Dept: CASE MANAGEMENT | Facility: OTHER | Age: 60
End: 2025-02-06
Payer: MEDICARE

## 2025-02-13 ENCOUNTER — TELEPHONE (OUTPATIENT)
Dept: FAMILY MEDICINE CLINIC | Facility: CLINIC | Age: 60
End: 2025-02-13
Payer: MEDICARE

## 2025-02-13 NOTE — TELEPHONE ENCOUNTER
Lizabeth with [facility] requesting orders for home health aid for bathing, 1x week, for 6 weeks    [phone]

## 2025-02-19 ENCOUNTER — TELEPHONE (OUTPATIENT)
Dept: FAMILY MEDICINE CLINIC | Facility: CLINIC | Age: 60
End: 2025-02-19
Payer: MEDICARE

## 2025-02-19 NOTE — TELEPHONE ENCOUNTER
April from [facility] called, she had a visit with this pt today who is experiencing burning with urination. She could not leave sample when aid was there, but aid left a urine cup. PT is coming by tomorrow, so they are looking for a verbal order for uring culture. I will talk to Dr. Hernandez and call back.      Callback number: [phone]

## 2025-02-26 ENCOUNTER — TELEPHONE (OUTPATIENT)
Dept: FAMILY MEDICINE CLINIC | Facility: CLINIC | Age: 60
End: 2025-02-26
Payer: MEDICARE

## 2025-02-26 NOTE — TELEPHONE ENCOUNTER
Caller: Sonya Posey    Relationship: Emergency Contact    Best call back number: [phone]    What is the medical concern/diagnosis: LIVER DAMAGE    What specialty or service is being requested: NEPHROLOGY

## 2025-03-03 NOTE — TELEPHONE ENCOUNTER
LEFT MESSAGE FOR PT TO RETURN CALL NEEDING MORE INFORMATION PER DR ROBERTS   OK FOR THE HUB TO RELAY MESSAGE IF NEEDED

## 2025-03-10 RX ORDER — PANTOPRAZOLE SODIUM 20 MG/1
20 TABLET, DELAYED RELEASE ORAL DAILY
Qty: 90 TABLET | Refills: 3 | OUTPATIENT
Start: 2025-03-10

## 2025-03-13 RX ORDER — CLOPIDOGREL BISULFATE 75 MG/1
75 TABLET ORAL DAILY
Qty: 90 TABLET | Refills: 1 | Status: SHIPPED | OUTPATIENT
Start: 2025-03-13

## 2025-03-13 NOTE — TELEPHONE ENCOUNTER
Caller: Sonya Posey    Relationship: Emergency Contact    Best call back number: [phone]    Requested Prescriptions:   Requested Prescriptions     Pending Prescriptions Disp Refills    clopidogrel (PLAVIX) 75 MG tablet 90 tablet 3     Sig: Take 1 tablet by mouth Daily.        Pharm[facility] where [facility] be sent[id number] [place] [address]  AT [facility] [phone] [facility] [phone] FX     Last office visit with prescribing clinician: 8/22/2024   Last telemedicine visit with prescribing clinician: Visit date not found   Next office visit with prescribing clinician: 8/28/2025         Johnathan Fontanez Rep   03/13/25 12:12 EDT

## 2025-03-18 NOTE — TELEPHONE ENCOUNTER
Caller: Sonya Posey    Relationship: Emergency Contact    Best call back number: [phone]     What is the medical concern/diagnosis: PROBLEMS WITH KIDNEYS    What specialty or service is being requested: KIDNEY DOCTOR    Any additional details: PATIENTS BROTHER STATED THE PATIENT NEEDS A KIDNEY DOCTOR FOR KIDNEY PROBLEMS, NOT LIVER.    PATIENTS BROTHER IS REQUESTING A REFERRAL BE SENT FOR THIS.    PLEASE CALL TO ADVISE

## 2025-03-18 NOTE — TELEPHONE ENCOUNTER
What is the medical concern/diagnosis: PROBLEMS WITH KIDNEYS     What specialty or service is being requested: KIDNEY DOCTOR     Any additional details: PATIENTS BROTHER STATED THE PATIENT NEEDS A KIDNEY DOCTOR FOR KIDNEY PROBLEMS, NOT LIVER.     PATIENTS BROTHER IS REQUESTING A REFERRAL BE SENT FOR THIS.

## 2025-03-19 NOTE — TELEPHONE ENCOUNTER
Using interpretor services brother was reminded that pt see Neph Dr. Holloway. Brother was given phone number. Brother verbalized understanding.

## 2025-03-20 ENCOUNTER — OFFICE VISIT (OUTPATIENT)
Dept: SURGERY | Facility: CLINIC | Age: 60
End: 2025-03-20
Payer: COMMERCIAL

## 2025-03-20 VITALS
HEART RATE: 50 BPM | HEIGHT: 62 IN | BODY MASS INDEX: 36.57 KG/M2 | OXYGEN SATURATION: 99 % | SYSTOLIC BLOOD PRESSURE: 150 MMHG | DIASTOLIC BLOOD PRESSURE: 82 MMHG

## 2025-03-20 DIAGNOSIS — Q79.0 MORGAGNI HERNIA: Primary | ICD-10-CM

## 2025-03-20 NOTE — PROGRESS NOTES
General Surgery  Initial Office Visit    CC: Morgagni hernia    HPI: The patient is a pleasant 59 y.o. 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  The hernia has resulted in herniation of the omentum and now some of the transverse colon into the right thorax, with resulting central and peripheral airway occlusion within the right middle lobe and right lower lobe.  On most recent CTA of the chest, the entire right middle lobe was collapsed with subtotal collapse of the right lower lobe.  She was also recently diagnosed in January with RSV bronchiolitis and is now on supplemental oxygen.  I was consulted during her hospital stay in January for the chronic diaphragmatic hernia, and her brother reports she had met with a surgeon many years ago and was told she would benefit from losing weight prior to any hernia repair.  Of note, on multiple prior CTs there is no ventral/abdominal wall hernia defect through the upper half of the rectus muscles and this is only a diaphragmatic hernia defect.  She met with Dr. Liliya Barlow 2 years ago to discuss an incisional hernia of the lower abdomen through one of the laparoscopic trocar sites used for her robotic hysterectomy.  This lower abdominal wall hernia has caused her little to no symptoms.    Past Medical History:  History of endometrial cancer  Depression  Asthma  Coronary artery disease  Hypertension  Hyperlipidemia     Past Surgical History:  Multiple EGDs (last done 2021 by Dr. Willson)  Multiple colonoscopies (last done 2022 by Dr. Willson)  Hysteroscopy with endometrial ablation (2021, Dr. Blake)  Da Matt robot-assisted hysterectomy  Cardiac catheterization with stent  Left breast cyst excision    Allergies: Peanuts (Itching)    Family History: Brother with history of lung cancer, no family history of colorectal cancer     Social History:  , non-smoker, no regular alcohol use    ROS:   Constitutional: Negative for fevers or chills  HENT: Negative for hearing loss or runny nose  Eyes: Negative for vision changes or scleral icterus  Respiratory: Positive for dyspnea; negative for cough  Cardiovascular: Negative for chest pain or heart palpitations  Gastrointestinal: Negative for abdominal distension, nausea, vomiting, constipation, melena, or hematochezia  Genitourinary: Negative for hematuria or dysuria  Musculoskeletal: Negative for joint swelling or gait instability  Neurologic: Negative for tremors or seizures  Psychiatric: Negative for suicidal ideations or agitation  All other systems reviewed and negative    Physical Exam:  Height: 157 cm  Weight: 90 kg  BMI: 36.57  General: No acute distress, well-nourished & well-developed  HEAD: normocephalic, atraumatic  EYES: normal conjunctiva, sclera anicteric  EARS: grossly normal hearing  NECK: supple, no thyromegaly  CARDIOVASCULAR: regular rate and rhythm  RESPIRATORY: clear to auscultation bilaterally, on supplemental oxygen currently  GASTROINTESTINAL: soft, nontender, non-distended  MUSCULOSKELETAL: normal gait and station. No gross extremity abnormalities  PSYCHIATRIC: oriented x3, normal mood and affect    IMAGING:  CTA CHEST (Jan 2025):  1. No definitive pulmonary embolism. Poor evaluation of left lower lobe segmental and subsegmental pulmonary arteries secondary to streak artifact from contrast in the venous system and respiratory motion artifact. If symptoms persist, follow-up CTA to reevaluate.  2. Large transverse colon and fat-containing Morgagni/diaphragmatic hernia, with transverse colon and fat extend into the anterior mediastinum and right hemithorax, with associated central and peripheral airway occlusions seen in the right middle lobe and right lower lobe with right middle lobe collapse and subtotal right lower lobe collapse.  3. Suspect partial atelectasis with subsegmental  airway occlusions in the basilar left lower lobe.    ASSESSMENT & PLAN  Mrs. Hassan is a 59-year-old lady with a chronic but enlarging Morgagni hernia now containing the entirety of the omentum and a portion of transverse colon, not obstructive in nature.  I reviewed her recent CTA of the chest and showed her and her family member the images.  Given the large amount of intrathoracic omental fat and colon, I am concerned a laparoscopic robotic repair might be exceedingly difficult and she may benefit from a thoracic approach.  She is already scheduled to see Dr. Mckenzie with thoracic surgery next week and I am interested to hear his thoughts regarding surgical approach for repair of this hernia.  I think he is probably better suited to fix this large hernia, but I will be available for any assistance needed.  I suspect the hernia as it has enlarged and has now caused significant compressive atelectasis of the majority of the right middle and lower lobes which could be contributing to her hypoxia and supplemental oxygen use.  She would most likely benefit from PFT testing before undergoing any surgical repair.    Irlanda Baig MD  General, Robotic, and Endoscopic Surgery  [facility]    [address], [address]  [place] [address]  P: [phone]  F: [phone]

## 2025-03-27 ENCOUNTER — PATIENT ROUNDING (BHMG ONLY) (OUTPATIENT)
Dept: SURGERY | Facility: CLINIC | Age: 60
End: 2025-03-27
Payer: MEDICARE

## 2025-03-27 ENCOUNTER — OFFICE VISIT (OUTPATIENT)
Dept: SURGERY | Facility: CLINIC | Age: 60
End: 2025-03-27
Payer: MEDICARE

## 2025-03-27 VITALS
BODY MASS INDEX: 36.14 KG/M2 | HEIGHT: 62 IN | OXYGEN SATURATION: 95 % | HEART RATE: 58 BPM | SYSTOLIC BLOOD PRESSURE: 132 MMHG | WEIGHT: 196.4 LBS | DIASTOLIC BLOOD PRESSURE: 66 MMHG

## 2025-03-27 DIAGNOSIS — Q79.0 MORGAGNI HERNIA: Primary | ICD-10-CM

## 2025-03-27 RX ORDER — ARIPIPRAZOLE 2 MG/1
2 TABLET ORAL
COMMUNITY
Start: 2025-03-10

## 2025-03-27 RX ORDER — BISOPROLOL FUMARATE AND HYDROCHLOROTHIAZIDE 5; 6.25 MG/1; MG/1
1 TABLET ORAL DAILY
Qty: 90 TABLET | Refills: 3 | Status: SHIPPED | OUTPATIENT
Start: 2025-03-27

## 2025-03-27 RX ORDER — BUDESONIDE AND FORMOTEROL FUMARATE DIHYDRATE 80; 4.5 UG/1; UG/1
2 AEROSOL RESPIRATORY (INHALATION)
COMMUNITY

## 2025-03-27 NOTE — PROGRESS NOTES
My name is Merna    I am with [facility]    I want to officially welcome you to the practice and ask about your recent visit.         If you could tell me about your visit with us. What things went well?          We're always looking for ways to make our patients' experiences even better. Do you have recommendations on ways we may improve?       Overall were you satisfied with your first visit to our practice?         I appreciate you taking the time to answer these few questions today. If there is anything else our office can do for you, please let us know.       Thank you and have a great day.    [employer]

## 2025-03-27 NOTE — LETTER
March 28, 2025     Olivia Nichole MD  [address] [place] [id number]    Patient: Flaca Hassan   YOB: 1965   Date of Visit: 3/27/2025     Dear Olivia Nichole MD:       Thank you for referring Flaca Hassan to me for evaluation. Below are the relevant portions of my assessment and plan of care.    If you have questions, please do not hesitate to call me. I look forward to following Flaca along with you.         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 Nichole    Subjective  HISTORY OF PRESENTING ILLNESS:   Flaca Hassan is a 59 y.o. female who has significant medical problems as mentioned in the medical chart.     History of Present Illness  She was referred to me to evaluate for surgical repair of Morgagni hernia as a potential treatment option to improve her shortness of breath.    She has a longstanding history of Morgagni hernia.  She is dependent on supplemental oxygen and is wheelchair-bound for majority of the time.   It was initially believed to be manageable, but it has since been determined that it requires intervention due to its impact on her lung function. She experiences dyspnea during rapid ambulation and when ascending or descending stairs. She also reports difficulty standing. She has no issues with eating or drinking, although she occasionally experiences a sensation of tightness in her chest. Consumption of spicy foods results in mild gastric discomfort. She reports no cardiac issues, but occasionally experiences a pinching sensation and general discomfort. She is capable of self-care at home. She has previously undergone colonoscopy and endoscopy, most recently 3 years ago, without any complications from sedation. She has been  using oxygen therapy for the past 2 months and utilizes a CPAP machine at home. She is considering weight loss medication. She is currently on Plavix. Her surgical history includes uterine surgery and D & C. She had endometrial cancer in 2000, which was successfully treated. Her last exposure to anesthesia was in 2000, during which she experienced oxygenation issues. In 2022, she developed an abscess in her umbilical region, which was subsequently drained. She was hospitalized due to Respiratory Syncytial Virus (RSV) in early 2025.    Past Medical History:   Diagnosis Date   • Anemia    • Asthma    • Breast cyst    • Cancer    • Coronary artery disease     stent   • Depression    • Diverticulosis    • H/O: GI bleed     recurrent   • Hematuria    • History of coronary angioplasty with insertion of stent    • History of transfusion     no reaction   • Hyperlipidemia    • Hypertension    • Incontinence of urine     wears pads   • Irritable bowel syndrome    • Malignant neoplasm of endometrium 02/2021   • Melena    • Obesity    • Oxygen dependent     uses oxygen 2 prn when walking if SOB   • Uterine cancer    • UTI (urinary tract infection)        Past Surgical History:   Procedure Laterality Date   • BREAST CYST EXCISION Left    • CARDIAC CATHETERIZATION N/A 11/13/2017    Procedure: Left Heart Cath;  Surgeon: Imer Montaño MD;  Location: [facility] CATH INVASIVE LOCATION;  Service:    • CARDIAC CATHETERIZATION N/A 11/13/2017    Procedure: Stent ROBERTO coronary;  Surgeon: Imer Montaño MD;  Location: [facility] CATH INVASIVE LOCATION;  Service:    • COLONOSCOPY  02/11/2016    VICTOR MANUEL hernandez,    • COLONOSCOPY N/A 02/28/2022    Procedure: COLONOSCOPY TO CECUM and TI WITH APC CAUTERY TO RECTAL AVMs;  Surgeon: Ruth Ann Willson MD;  Location: [facility] ENDOSCOPY;  Service: Gastroenterology;  Laterality: N/A;  FAMILY HX COLON CA  --HEMORRHOIDS, BLEEDING RECTAL AVMs    • D & C HYSTEROSCOPY ENDOMETRIAL ABLATION N/A 01/08/2021     Procedure: DILATATION AND CURETTAGE HYSTEROSCOPY ENDOMETRIAL  RESECTION;  Surgeon: Thu Blake MD;  Location: [facility] OR [facility];  Service: Obstetrics/Gynecology;  Laterality: N/A;   • ENDOSCOPY N/A 03/03/2017    erythematous mucosa in stomach, duodenal ulcer , mild to moderate chronic active gastritis, positive for h pylori   • ENDOSCOPY N/A 11/08/2021    Procedure: ESOPHAGOGASTRODUODENOSCOPY WITH BX'S;  Surgeon: Ruth Ann Willson MD;  [facility];  Service: Gastroenterology;  Laterality: N/A;  pre: EPIGASTRIC PAIN, HX OF STOMACH ULCER  post: GASTRITIS   • HYSTERECTOMY     • UPPER GASTROINTESTINAL ENDOSCOPY  02/10/2016    sami-Ruth Ann Willson M.D.       Family History   Problem Relation Age of Onset   • Breast cancer Other    • Lung cancer Brother    • Hypertension Brother    • Hyperlipidemia Brother    • Heart disease Brother    • Throat cancer Paternal Uncle    • Tongue cancer Paternal Grandfather    • Hypertension Father    • Hyperlipidemia Father    • Heart disease Father    • Malig Hyperthermia Neg Hx        Social History     Socioeconomic History   • Marital status:    • Years of education: High School   Tobacco Use   • Smoking status: Never     Passive exposure: Never   • Smokeless tobacco: Never   Vaping Use   • Vaping status: Never Used   Substance and Sexual Activity   • Alcohol use: No   • Drug use: Never   • Sexual activity: Defer         Current Outpatient Medications:   •  acetaminophen (TYLENOL) 500 MG tablet, Take 2 tablets by mouth Every 6 (Six) Hours As Needed for Mild Pain., Disp: , Rfl:   •  amLODIPine (NORVASC) 10 MG tablet, Take 1 tablet by mouth Daily., Disp: 90 tablet, Rfl: 4  •  ARIPiprazole (ABILIFY) 2 MG tablet, Take 1 tablet by mouth every night at bedtime., Disp: , Rfl:   •  budesonide-formoterol (SYMBICORT) 80-4.5 MCG/ACT inhaler, Inhale 2 puffs 2 (Two) Times a Day., Disp: , Rfl:   •  clopidogrel (PLAVIX) 75 MG tablet, Take 1 tablet by mouth Daily., Disp: 90  tablet, Rfl: 1  •  escitalopram (LEXAPRO) 20 MG tablet, Take 1 tablet by mouth Every Night., Disp: , Rfl:   •  fluticasone (FLONASE) 50 MCG/ACT nasal spray, Administer 2 sprays into the nostril(s) as directed by provider Daily., Disp: , Rfl:   •  furosemide (LASIX) 40 MG tablet, Take 1 tablet by mouth Daily., Disp: 90 tablet, Rfl: 3  •  montelukast (SINGULAIR) 10 MG tablet, Take 1 tablet by mouth Daily., Disp: 90 tablet, Rfl: 3  •  nitroglycerin (NITROSTAT) 0.4 MG SL tablet, 1 under the tongue as needed for angina, may repeat q5mins for up three doses (Patient taking differently: Place 1 tablet under the tongue Every 5 (Five) Minutes As Needed for Chest Pain. 1 under the tongue as needed for angina, may repeat q5mins for up three doses), Disp: 25 tablet, Rfl: 3  •  olmesartan (BENICAR) 40 MG tablet, Take 1 tablet by mouth Daily., Disp: 90 tablet, Rfl: 4  •  pantoprazole (PROTONIX) 40 MG EC tablet, TAKE 1 TABLET BY MOUTH DAILY, Disp: 90 tablet, Rfl: 1  •  potassium chloride (MICRO-K) 10 MEQ CR capsule, TAKE 1 CAPSULE BY MOUTH DAILY, Disp: 90 capsule, Rfl: 1  •  Proctozone-HC 2.5 % rectal cream, Insert 1 application into the rectum As Needed (28)., Disp: 28 g, Rfl: 3  •  simvastatin (ZOCOR) 20 MG tablet, Take 1 tablet by mouth Every Evening., Disp: 90 tablet, Rfl: 4  •  Trelegy Ellipta 200-62.5-25 MCG/ACT inhaler, Inhale 1 puff Daily., Disp: , Rfl:   •  VENTOLIN  (90 Base) MCG/ACT inhaler, Inhale 2 puffs Every 4 (Four) Hours As Needed for Wheezing or Shortness of Air., Disp: , Rfl: 6  •  bisoprolol-hydrochlorothiazide (ZIAC) 5-6.25 MG per tablet, TAKE 1 TABLET BY MOUTH DAILY, Disp: 90 tablet, Rfl: 3  •  Cholecalciferol (VITAMIN D3) 2000 units tablet, Take 1 tablet by mouth Daily. (Patient not taking: Reported on 3/27/2025), Disp: , Rfl: 1  •  ipratropium-albuterol (DUO-NEB) 0.5-2.5 mg/3 ml nebulizer, Take 3 mL (one vial) by nebulization 4 (Four) Times a Day As Needed for Wheezing., Disp: 360 mL, Rfl: 0  "    Allergies   Allergen Reactions   • Peanut (Diagnostic) Itching             Objective   OBJECTIVE:     VITAL SIGNS:  /66 (BP Location: Left arm, Patient Position: Sitting)   Pulse 58   Ht 157.5 cm (62.01\")   Wt 89.1 kg (196 lb 6.4 oz)   LMP  (LMP Unknown)   SpO2 95%   BMI 35.91 kg/m²     PHYSICAL EXAM:  Normal appearance.   Head is normocephalic.   Nose appears normal.   No obvious deformity of the mouth and throat.  Conjunctivae normal.   Heart rate and rhythm is normal.  Pulmonary effort is normal.   Moving all 4 extremities.  Extremities warm.  No focal deficit present.   Alert and oriented to person, place, and time.     RESULTS REVIEW:  I have reviewed the patient's all relevant laboratory and imaging findings.     Assessment & Plan   ASSESSMENT & PLAN:  Flaca Hassan is a 59 y.o. female with significant medical conditions as mentioned above presented to my clinic.    Assessment & Plan  1. 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  She has central obesity.  She is wheelchair-bound for most of the time and dependent on supplemental oxygen.  She has restrictive lung lung disease and part of that could be due to the large Morgagni hernia with omental herniation.  She has this hernia for the last 20 years and I cannot blame the hernia as a sole factor responsible for her shortness of breath.    I discussed with the patient and her brothers in detail that if she is determined to improve her breathing and her health condition, we need to tackle this in a multidisciplinary approach.  I recommended enrolling her in a weight loss program.  I will reach out to her primary care physician to discuss the options of medical weight loss management as well.  With her current BMI and central obesity, she is exceedingly high risk for recurrence. There is no reported difference between " transthoracic and laparoscopic repair approaches. I have concern regarding her ability to tolerate single lung ventilation for repair. I discussed with Dr. Nichole regarding enrolling in her a pulmonary rehab program. I also recommended transthoracic echo to screen for congestive heart failure, pulmonary hypertension and right heart failure.     If she can lose weight and her pulmonary function improves, surgical repair can be reconsidered. I plan to see her in 3 months.     I discussed the patients findings and my recommendations with the patient/family/caregiver. The patient/family/caregiver was given adequate time to ask questions and all questions were answered to patient satisfaction. Thank you for this consult and allowing us to participate in the care of your patient.      Salomon Mckenzie MD  Thoracic Surgeon  [facility] [place]        Dictated utilizing Dragon dictation    I spent 70 minutes caring for Flaca on this date of service. This time includes time spent by me in the following activities:preparing for the visit, reviewing tests, obtaining and/or reviewing a separately obtained history, performing a medically appropriate examination and/or evaluation, counseling and educating the patient/family/caregiver, ordering medications, tests, or procedures, referring and communicating with other health care professionals , documenting information in the medical record, independently interpreting results and communicating that information with the patient/family/caregiver, and care coordination and more than half the time was spent in direct face to face evaluation and decision making.     Patient or patient representative verbalized consent for the use of Ambient Listening during the visit with  Salomon Mckenzie MD for chart documentation. 3/28/2025  07:56 EDT

## 2025-03-28 NOTE — PROGRESS NOTES
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 Nichole    Subjective   HISTORY OF PRESENTING ILLNESS:   Flaca Hassan is a 59 y.o. female who has significant medical problems as mentioned in the medical chart.     History of Present Illness  She was referred to me to evaluate for surgical repair of Morgagni hernia as a potential treatment option to improve her shortness of breath.    She has a longstanding history of Morgagni hernia.  She is dependent on supplemental oxygen and is wheelchair-bound for majority of the time.   It was initially believed to be manageable, but it has since been determined that it requires intervention due to its impact on her lung function. She experiences dyspnea during rapid ambulation and when ascending or descending stairs. She also reports difficulty standing. She has no issues with eating or drinking, although she occasionally experiences a sensation of tightness in her chest. Consumption of spicy foods results in mild gastric discomfort. She reports no cardiac issues, but occasionally experiences a pinching sensation and general discomfort. She is capable of self-care at home. She has previously undergone colonoscopy and endoscopy, most recently 3 years ago, without any complications from sedation. She has been using oxygen therapy for the past 2 months and utilizes a CPAP machine at home. She is considering weight loss medication. She is currently on Plavix. Her surgical history includes uterine surgery and D & C. She had endometrial cancer in 2000, which was successfully treated. Her last exposure to anesthesia was in 2000, during which she experienced oxygenation issues. In 2022, she developed an abscess in her umbilical region, which was subsequently drained. She was hospitalized due to Respiratory Syncytial Virus (RSV) in early 2025.    Past Medical History:   Diagnosis  Date    Anemia     Asthma     Breast cyst     Cancer     Coronary artery disease     stent    Depression     Diverticulosis     H/O: GI bleed     recurrent    Hematuria     History of coronary angioplasty with insertion of stent     History of transfusion     no reaction    Hyperlipidemia     Hypertension     Incontinence of urine     wears pads    Irritable bowel syndrome     Malignant neoplasm of endometrium 02/2021    Melena     Obesity     Oxygen dependent     uses oxygen 2 prn when walking if SOB    Uterine cancer     UTI (urinary tract infection)        Past Surgical History:   Procedure Laterality Date    BREAST CYST EXCISION Left     CARDIAC CATHETERIZATION N/A 11/13/2017    Procedure: Left Heart Cath;  Surgeon: Imer Montaño MD;  Location: [facility] CATH INVASIVE LOCATION;  Service:     CARDIAC CATHETERIZATION N/A 11/13/2017    Procedure: Stent ROBERTO coronary;  Surgeon: Imer Montaño MD;  Location: [facility] CATH INVASIVE LOCATION;  Service:     COLONOSCOPY  02/11/2016    tics, NBIH,     COLONOSCOPY N/A 02/28/2022    Procedure: COLONOSCOPY TO CECUM and TI WITH APC CAUTERY TO RECTAL AVMs;  Surgeon: Ruth Ann Willson MD;  Location: [facility] ENDOSCOPY;  Service: Gastroenterology;  Laterality: N/A;  FAMILY HX COLON CA  --HEMORRHOIDS, BLEEDING RECTAL AVMs     D & C HYSTEROSCOPY ENDOMETRIAL ABLATION N/A 01/08/2021    Procedure: DILATATION AND CURETTAGE HYSTEROSCOPY ENDOMETRIAL  RESECTION;  Surgeon: Thu Blake MD;  Location: [facility] OR [facility];  Service: Obstetrics/Gynecology;  Laterality: N/A;    ENDOSCOPY N/A 03/03/2017    erythematous mucosa in stomach, duodenal ulcer , mild to moderate chronic active gastritis, positive for h pylori    ENDOSCOPY N/A 11/08/2021    Procedure: ESOPHAGOGASTRODUODENOSCOPY WITH BX'S;  Surgeon: Ruth Ann Willson MD;  Location: [facility] ENDOSCOPY;  Service: Gastroenterology;  Laterality: N/A;  pre: EPIGASTRIC PAIN, HX OF STOMACH ULCER  post: GASTRITIS    HYSTERECTOMY      UPPER  GASTROINTESTINAL ENDOSCOPY  02/10/2016    Dieudonne Willson M.D.       Family History   Problem Relation Age of Onset    Breast cancer Other     Lung cancer Brother     Hypertension Brother     Hyperlipidemia Brother     Heart disease Brother     Throat cancer Paternal Uncle     Tongue cancer Paternal Grandfather     Hypertension Father     Hyperlipidemia Father     Heart disease Father     Malig Hyperthermia Neg Hx        Social History     Socioeconomic History    Marital status:     Years of education: High School   Tobacco Use    Smoking status: Never     Passive exposure: Never    Smokeless tobacco: Never   Vaping Use    Vaping status: Never Used   Substance and Sexual Activity    Alcohol use: No    Drug use: Never    Sexual activity: Defer         Current Outpatient Medications:     acetaminophen (TYLENOL) 500 MG tablet, Take 2 tablets by mouth Every 6 (Six) Hours As Needed for Mild Pain., Disp: , Rfl:     amLODIPine (NORVASC) 10 MG tablet, Take 1 tablet by mouth Daily., Disp: 90 tablet, Rfl: 4    ARIPiprazole (ABILIFY) 2 MG tablet, Take 1 tablet by mouth every night at bedtime., Disp: , Rfl:     budesonide-formoterol (SYMBICORT) 80-4.5 MCG/ACT inhaler, Inhale 2 puffs 2 (Two) Times a Day., Disp: , Rfl:     clopidogrel (PLAVIX) 75 MG tablet, Take 1 tablet by mouth Daily., Disp: 90 tablet, Rfl: 1    escitalopram (LEXAPRO) 20 MG tablet, Take 1 tablet by mouth Every Night., Disp: , Rfl:     fluticasone (FLONASE) 50 MCG/ACT nasal spray, Administer 2 sprays into the nostril(s) as directed by provider Daily., Disp: , Rfl:     furosemide (LASIX) 40 MG tablet, Take 1 tablet by mouth Daily., Disp: 90 tablet, Rfl: 3    montelukast (SINGULAIR) 10 MG tablet, Take 1 tablet by mouth Daily., Disp: 90 tablet, Rfl: 3    nitroglycerin (NITROSTAT) 0.4 MG SL tablet, 1 under the tongue as needed for angina, may repeat q5mins for up three doses (Patient taking differently: Place 1 tablet under the tongue Every 5 (Five)  "Minutes As Needed for Chest Pain. 1 under the tongue as needed for angina, may repeat q5mins for up three doses), Disp: 25 tablet, Rfl: 3    olmesartan (BENICAR) 40 MG tablet, Take 1 tablet by mouth Daily., Disp: 90 tablet, Rfl: 4    pantoprazole (PROTONIX) 40 MG EC tablet, TAKE 1 TABLET BY MOUTH DAILY, Disp: 90 tablet, Rfl: 1    potassium chloride (MICRO-K) 10 MEQ CR capsule, TAKE 1 CAPSULE BY MOUTH DAILY, Disp: 90 capsule, Rfl: 1    Proctozone-HC 2.5 % rectal cream, Insert 1 application into the rectum As Needed (28)., Disp: 28 g, Rfl: 3    simvastatin (ZOCOR) 20 MG tablet, Take 1 tablet by mouth Every Evening., Disp: 90 tablet, Rfl: 4    Trelegy Ellipta 200-62.5-25 MCG/ACT inhaler, Inhale 1 puff Daily., Disp: , Rfl:     VENTOLIN  (90 Base) MCG/ACT inhaler, Inhale 2 puffs Every 4 (Four) Hours As Needed for Wheezing or Shortness of Air., Disp: , Rfl: 6    bisoprolol-hydrochlorothiazide (ZIAC) 5-6.25 MG per tablet, TAKE 1 TABLET BY MOUTH DAILY, Disp: 90 tablet, Rfl: 3    Cholecalciferol (VITAMIN D3) 2000 units tablet, Take 1 tablet by mouth Daily. (Patient not taking: Reported on 3/27/2025), Disp: , Rfl: 1    ipratropium-albuterol (DUO-NEB) 0.5-2.5 mg/3 ml nebulizer, Take 3 mL (one vial) by nebulization 4 (Four) Times a Day As Needed for Wheezing., Disp: 360 mL, Rfl: 0     Allergies   Allergen Reactions    Peanut (Diagnostic) Itching             Objective    OBJECTIVE:     VITAL SIGNS:  /66 (BP Location: Left arm, Patient Position: Sitting)   Pulse 58   Ht 157.5 cm (62.01\")   Wt 89.1 kg (196 lb 6.4 oz)   LMP  (LMP Unknown)   SpO2 95%   BMI 35.91 kg/m²     PHYSICAL EXAM:  Normal appearance.   Head is normocephalic.   Nose appears normal.   No obvious deformity of the mouth and throat.  Conjunctivae normal.   Heart rate and rhythm is normal.  Pulmonary effort is normal.   Moving all 4 extremities.  Extremities warm.  No focal deficit present.   Alert and oriented to person, place, and time. "     RESULTS REVIEW:  I have reviewed the patient's all relevant laboratory and imaging findings.     Assessment & Plan    ASSESSMENT & PLAN:  Flaca Hassan is a 59 y.o. female with significant medical conditions as mentioned above presented to my clinic.    Assessment & Plan  1. 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  She has central obesity.  She is wheelchair-bound for most of the time and dependent on supplemental oxygen.  She has restrictive lung lung disease and part of that could be due to the large Morgagni hernia with omental herniation.  She has this hernia for the last 20 years and I cannot blame the hernia as a sole factor responsible for her shortness of breath.    I discussed with the patient and her brothers in detail that if she is determined to improve her breathing and her health condition, we need to tackle this in a multidisciplinary approach.  I recommended enrolling her in a weight loss program.  I will reach out to her primary care physician to discuss the options of medical weight loss management as well.  With her current BMI and central obesity, she is exceedingly high risk for recurrence. There is no reported difference between transthoracic and laparoscopic repair approaches. I have concern regarding her ability to tolerate single lung ventilation for repair. I discussed with Dr. Nichole regarding enrolling in her a pulmonary rehab program. I also recommended transthoracic echo to screen for congestive heart failure, pulmonary hypertension and right heart failure.     If she can lose weight and her pulmonary function improves, surgical repair can be reconsidered. I plan to see her in 3 months.     I discussed the patients findings and my recommendations with the patient/family/caregiver. The patient/family/caregiver was given adequate time to ask questions and  all questions were answered to patient satisfaction. Thank you for this consult and allowing us to participate in the care of your patient.      Salomon Mckenzie MD  Thoracic Surgeon  [facility] [place]        Dictated utilizing Dragon dictation    I spent 70 minutes caring for Flaca on this date of service. This time includes time spent by me in the following activities:preparing for the visit, reviewing tests, obtaining and/or reviewing a separately obtained history, performing a medically appropriate examination and/or evaluation, counseling and educating the patient/family/caregiver, ordering medications, tests, or procedures, referring and communicating with other health care professionals , documenting information in the medical record, independently interpreting results and communicating that information with the patient/family/caregiver, and care coordination and more than half the time was spent in direct face to face evaluation and decision making.     Patient or patient representative verbalized consent for the use of Ambient Listening during the visit with  Salomon Mckenzie MD for chart documentation. 3/28/2025  07:56 EDT

## 2025-04-21 ENCOUNTER — TELEPHONE (OUTPATIENT)
Dept: FAMILY MEDICINE CLINIC | Facility: CLINIC | Age: 60
End: 2025-04-21
Payer: MEDICARE

## 2025-04-21 NOTE — TELEPHONE ENCOUNTER
Caller: JAZ BURNETTE/ZAYDA    Relationship to patient:     Best call back number:     [phone]   OPTION #4     Patient is needing: JAZ IS CALLING TO GIVE THE OFFICE THE EFFECTIVE DATES AND APPROVAL NUMBER. EFFECTIVE 4/24/2025-06/20/2025   THE APPROVAL NUMBER IS [id number] NOT A GUARANTEE OF PAYMENT BUT A HEALTH PLAN FOR A CT OF CHEST WITHOUT CONTRAST

## 2025-05-06 ENCOUNTER — HOSPITAL ENCOUNTER (OUTPATIENT)
Dept: CT IMAGING | Facility: HOSPITAL | Age: 60
Discharge: HOME | End: 2025-05-06
Admitting: FAMILY MEDICINE
Payer: MEDICARE

## 2025-05-06 DIAGNOSIS — R93.89 ABNORMAL CHEST CT: ICD-10-CM

## 2025-05-06 PROCEDURE — 71250 CT THORAX DX C-: CPT

## 2025-05-08 RX ORDER — AMLODIPINE BESYLATE 10 MG/1
10 TABLET ORAL DAILY
Qty: 90 TABLET | Refills: 1 | Status: SHIPPED | OUTPATIENT
Start: 2025-05-08 | End: 2025-05-08

## 2025-05-08 RX ORDER — OLMESARTAN MEDOXOMIL 40 MG/1
40 TABLET ORAL DAILY
Qty: 90 TABLET | Refills: 1 | Status: SHIPPED | OUTPATIENT
Start: 2025-05-08

## 2025-05-08 RX ORDER — SIMVASTATIN 20 MG
20 TABLET ORAL EVERY EVENING
Qty: 90 TABLET | Refills: 0 | Status: SHIPPED | OUTPATIENT
Start: 2025-05-08

## 2025-05-08 RX ORDER — AMLODIPINE BESYLATE 10 MG/1
10 TABLET ORAL DAILY
Qty: 30 TABLET | Refills: 0 | Status: SHIPPED | OUTPATIENT
Start: 2025-05-08

## 2025-05-08 RX ORDER — BISOPROLOL FUMARATE AND HYDROCHLOROTHIAZIDE 5; 6.25 MG/1; MG/1
1 TABLET ORAL DAILY
Qty: 90 TABLET | Refills: 1 | Status: SHIPPED | OUTPATIENT
Start: 2025-05-08

## 2025-05-08 RX ORDER — PANTOPRAZOLE SODIUM 40 MG/1
40 TABLET, DELAYED RELEASE ORAL DAILY
Qty: 90 TABLET | Refills: 0 | Status: SHIPPED | OUTPATIENT
Start: 2025-05-08

## 2025-05-09 DIAGNOSIS — R93.89 ABNORMAL CHEST CT: Primary | ICD-10-CM

## 2025-07-08 ENCOUNTER — OFFICE VISIT (OUTPATIENT)
Dept: GASTROENTEROLOGY | Facility: CLINIC | Age: 60
End: 2025-07-08
Payer: MEDICARE

## 2025-07-08 VITALS
HEIGHT: 62 IN | DIASTOLIC BLOOD PRESSURE: 66 MMHG | HEART RATE: 58 BPM | WEIGHT: 194.8 LBS | SYSTOLIC BLOOD PRESSURE: 101 MMHG | TEMPERATURE: 98 F | BODY MASS INDEX: 35.85 KG/M2

## 2025-07-08 DIAGNOSIS — Z87.11 HISTORY OF PEPTIC ULCER DISEASE: Primary | ICD-10-CM

## 2025-07-08 DIAGNOSIS — K21.9 GASTROESOPHAGEAL REFLUX DISEASE, UNSPECIFIED WHETHER ESOPHAGITIS PRESENT: ICD-10-CM

## 2025-07-08 PROCEDURE — 99214 OFFICE O/P EST MOD 30 MIN: CPT

## 2025-07-08 PROCEDURE — 3078F DIAST BP <80 MM HG: CPT

## 2025-07-08 PROCEDURE — 1159F MED LIST DOCD IN RCRD: CPT

## 2025-07-08 PROCEDURE — 1160F RVW MEDS BY RX/DR IN RCRD: CPT

## 2025-07-08 PROCEDURE — 3074F SYST BP LT 130 MM HG: CPT

## 2025-07-08 RX ORDER — OLANZAPINE 10 MG/1
10 TABLET, FILM COATED ORAL
COMMUNITY
Start: 2025-06-25

## 2025-07-08 RX ORDER — PANTOPRAZOLE SODIUM 40 MG/1
40 TABLET, DELAYED RELEASE ORAL DAILY
Qty: 90 TABLET | Refills: 3 | Status: SHIPPED | OUTPATIENT
Start: 2025-07-08

## 2025-07-08 NOTE — PROGRESS NOTES
"Chief Complaint  Irritable Bowel Syndrome, Abdominal Pain, and Difficulty Swallowing    Subjective          History of Present Illness    Flaca Hassan is a  60 y.o. female presents for  follow-up for heartburn, dyspepsia, IBS.  She is a patient of Dr. Willson and is new to me.       She has a history of endometrial carcinoma and has undergone a hysterectomy.  Her postoperative course was complicated by an abscess.  Review of her imaging from last August showed that she had a fluid collection and a resolving abscess.  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    She has been following up with surgery to try and have her hernia repaired but she cannot have this done due to her weight and pulmonary function currently.  She reports that as long she takes her pantoprazole her stomach feels good.  She will have some abdominal discomfort if she eats spicy foods.  No black or bloody stool.     Colonoscopy 2/28/2022-a few colonic angioectasias seen in the rectum which were treated with APC.  Grade 1 nonbleeding internal hemorrhoids were also noted.  5-year follow-up recommended.     She had a GI bleed secondary to an ulcer in [id number] EGD showed resolution of the ulcer.  She has been maintained on low-dose pantoprazole.    Reviewed labs and kidney function is stable.    Objective   Vital Signs:   /66 (BP Location: Left arm)   Pulse 58   Temp 98 °F (36.7 °C)   Ht 157.5 cm (62\")   Wt 88.4 kg (194 lb 12.8 oz)   BMI 35.63 kg/m²       Physical Exam  Constitutional:       General: She is not in acute distress.     Appearance: Normal appearance.   Eyes:      General: No scleral icterus.  Pulmonary:      Effort: Pulmonary effort is normal.   Abdominal:      General: Abdomen is flat. There is no distension.      Tenderness: There is no abdominal tenderness. There is no guarding.   Skin:     Coloration: Skin is not jaundiced.   Neurological:      General: No focal deficit present. "      Mental Status: She is alert and oriented to person, place, and time.   Psychiatric:         Mood and Affect: Mood normal.         Behavior: Behavior normal.          Result Review :   The following data was reviewed by: Rizwana Stokes PA-C on 07/08/2025:  CMP          1/28/2025    05:53 1/29/2025    05:50 1/30/2025    05:35   CMP   Glucose 153  136  140    BUN 28  39  37    Creatinine 0.93  1.00  0.90    EGFR 70.9  65.0  73.8    Sodium 139  135  137    Potassium 4.7  4.5  4.0    Chloride 108  103  104    Calcium 8.8  8.8  8.8    BUN/Creatinine Ratio 30.1  39.0  41.1    Anion Gap 10.0  9.0  8.0      CBC          1/28/2025    05:53 1/29/2025    05:50 1/30/2025    05:35   CBC   WBC 11.39  10.41  9.96    RBC 5.01  4.97  4.99    Hemoglobin 13.5  13.3  13.6    Hematocrit 42.5  42.9  42.1    MCV 84.8  86.3  84.4    MCH 26.9  26.8  27.3    MCHC 31.8  31.0  32.3    RDW 14.9  15.2  14.9    Platelets 323  283  327              Assessment:   Diagnoses and all orders for this visit:    1. History of peptic ulcer disease (Primary)  -     pantoprazole (PROTONIX) 40 MG EC tablet; Take 1 tablet by mouth Daily.  Dispense: 90 tablet; Refill: 3    2. Gastroesophageal reflux disease, unspecified whether esophagitis present  -     pantoprazole (PROTONIX) 40 MG EC tablet; Take 1 tablet by mouth Daily.  Dispense: 90 tablet; Refill: 3          Plan:   - Recommend continuing pantoprazole especially with history of gastric ulcer and intermittent abdominal discomfort especially after eating spicy foods.  I did caution against eating spicy foods.   - We did discuss nutrition consult/Referral but she states she is already talked to a dietitian and is not interested at this time.      Follow Up   Return in about 1 year (around 7/8/2026), or if symptoms worsen or fail to improve.    Dragon dictation used throughout this note.         Rizwana Stokes PA-C  [facility]  [address] [place] [address] - [address]  [place]  [address]  Office: [phone]

## (undated) DEVICE — ERBE NESSY®PLATE 170 SPLIT; 168CM²; CABLE 3M: Brand: ERBE

## (undated) DEVICE — KT ORCA ORCAPOD DISP STRL

## (undated) DEVICE — BIPOLAR ELECTROHEMOSTASIS CATHETER: Brand: INJECTION GOLD PROBE

## (undated) DEVICE — CATH DIAG IMPULSE FL3.5 5F 100CM

## (undated) DEVICE — KT MANIFLD CARDIAC

## (undated) DEVICE — GLIDESHEATH BASIC HYDROPHILIC COATED INTRODUCER SHEATH: Brand: GLIDESHEATH

## (undated) DEVICE — GLV SURG SENSICARE POLYISPRN W/ALOE PF LF 6.5 GRN STRL

## (undated) DEVICE — BITEBLOCK OMNI BLOC

## (undated) DEVICE — OSC HYSTEROSCOPY: Brand: MEDLINE INDUSTRIES, INC.

## (undated) DEVICE — TUBING, SUCTION, 1/4" X 10', STRAIGHT: Brand: MEDLINE

## (undated) DEVICE — FRCP BX RADJAW4 NDL 2.8 240CM LG OG BX40

## (undated) DEVICE — SENSR O2 OXIMAX FNGR A/ 18IN NONSTR

## (undated) DEVICE — MSK PROC CURAPLEX O2 2/ADAPT 7FT

## (undated) DEVICE — DEV INDEFLATOR

## (undated) DEVICE — CATH VENT MIV RADL PIG ST TIP 4F 110CM

## (undated) DEVICE — GLV SURG BIOGEL LTX PF 6

## (undated) DEVICE — LN SMPL CO2 SHTRM SD STREAM W/M LUER

## (undated) DEVICE — ADAPT CLN BIOGUARD AIR/H2O DISP

## (undated) DEVICE — THE CARR-LOCKE INJECTION NEEDLE IS A SINGLE USE, DISPOSABLE, FLEXIBLE SHEATH INJECTION NEEDLE USED FOR THE INJECTION OF VARIOUS TYPES OF MEDIA THROUGH FLEXIBLE ENDOSCOPES.

## (undated) DEVICE — Device

## (undated) DEVICE — TREK CORONARY DILATATION CATHETER 2.50 MM X 12 MM / RAPID-EXCHANGE: Brand: TREK

## (undated) DEVICE — CATH DIAG IMPULSE FR4 5F 100CM

## (undated) DEVICE — TR BAND RADIAL ARTERY COMPRESSION DEVICE: Brand: TR BAND

## (undated) DEVICE — PROB ABL ENDOMTRL NOVASURE/G4 W/SURESND

## (undated) DEVICE — MSK ENDO PORT O2 POM ELITE CURAPLEX A/

## (undated) DEVICE — HI-TORQUE BALANCE MIDDLEWEIGHT GUIDE WIRE .014 STRAIGHT TIP 3.0 CM X 190 CM: Brand: HI-TORQUE BALANCE MIDDLEWEIGHT

## (undated) DEVICE — GW EMR FIX EXCHG J STD .035 3MM 260CM

## (undated) DEVICE — APC PROBE 2200 A, SINGLE USE OD 2.3MM/6.9FR; L. 2.2M/7.2FT: Brand: ERBE

## (undated) DEVICE — 6F .070 JR 4 100CM: Brand: CORDIS

## (undated) DEVICE — Device: Brand: DEFENDO AIR/WATER/SUCTION AND BIOPSY VALVE

## (undated) DEVICE — PK CATH CARD 40

## (undated) DEVICE — SOL NACL 0.9PCT 1000ML

## (undated) DEVICE — STRAP STIRUP WO/ RNG

## (undated) DEVICE — CANN NASL CO2 TRULINK W/O2 A/

## (undated) DEVICE — NC TREK CORONARY DILATATION CATHETER 3.5 MM X 12 MM / RAPID-EXCHANGE: Brand: NC TREK

## (undated) DEVICE — DEV TISS REMOV MYOSURE REACH